# Patient Record
Sex: MALE | Race: WHITE | NOT HISPANIC OR LATINO
[De-identification: names, ages, dates, MRNs, and addresses within clinical notes are randomized per-mention and may not be internally consistent; named-entity substitution may affect disease eponyms.]

---

## 2017-01-25 ENCOUNTER — APPOINTMENT (OUTPATIENT)
Dept: INTERNAL MEDICINE | Facility: CLINIC | Age: 62
End: 2017-01-25

## 2017-01-25 ENCOUNTER — LABORATORY RESULT (OUTPATIENT)
Age: 62
End: 2017-01-25

## 2017-01-25 ENCOUNTER — NON-APPOINTMENT (OUTPATIENT)
Age: 62
End: 2017-01-25

## 2017-01-25 VITALS
DIASTOLIC BLOOD PRESSURE: 80 MMHG | HEIGHT: 72 IN | SYSTOLIC BLOOD PRESSURE: 134 MMHG | WEIGHT: 290 LBS | BODY MASS INDEX: 39.28 KG/M2

## 2017-01-26 ENCOUNTER — TRANSCRIPTION ENCOUNTER (OUTPATIENT)
Age: 62
End: 2017-01-26

## 2017-01-26 ENCOUNTER — APPOINTMENT (OUTPATIENT)
Dept: ULTRASOUND IMAGING | Facility: CLINIC | Age: 62
End: 2017-01-26

## 2017-01-26 ENCOUNTER — OUTPATIENT (OUTPATIENT)
Dept: OUTPATIENT SERVICES | Facility: HOSPITAL | Age: 62
LOS: 1 days | End: 2017-01-26
Payer: MEDICAID

## 2017-01-26 DIAGNOSIS — D17.9 BENIGN LIPOMATOUS NEOPLASM, UNSPECIFIED: ICD-10-CM

## 2017-01-26 PROCEDURE — 76882 US LMTD JT/FCL EVL NVASC XTR: CPT

## 2017-01-26 PROCEDURE — 76882 US LMTD JT/FCL EVL NVASC XTR: CPT | Mod: 26

## 2017-01-30 LAB
ALBUMIN SERPL ELPH-MCNC: 4.4 G/DL
ALP BLD-CCNC: 70 U/L
ALT SERPL-CCNC: 25 U/L
ANION GAP SERPL CALC-SCNC: 13 MMOL/L
APPEARANCE: ABNORMAL
AST SERPL-CCNC: 20 U/L
B BURGDOR IGG+IGM SER QL IB: NORMAL
BACTERIA: NEGATIVE
BASOPHILS # BLD AUTO: 0.03 K/UL
BASOPHILS NFR BLD AUTO: 0.4 %
BILIRUB SERPL-MCNC: 0.6 MG/DL
BILIRUBIN URINE: NEGATIVE
BLOOD URINE: NEGATIVE
BUN SERPL-MCNC: 15 MG/DL
CALCIUM SERPL-MCNC: 9.5 MG/DL
CHLORIDE SERPL-SCNC: 105 MMOL/L
CHOLEST SERPL-MCNC: 235 MG/DL
CHOLEST/HDLC SERPL: 7.1 RATIO
CO2 SERPL-SCNC: 27 MMOL/L
COLOR: YELLOW
CREAT SERPL-MCNC: 1.11 MG/DL
EOSINOPHIL # BLD AUTO: 0.3 K/UL
EOSINOPHIL NFR BLD AUTO: 4 %
GLUCOSE QUALITATIVE U: NORMAL MG/DL
GLUCOSE SERPL-MCNC: 87 MG/DL
HBA1C MFR BLD HPLC: 5.9 %
HCT VFR BLD CALC: 48.6 %
HCV AB SER QL: NONREACTIVE
HCV S/CO RATIO: 0.22 S/CO
HDLC SERPL-MCNC: 33 MG/DL
HGB BLD-MCNC: 15.8 G/DL
IMM GRANULOCYTES NFR BLD AUTO: 0.3 %
KETONES URINE: NEGATIVE
LDLC SERPL CALC-MCNC: 156 MG/DL
LEUKOCYTE ESTERASE URINE: NEGATIVE
LYMPHOCYTES # BLD AUTO: 2.01 K/UL
LYMPHOCYTES NFR BLD AUTO: 26.6 %
MAN DIFF?: NORMAL
MCHC RBC-ENTMCNC: 29.4 PG
MCHC RBC-ENTMCNC: 32.5 GM/DL
MCV RBC AUTO: 90.5 FL
MICROSCOPIC-UA: NORMAL
MONOCYTES # BLD AUTO: 0.6 K/UL
MONOCYTES NFR BLD AUTO: 7.9 %
NEUTROPHILS # BLD AUTO: 4.6 K/UL
NEUTROPHILS NFR BLD AUTO: 60.8 %
NITRITE URINE: NEGATIVE
PH URINE: 5
PLATELET # BLD AUTO: 225 K/UL
POTASSIUM SERPL-SCNC: 4.8 MMOL/L
PROT SERPL-MCNC: 7.5 G/DL
PROTEIN URINE: NEGATIVE MG/DL
PSA SERPL-MCNC: 5.69 NG/ML
RBC # BLD: 5.37 M/UL
RBC # FLD: 13.5 %
RED BLOOD CELLS URINE: 0 /HPF
SODIUM SERPL-SCNC: 145 MMOL/L
SPECIFIC GRAVITY URINE: 1.03
SQUAMOUS EPITHELIAL CELLS: 0 /HPF
TRIGL SERPL-MCNC: 232 MG/DL
TSH SERPL-ACNC: 1.06 UIU/ML
UROBILINOGEN URINE: NORMAL MG/DL
WBC # FLD AUTO: 7.56 K/UL
WHITE BLOOD CELLS URINE: 0 /HPF

## 2017-03-01 ENCOUNTER — APPOINTMENT (OUTPATIENT)
Dept: SURGERY | Facility: CLINIC | Age: 62
End: 2017-03-01

## 2017-03-01 VITALS
SYSTOLIC BLOOD PRESSURE: 156 MMHG | TEMPERATURE: 98.3 F | OXYGEN SATURATION: 97 % | RESPIRATION RATE: 15 BRPM | DIASTOLIC BLOOD PRESSURE: 95 MMHG | WEIGHT: 290 LBS | BODY MASS INDEX: 39.28 KG/M2 | HEART RATE: 67 BPM | HEIGHT: 72 IN

## 2017-03-23 ENCOUNTER — OUTPATIENT (OUTPATIENT)
Dept: OUTPATIENT SERVICES | Facility: HOSPITAL | Age: 62
LOS: 1 days | End: 2017-03-23
Payer: MEDICAID

## 2017-03-23 VITALS
RESPIRATION RATE: 16 BRPM | DIASTOLIC BLOOD PRESSURE: 90 MMHG | HEART RATE: 78 BPM | TEMPERATURE: 98 F | WEIGHT: 275.58 LBS | SYSTOLIC BLOOD PRESSURE: 150 MMHG | HEIGHT: 72 IN

## 2017-03-23 DIAGNOSIS — Z01.818 ENCOUNTER FOR OTHER PREPROCEDURAL EXAMINATION: ICD-10-CM

## 2017-03-23 DIAGNOSIS — D17.1 BENIGN LIPOMATOUS NEOPLASM OF SKIN AND SUBCUTANEOUS TISSUE OF TRUNK: ICD-10-CM

## 2017-03-23 LAB
ANION GAP SERPL CALC-SCNC: 11 MMOL/L — SIGNIFICANT CHANGE UP (ref 5–17)
BASOPHILS # BLD AUTO: 0 K/UL — SIGNIFICANT CHANGE UP (ref 0–0.2)
BASOPHILS NFR BLD AUTO: 0.3 % — SIGNIFICANT CHANGE UP (ref 0–2)
BUN SERPL-MCNC: 18 MG/DL — SIGNIFICANT CHANGE UP (ref 8–20)
CALCIUM SERPL-MCNC: 8.6 MG/DL — SIGNIFICANT CHANGE UP (ref 8.6–10.2)
CHLORIDE SERPL-SCNC: 102 MMOL/L — SIGNIFICANT CHANGE UP (ref 98–107)
CO2 SERPL-SCNC: 27 MMOL/L — SIGNIFICANT CHANGE UP (ref 22–29)
CREAT SERPL-MCNC: 0.98 MG/DL — SIGNIFICANT CHANGE UP (ref 0.5–1.3)
EOSINOPHIL # BLD AUTO: 0.3 K/UL — SIGNIFICANT CHANGE UP (ref 0–0.5)
EOSINOPHIL NFR BLD AUTO: 4.4 % — SIGNIFICANT CHANGE UP (ref 0–5)
GLUCOSE SERPL-MCNC: 91 MG/DL — SIGNIFICANT CHANGE UP (ref 70–115)
HCT VFR BLD CALC: 42.7 % — SIGNIFICANT CHANGE UP (ref 42–52)
HGB BLD-MCNC: 14.1 G/DL — SIGNIFICANT CHANGE UP (ref 14–18)
LYMPHOCYTES # BLD AUTO: 1.7 K/UL — SIGNIFICANT CHANGE UP (ref 1–4.8)
LYMPHOCYTES # BLD AUTO: 24.3 % — SIGNIFICANT CHANGE UP (ref 20–55)
MCHC RBC-ENTMCNC: 29.5 PG — SIGNIFICANT CHANGE UP (ref 27–31)
MCHC RBC-ENTMCNC: 33 G/DL — SIGNIFICANT CHANGE UP (ref 32–36)
MCV RBC AUTO: 89.3 FL — SIGNIFICANT CHANGE UP (ref 80–94)
MONOCYTES # BLD AUTO: 0.5 K/UL — SIGNIFICANT CHANGE UP (ref 0–0.8)
MONOCYTES NFR BLD AUTO: 7 % — SIGNIFICANT CHANGE UP (ref 3–10)
NEUTROPHILS # BLD AUTO: 4.4 K/UL — SIGNIFICANT CHANGE UP (ref 1.8–8)
NEUTROPHILS NFR BLD AUTO: 63.9 % — SIGNIFICANT CHANGE UP (ref 37–73)
PLATELET # BLD AUTO: 195 K/UL — SIGNIFICANT CHANGE UP (ref 150–400)
POTASSIUM SERPL-MCNC: 4.2 MMOL/L — SIGNIFICANT CHANGE UP (ref 3.5–5.3)
POTASSIUM SERPL-SCNC: 4.2 MMOL/L — SIGNIFICANT CHANGE UP (ref 3.5–5.3)
RBC # BLD: 4.78 M/UL — SIGNIFICANT CHANGE UP (ref 4.6–6.2)
RBC # FLD: 13.6 % — SIGNIFICANT CHANGE UP (ref 11–15.6)
SODIUM SERPL-SCNC: 140 MMOL/L — SIGNIFICANT CHANGE UP (ref 135–145)
WBC # BLD: 6.8 K/UL — SIGNIFICANT CHANGE UP (ref 4.8–10.8)
WBC # FLD AUTO: 6.8 K/UL — SIGNIFICANT CHANGE UP (ref 4.8–10.8)

## 2017-03-23 PROCEDURE — 93005 ELECTROCARDIOGRAM TRACING: CPT

## 2017-03-23 PROCEDURE — 80048 BASIC METABOLIC PNL TOTAL CA: CPT

## 2017-03-23 PROCEDURE — G0463: CPT

## 2017-03-23 PROCEDURE — 85027 COMPLETE CBC AUTOMATED: CPT

## 2017-03-23 PROCEDURE — 93010 ELECTROCARDIOGRAM REPORT: CPT

## 2017-03-23 NOTE — H&P PST ADULT - NSANTHOSAYNRD_GEN_A_CORE
No. ALFONSO screening performed.  STOP BANG Legend: 0-2 = LOW Risk; 3-4 = INTERMEDIATE Risk; 5-8 = HIGH Risk

## 2017-03-23 NOTE — H&P PST ADULT - HISTORY OF PRESENT ILLNESS
62 yo male with three large lipomas, back, L shoulder and abdomen. 62 yo male with three large lipomas, on on his R back, one on his L shoulder and a third on his L abdomen.

## 2017-03-23 NOTE — H&P PST ADULT - FAMILY HISTORY
Father  Still living? No  Family history of hypertension, Age at diagnosis: Age Unknown  Family history of coronary artery disease, Age at diagnosis: 71-80

## 2017-03-23 NOTE — H&P PST ADULT - ATTENDING COMMENTS
I have read, reviewed, edited and agree c/ the above H+P.  I have discussed the risks/benefits of surgery including blood loss, infection, seroma, wound dehiscence, and recurrence.  He understands and is willing to proceed c/ surgery.

## 2017-03-24 DIAGNOSIS — Z01.818 ENCOUNTER FOR OTHER PREPROCEDURAL EXAMINATION: ICD-10-CM

## 2017-03-24 DIAGNOSIS — D17.9 BENIGN LIPOMATOUS NEOPLASM, UNSPECIFIED: ICD-10-CM

## 2017-03-28 ENCOUNTER — APPOINTMENT (OUTPATIENT)
Dept: INTERNAL MEDICINE | Facility: CLINIC | Age: 62
End: 2017-03-28

## 2017-03-28 VITALS
HEART RATE: 61 BPM | HEIGHT: 72 IN | OXYGEN SATURATION: 97 % | SYSTOLIC BLOOD PRESSURE: 132 MMHG | WEIGHT: 278 LBS | BODY MASS INDEX: 37.65 KG/M2 | DIASTOLIC BLOOD PRESSURE: 70 MMHG

## 2017-03-28 DIAGNOSIS — Z82.49 FAMILY HISTORY OF ISCHEMIC HEART DISEASE AND OTHER DISEASES OF THE CIRCULATORY SYSTEM: ICD-10-CM

## 2017-03-29 ENCOUNTER — RESULT REVIEW (OUTPATIENT)
Age: 62
End: 2017-03-29

## 2017-03-30 ENCOUNTER — OUTPATIENT (OUTPATIENT)
Dept: OUTPATIENT SERVICES | Facility: HOSPITAL | Age: 62
LOS: 1 days | End: 2017-03-30
Payer: MEDICAID

## 2017-03-30 VITALS
TEMPERATURE: 98 F | WEIGHT: 272.05 LBS | HEIGHT: 72 IN | HEART RATE: 75 BPM | SYSTOLIC BLOOD PRESSURE: 122 MMHG | DIASTOLIC BLOOD PRESSURE: 82 MMHG | RESPIRATION RATE: 20 BRPM | OXYGEN SATURATION: 97 %

## 2017-03-30 VITALS
HEART RATE: 72 BPM | OXYGEN SATURATION: 95 % | DIASTOLIC BLOOD PRESSURE: 76 MMHG | SYSTOLIC BLOOD PRESSURE: 142 MMHG | RESPIRATION RATE: 16 BRPM

## 2017-03-30 DIAGNOSIS — D17.9 BENIGN LIPOMATOUS NEOPLASM, UNSPECIFIED: ICD-10-CM

## 2017-03-30 PROCEDURE — 12036 INTMD RPR S/A/T/EXT 20.1-30: CPT

## 2017-03-30 PROCEDURE — 88304 TISSUE EXAM BY PATHOLOGIST: CPT

## 2017-03-30 PROCEDURE — 88304 TISSUE EXAM BY PATHOLOGIST: CPT | Mod: 26

## 2017-03-30 PROCEDURE — 22902 EXC ABD LES SC < 3 CM: CPT

## 2017-03-30 PROCEDURE — 23075 EXC SHOULDER LES SC < 3 CM: CPT

## 2017-03-30 PROCEDURE — 21931 EXC BACK LES SC 3 CM/>: CPT

## 2017-03-30 PROCEDURE — 11406 EXC TR-EXT B9+MARG >4.0 CM: CPT | Mod: 59

## 2017-03-30 RX ORDER — MULTIVIT-MIN/FERROUS GLUCONATE 9 MG/15 ML
1 LIQUID (ML) ORAL
Qty: 0 | Refills: 0 | COMMUNITY

## 2017-03-30 RX ORDER — CEFAZOLIN SODIUM 1 G
3000 VIAL (EA) INJECTION ONCE
Qty: 0 | Refills: 0 | Status: COMPLETED | OUTPATIENT
Start: 2017-03-30 | End: 2017-03-30

## 2017-03-30 RX ORDER — SODIUM CHLORIDE 9 MG/ML
3 INJECTION INTRAMUSCULAR; INTRAVENOUS; SUBCUTANEOUS ONCE
Qty: 0 | Refills: 0 | Status: DISCONTINUED | OUTPATIENT
Start: 2017-03-30 | End: 2017-03-30

## 2017-03-30 RX ORDER — CEFAZOLIN SODIUM 1 G
2000 VIAL (EA) INJECTION ONCE
Qty: 0 | Refills: 0 | Status: DISCONTINUED | OUTPATIENT
Start: 2017-03-30 | End: 2017-03-30

## 2017-03-30 RX ORDER — OXYCODONE HYDROCHLORIDE 5 MG/1
1 TABLET ORAL
Qty: 10 | Refills: 0 | OUTPATIENT
Start: 2017-03-30

## 2017-03-30 RX ADMIN — Medication 200 MILLIGRAM(S): at 15:54

## 2017-03-30 NOTE — BRIEF OPERATIVE NOTE - OPERATION/FINDINGS
Lipomas in the Right Flank,  Left anterior abdominal wall and Left posterior Scapula Lipoma in the Right Flank 10cm x 8cm x 3cm ,  Left anterior abdominal wall 8cm x 5cm ovoid lipoma, and Left posterior Scapula/axilla 5cm ovoid lipomas

## 2017-03-30 NOTE — BRIEF OPERATIVE NOTE - POST-OP DX
Lipoma  03/30/2017  Lipoma Right Flank, Lipoma Left anterior abdmonial wall. Lipoma Left posterior Scalpula  Active  Shantelle Blackburn

## 2017-03-30 NOTE — ASU DISCHARGE PLAN (ADULT/PEDIATRIC). - MEDICATION SUMMARY - MEDICATIONS TO TAKE
I will START or STAY ON the medications listed below when I get home from the hospital:    acetaminophen-oxyCODONE 325 mg-5 mg oral tablet  -- 1 tab(s) by mouth every 6 hours, As Needed -for severe pain MDD:6 tabs  -- Caution federal law prohibits the transfer of this drug to any person other  than the person for whom it was prescribed.  May cause drowsiness.  Alcohol may intensify this effect.  Use care when operating dangerous machinery.  This prescription cannot be refilled.  This product contains acetaminophen.  Do not use  with any other product containing acetaminophen to prevent possible liver damage.  Using more of this medication than prescribed may cause serious breathing problems.    -- Indication: For Benign lipomatous neoplasm    Betimol 0.5% ophthalmic solution  -- 1 drop(s) to each affected eye 2 times a day  -- Indication: For as per PMD     Travatan 0.004% ophthalmic solution  -- 1 drop(s) to each affected eye once a day (in the evening)  -- Indication: For as per PMD

## 2017-03-30 NOTE — BRIEF OPERATIVE NOTE - PROCEDURE
Excision  03/30/2017  Excision of lipoma x 3, Lipoma Right Flank, Lipoma Left anterior abdominall wall. Lipoma Left posterior Scapula  Active  LLENTZ

## 2017-03-30 NOTE — ASU DISCHARGE PLAN (ADULT/PEDIATRIC). - SPECIAL INSTRUCTIONS
you can apply an ice pack to the surgical sites.You can use ibuprofen instead of or with percocet for pain.

## 2017-03-30 NOTE — BRIEF OPERATIVE NOTE - PRE-OP DX
Lipoma  03/30/2017  Lipoma Right Flank, Lipoma Left anterior abdominal wall. Lipoma Left posterior Scapula  Active  Shantelle Blackburn

## 2017-03-30 NOTE — ASU DISCHARGE PLAN (ADULT/PEDIATRIC). - NOTIFY
Numbness, color, or temperature change to extremity/Inability to Tolerate Liquids or Foods/Persistent Nausea and Vomiting/Fever greater than 101/Swelling that continues/Pain not relieved by Medications/Bleeding that does not stop

## 2017-03-30 NOTE — ASU DISCHARGE PLAN (ADULT/PEDIATRIC). - ACTIVITY LEVEL
no tub baths/no heavy lifting/you can walk, climb stairs, ride a bike, no heavy lifting or strenuous activity for at least 3 weeks/exercise

## 2017-03-30 NOTE — ASU DISCHARGE PLAN (ADULT/PEDIATRIC). - COMMENTS
Call for appointment in 1-2 weeks.  Please call with any of the above signs/symptoms of complications

## 2017-03-31 ENCOUNTER — TRANSCRIPTION ENCOUNTER (OUTPATIENT)
Age: 62
End: 2017-03-31

## 2017-04-05 LAB — SURGICAL PATHOLOGY FINAL REPORT - CH: SIGNIFICANT CHANGE UP

## 2017-04-06 ENCOUNTER — APPOINTMENT (OUTPATIENT)
Dept: UROLOGY | Facility: CLINIC | Age: 62
End: 2017-04-06

## 2017-04-06 VITALS — SYSTOLIC BLOOD PRESSURE: 128 MMHG | DIASTOLIC BLOOD PRESSURE: 95 MMHG | HEART RATE: 112 BPM | TEMPERATURE: 97.9 F

## 2017-04-10 ENCOUNTER — APPOINTMENT (OUTPATIENT)
Dept: SURGERY | Facility: CLINIC | Age: 62
End: 2017-04-10

## 2017-04-10 VITALS
BODY MASS INDEX: 36.57 KG/M2 | WEIGHT: 270 LBS | HEIGHT: 72 IN | TEMPERATURE: 97.9 F | HEART RATE: 75 BPM | SYSTOLIC BLOOD PRESSURE: 142 MMHG | OXYGEN SATURATION: 97 % | DIASTOLIC BLOOD PRESSURE: 90 MMHG | RESPIRATION RATE: 15 BRPM

## 2017-04-12 ENCOUNTER — RX RENEWAL (OUTPATIENT)
Age: 62
End: 2017-04-12

## 2017-04-13 ENCOUNTER — RESULT REVIEW (OUTPATIENT)
Age: 62
End: 2017-04-13

## 2017-04-14 ENCOUNTER — APPOINTMENT (OUTPATIENT)
Dept: GASTROENTEROLOGY | Facility: GI CENTER | Age: 62
End: 2017-04-14

## 2017-04-14 ENCOUNTER — OUTPATIENT (OUTPATIENT)
Dept: OUTPATIENT SERVICES | Facility: HOSPITAL | Age: 62
LOS: 1 days | End: 2017-04-14
Payer: MEDICAID

## 2017-04-14 DIAGNOSIS — K64.8 OTHER HEMORRHOIDS: ICD-10-CM

## 2017-04-14 DIAGNOSIS — K64.4 RESIDUAL HEMORRHOIDAL SKIN TAGS: ICD-10-CM

## 2017-04-14 DIAGNOSIS — Z12.11 ENCOUNTER FOR SCREENING FOR MALIGNANT NEOPLASM OF COLON: ICD-10-CM

## 2017-04-14 DIAGNOSIS — K62.5 HEMORRHAGE OF ANUS AND RECTUM: ICD-10-CM

## 2017-04-14 PROCEDURE — 45385 COLONOSCOPY W/LESION REMOVAL: CPT

## 2017-04-14 PROCEDURE — 88305 TISSUE EXAM BY PATHOLOGIST: CPT

## 2017-04-14 PROCEDURE — 88305 TISSUE EXAM BY PATHOLOGIST: CPT | Mod: 26

## 2017-04-17 ENCOUNTER — APPOINTMENT (OUTPATIENT)
Dept: CT IMAGING | Facility: CLINIC | Age: 62
End: 2017-04-17

## 2017-04-17 ENCOUNTER — OUTPATIENT (OUTPATIENT)
Dept: OUTPATIENT SERVICES | Facility: HOSPITAL | Age: 62
LOS: 1 days | End: 2017-04-17
Payer: MEDICAID

## 2017-04-17 ENCOUNTER — RESULT REVIEW (OUTPATIENT)
Age: 62
End: 2017-04-17

## 2017-04-17 DIAGNOSIS — Z00.8 ENCOUNTER FOR OTHER GENERAL EXAMINATION: ICD-10-CM

## 2017-04-17 LAB — SURGICAL PATHOLOGY FINAL REPORT - CH: SIGNIFICANT CHANGE UP

## 2017-04-17 PROCEDURE — 74177 CT ABD & PELVIS W/CONTRAST: CPT

## 2017-04-19 DIAGNOSIS — K76.89 OTHER SPECIFIED DISEASES OF LIVER: ICD-10-CM

## 2017-04-25 ENCOUNTER — OUTPATIENT (OUTPATIENT)
Dept: OUTPATIENT SERVICES | Facility: HOSPITAL | Age: 62
LOS: 1 days | End: 2017-04-25
Payer: MEDICAID

## 2017-04-25 ENCOUNTER — APPOINTMENT (OUTPATIENT)
Dept: MRI IMAGING | Facility: CLINIC | Age: 62
End: 2017-04-25

## 2017-04-25 ENCOUNTER — APPOINTMENT (OUTPATIENT)
Dept: INTERNAL MEDICINE | Facility: CLINIC | Age: 62
End: 2017-04-25

## 2017-04-25 VITALS
DIASTOLIC BLOOD PRESSURE: 92 MMHG | SYSTOLIC BLOOD PRESSURE: 140 MMHG | WEIGHT: 270 LBS | BODY MASS INDEX: 36.57 KG/M2 | HEIGHT: 72 IN

## 2017-04-25 DIAGNOSIS — Z12.11 ENCOUNTER FOR SCREENING FOR MALIGNANT NEOPLASM OF COLON: ICD-10-CM

## 2017-04-25 DIAGNOSIS — K76.89 OTHER SPECIFIED DISEASES OF LIVER: ICD-10-CM

## 2017-04-25 DIAGNOSIS — Z77.011 CONTACT WITH AND (SUSPECTED) EXPOSURE TO LEAD: ICD-10-CM

## 2017-04-25 DIAGNOSIS — M79.673 PAIN IN UNSPECIFIED FOOT: ICD-10-CM

## 2017-04-25 PROCEDURE — 74183 MRI ABD W/O CNTR FLWD CNTR: CPT

## 2017-04-25 PROCEDURE — A9585: CPT

## 2017-04-25 RX ORDER — SODIUM SULFATE, POTASSIUM SULFATE, MAGNESIUM SULFATE 17.5; 3.13; 1.6 G/ML; G/ML; G/ML
17.5-3.13-1.6 SOLUTION, CONCENTRATE ORAL
Qty: 1 | Refills: 0 | Status: DISCONTINUED | COMMUNITY
Start: 2017-04-12 | End: 2017-04-25

## 2017-04-26 LAB
CHOLEST SERPL-MCNC: 205 MG/DL
CHOLEST/HDLC SERPL: 5 RATIO
HBA1C MFR BLD HPLC: 5.8 %
HDLC SERPL-MCNC: 41 MG/DL
LDLC SERPL CALC-MCNC: 135 MG/DL
PSA SERPL-MCNC: 5.74 NG/ML
TRIGL SERPL-MCNC: 143 MG/DL

## 2017-04-27 ENCOUNTER — OUTPATIENT (OUTPATIENT)
Dept: OUTPATIENT SERVICES | Facility: HOSPITAL | Age: 62
LOS: 1 days | Discharge: HOME | End: 2017-04-27

## 2017-04-27 ENCOUNTER — APPOINTMENT (OUTPATIENT)
Dept: COLORECTAL SURGERY | Facility: CLINIC | Age: 62
End: 2017-04-27

## 2017-04-27 VITALS
SYSTOLIC BLOOD PRESSURE: 136 MMHG | DIASTOLIC BLOOD PRESSURE: 86 MMHG | HEIGHT: 72 IN | TEMPERATURE: 98.4 F | HEART RATE: 74 BPM | WEIGHT: 265 LBS | BODY MASS INDEX: 35.89 KG/M2

## 2017-04-27 DIAGNOSIS — C20 MALIGNANT NEOPLASM OF RECTUM: ICD-10-CM

## 2017-04-27 DIAGNOSIS — F41.9 ANXIETY DISORDER, UNSPECIFIED: ICD-10-CM

## 2017-04-28 ENCOUNTER — APPOINTMENT (OUTPATIENT)
Dept: RADIATION ONCOLOGY | Facility: CLINIC | Age: 62
End: 2017-04-28

## 2017-04-28 VITALS
RESPIRATION RATE: 16 BRPM | SYSTOLIC BLOOD PRESSURE: 119 MMHG | OXYGEN SATURATION: 95 % | TEMPERATURE: 97.7 F | BODY MASS INDEX: 36.3 KG/M2 | HEIGHT: 72 IN | DIASTOLIC BLOOD PRESSURE: 84 MMHG | WEIGHT: 268 LBS | HEART RATE: 79 BPM

## 2017-04-30 ENCOUNTER — RESULT REVIEW (OUTPATIENT)
Age: 62
End: 2017-04-30

## 2017-05-01 ENCOUNTER — APPOINTMENT (OUTPATIENT)
Dept: SURGERY | Facility: CLINIC | Age: 62
End: 2017-05-01

## 2017-05-01 VITALS
TEMPERATURE: 98.1 F | WEIGHT: 268 LBS | HEART RATE: 57 BPM | OXYGEN SATURATION: 98 % | HEIGHT: 72 IN | RESPIRATION RATE: 15 BRPM | BODY MASS INDEX: 36.3 KG/M2 | DIASTOLIC BLOOD PRESSURE: 84 MMHG | SYSTOLIC BLOOD PRESSURE: 127 MMHG

## 2017-05-01 DIAGNOSIS — Z71.9 COUNSELING, UNSPECIFIED: ICD-10-CM

## 2017-05-02 ENCOUNTER — OTHER (OUTPATIENT)
Age: 62
End: 2017-05-02

## 2017-05-02 ENCOUNTER — APPOINTMENT (OUTPATIENT)
Dept: NUCLEAR MEDICINE | Facility: CLINIC | Age: 62
End: 2017-05-02

## 2017-05-02 ENCOUNTER — OUTPATIENT (OUTPATIENT)
Dept: OUTPATIENT SERVICES | Facility: HOSPITAL | Age: 62
LOS: 1 days | End: 2017-05-02

## 2017-05-02 DIAGNOSIS — D49.0 NEOPLASM OF UNSPECIFIED BEHAVIOR OF DIGESTIVE SYSTEM: ICD-10-CM

## 2017-05-08 ENCOUNTER — FORM ENCOUNTER (OUTPATIENT)
Age: 62
End: 2017-05-08

## 2017-05-09 ENCOUNTER — OUTPATIENT (OUTPATIENT)
Dept: OUTPATIENT SERVICES | Facility: HOSPITAL | Age: 62
LOS: 1 days | End: 2017-05-09
Payer: MEDICAID

## 2017-05-09 ENCOUNTER — APPOINTMENT (OUTPATIENT)
Dept: MRI IMAGING | Facility: CLINIC | Age: 62
End: 2017-05-09

## 2017-05-09 DIAGNOSIS — D49.0 NEOPLASM OF UNSPECIFIED BEHAVIOR OF DIGESTIVE SYSTEM: ICD-10-CM

## 2017-05-10 ENCOUNTER — RESULT REVIEW (OUTPATIENT)
Age: 62
End: 2017-05-10

## 2017-05-10 ENCOUNTER — APPOINTMENT (OUTPATIENT)
Dept: HEMATOLOGY ONCOLOGY | Facility: CLINIC | Age: 62
End: 2017-05-10

## 2017-05-10 ENCOUNTER — APPOINTMENT (OUTPATIENT)
Dept: MRI IMAGING | Facility: CLINIC | Age: 62
End: 2017-05-10

## 2017-05-10 VITALS
BODY MASS INDEX: 35.41 KG/M2 | HEIGHT: 71.97 IN | HEART RATE: 91 BPM | WEIGHT: 261.42 LBS | RESPIRATION RATE: 16 BRPM | SYSTOLIC BLOOD PRESSURE: 121 MMHG | OXYGEN SATURATION: 99 % | DIASTOLIC BLOOD PRESSURE: 84 MMHG | TEMPERATURE: 97.4 F

## 2017-05-10 DIAGNOSIS — Z80.42 FAMILY HISTORY OF MALIGNANT NEOPLASM OF PROSTATE: ICD-10-CM

## 2017-05-10 DIAGNOSIS — R11.2 NAUSEA WITH VOMITING, UNSPECIFIED: ICD-10-CM

## 2017-05-10 DIAGNOSIS — T45.1X5A NAUSEA WITH VOMITING, UNSPECIFIED: ICD-10-CM

## 2017-05-10 LAB
ALBUMIN SERPL ELPH-MCNC: 4.3 G/DL
ALP BLD-CCNC: 75 U/L
ALT SERPL-CCNC: 23 U/L
ANION GAP SERPL CALC-SCNC: 18 MMOL/L
APTT BLD: 34.3 SEC
AST SERPL-CCNC: 25 U/L
BASOPHILS # BLD AUTO: 0.1 K/UL — SIGNIFICANT CHANGE UP (ref 0–0.2)
BASOPHILS NFR BLD AUTO: 2.1 % — HIGH (ref 0–2)
BILIRUB SERPL-MCNC: 0.7 MG/DL
BUN SERPL-MCNC: 13 MG/DL
CALCIUM SERPL-MCNC: 9.5 MG/DL
CEA SERPL-MCNC: 0.7 NG/ML
CHLORIDE SERPL-SCNC: 102 MMOL/L
CO2 SERPL-SCNC: 21 MMOL/L
CREAT SERPL-MCNC: 0.96 MG/DL
EOSINOPHIL # BLD AUTO: 0.2 K/UL — SIGNIFICANT CHANGE UP (ref 0–0.5)
EOSINOPHIL NFR BLD AUTO: 4 % — SIGNIFICANT CHANGE UP (ref 0–6)
GLUCOSE SERPL-MCNC: 87 MG/DL
HCT VFR BLD CALC: 43.4 % — SIGNIFICANT CHANGE UP (ref 39–50)
HGB BLD-MCNC: 14.6 G/DL — SIGNIFICANT CHANGE UP (ref 13–17)
INR PPP: 1.01 RATIO
LYMPHOCYTES # BLD AUTO: 1.3 K/UL — SIGNIFICANT CHANGE UP (ref 1–3.3)
LYMPHOCYTES # BLD AUTO: 22.3 % — SIGNIFICANT CHANGE UP (ref 13–44)
MAGNESIUM SERPL-MCNC: 2.1 MG/DL
MCHC RBC-ENTMCNC: 29.4 PG — SIGNIFICANT CHANGE UP (ref 27–34)
MCHC RBC-ENTMCNC: 33.7 GM/DL — SIGNIFICANT CHANGE UP (ref 32–36)
MCV RBC AUTO: 87.2 FL — SIGNIFICANT CHANGE UP (ref 80–100)
MONOCYTES # BLD AUTO: 0.4 K/UL — SIGNIFICANT CHANGE UP (ref 0–0.9)
MONOCYTES NFR BLD AUTO: 6.4 % — SIGNIFICANT CHANGE UP (ref 2–14)
NEUTROPHILS # BLD AUTO: 3.7 K/UL — SIGNIFICANT CHANGE UP (ref 1.8–7.4)
NEUTROPHILS NFR BLD AUTO: 65.1 % — SIGNIFICANT CHANGE UP (ref 43–77)
PLATELET # BLD AUTO: 156 K/UL — SIGNIFICANT CHANGE UP (ref 150–400)
POTASSIUM SERPL-SCNC: 4.2 MMOL/L
PROT SERPL-MCNC: 7 G/DL
PT BLD: 11.4 SEC
RBC # BLD: 4.98 M/UL — SIGNIFICANT CHANGE UP (ref 4.2–5.8)
RBC # FLD: 11.5 % — SIGNIFICANT CHANGE UP (ref 10.3–14.5)
SODIUM SERPL-SCNC: 141 MMOL/L
WBC # BLD: 5.7 K/UL — SIGNIFICANT CHANGE UP (ref 3.8–10.5)
WBC # FLD AUTO: 5.7 K/UL — SIGNIFICANT CHANGE UP (ref 3.8–10.5)

## 2017-05-10 PROCEDURE — A9585: CPT

## 2017-05-10 PROCEDURE — 82565 ASSAY OF CREATININE: CPT

## 2017-05-10 PROCEDURE — 72197 MRI PELVIS W/O & W/DYE: CPT

## 2017-05-11 PROBLEM — R11.2 CHEMOTHERAPY INDUCED NAUSEA AND VOMITING: Status: ACTIVE | Noted: 2017-05-11

## 2017-05-11 LAB
HBV CORE IGM SER QL: NONREACTIVE
HBV SURFACE AB SER QL: NONREACTIVE
HBV SURFACE AB SERPL IA-ACNC: <3 MIU/ML
HBV SURFACE AG SER QL: NONREACTIVE
HCV AB SER QL: NONREACTIVE
HCV S/CO RATIO: 0.14 S/CO

## 2017-05-18 ENCOUNTER — OTHER (OUTPATIENT)
Age: 62
End: 2017-05-18

## 2017-05-18 ENCOUNTER — APPOINTMENT (OUTPATIENT)
Dept: UROLOGY | Facility: CLINIC | Age: 62
End: 2017-05-18

## 2017-05-18 RX ORDER — AMOXICILLIN AND CLAVULANATE POTASSIUM 875; 125 MG/1; MG/1
875-125 TABLET, COATED ORAL TWICE DAILY
Qty: 6 | Refills: 0 | Status: COMPLETED | COMMUNITY
Start: 2017-05-18 | End: 2017-05-21

## 2017-05-22 ENCOUNTER — MESSAGE (OUTPATIENT)
Age: 62
End: 2017-05-22

## 2017-05-23 ENCOUNTER — APPOINTMENT (OUTPATIENT)
Dept: INTERNAL MEDICINE | Facility: CLINIC | Age: 62
End: 2017-05-23

## 2017-05-23 ENCOUNTER — OUTPATIENT (OUTPATIENT)
Dept: OUTPATIENT SERVICES | Facility: HOSPITAL | Age: 62
LOS: 1 days | Discharge: HOME | End: 2017-05-23

## 2017-05-23 VITALS
BODY MASS INDEX: 35.42 KG/M2 | HEIGHT: 71 IN | DIASTOLIC BLOOD PRESSURE: 76 MMHG | SYSTOLIC BLOOD PRESSURE: 118 MMHG | WEIGHT: 253 LBS

## 2017-05-23 DIAGNOSIS — Z86.018 PERSONAL HISTORY OF OTHER BENIGN NEOPLASM: ICD-10-CM

## 2017-05-23 DIAGNOSIS — C20 MALIGNANT NEOPLASM OF RECTUM: ICD-10-CM

## 2017-05-24 ENCOUNTER — APPOINTMENT (OUTPATIENT)
Dept: UROLOGY | Facility: CLINIC | Age: 62
End: 2017-05-24

## 2017-05-24 ENCOUNTER — OUTPATIENT (OUTPATIENT)
Dept: OUTPATIENT SERVICES | Facility: HOSPITAL | Age: 62
LOS: 1 days | End: 2017-05-24
Payer: MEDICAID

## 2017-05-24 VITALS
WEIGHT: 253 LBS | HEIGHT: 71 IN | TEMPERATURE: 98.6 F | BODY MASS INDEX: 35.42 KG/M2 | SYSTOLIC BLOOD PRESSURE: 118 MMHG | RESPIRATION RATE: 16 BRPM | DIASTOLIC BLOOD PRESSURE: 72 MMHG | HEART RATE: 57 BPM

## 2017-05-24 DIAGNOSIS — R35.0 FREQUENCY OF MICTURITION: ICD-10-CM

## 2017-05-24 RX ORDER — AMIKACIN SULFATE 250 MG/ML
500 INJECTION, SOLUTION INTRAMUSCULAR; INTRAVENOUS
Refills: 0 | Status: COMPLETED | OUTPATIENT
Start: 2017-05-24

## 2017-05-24 RX ADMIN — AMIKACIN SULFATE 0 MG/2ML: 250 INJECTION, SOLUTION INTRAMUSCULAR; INTRAVENOUS at 00:00

## 2017-05-25 ENCOUNTER — RESULT REVIEW (OUTPATIENT)
Age: 62
End: 2017-05-25

## 2017-05-25 ENCOUNTER — APPOINTMENT (OUTPATIENT)
Dept: HEMATOLOGY ONCOLOGY | Facility: CLINIC | Age: 62
End: 2017-05-25

## 2017-05-25 VITALS
RESPIRATION RATE: 16 BRPM | HEART RATE: 66 BPM | SYSTOLIC BLOOD PRESSURE: 124 MMHG | OXYGEN SATURATION: 97 % | TEMPERATURE: 98 F | DIASTOLIC BLOOD PRESSURE: 78 MMHG | WEIGHT: 261.25 LBS

## 2017-05-25 LAB
BASOPHILS # BLD AUTO: 0 K/UL — SIGNIFICANT CHANGE UP (ref 0–0.2)
BASOPHILS NFR BLD AUTO: 1 % — SIGNIFICANT CHANGE UP (ref 0–2)
EOSINOPHIL # BLD AUTO: 0.2 K/UL — SIGNIFICANT CHANGE UP (ref 0–0.5)
EOSINOPHIL NFR BLD AUTO: 3.9 % — SIGNIFICANT CHANGE UP (ref 0–6)
HCT VFR BLD CALC: 42.5 % — SIGNIFICANT CHANGE UP (ref 39–50)
HGB BLD-MCNC: 13.6 G/DL — SIGNIFICANT CHANGE UP (ref 13–17)
LYMPHOCYTES # BLD AUTO: 1.2 K/UL — SIGNIFICANT CHANGE UP (ref 1–3.3)
LYMPHOCYTES # BLD AUTO: 23.8 % — SIGNIFICANT CHANGE UP (ref 13–44)
MCHC RBC-ENTMCNC: 28.2 PG — SIGNIFICANT CHANGE UP (ref 27–34)
MCHC RBC-ENTMCNC: 32 GM/DL — SIGNIFICANT CHANGE UP (ref 32–36)
MCV RBC AUTO: 88.1 FL — SIGNIFICANT CHANGE UP (ref 80–100)
MONOCYTES # BLD AUTO: 0.4 K/UL — SIGNIFICANT CHANGE UP (ref 0–0.9)
MONOCYTES NFR BLD AUTO: 7.1 % — SIGNIFICANT CHANGE UP (ref 2–14)
NEUTROPHILS # BLD AUTO: 3.2 K/UL — SIGNIFICANT CHANGE UP (ref 1.8–7.4)
NEUTROPHILS NFR BLD AUTO: 64.2 % — SIGNIFICANT CHANGE UP (ref 43–77)
PLATELET # BLD AUTO: 183 K/UL — SIGNIFICANT CHANGE UP (ref 150–400)
RBC # BLD: 4.82 M/UL — SIGNIFICANT CHANGE UP (ref 4.2–5.8)
RBC # FLD: 12.1 % — SIGNIFICANT CHANGE UP (ref 10.3–14.5)
WBC # BLD: 5 K/UL — SIGNIFICANT CHANGE UP (ref 3.8–10.5)
WBC # FLD AUTO: 5 K/UL — SIGNIFICANT CHANGE UP (ref 3.8–10.5)

## 2017-05-25 RX ORDER — LEVOFLOXACIN 5 MG/ML
0.5 SOLUTION/ DROPS TOPICAL
Qty: 5 | Refills: 0 | Status: DISCONTINUED | COMMUNITY
Start: 2016-10-05 | End: 2017-05-25

## 2017-05-26 ENCOUNTER — MESSAGE (OUTPATIENT)
Age: 62
End: 2017-05-26

## 2017-05-30 PROCEDURE — 76942 ECHO GUIDE FOR BIOPSY: CPT | Mod: 59

## 2017-05-30 PROCEDURE — 76872 US TRANSRECTAL: CPT

## 2017-05-30 PROCEDURE — 55700: CPT

## 2017-05-31 ENCOUNTER — RESULT REVIEW (OUTPATIENT)
Age: 62
End: 2017-05-31

## 2017-05-31 LAB — CORE LAB BIOPSY: NORMAL

## 2017-06-08 DIAGNOSIS — R97.20 ELEVATED PROSTATE SPECIFIC ANTIGEN [PSA]: ICD-10-CM

## 2017-06-19 ENCOUNTER — OTHER (OUTPATIENT)
Age: 62
End: 2017-06-19

## 2017-06-19 VITALS
TEMPERATURE: 97.2 F | RESPIRATION RATE: 16 BRPM | SYSTOLIC BLOOD PRESSURE: 154 MMHG | HEIGHT: 71 IN | WEIGHT: 261 LBS | DIASTOLIC BLOOD PRESSURE: 96 MMHG | BODY MASS INDEX: 36.54 KG/M2 | OXYGEN SATURATION: 98 % | HEART RATE: 99 BPM

## 2017-06-20 ENCOUNTER — APPOINTMENT (OUTPATIENT)
Dept: UROLOGY | Facility: CLINIC | Age: 62
End: 2017-06-20

## 2017-06-20 ENCOUNTER — OUTPATIENT (OUTPATIENT)
Dept: OUTPATIENT SERVICES | Facility: HOSPITAL | Age: 62
LOS: 1 days | End: 2017-06-20
Payer: MEDICAID

## 2017-06-20 VITALS
RESPIRATION RATE: 16 BRPM | DIASTOLIC BLOOD PRESSURE: 81 MMHG | SYSTOLIC BLOOD PRESSURE: 130 MMHG | HEART RATE: 66 BPM | TEMPERATURE: 98.1 F

## 2017-06-20 DIAGNOSIS — C61 MALIGNANT NEOPLASM OF PROSTATE: ICD-10-CM

## 2017-06-20 DIAGNOSIS — R97.20 ELEVATED PROSTATE, SPECIFIC ANTIGEN [PSA]: ICD-10-CM

## 2017-06-20 DIAGNOSIS — R35.0 FREQUENCY OF MICTURITION: ICD-10-CM

## 2017-06-20 PROCEDURE — 96402 CHEMO HORMON ANTINEOPL SQ/IM: CPT

## 2017-06-20 RX ORDER — LEUPROLIDE ACETATE 45 MG
45 KIT INTRAMUSCULAR
Qty: 1 | Refills: 0 | Status: COMPLETED | OUTPATIENT
Start: 2017-06-20

## 2017-06-20 RX ORDER — AMIKACIN SULFATE 250 MG/ML
500 INJECTION, SOLUTION INTRAMUSCULAR; INTRAVENOUS
Qty: 2 | Refills: 0 | Status: COMPLETED | OUTPATIENT
Start: 2017-05-30 | End: 2017-06-20

## 2017-06-20 RX ORDER — LEUPROLIDE ACETATE 45 MG
45 KIT INTRAMUSCULAR
Qty: 1 | Refills: 0 | Status: COMPLETED | OUTPATIENT
Start: 2017-06-20 | End: 2017-06-20

## 2017-06-20 RX ADMIN — LEUPROLIDE ACETATE 0 MG: KIT at 00:00

## 2017-06-21 ENCOUNTER — RESULT REVIEW (OUTPATIENT)
Age: 62
End: 2017-06-21

## 2017-06-21 ENCOUNTER — APPOINTMENT (OUTPATIENT)
Dept: HEMATOLOGY ONCOLOGY | Facility: CLINIC | Age: 62
End: 2017-06-21

## 2017-06-21 VITALS
OXYGEN SATURATION: 97 % | BODY MASS INDEX: 36.13 KG/M2 | DIASTOLIC BLOOD PRESSURE: 87 MMHG | SYSTOLIC BLOOD PRESSURE: 133 MMHG | TEMPERATURE: 98.1 F | RESPIRATION RATE: 16 BRPM | WEIGHT: 255.19 LBS | HEIGHT: 70.59 IN

## 2017-06-21 DIAGNOSIS — C20 MALIGNANT NEOPLASM OF RECTUM: ICD-10-CM

## 2017-06-21 LAB
BASOPHILS # BLD AUTO: 0.1 K/UL — SIGNIFICANT CHANGE UP (ref 0–0.2)
BASOPHILS NFR BLD AUTO: 1.5 % — SIGNIFICANT CHANGE UP (ref 0–2)
EOSINOPHIL # BLD AUTO: 0.3 K/UL — SIGNIFICANT CHANGE UP (ref 0–0.5)
EOSINOPHIL NFR BLD AUTO: 3.5 % — SIGNIFICANT CHANGE UP (ref 0–6)
HCT VFR BLD CALC: 46 % — SIGNIFICANT CHANGE UP (ref 39–50)
HGB BLD-MCNC: 15.6 G/DL — SIGNIFICANT CHANGE UP (ref 13–17)
LYMPHOCYTES # BLD AUTO: 1.4 K/UL — SIGNIFICANT CHANGE UP (ref 1–3.3)
LYMPHOCYTES # BLD AUTO: 18.3 % — SIGNIFICANT CHANGE UP (ref 13–44)
MCHC RBC-ENTMCNC: 29.4 PG — SIGNIFICANT CHANGE UP (ref 27–34)
MCHC RBC-ENTMCNC: 33.8 GM/DL — SIGNIFICANT CHANGE UP (ref 32–36)
MCV RBC AUTO: 87 FL — SIGNIFICANT CHANGE UP (ref 80–100)
MONOCYTES # BLD AUTO: 0.4 K/UL — SIGNIFICANT CHANGE UP (ref 0–0.9)
MONOCYTES NFR BLD AUTO: 4.8 % — SIGNIFICANT CHANGE UP (ref 2–14)
NEUTROPHILS # BLD AUTO: 5.5 K/UL — SIGNIFICANT CHANGE UP (ref 1.8–7.4)
NEUTROPHILS NFR BLD AUTO: 72 % — SIGNIFICANT CHANGE UP (ref 43–77)
PLATELET # BLD AUTO: 219 K/UL — SIGNIFICANT CHANGE UP (ref 150–400)
RBC # BLD: 5.28 M/UL — SIGNIFICANT CHANGE UP (ref 4.2–5.8)
RBC # FLD: 12 % — SIGNIFICANT CHANGE UP (ref 10.3–14.5)
WBC # BLD: 7.6 K/UL — SIGNIFICANT CHANGE UP (ref 3.8–10.5)
WBC # FLD AUTO: 7.6 K/UL — SIGNIFICANT CHANGE UP (ref 3.8–10.5)

## 2017-06-21 RX ORDER — MV-MIN/FOLIC/VIT K/LYCOP/COQ10 200-100MCG
CAPSULE ORAL
Refills: 0 | Status: DISCONTINUED | COMMUNITY
End: 2017-06-21

## 2017-06-26 VITALS
RESPIRATION RATE: 16 BRPM | BODY MASS INDEX: 37.09 KG/M2 | HEART RATE: 73 BPM | TEMPERATURE: 97.8 F | WEIGHT: 262 LBS | SYSTOLIC BLOOD PRESSURE: 120 MMHG | DIASTOLIC BLOOD PRESSURE: 77 MMHG | HEIGHT: 70.5 IN | OXYGEN SATURATION: 97 %

## 2017-06-30 ENCOUNTER — OUTPATIENT (OUTPATIENT)
Dept: OUTPATIENT SERVICES | Facility: HOSPITAL | Age: 62
LOS: 1 days | Discharge: HOME | End: 2017-06-30

## 2017-06-30 DIAGNOSIS — C20 MALIGNANT NEOPLASM OF RECTUM: ICD-10-CM

## 2017-07-03 ENCOUNTER — OTHER (OUTPATIENT)
Age: 62
End: 2017-07-03

## 2017-07-03 VITALS
HEIGHT: 70.5 IN | RESPIRATION RATE: 16 BRPM | BODY MASS INDEX: 36.98 KG/M2 | WEIGHT: 261.2 LBS | SYSTOLIC BLOOD PRESSURE: 142 MMHG | OXYGEN SATURATION: 98 % | DIASTOLIC BLOOD PRESSURE: 91 MMHG | TEMPERATURE: 97.6 F | HEART RATE: 63 BPM

## 2017-07-07 ENCOUNTER — APPOINTMENT (OUTPATIENT)
Dept: HEMATOLOGY ONCOLOGY | Facility: CLINIC | Age: 62
End: 2017-07-07

## 2017-07-07 ENCOUNTER — RESULT REVIEW (OUTPATIENT)
Age: 62
End: 2017-07-07

## 2017-07-07 VITALS
HEIGHT: 70 IN | WEIGHT: 260.37 LBS | BODY MASS INDEX: 37.27 KG/M2 | SYSTOLIC BLOOD PRESSURE: 127 MMHG | OXYGEN SATURATION: 98 % | DIASTOLIC BLOOD PRESSURE: 85 MMHG | TEMPERATURE: 97.3 F | RESPIRATION RATE: 13 BRPM | HEART RATE: 74 BPM

## 2017-07-07 DIAGNOSIS — Z79.899 OTHER LONG TERM (CURRENT) DRUG THERAPY: ICD-10-CM

## 2017-07-07 LAB
ALBUMIN SERPL ELPH-MCNC: 3.8 G/DL
ALP BLD-CCNC: 72 U/L
ALT SERPL-CCNC: 23 U/L
ANION GAP SERPL CALC-SCNC: 16 MMOL/L
AST SERPL-CCNC: 21 U/L
BASOPHILS # BLD AUTO: 0 K/UL — SIGNIFICANT CHANGE UP (ref 0–0.2)
BASOPHILS NFR BLD AUTO: 0.5 % — SIGNIFICANT CHANGE UP (ref 0–2)
BILIRUB SERPL-MCNC: 0.5 MG/DL
BUN SERPL-MCNC: 17 MG/DL
CALCIUM SERPL-MCNC: 9.4 MG/DL
CHLORIDE SERPL-SCNC: 102 MMOL/L
CO2 SERPL-SCNC: 25 MMOL/L
CREAT SERPL-MCNC: 1.03 MG/DL
EOSINOPHIL # BLD AUTO: 0.2 K/UL — SIGNIFICANT CHANGE UP (ref 0–0.5)
EOSINOPHIL NFR BLD AUTO: 3.9 % — SIGNIFICANT CHANGE UP (ref 0–6)
GLUCOSE SERPL-MCNC: 102 MG/DL
HCT VFR BLD CALC: 45.8 % — SIGNIFICANT CHANGE UP (ref 39–50)
HGB BLD-MCNC: 14.9 G/DL — SIGNIFICANT CHANGE UP (ref 13–17)
LYMPHOCYTES # BLD AUTO: 0.6 K/UL — LOW (ref 1–3.3)
LYMPHOCYTES # BLD AUTO: 10.6 % — LOW (ref 13–44)
MAGNESIUM SERPL-MCNC: 2.1 MG/DL
MCHC RBC-ENTMCNC: 28.8 PG — SIGNIFICANT CHANGE UP (ref 27–34)
MCHC RBC-ENTMCNC: 32.6 GM/DL — SIGNIFICANT CHANGE UP (ref 32–36)
MCV RBC AUTO: 88.3 FL — SIGNIFICANT CHANGE UP (ref 80–100)
MONOCYTES # BLD AUTO: 0.4 K/UL — SIGNIFICANT CHANGE UP (ref 0–0.9)
MONOCYTES NFR BLD AUTO: 7 % — SIGNIFICANT CHANGE UP (ref 2–14)
NEUTROPHILS # BLD AUTO: 4.1 K/UL — SIGNIFICANT CHANGE UP (ref 1.8–7.4)
NEUTROPHILS NFR BLD AUTO: 77.9 % — HIGH (ref 43–77)
PLATELET # BLD AUTO: 133 K/UL — LOW (ref 150–400)
POTASSIUM SERPL-SCNC: 4.5 MMOL/L
PROT SERPL-MCNC: 7 G/DL
RBC # BLD: 5.18 M/UL — SIGNIFICANT CHANGE UP (ref 4.2–5.8)
RBC # FLD: 14 % — SIGNIFICANT CHANGE UP (ref 10.3–14.5)
SODIUM SERPL-SCNC: 143 MMOL/L
WBC # BLD: 5.2 K/UL — SIGNIFICANT CHANGE UP (ref 3.8–10.5)
WBC # FLD AUTO: 5.2 K/UL — SIGNIFICANT CHANGE UP (ref 3.8–10.5)

## 2017-07-07 RX ORDER — CALCIUM CARBONATE 260MG(650)
TABLET,CHEWABLE ORAL
Refills: 0 | Status: DISCONTINUED | COMMUNITY
End: 2017-07-07

## 2017-07-07 RX ORDER — UBIDECARENONE/VIT E ACET 100MG-5
CAPSULE ORAL
Refills: 0 | Status: DISCONTINUED | COMMUNITY
End: 2017-07-07

## 2017-07-10 VITALS
HEART RATE: 52 BPM | RESPIRATION RATE: 13 BRPM | WEIGHT: 262.8 LBS | OXYGEN SATURATION: 98 % | SYSTOLIC BLOOD PRESSURE: 138 MMHG | DIASTOLIC BLOOD PRESSURE: 87 MMHG | TEMPERATURE: 97.5 F | HEIGHT: 70 IN | BODY MASS INDEX: 37.62 KG/M2

## 2017-07-17 ENCOUNTER — CHART COPY (OUTPATIENT)
Age: 62
End: 2017-07-17

## 2017-07-17 VITALS
HEART RATE: 62 BPM | TEMPERATURE: 97.5 F | RESPIRATION RATE: 16 BRPM | HEIGHT: 70 IN | BODY MASS INDEX: 36.94 KG/M2 | WEIGHT: 258 LBS | DIASTOLIC BLOOD PRESSURE: 87 MMHG | SYSTOLIC BLOOD PRESSURE: 142 MMHG | OXYGEN SATURATION: 95 %

## 2017-07-24 VITALS
OXYGEN SATURATION: 97 % | HEIGHT: 70 IN | BODY MASS INDEX: 36.94 KG/M2 | WEIGHT: 258 LBS | TEMPERATURE: 97.8 F | HEART RATE: 59 BPM | SYSTOLIC BLOOD PRESSURE: 116 MMHG | RESPIRATION RATE: 16 BRPM | DIASTOLIC BLOOD PRESSURE: 81 MMHG

## 2017-07-27 ENCOUNTER — OUTPATIENT (OUTPATIENT)
Dept: OUTPATIENT SERVICES | Facility: HOSPITAL | Age: 62
LOS: 1 days | Discharge: HOME | End: 2017-07-27

## 2017-07-27 DIAGNOSIS — C20 MALIGNANT NEOPLASM OF RECTUM: ICD-10-CM

## 2017-07-31 ENCOUNTER — RESULT REVIEW (OUTPATIENT)
Age: 62
End: 2017-07-31

## 2017-07-31 ENCOUNTER — APPOINTMENT (OUTPATIENT)
Dept: HEMATOLOGY ONCOLOGY | Facility: CLINIC | Age: 62
End: 2017-07-31
Payer: MEDICAID

## 2017-07-31 VITALS
OXYGEN SATURATION: 97 % | SYSTOLIC BLOOD PRESSURE: 128 MMHG | TEMPERATURE: 97.3 F | BODY MASS INDEX: 37.2 KG/M2 | WEIGHT: 259.26 LBS | DIASTOLIC BLOOD PRESSURE: 83 MMHG | HEART RATE: 60 BPM | RESPIRATION RATE: 16 BRPM

## 2017-07-31 LAB
ALBUMIN SERPL ELPH-MCNC: 4.1 G/DL
ALP BLD-CCNC: 73 U/L
ALT SERPL-CCNC: 19 U/L
ANION GAP SERPL CALC-SCNC: 16 MMOL/L
AST SERPL-CCNC: 14 U/L
BASOPHILS # BLD AUTO: 0.1 K/UL — SIGNIFICANT CHANGE UP (ref 0–0.2)
BASOPHILS NFR BLD AUTO: 1.4 % — SIGNIFICANT CHANGE UP (ref 0–2)
BILIRUB SERPL-MCNC: 0.7 MG/DL
BUN SERPL-MCNC: 21 MG/DL
CALCIUM SERPL-MCNC: 9.3 MG/DL
CHLORIDE SERPL-SCNC: 102 MMOL/L
CO2 SERPL-SCNC: 23 MMOL/L
CREAT SERPL-MCNC: 1.15 MG/DL
EOSINOPHIL # BLD AUTO: 0.3 K/UL — SIGNIFICANT CHANGE UP (ref 0–0.5)
EOSINOPHIL NFR BLD AUTO: 6.2 % — HIGH (ref 0–6)
GLUCOSE SERPL-MCNC: 92 MG/DL
HCT VFR BLD CALC: 44 % — SIGNIFICANT CHANGE UP (ref 39–50)
HGB BLD-MCNC: 14.5 G/DL — SIGNIFICANT CHANGE UP (ref 13–17)
LYMPHOCYTES # BLD AUTO: 0.5 K/UL — LOW (ref 1–3.3)
LYMPHOCYTES # BLD AUTO: 8.8 % — LOW (ref 13–44)
MAGNESIUM SERPL-MCNC: 2.3 MG/DL
MCHC RBC-ENTMCNC: 31.1 PG — SIGNIFICANT CHANGE UP (ref 27–34)
MCHC RBC-ENTMCNC: 33 GM/DL — SIGNIFICANT CHANGE UP (ref 32–36)
MCV RBC AUTO: 94.4 FL — SIGNIFICANT CHANGE UP (ref 80–100)
MONOCYTES # BLD AUTO: 0.5 K/UL — SIGNIFICANT CHANGE UP (ref 0–0.9)
MONOCYTES NFR BLD AUTO: 10.5 % — SIGNIFICANT CHANGE UP (ref 2–14)
NEUTROPHILS # BLD AUTO: 3.8 K/UL — SIGNIFICANT CHANGE UP (ref 1.8–7.4)
NEUTROPHILS NFR BLD AUTO: 73.1 % — SIGNIFICANT CHANGE UP (ref 43–77)
PLATELET # BLD AUTO: 167 K/UL — SIGNIFICANT CHANGE UP (ref 150–400)
POTASSIUM SERPL-SCNC: 4.2 MMOL/L
PROT SERPL-MCNC: 6.9 G/DL
RBC # BLD: 4.66 M/UL — SIGNIFICANT CHANGE UP (ref 4.2–5.8)
RBC # FLD: 15.7 % — HIGH (ref 10.3–14.5)
SODIUM SERPL-SCNC: 141 MMOL/L
WBC # BLD: 5.2 K/UL — SIGNIFICANT CHANGE UP (ref 3.8–10.5)
WBC # FLD AUTO: 5.2 K/UL — SIGNIFICANT CHANGE UP (ref 3.8–10.5)

## 2017-07-31 PROCEDURE — 99214 OFFICE O/P EST MOD 30 MIN: CPT

## 2017-07-31 RX ORDER — CAPECITABINE 500 MG/1
500 TABLET ORAL TWICE DAILY
Qty: 280 | Refills: 0 | Status: DISCONTINUED | COMMUNITY
Start: 2017-05-25 | End: 2017-07-31

## 2017-08-08 ENCOUNTER — APPOINTMENT (OUTPATIENT)
Dept: COLORECTAL SURGERY | Facility: CLINIC | Age: 62
End: 2017-08-08
Payer: COMMERCIAL

## 2017-08-08 VITALS
SYSTOLIC BLOOD PRESSURE: 131 MMHG | WEIGHT: 258 LBS | HEART RATE: 62 BPM | BODY MASS INDEX: 36.94 KG/M2 | HEIGHT: 70 IN | DIASTOLIC BLOOD PRESSURE: 85 MMHG

## 2017-08-08 PROCEDURE — 99215 OFFICE O/P EST HI 40 MIN: CPT

## 2017-08-14 ENCOUNTER — APPOINTMENT (OUTPATIENT)
Dept: COLORECTAL SURGERY | Facility: CLINIC | Age: 62
End: 2017-08-14

## 2017-08-14 ENCOUNTER — MEDICATION RENEWAL (OUTPATIENT)
Age: 62
End: 2017-08-14

## 2017-08-15 ENCOUNTER — FORM ENCOUNTER (OUTPATIENT)
Age: 62
End: 2017-08-15

## 2017-08-16 ENCOUNTER — APPOINTMENT (OUTPATIENT)
Dept: CT IMAGING | Facility: CLINIC | Age: 62
End: 2017-08-16
Payer: COMMERCIAL

## 2017-08-16 ENCOUNTER — OUTPATIENT (OUTPATIENT)
Dept: OUTPATIENT SERVICES | Facility: HOSPITAL | Age: 62
LOS: 1 days | End: 2017-08-16
Payer: COMMERCIAL

## 2017-08-16 DIAGNOSIS — C20 MALIGNANT NEOPLASM OF RECTUM: ICD-10-CM

## 2017-08-16 PROCEDURE — 71260 CT THORAX DX C+: CPT | Mod: 26

## 2017-08-16 PROCEDURE — 71260 CT THORAX DX C+: CPT

## 2017-08-16 PROCEDURE — 74177 CT ABD & PELVIS W/CONTRAST: CPT | Mod: 26

## 2017-08-16 PROCEDURE — 74177 CT ABD & PELVIS W/CONTRAST: CPT

## 2017-08-18 ENCOUNTER — OTHER (OUTPATIENT)
Age: 62
End: 2017-08-18

## 2017-08-22 ENCOUNTER — APPOINTMENT (OUTPATIENT)
Dept: NUCLEAR MEDICINE | Facility: CLINIC | Age: 62
End: 2017-08-22
Payer: COMMERCIAL

## 2017-08-22 ENCOUNTER — FORM ENCOUNTER (OUTPATIENT)
Age: 62
End: 2017-08-22

## 2017-08-22 ENCOUNTER — OUTPATIENT (OUTPATIENT)
Dept: OUTPATIENT SERVICES | Facility: HOSPITAL | Age: 62
LOS: 1 days | End: 2017-08-22

## 2017-08-22 DIAGNOSIS — C20 MALIGNANT NEOPLASM OF RECTUM: ICD-10-CM

## 2017-08-22 PROCEDURE — 78815 PET IMAGE W/CT SKULL-THIGH: CPT | Mod: 26,PS

## 2017-08-23 ENCOUNTER — OUTPATIENT (OUTPATIENT)
Dept: OUTPATIENT SERVICES | Facility: HOSPITAL | Age: 62
LOS: 1 days | End: 2017-08-23
Payer: COMMERCIAL

## 2017-08-23 ENCOUNTER — APPOINTMENT (OUTPATIENT)
Dept: MRI IMAGING | Facility: CLINIC | Age: 62
End: 2017-08-23
Payer: COMMERCIAL

## 2017-08-23 DIAGNOSIS — C19 MALIGNANT NEOPLASM OF RECTOSIGMOID JUNCTION: ICD-10-CM

## 2017-08-23 DIAGNOSIS — R91.1 SOLITARY PULMONARY NODULE: ICD-10-CM

## 2017-08-23 DIAGNOSIS — C20 MALIGNANT NEOPLASM OF RECTUM: ICD-10-CM

## 2017-08-23 PROCEDURE — 82565 ASSAY OF CREATININE: CPT

## 2017-08-23 PROCEDURE — 72197 MRI PELVIS W/O & W/DYE: CPT

## 2017-08-23 PROCEDURE — A9585: CPT

## 2017-08-23 PROCEDURE — 72197 MRI PELVIS W/O & W/DYE: CPT | Mod: 26

## 2017-08-31 ENCOUNTER — OUTPATIENT (OUTPATIENT)
Dept: OUTPATIENT SERVICES | Facility: HOSPITAL | Age: 62
LOS: 1 days | Discharge: HOME | End: 2017-08-31

## 2017-08-31 DIAGNOSIS — C20 MALIGNANT NEOPLASM OF RECTUM: ICD-10-CM

## 2017-09-01 ENCOUNTER — OUTPATIENT (OUTPATIENT)
Dept: OUTPATIENT SERVICES | Facility: HOSPITAL | Age: 62
LOS: 1 days | End: 2017-09-01
Payer: COMMERCIAL

## 2017-09-01 VITALS
TEMPERATURE: 97 F | DIASTOLIC BLOOD PRESSURE: 70 MMHG | HEIGHT: 71 IN | WEIGHT: 264.55 LBS | SYSTOLIC BLOOD PRESSURE: 122 MMHG | HEART RATE: 60 BPM | RESPIRATION RATE: 16 BRPM

## 2017-09-01 DIAGNOSIS — C20 MALIGNANT NEOPLASM OF RECTUM: ICD-10-CM

## 2017-09-01 DIAGNOSIS — Z86.018 PERSONAL HISTORY OF OTHER BENIGN NEOPLASM: Chronic | ICD-10-CM

## 2017-09-01 DIAGNOSIS — Z96.7 PRESENCE OF OTHER BONE AND TENDON IMPLANTS: Chronic | ICD-10-CM

## 2017-09-01 DIAGNOSIS — Z01.818 ENCOUNTER FOR OTHER PREPROCEDURAL EXAMINATION: ICD-10-CM

## 2017-09-01 LAB
ALBUMIN SERPL ELPH-MCNC: 4.1 G/DL — SIGNIFICANT CHANGE UP (ref 3.3–5.2)
ALP SERPL-CCNC: 68 U/L — SIGNIFICANT CHANGE UP (ref 40–120)
ALT FLD-CCNC: 19 U/L — SIGNIFICANT CHANGE UP
ANION GAP SERPL CALC-SCNC: 12 MMOL/L — SIGNIFICANT CHANGE UP (ref 5–17)
APTT BLD: 31 SEC — SIGNIFICANT CHANGE UP (ref 27.5–37.4)
AST SERPL-CCNC: 16 U/L — SIGNIFICANT CHANGE UP
BASOPHILS # BLD AUTO: 0 K/UL — SIGNIFICANT CHANGE UP (ref 0–0.2)
BASOPHILS NFR BLD AUTO: 0.2 % — SIGNIFICANT CHANGE UP (ref 0–2)
BILIRUB SERPL-MCNC: 0.5 MG/DL — SIGNIFICANT CHANGE UP (ref 0.4–2)
BUN SERPL-MCNC: 17 MG/DL — SIGNIFICANT CHANGE UP (ref 8–20)
CALCIUM SERPL-MCNC: 9.4 MG/DL — SIGNIFICANT CHANGE UP (ref 8.6–10.2)
CHLORIDE SERPL-SCNC: 103 MMOL/L — SIGNIFICANT CHANGE UP (ref 98–107)
CO2 SERPL-SCNC: 28 MMOL/L — SIGNIFICANT CHANGE UP (ref 22–29)
CREAT SERPL-MCNC: 0.85 MG/DL — SIGNIFICANT CHANGE UP (ref 0.5–1.3)
EOSINOPHIL # BLD AUTO: 0.1 K/UL — SIGNIFICANT CHANGE UP (ref 0–0.5)
EOSINOPHIL NFR BLD AUTO: 2.6 % — SIGNIFICANT CHANGE UP (ref 0–5)
GLUCOSE SERPL-MCNC: 100 MG/DL — SIGNIFICANT CHANGE UP (ref 70–115)
HCT VFR BLD CALC: 37.9 % — LOW (ref 42–52)
HGB BLD-MCNC: 12.9 G/DL — LOW (ref 14–18)
INR BLD: 0.97 RATIO — SIGNIFICANT CHANGE UP (ref 0.88–1.16)
LYMPHOCYTES # BLD AUTO: 0.4 K/UL — LOW (ref 1–4.8)
LYMPHOCYTES # BLD AUTO: 9.6 % — LOW (ref 20–55)
MCHC RBC-ENTMCNC: 31.1 PG — HIGH (ref 27–31)
MCHC RBC-ENTMCNC: 34 G/DL — SIGNIFICANT CHANGE UP (ref 32–36)
MCV RBC AUTO: 91.3 FL — SIGNIFICANT CHANGE UP (ref 80–94)
MONOCYTES # BLD AUTO: 0.4 K/UL — SIGNIFICANT CHANGE UP (ref 0–0.8)
MONOCYTES NFR BLD AUTO: 8.3 % — SIGNIFICANT CHANGE UP (ref 3–10)
NEUTROPHILS # BLD AUTO: 3.6 K/UL — SIGNIFICANT CHANGE UP (ref 1.8–8)
NEUTROPHILS NFR BLD AUTO: 79.1 % — HIGH (ref 37–73)
PLATELET # BLD AUTO: 174 K/UL — SIGNIFICANT CHANGE UP (ref 150–400)
POTASSIUM SERPL-MCNC: 4.4 MMOL/L — SIGNIFICANT CHANGE UP (ref 3.5–5.3)
POTASSIUM SERPL-SCNC: 4.4 MMOL/L — SIGNIFICANT CHANGE UP (ref 3.5–5.3)
PROT SERPL-MCNC: 7.2 G/DL — SIGNIFICANT CHANGE UP (ref 6.6–8.7)
PROTHROM AB SERPL-ACNC: 10.7 SEC — SIGNIFICANT CHANGE UP (ref 9.8–12.7)
RBC # BLD: 4.15 M/UL — LOW (ref 4.6–6.2)
RBC # FLD: 15.3 % — SIGNIFICANT CHANGE UP (ref 11–15.6)
SODIUM SERPL-SCNC: 143 MMOL/L — SIGNIFICANT CHANGE UP (ref 135–145)
WBC # BLD: 4.6 K/UL — LOW (ref 4.8–10.8)
WBC # FLD AUTO: 4.6 K/UL — LOW (ref 4.8–10.8)

## 2017-09-01 PROCEDURE — 80053 COMPREHEN METABOLIC PANEL: CPT

## 2017-09-01 PROCEDURE — G0463: CPT

## 2017-09-01 PROCEDURE — 36415 COLL VENOUS BLD VENIPUNCTURE: CPT

## 2017-09-01 PROCEDURE — 85027 COMPLETE CBC AUTOMATED: CPT

## 2017-09-01 PROCEDURE — 85730 THROMBOPLASTIN TIME PARTIAL: CPT

## 2017-09-01 PROCEDURE — 85610 PROTHROMBIN TIME: CPT

## 2017-09-01 PROCEDURE — 93005 ELECTROCARDIOGRAM TRACING: CPT

## 2017-09-01 PROCEDURE — 93010 ELECTROCARDIOGRAM REPORT: CPT

## 2017-09-01 NOTE — H&P PST ADULT - HISTORY OF PRESENT ILLNESS
61M with rectal bleeding earlier this year, April 2017, colonoscopy showed "a tumor." Completed 6 weeks of radiation now for repeat colonoscopy.

## 2017-09-01 NOTE — H&P PST ADULT - FAMILY HISTORY
Father  Still living? No  Family history of hypertension, Age at diagnosis: Age Unknown  Family history of coronary artery disease, Age at diagnosis: 71-80  Family history of prostate cancer, Age at diagnosis: Age Unknown  Family history of CABG, Age at diagnosis: Age Unknown     Mother  Still living? No  Family history of cervical cancer, Age at diagnosis: 61-70     Sibling  Still living? Yes, Estimated age: Age Unknown  Family history of prostate cancer, Age at diagnosis: 51-60

## 2017-09-06 ENCOUNTER — APPOINTMENT (OUTPATIENT)
Dept: HEMATOLOGY ONCOLOGY | Facility: CLINIC | Age: 62
End: 2017-09-06
Payer: COMMERCIAL

## 2017-09-06 ENCOUNTER — RESULT REVIEW (OUTPATIENT)
Age: 62
End: 2017-09-06

## 2017-09-06 VITALS
BODY MASS INDEX: 38.72 KG/M2 | WEIGHT: 269.84 LBS | HEART RATE: 60 BPM | TEMPERATURE: 97.4 F | SYSTOLIC BLOOD PRESSURE: 147 MMHG | RESPIRATION RATE: 16 BRPM | DIASTOLIC BLOOD PRESSURE: 86 MMHG | OXYGEN SATURATION: 97 %

## 2017-09-06 LAB
BASOPHILS # BLD AUTO: 0.1 K/UL — SIGNIFICANT CHANGE UP (ref 0–0.2)
BASOPHILS NFR BLD AUTO: 1.2 % — SIGNIFICANT CHANGE UP (ref 0–2)
EOSINOPHIL # BLD AUTO: 0.1 K/UL — SIGNIFICANT CHANGE UP (ref 0–0.5)
EOSINOPHIL NFR BLD AUTO: 2.7 % — SIGNIFICANT CHANGE UP (ref 0–6)
HCT VFR BLD CALC: 38.8 % — LOW (ref 39–50)
HGB BLD-MCNC: 13.4 G/DL — SIGNIFICANT CHANGE UP (ref 13–17)
LYMPHOCYTES # BLD AUTO: 0.5 K/UL — LOW (ref 1–3.3)
LYMPHOCYTES # BLD AUTO: 12.3 % — LOW (ref 13–44)
MCHC RBC-ENTMCNC: 31.8 PG — SIGNIFICANT CHANGE UP (ref 27–34)
MCHC RBC-ENTMCNC: 34.6 GM/DL — SIGNIFICANT CHANGE UP (ref 32–36)
MCV RBC AUTO: 91.8 FL — SIGNIFICANT CHANGE UP (ref 80–100)
MONOCYTES # BLD AUTO: 0.3 K/UL — SIGNIFICANT CHANGE UP (ref 0–0.9)
MONOCYTES NFR BLD AUTO: 6.3 % — SIGNIFICANT CHANGE UP (ref 2–14)
NEUTROPHILS # BLD AUTO: 3.4 K/UL — SIGNIFICANT CHANGE UP (ref 1.8–7.4)
NEUTROPHILS NFR BLD AUTO: 77.5 % — HIGH (ref 43–77)
PLATELET # BLD AUTO: 168 K/UL — SIGNIFICANT CHANGE UP (ref 150–400)
RBC # BLD: 4.23 M/UL — SIGNIFICANT CHANGE UP (ref 4.2–5.8)
RBC # FLD: 13.9 % — SIGNIFICANT CHANGE UP (ref 10.3–14.5)
WBC # BLD: 4.4 K/UL — SIGNIFICANT CHANGE UP (ref 3.8–10.5)
WBC # FLD AUTO: 4.4 K/UL — SIGNIFICANT CHANGE UP (ref 3.8–10.5)

## 2017-09-06 PROCEDURE — 99214 OFFICE O/P EST MOD 30 MIN: CPT

## 2017-09-07 ENCOUNTER — NON-APPOINTMENT (OUTPATIENT)
Age: 62
End: 2017-09-07

## 2017-09-07 ENCOUNTER — APPOINTMENT (OUTPATIENT)
Dept: INTERNAL MEDICINE | Facility: CLINIC | Age: 62
End: 2017-09-07
Payer: COMMERCIAL

## 2017-09-07 LAB
ALBUMIN SERPL ELPH-MCNC: 4 G/DL
ALP BLD-CCNC: 70 U/L
ALT SERPL-CCNC: 25 U/L
ANION GAP SERPL CALC-SCNC: 17 MMOL/L
AST SERPL-CCNC: 21 U/L
BILIRUB SERPL-MCNC: 0.4 MG/DL
BUN SERPL-MCNC: 24 MG/DL
CALCIUM SERPL-MCNC: 9.4 MG/DL
CHLORIDE SERPL-SCNC: 103 MMOL/L
CO2 SERPL-SCNC: 21 MMOL/L
CREAT SERPL-MCNC: 1.01 MG/DL
GLUCOSE SERPL-MCNC: 122 MG/DL
MAGNESIUM SERPL-MCNC: 2 MG/DL
POTASSIUM SERPL-SCNC: 3.9 MMOL/L
PROT SERPL-MCNC: 6.8 G/DL
SODIUM SERPL-SCNC: 141 MMOL/L

## 2017-09-07 PROCEDURE — 93000 ELECTROCARDIOGRAM COMPLETE: CPT

## 2017-09-07 PROCEDURE — 99242 OFF/OP CONSLTJ NEW/EST SF 20: CPT | Mod: 25

## 2017-09-08 ENCOUNTER — RESULT REVIEW (OUTPATIENT)
Age: 62
End: 2017-09-08

## 2017-09-08 ENCOUNTER — OUTPATIENT (OUTPATIENT)
Dept: OUTPATIENT SERVICES | Facility: HOSPITAL | Age: 62
LOS: 1 days | End: 2017-09-08
Payer: COMMERCIAL

## 2017-09-08 ENCOUNTER — APPOINTMENT (OUTPATIENT)
Dept: COLORECTAL SURGERY | Facility: HOSPITAL | Age: 62
End: 2017-09-08
Payer: COMMERCIAL

## 2017-09-08 ENCOUNTER — TRANSCRIPTION ENCOUNTER (OUTPATIENT)
Age: 62
End: 2017-09-08

## 2017-09-08 DIAGNOSIS — Z01.818 ENCOUNTER FOR OTHER PREPROCEDURAL EXAMINATION: ICD-10-CM

## 2017-09-08 DIAGNOSIS — Z86.018 PERSONAL HISTORY OF OTHER BENIGN NEOPLASM: Chronic | ICD-10-CM

## 2017-09-08 DIAGNOSIS — C20 MALIGNANT NEOPLASM OF RECTUM: ICD-10-CM

## 2017-09-08 DIAGNOSIS — Z96.7 PRESENCE OF OTHER BONE AND TENDON IMPLANTS: Chronic | ICD-10-CM

## 2017-09-08 PROCEDURE — 45380 COLONOSCOPY AND BIOPSY: CPT

## 2017-09-08 PROCEDURE — 88305 TISSUE EXAM BY PATHOLOGIST: CPT | Mod: 26

## 2017-09-19 ENCOUNTER — APPOINTMENT (OUTPATIENT)
Dept: COLORECTAL SURGERY | Facility: CLINIC | Age: 62
End: 2017-09-19
Payer: COMMERCIAL

## 2017-09-19 ENCOUNTER — APPOINTMENT (OUTPATIENT)
Dept: RADIATION ONCOLOGY | Facility: CLINIC | Age: 62
End: 2017-09-19
Payer: COMMERCIAL

## 2017-09-19 VITALS
TEMPERATURE: 97.3 F | RESPIRATION RATE: 16 BRPM | HEART RATE: 66 BPM | HEIGHT: 70 IN | SYSTOLIC BLOOD PRESSURE: 103 MMHG | WEIGHT: 271.4 LBS | BODY MASS INDEX: 38.85 KG/M2 | OXYGEN SATURATION: 97 % | DIASTOLIC BLOOD PRESSURE: 67 MMHG

## 2017-09-19 PROCEDURE — 99213 OFFICE O/P EST LOW 20 MIN: CPT

## 2017-09-19 PROCEDURE — 99215 OFFICE O/P EST HI 40 MIN: CPT

## 2017-09-20 ENCOUNTER — OTHER (OUTPATIENT)
Age: 62
End: 2017-09-20

## 2017-09-26 ENCOUNTER — OUTPATIENT (OUTPATIENT)
Dept: OUTPATIENT SERVICES | Facility: HOSPITAL | Age: 62
LOS: 1 days | End: 2017-09-26
Payer: COMMERCIAL

## 2017-09-26 VITALS
DIASTOLIC BLOOD PRESSURE: 94 MMHG | RESPIRATION RATE: 16 BRPM | WEIGHT: 270.95 LBS | HEART RATE: 64 BPM | HEIGHT: 71 IN | SYSTOLIC BLOOD PRESSURE: 152 MMHG | TEMPERATURE: 97 F

## 2017-09-26 DIAGNOSIS — C20 MALIGNANT NEOPLASM OF RECTUM: ICD-10-CM

## 2017-09-26 DIAGNOSIS — Z86.018 PERSONAL HISTORY OF OTHER BENIGN NEOPLASM: Chronic | ICD-10-CM

## 2017-09-26 DIAGNOSIS — Z01.818 ENCOUNTER FOR OTHER PREPROCEDURAL EXAMINATION: ICD-10-CM

## 2017-09-26 DIAGNOSIS — Z96.7 PRESENCE OF OTHER BONE AND TENDON IMPLANTS: Chronic | ICD-10-CM

## 2017-09-26 LAB
ALBUMIN SERPL ELPH-MCNC: 4.4 G/DL — SIGNIFICANT CHANGE UP (ref 3.3–5.2)
ALP SERPL-CCNC: 64 U/L — SIGNIFICANT CHANGE UP (ref 40–120)
ALT FLD-CCNC: 20 U/L — SIGNIFICANT CHANGE UP
ANION GAP SERPL CALC-SCNC: 12 MMOL/L — SIGNIFICANT CHANGE UP (ref 5–17)
APTT BLD: 32.6 SEC — SIGNIFICANT CHANGE UP (ref 27.5–37.4)
AST SERPL-CCNC: 17 U/L — SIGNIFICANT CHANGE UP
BILIRUB SERPL-MCNC: 0.4 MG/DL — SIGNIFICANT CHANGE UP (ref 0.4–2)
BLD GP AB SCN SERPL QL: SIGNIFICANT CHANGE UP
BUN SERPL-MCNC: 22 MG/DL — HIGH (ref 8–20)
CALCIUM SERPL-MCNC: 9.1 MG/DL — SIGNIFICANT CHANGE UP (ref 8.6–10.2)
CHLORIDE SERPL-SCNC: 106 MMOL/L — SIGNIFICANT CHANGE UP (ref 98–107)
CO2 SERPL-SCNC: 24 MMOL/L — SIGNIFICANT CHANGE UP (ref 22–29)
CREAT SERPL-MCNC: 0.84 MG/DL — SIGNIFICANT CHANGE UP (ref 0.5–1.3)
GLUCOSE SERPL-MCNC: 90 MG/DL — SIGNIFICANT CHANGE UP (ref 70–115)
HBA1C BLD-MCNC: 5.2 % — SIGNIFICANT CHANGE UP (ref 4–5.6)
HCT VFR BLD CALC: 35.1 % — LOW (ref 42–52)
HGB BLD-MCNC: 11.9 G/DL — LOW (ref 14–18)
INR BLD: 1.02 RATIO — SIGNIFICANT CHANGE UP (ref 0.88–1.16)
MCHC RBC-ENTMCNC: 31.1 PG — HIGH (ref 27–31)
MCHC RBC-ENTMCNC: 33.9 G/DL — SIGNIFICANT CHANGE UP (ref 32–36)
MCV RBC AUTO: 91.6 FL — SIGNIFICANT CHANGE UP (ref 80–94)
PLATELET # BLD AUTO: 164 K/UL — SIGNIFICANT CHANGE UP (ref 150–400)
POTASSIUM SERPL-MCNC: 3.9 MMOL/L — SIGNIFICANT CHANGE UP (ref 3.5–5.3)
POTASSIUM SERPL-SCNC: 3.9 MMOL/L — SIGNIFICANT CHANGE UP (ref 3.5–5.3)
PROT SERPL-MCNC: 7.4 G/DL — SIGNIFICANT CHANGE UP (ref 6.6–8.7)
PROTHROM AB SERPL-ACNC: 11.2 SEC — SIGNIFICANT CHANGE UP (ref 9.8–12.7)
RBC # BLD: 3.83 M/UL — LOW (ref 4.6–6.2)
RBC # FLD: 14.1 % — SIGNIFICANT CHANGE UP (ref 11–15.6)
SODIUM SERPL-SCNC: 142 MMOL/L — SIGNIFICANT CHANGE UP (ref 135–145)
TYPE + AB SCN PNL BLD: SIGNIFICANT CHANGE UP
WBC # BLD: 4.5 K/UL — LOW (ref 4.8–10.8)
WBC # FLD AUTO: 4.5 K/UL — LOW (ref 4.8–10.8)

## 2017-09-26 RX ORDER — SODIUM CHLORIDE 9 MG/ML
3 INJECTION INTRAMUSCULAR; INTRAVENOUS; SUBCUTANEOUS EVERY 8 HOURS
Qty: 0 | Refills: 0 | Status: DISCONTINUED | OUTPATIENT
Start: 2017-10-03 | End: 2017-10-09

## 2017-09-26 RX ORDER — CEFOTETAN DISODIUM 1 G
2 VIAL (EA) INJECTION ONCE
Qty: 0 | Refills: 0 | Status: DISCONTINUED | OUTPATIENT
Start: 2017-10-02 | End: 2017-10-09

## 2017-09-26 RX ORDER — ALVIMOPAN 12 MG/1
12 CAPSULE ORAL ONCE
Qty: 0 | Refills: 0 | Status: COMPLETED | OUTPATIENT
Start: 2017-10-02 | End: 2017-10-02

## 2017-09-26 NOTE — PATIENT PROFILE ADULT. - LEARNING ASSESSMENT (PATIENT) ADDITIONAL COMMENTS
presurgical , surgical scrub instructions , pain management education given - verbalized understanding

## 2017-09-26 NOTE — H&P PST ADULT - NEGATIVE MUSCULOSKELETAL SYMPTOMS
no neck pain/no arm pain L/no arm pain R/no arthralgia/no myalgia/no muscle cramps/no stiffness/no leg pain L/no joint swelling/no back pain/no leg pain R/no arthritis/no muscle weakness

## 2017-09-26 NOTE — H&P PST ADULT - NEGATIVE SKIN SYMPTOMS
no dryness/no change in size/color of mole/no pitted nails/no itching/no rash/no tumor/no hair loss/no brittle nails

## 2017-09-26 NOTE — H&P PST ADULT - NEGATIVE GENERAL SYMPTOMS
no anorexia/no polyphagia/no fever/no malaise/no polyuria/no weight loss/no chills/no sweating/no polydipsia/no fatigue/no weight gain

## 2017-09-26 NOTE — H&P PST ADULT - HISTORY OF PRESENT ILLNESS
60 y/o male with 60 y/o male with rectal bleeding had colonoscopy and was DX with rectal cancer patient now presents for laparoscopic possible open low anterior resection with vaginal hysterectomy anterior posterior repair sacrospinous ligament fixation node disection with ileostomy 62 y/o male with rectal bleeding had colonoscopy and was DX with rectal cancer patient now presents for laparoscopic possible open low anterior resection lymph node dissection and possible  ileostomy

## 2017-09-26 NOTE — H&P PST ADULT - MUSCULOSKELETAL
details… detailed exam normal/ROM intact/no calf tenderness/no joint swelling/normal strength/no joint warmth/no joint erythema

## 2017-09-26 NOTE — H&P PST ADULT - NEGATIVE PSYCHIATRIC SYMPTOMS
no auditory hallucinations/no hyperactivity/no mood swings/no anxiety/no depression/no insomnia/no paranoia/no agitation/no visual hallucinations/no memory loss/no suicidal ideation

## 2017-09-26 NOTE — H&P PST ADULT - NEGATIVE ENMT SYMPTOMS
no nasal discharge/no nasal obstruction/no post-nasal discharge/no nose bleeds/no hearing difficulty/no dysphagia/no gum bleeding/no throat pain/no sinus symptoms/no ear pain/no tinnitus/no nasal congestion/no vertigo/no recurrent cold sores/no dry mouth/no abnormal taste sensation

## 2017-09-26 NOTE — H&P PST ADULT - RS GEN PE MLT RESP DETAILS PC
airway patent/good air movement/normal/no rhonchi/breath sounds equal/no chest wall tenderness/no intercostal retractions/no rales/no subcutaneous emphysema/no wheezes/respirations non-labored/clear to auscultation bilaterally

## 2017-09-26 NOTE — H&P PST ADULT - NEGATIVE NEUROLOGICAL SYMPTOMS
no facial palsy/no vertigo/no confusion/no generalized seizures/no headache/no loss of consciousness/no hemiparesis/no weakness/no focal seizures/no transient paralysis/no loss of sensation/no difficulty walking/no paresthesias/no syncope/no tremors

## 2017-09-26 NOTE — H&P PST ADULT - PROBLEM SELECTOR PLAN 1
laparoscopic possible open low anterior resection with vaginal hysterectomy anterior posterior repair sacrospinous ligament fixation node disection with ileostomy laparoscopic possible open low anterior resection with possible ileostomy

## 2017-09-26 NOTE — H&P PST ADULT - NEUROLOGICAL DETAILS
responds to pain/superficial reflexes intact/normal strength/responds to verbal commands/deep reflexes intact/alert and oriented x 3/no spontaneous movement/cranial nerves intact/sensation intact

## 2017-09-26 NOTE — H&P PST ADULT - NEGATIVE GASTROINTESTINAL SYMPTOMS
no nausea/no diarrhea/no constipation/no vomiting/no change in bowel habits/no flatulence/no jaundice/no hiccoughs/no melena/no hematochezia/no steatorrhea/no abdominal pain

## 2017-09-26 NOTE — H&P PST ADULT - NEGATIVE OPHTHALMOLOGIC SYMPTOMS
no irritation L/no irritation R/no blurred vision R/no diplopia/no blurred vision L/no discharge L/no photophobia/no pain R/no lacrimation L/no lacrimation R/no loss of vision L/no loss of vision R/no scleral injection L/no scleral injection R/no discharge R/no pain L

## 2017-09-26 NOTE — PATIENT PROFILE ADULT. - AS SC BRADEN ACTIVITY
Pt was sitting outside at the nursing home (admitted 10 days ago after CABG for rehab) when her external defibrillator alarmed. When EMS arrived the initial rhythm was a wide complex tachycardia, then converted on her own to a normal sinus rhythm   No pain, no shortness or breath.   (3) walks occasionally

## 2017-09-26 NOTE — H&P PST ADULT - GASTROINTESTINAL DETAILS
no distention/no organomegaly/bowel sounds normal/soft/normal/nontender/no masses palpable/no guarding/no rebound tenderness/no rigidity/no bruit

## 2017-09-26 NOTE — H&P PST ADULT - NEGATIVE BREAST SYMPTOMS
no breast lump L/no breast lump R/no nipple discharge L/no nipple discharge R/no breast tenderness L/no breast tenderness R

## 2017-09-26 NOTE — H&P PST ADULT - NEGATIVE MALE-SPECIFIC SYMPTOMS
no genital sores/no undescended testicle L/no urethral discharge/no ejaculatory dysfunction/no scrotal mass L/no impotence/no erectile dysfunction/no undescended testicle R/no scrotal mass R

## 2017-09-26 NOTE — H&P PST ADULT - NEGATIVE CARDIOVASCULAR SYMPTOMS
no orthopnea/no chest pain/no palpitations/no claudication/no dyspnea on exertion/no peripheral edema/no paroxysmal nocturnal dyspnea

## 2017-09-26 NOTE — H&P PST ADULT - NEGATIVE GENERAL GENITOURINARY SYMPTOMS
no bladder infections/no gas in urine/normal urinary frequency/no nocturia/no hematuria/no renal colic/no flank pain L/no flank pain R/no urine discoloration/normal libido/no incontinence/no dysuria/no urinary hesitancy

## 2017-09-28 LAB — CEA SERPL-MCNC: 0.8 NG/ML — SIGNIFICANT CHANGE UP (ref 0–3.8)

## 2017-09-29 ENCOUNTER — OTHER (OUTPATIENT)
Age: 62
End: 2017-09-29

## 2017-10-02 ENCOUNTER — RESULT REVIEW (OUTPATIENT)
Age: 62
End: 2017-10-02

## 2017-10-02 ENCOUNTER — TRANSCRIPTION ENCOUNTER (OUTPATIENT)
Age: 62
End: 2017-10-02

## 2017-10-02 ENCOUNTER — INPATIENT (INPATIENT)
Facility: HOSPITAL | Age: 62
LOS: 6 days | Discharge: HOME | DRG: 330 | End: 2017-10-09
Attending: COLON & RECTAL SURGERY | Admitting: COLON & RECTAL SURGERY
Payer: COMMERCIAL

## 2017-10-02 ENCOUNTER — APPOINTMENT (OUTPATIENT)
Dept: COLORECTAL SURGERY | Facility: HOSPITAL | Age: 62
End: 2017-10-02

## 2017-10-02 VITALS
TEMPERATURE: 97 F | RESPIRATION RATE: 16 BRPM | WEIGHT: 270.95 LBS | HEIGHT: 71 IN | SYSTOLIC BLOOD PRESSURE: 113 MMHG | DIASTOLIC BLOOD PRESSURE: 85 MMHG | OXYGEN SATURATION: 99 % | HEART RATE: 61 BPM

## 2017-10-02 DIAGNOSIS — Z96.7 PRESENCE OF OTHER BONE AND TENDON IMPLANTS: Chronic | ICD-10-CM

## 2017-10-02 DIAGNOSIS — C20 MALIGNANT NEOPLASM OF RECTUM: ICD-10-CM

## 2017-10-02 DIAGNOSIS — Z86.018 PERSONAL HISTORY OF OTHER BENIGN NEOPLASM: Chronic | ICD-10-CM

## 2017-10-02 LAB — ABO RH CONFIRMATION: SIGNIFICANT CHANGE UP

## 2017-10-02 PROCEDURE — 44187 LAP ILEO/JEJUNO-STOMY: CPT | Mod: 59

## 2017-10-02 PROCEDURE — 38570 LAPAROSCOPY LYMPH NODE BIOP: CPT

## 2017-10-02 PROCEDURE — 49203: CPT | Mod: AS

## 2017-10-02 PROCEDURE — 49203: CPT

## 2017-10-02 PROCEDURE — 45381 COLONOSCOPY SUBMUCOUS NJX: CPT

## 2017-10-02 PROCEDURE — 45380 COLONOSCOPY AND BIOPSY: CPT | Mod: XS

## 2017-10-02 PROCEDURE — 45397 LAP REMOVE RECTUM W/POUCH: CPT

## 2017-10-02 PROCEDURE — 88305 TISSUE EXAM BY PATHOLOGIST: CPT | Mod: 26

## 2017-10-02 PROCEDURE — 38570 LAPAROSCOPY LYMPH NODE BIOP: CPT | Mod: AS

## 2017-10-02 PROCEDURE — 45300 PROCTOSIGMOIDOSCOPY DX: CPT

## 2017-10-02 PROCEDURE — 44187 LAP ILEO/JEJUNO-STOMY: CPT | Mod: AS,59

## 2017-10-02 PROCEDURE — 45397 LAP REMOVE RECTUM W/POUCH: CPT | Mod: AS

## 2017-10-02 PROCEDURE — 88309 TISSUE EXAM BY PATHOLOGIST: CPT | Mod: 26

## 2017-10-02 PROCEDURE — 88305 TISSUE EXAM BY PATHOLOGIST: CPT

## 2017-10-02 RX ORDER — FENTANYL CITRATE 50 UG/ML
50 INJECTION INTRAVENOUS
Qty: 0 | Refills: 0 | Status: DISCONTINUED | OUTPATIENT
Start: 2017-10-02 | End: 2017-10-03

## 2017-10-02 RX ORDER — HEPARIN SODIUM 5000 [USP'U]/ML
5000 INJECTION INTRAVENOUS; SUBCUTANEOUS EVERY 8 HOURS
Qty: 0 | Refills: 0 | Status: DISCONTINUED | OUTPATIENT
Start: 2017-10-03 | End: 2017-10-09

## 2017-10-02 RX ORDER — ONDANSETRON 8 MG/1
4 TABLET, FILM COATED ORAL EVERY 6 HOURS
Qty: 0 | Refills: 0 | Status: DISCONTINUED | OUTPATIENT
Start: 2017-10-03 | End: 2017-10-09

## 2017-10-02 RX ORDER — BICALUTAMIDE 50 MG/1
50 TABLET, FILM COATED ORAL DAILY
Qty: 0 | Refills: 0 | Status: DISCONTINUED | OUTPATIENT
Start: 2017-10-02 | End: 2017-10-09

## 2017-10-02 RX ORDER — ONDANSETRON 8 MG/1
4 TABLET, FILM COATED ORAL ONCE
Qty: 0 | Refills: 0 | Status: DISCONTINUED | OUTPATIENT
Start: 2017-10-02 | End: 2017-10-03

## 2017-10-02 RX ORDER — HYDROMORPHONE HYDROCHLORIDE 2 MG/ML
0.5 INJECTION INTRAMUSCULAR; INTRAVENOUS; SUBCUTANEOUS
Qty: 0 | Refills: 0 | Status: DISCONTINUED | OUTPATIENT
Start: 2017-10-02 | End: 2017-10-03

## 2017-10-02 RX ORDER — ALVIMOPAN 12 MG/1
12 CAPSULE ORAL
Qty: 0 | Refills: 0 | Status: DISCONTINUED | OUTPATIENT
Start: 2017-10-03 | End: 2017-10-09

## 2017-10-02 RX ORDER — ONDANSETRON 8 MG/1
4 TABLET, FILM COATED ORAL EVERY 6 HOURS
Qty: 0 | Refills: 0 | Status: DISCONTINUED | OUTPATIENT
Start: 2017-10-02 | End: 2017-10-02

## 2017-10-02 RX ORDER — HYDROMORPHONE HYDROCHLORIDE 2 MG/ML
30 INJECTION INTRAMUSCULAR; INTRAVENOUS; SUBCUTANEOUS
Qty: 0 | Refills: 0 | Status: DISCONTINUED | OUTPATIENT
Start: 2017-10-02 | End: 2017-10-07

## 2017-10-02 RX ORDER — HYDROCHLOROTHIAZIDE 25 MG
12.5 TABLET ORAL DAILY
Qty: 0 | Refills: 0 | Status: DISCONTINUED | OUTPATIENT
Start: 2017-10-02 | End: 2017-10-03

## 2017-10-02 RX ORDER — SODIUM CHLORIDE 9 MG/ML
1000 INJECTION INTRAMUSCULAR; INTRAVENOUS; SUBCUTANEOUS
Qty: 0 | Refills: 0 | Status: DISCONTINUED | OUTPATIENT
Start: 2017-10-02 | End: 2017-10-03

## 2017-10-02 RX ORDER — BUPIVACAINE 13.3 MG/ML
20 INJECTION, SUSPENSION, LIPOSOMAL INFILTRATION ONCE
Qty: 0 | Refills: 0 | Status: DISCONTINUED | OUTPATIENT
Start: 2017-10-02 | End: 2017-10-02

## 2017-10-02 RX ORDER — LATANOPROST 0.05 MG/ML
1 SOLUTION/ DROPS OPHTHALMIC; TOPICAL AT BEDTIME
Qty: 0 | Refills: 0 | Status: DISCONTINUED | OUTPATIENT
Start: 2017-10-02 | End: 2017-10-09

## 2017-10-02 RX ORDER — ONDANSETRON 8 MG/1
4 TABLET, FILM COATED ORAL EVERY 6 HOURS
Qty: 0 | Refills: 0 | Status: DISCONTINUED | OUTPATIENT
Start: 2017-10-02 | End: 2017-10-03

## 2017-10-02 RX ORDER — DEXAMETHASONE 0.5 MG/5ML
8 ELIXIR ORAL ONCE
Qty: 0 | Refills: 0 | Status: DISCONTINUED | OUTPATIENT
Start: 2017-10-02 | End: 2017-10-03

## 2017-10-02 RX ORDER — SODIUM CHLORIDE 9 MG/ML
1000 INJECTION, SOLUTION INTRAVENOUS
Qty: 0 | Refills: 0 | Status: DISCONTINUED | OUTPATIENT
Start: 2017-10-03 | End: 2017-10-04

## 2017-10-02 RX ORDER — HYDROMORPHONE HYDROCHLORIDE 2 MG/ML
30 INJECTION INTRAMUSCULAR; INTRAVENOUS; SUBCUTANEOUS
Qty: 0 | Refills: 0 | Status: DISCONTINUED | OUTPATIENT
Start: 2017-10-02 | End: 2017-10-02

## 2017-10-02 RX ORDER — NALOXONE HYDROCHLORIDE 4 MG/.1ML
0.1 SPRAY NASAL
Qty: 0 | Refills: 0 | Status: DISCONTINUED | OUTPATIENT
Start: 2017-10-02 | End: 2017-10-02

## 2017-10-02 RX ORDER — TIMOLOL 0.5 %
1 DROPS OPHTHALMIC (EYE)
Qty: 0 | Refills: 0 | Status: DISCONTINUED | OUTPATIENT
Start: 2017-10-02 | End: 2017-10-09

## 2017-10-02 RX ORDER — HEPARIN SODIUM 5000 [USP'U]/ML
5000 INJECTION INTRAVENOUS; SUBCUTANEOUS ONCE
Qty: 0 | Refills: 0 | Status: COMPLETED | OUTPATIENT
Start: 2017-10-02 | End: 2017-10-02

## 2017-10-02 RX ORDER — NALOXONE HYDROCHLORIDE 4 MG/.1ML
0.1 SPRAY NASAL
Qty: 0 | Refills: 0 | Status: DISCONTINUED | OUTPATIENT
Start: 2017-10-02 | End: 2017-10-09

## 2017-10-02 RX ORDER — METOPROLOL TARTRATE 50 MG
25 TABLET ORAL
Qty: 0 | Refills: 0 | Status: DISCONTINUED | OUTPATIENT
Start: 2017-10-02 | End: 2017-10-04

## 2017-10-02 RX ADMIN — ALVIMOPAN 12 MILLIGRAM(S): 12 CAPSULE ORAL at 14:07

## 2017-10-02 RX ADMIN — HEPARIN SODIUM 5000 UNIT(S): 5000 INJECTION INTRAVENOUS; SUBCUTANEOUS at 14:07

## 2017-10-02 RX ADMIN — HYDROMORPHONE HYDROCHLORIDE 30 MILLILITER(S): 2 INJECTION INTRAMUSCULAR; INTRAVENOUS; SUBCUTANEOUS at 22:59

## 2017-10-02 RX ADMIN — HYDROMORPHONE HYDROCHLORIDE 0.5 MILLIGRAM(S): 2 INJECTION INTRAMUSCULAR; INTRAVENOUS; SUBCUTANEOUS at 22:54

## 2017-10-02 RX ADMIN — HYDROMORPHONE HYDROCHLORIDE 0.5 MILLIGRAM(S): 2 INJECTION INTRAMUSCULAR; INTRAVENOUS; SUBCUTANEOUS at 22:33

## 2017-10-02 NOTE — BRIEF OPERATIVE NOTE - PROCEDURE
<<-----Click on this checkbox to enter Procedure Ileostomy, loop  10/02/2017    Active  MBERRONESJR  Low anterior resection of rectum  10/02/2017  Lap assisted  Active  MBERRONESJR

## 2017-10-03 DIAGNOSIS — C20 MALIGNANT NEOPLASM OF RECTUM: ICD-10-CM

## 2017-10-03 DIAGNOSIS — G89.18 OTHER ACUTE POSTPROCEDURAL PAIN: ICD-10-CM

## 2017-10-03 LAB
ANION GAP SERPL CALC-SCNC: 14 MMOL/L — SIGNIFICANT CHANGE UP (ref 5–17)
BUN SERPL-MCNC: 19 MG/DL — SIGNIFICANT CHANGE UP (ref 8–20)
CALCIUM SERPL-MCNC: 9 MG/DL — SIGNIFICANT CHANGE UP (ref 8.6–10.2)
CHLORIDE SERPL-SCNC: 101 MMOL/L — SIGNIFICANT CHANGE UP (ref 98–107)
CO2 SERPL-SCNC: 25 MMOL/L — SIGNIFICANT CHANGE UP (ref 22–29)
CREAT SERPL-MCNC: 1.03 MG/DL — SIGNIFICANT CHANGE UP (ref 0.5–1.3)
GLUCOSE SERPL-MCNC: 135 MG/DL — HIGH (ref 70–115)
HCT VFR BLD CALC: 35.6 % — LOW (ref 42–52)
HGB BLD-MCNC: 12.2 G/DL — LOW (ref 14–18)
MCHC RBC-ENTMCNC: 31.5 PG — HIGH (ref 27–31)
MCHC RBC-ENTMCNC: 34.3 G/DL — SIGNIFICANT CHANGE UP (ref 32–36)
MCV RBC AUTO: 92 FL — SIGNIFICANT CHANGE UP (ref 80–94)
PLATELET # BLD AUTO: 188 K/UL — SIGNIFICANT CHANGE UP (ref 150–400)
POTASSIUM SERPL-MCNC: 4.8 MMOL/L — SIGNIFICANT CHANGE UP (ref 3.5–5.3)
POTASSIUM SERPL-SCNC: 4.8 MMOL/L — SIGNIFICANT CHANGE UP (ref 3.5–5.3)
RBC # BLD: 3.87 M/UL — LOW (ref 4.6–6.2)
RBC # FLD: 13.7 % — SIGNIFICANT CHANGE UP (ref 11–15.6)
SODIUM SERPL-SCNC: 140 MMOL/L — SIGNIFICANT CHANGE UP (ref 135–145)
WBC # BLD: 9.9 K/UL — SIGNIFICANT CHANGE UP (ref 4.8–10.8)
WBC # FLD AUTO: 9.9 K/UL — SIGNIFICANT CHANGE UP (ref 4.8–10.8)

## 2017-10-03 PROCEDURE — 82378 CARCINOEMBRYONIC ANTIGEN: CPT

## 2017-10-03 PROCEDURE — 83036 HEMOGLOBIN GLYCOSYLATED A1C: CPT

## 2017-10-03 PROCEDURE — 86900 BLOOD TYPING SEROLOGIC ABO: CPT

## 2017-10-03 PROCEDURE — 80053 COMPREHEN METABOLIC PANEL: CPT

## 2017-10-03 PROCEDURE — 85027 COMPLETE CBC AUTOMATED: CPT

## 2017-10-03 PROCEDURE — G0463: CPT

## 2017-10-03 PROCEDURE — 86901 BLOOD TYPING SEROLOGIC RH(D): CPT

## 2017-10-03 PROCEDURE — 86850 RBC ANTIBODY SCREEN: CPT

## 2017-10-03 PROCEDURE — 36415 COLL VENOUS BLD VENIPUNCTURE: CPT

## 2017-10-03 PROCEDURE — 93010 ELECTROCARDIOGRAM REPORT: CPT

## 2017-10-03 PROCEDURE — 93005 ELECTROCARDIOGRAM TRACING: CPT

## 2017-10-03 PROCEDURE — 85610 PROTHROMBIN TIME: CPT

## 2017-10-03 PROCEDURE — 85730 THROMBOPLASTIN TIME PARTIAL: CPT

## 2017-10-03 RX ORDER — CEFOTETAN DISODIUM 1 G
2 VIAL (EA) INJECTION ONCE
Qty: 0 | Refills: 0 | Status: COMPLETED | OUTPATIENT
Start: 2017-10-03 | End: 2017-10-03

## 2017-10-03 RX ORDER — ACETAMINOPHEN 500 MG
1000 TABLET ORAL ONCE
Qty: 0 | Refills: 0 | Status: COMPLETED | OUTPATIENT
Start: 2017-10-03 | End: 2017-10-04

## 2017-10-03 RX ORDER — ACETAMINOPHEN 500 MG
1000 TABLET ORAL ONCE
Qty: 0 | Refills: 0 | Status: COMPLETED | OUTPATIENT
Start: 2017-10-03 | End: 2017-10-03

## 2017-10-03 RX ORDER — KETOROLAC TROMETHAMINE 30 MG/ML
30 SYRINGE (ML) INJECTION EVERY 6 HOURS
Qty: 0 | Refills: 0 | Status: DISCONTINUED | OUTPATIENT
Start: 2017-10-03 | End: 2017-10-07

## 2017-10-03 RX ADMIN — SODIUM CHLORIDE 150 MILLILITER(S): 9 INJECTION, SOLUTION INTRAVENOUS at 05:15

## 2017-10-03 RX ADMIN — Medication 1 DROP(S): at 05:59

## 2017-10-03 RX ADMIN — LATANOPROST 1 DROP(S): 0.05 SOLUTION/ DROPS OPHTHALMIC; TOPICAL at 23:11

## 2017-10-03 RX ADMIN — BICALUTAMIDE 50 MILLIGRAM(S): 50 TABLET, FILM COATED ORAL at 13:40

## 2017-10-03 RX ADMIN — SODIUM CHLORIDE 3 MILLILITER(S): 9 INJECTION INTRAMUSCULAR; INTRAVENOUS; SUBCUTANEOUS at 06:04

## 2017-10-03 RX ADMIN — HEPARIN SODIUM 5000 UNIT(S): 5000 INJECTION INTRAVENOUS; SUBCUTANEOUS at 05:59

## 2017-10-03 RX ADMIN — Medication 25 MILLIGRAM(S): at 17:11

## 2017-10-03 RX ADMIN — Medication 400 MILLIGRAM(S): at 05:58

## 2017-10-03 RX ADMIN — HYDROMORPHONE HYDROCHLORIDE 30 MILLILITER(S): 2 INJECTION INTRAMUSCULAR; INTRAVENOUS; SUBCUTANEOUS at 07:14

## 2017-10-03 RX ADMIN — Medication 1000 MILLIGRAM(S): at 06:04

## 2017-10-03 RX ADMIN — Medication 12.5 MILLIGRAM(S): at 05:58

## 2017-10-03 RX ADMIN — Medication 100 GRAM(S): at 09:00

## 2017-10-03 RX ADMIN — Medication 1 TABLET(S): at 13:28

## 2017-10-03 RX ADMIN — HEPARIN SODIUM 5000 UNIT(S): 5000 INJECTION INTRAVENOUS; SUBCUTANEOUS at 13:28

## 2017-10-03 RX ADMIN — SODIUM CHLORIDE 3 MILLILITER(S): 9 INJECTION INTRAMUSCULAR; INTRAVENOUS; SUBCUTANEOUS at 13:28

## 2017-10-03 RX ADMIN — ALVIMOPAN 12 MILLIGRAM(S): 12 CAPSULE ORAL at 05:59

## 2017-10-03 RX ADMIN — SODIUM CHLORIDE 150 MILLILITER(S): 9 INJECTION, SOLUTION INTRAVENOUS at 01:28

## 2017-10-03 RX ADMIN — SODIUM CHLORIDE 3 MILLILITER(S): 9 INJECTION INTRAMUSCULAR; INTRAVENOUS; SUBCUTANEOUS at 21:57

## 2017-10-03 RX ADMIN — ALVIMOPAN 12 MILLIGRAM(S): 12 CAPSULE ORAL at 17:11

## 2017-10-03 RX ADMIN — HYDROMORPHONE HYDROCHLORIDE 30 MILLILITER(S): 2 INJECTION INTRAMUSCULAR; INTRAVENOUS; SUBCUTANEOUS at 19:44

## 2017-10-03 RX ADMIN — Medication 1 DROP(S): at 17:11

## 2017-10-03 RX ADMIN — HYDROMORPHONE HYDROCHLORIDE 30 MILLILITER(S): 2 INJECTION INTRAMUSCULAR; INTRAVENOUS; SUBCUTANEOUS at 01:03

## 2017-10-03 RX ADMIN — Medication 25 MILLIGRAM(S): at 05:58

## 2017-10-03 RX ADMIN — HEPARIN SODIUM 5000 UNIT(S): 5000 INJECTION INTRAVENOUS; SUBCUTANEOUS at 22:02

## 2017-10-03 NOTE — CONSULT NOTE ADULT - SUBJECTIVE AND OBJECTIVE BOX
HPI:  62 y/o male with rectal bleeding had colonoscopy and was DX with rectal cancer patient now presents for laparoscopic possible open low anterior resection lymph node dissection and possible  ileostomy (26 Sep 2017 15:37)      PAST MEDICAL & SURGICAL HISTORY:  Rectal cancer  Prostate cancer  Hypertension  Glaucoma  S/P ORIF (open reduction internal fixation) fracture: Left ankle  History of lipoma: x 3    acetaminophen  IVPB. 1000 milliGRAM(s) IV Intermittent once PRN  alvimopan 12 milliGRAM(s) Oral two times a day  bicalutamide 50 milliGRAM(s) Oral daily  cefoTEtan  IVPB 2 Gram(s) IV Intermittent once  heparin  Injectable 5000 Unit(s) SubCutaneous every 8 hours  HYDROmorphone PCA (1 mG/mL) 30 milliLiter(s) PCA Continuous PCA Continuous  ketorolac   Injectable 30 milliGRAM(s) IV Push every 6 hours PRN  lactated ringers. 1000 milliLiter(s) IV Continuous <Continuous>  latanoprost 0.005% Ophthalmic Solution 1 Drop(s) Both EYES at bedtime  metoprolol 25 milliGRAM(s) Oral two times a day  multivitamin 1 Tablet(s) Oral daily  naloxone Injectable 0.1 milliGRAM(s) IV Push every 3 minutes PRN  ondansetron Injectable 4 milliGRAM(s) IV Push every 6 hours PRN  sodium chloride 0.9% lock flush 3 milliLiter(s) IV Push every 8 hours  timolol 0.5% Solution 1 Drop(s) Both EYES two times a day    MEDICATIONS  (STANDING):  alvimopan 12 milliGRAM(s) Oral two times a day  bicalutamide 50 milliGRAM(s) Oral daily  cefoTEtan  IVPB 2 Gram(s) IV Intermittent once  heparin  Injectable 5000 Unit(s) SubCutaneous every 8 hours  HYDROmorphone PCA (1 mG/mL) 30 milliLiter(s) PCA Continuous PCA Continuous  lactated ringers. 1000 milliLiter(s) (150 mL/Hr) IV Continuous <Continuous>  latanoprost 0.005% Ophthalmic Solution 1 Drop(s) Both EYES at bedtime  metoprolol 25 milliGRAM(s) Oral two times a day  multivitamin 1 Tablet(s) Oral daily  sodium chloride 0.9% lock flush 3 milliLiter(s) IV Push every 8 hours  timolol 0.5% Solution 1 Drop(s) Both EYES two times a day    MEDICATIONS  (PRN):  acetaminophen  IVPB. 1000 milliGRAM(s) IV Intermittent once PRN Mild Pain (1 - 3)  ketorolac   Injectable 30 milliGRAM(s) IV Push every 6 hours PRN Severe Pain (7 - 10)  naloxone Injectable 0.1 milliGRAM(s) IV Push every 3 minutes PRN For ANY of the following changes in patient status:  A. RR LESS THAN 10 breaths per minute, B. Oxygen saturation LESS THAN 90%, C. Sedation score of 6  ondansetron Injectable 4 milliGRAM(s) IV Push every 6 hours PRN Nausea and/or Vomiting      Allergies    No Known Allergies    Intolerances        SOCIAL HISTORY:  No S/D/ IVDU    FAMILY HISTORY:  Family history of CABG (Father)  Family history of prostate cancer (Father, Sibling)  Family history of cervical cancer (Mother)  Family history of coronary artery disease (Father)  Family history of hypertension (Father)      LABS:                        12.2   9.9   )-----------( 188      ( 03 Oct 2017 12:26 )             35.6     10-03    140  |  101  |  19.0  ----------------------------<  135<H>  4.8   |  25.0  |  1.03    Ca    9.0      03 Oct 2017 12:26      ROS  - Headache  - Neck Stiffness  - Chest Pain  - SOB  Mod Abd pain  - Pelvic Pain  - Leg Pain  - Head Ache      Vital Signs Last 24 Hrs  T(C): 36.4 (03 Oct 2017 16:53), Max: 36.7 (03 Oct 2017 09:14)  T(F): 97.6 (03 Oct 2017 16:53), Max: 98 (03 Oct 2017 09:14)  HR: 69 (03 Oct 2017 16:53) (62 - 72)  BP: 132/83 (03 Oct 2017 16:53) (111/55 - 152/74)  BP(mean): --  RR: 19 (03 Oct 2017 16:53) (12 - 20)  SpO2: 94% (03 Oct 2017 16:53) (94% - 100%)      HEENT: PEARLA  Neck: Supple  Cardio: S1 S2 No Murmur  Pulm: CTA No Rales or Ronchi  Abd: Soft NT ND BS dec, Colostomy Incision bandage clean  Rectal - refused  Ext: No DCT  Skin: No Rash  Neuro: Awake Pleasant    Rectal cancer - Post op Pain control, ambulation await bowel fxn  Prostate cancer - will monitor for urinary retention post alejandre removal  Hypertension - meds with parameters  Glaucoma - timolol

## 2017-10-03 NOTE — PROGRESS NOTE ADULT - SUBJECTIVE AND OBJECTIVE BOX
SURGICAL PA NOTE:     STATUS POST:      Diagnosis:    Pre-Op Diagnosis:  Rectal cancer  10/02/2017    Active  Bill Damon     Post-Op Dx:  Rectal cancer  10/02/2017    Active  Bill Damon    Procedure:    Procedure:  Ileostomy, loop  10/02/2017    Active  JULIUSONES  Low anterior resection of rectum  10/02/2017  Lap assisted  Active  JULIUSONES.       Operative Findings:  · Operative Findings	adhesions, low rectal tumor/cancer	    Specimens/Blood Loss/IV/Output/Protocol/VTE:    Specimens/Blood Loss/IV/Output/Protocol/VTE:  · Specimens	sigmoid rectum and lymph nodes, coloanal anastamosis	  · Estimated Blood Loss	150 milliLiter(s)	      POST OPERATIVE DAY #: 1    Vital Signs Last 24 Hrs  T(C): 36.3 (03 Oct 2017 04:40), Max: 36.4 (03 Oct 2017 01:01)  T(F): 97.4 (03 Oct 2017 04:40), Max: 97.6 (03 Oct 2017 01:01)  HR: 68 (03 Oct 2017 04:40) (61 - 72)  BP: 124/75 (03 Oct 2017 04:40) (111/55 - 152/74)  BP(mean): --  RR: 18 (03 Oct 2017 04:40) (12 - 20)  SpO2: 99% (03 Oct 2017 04:40) (95% - 100%)    HPI:  62 y/o male with rectal bleeding had colonoscopy and was DX with rectal cancer patient now presents for laparoscopic possible open low anterior resection lymph node dissection and possible  ileostomy (26 Sep 2017 15:37)      Malignant neoplasm of rectum  No h/o HF  Family history of CABG (Father)  Family history of prostate cancer (Father, Sibling)  Family history of cervical cancer (Mother)  Family history of coronary artery disease (Father)  Family history of hypertension (Father)  Handoff  MEWS Score  Rectal cancer  Colon cancer  Prostate cancer  Hypertension  Glaucoma  Rectal cancer  Rectal cancer  CA of rectum  Low anterior resection of rectum  Ileostomy, loop  S/P ORIF (open reduction internal fixation) fracture  History of lipoma  No significant past surgical history  MALIGNANT NEOPLASM OF RECTUM      SUBJECTIVE: Pt seen lying supine with HOB up, c/o incisional pain     Diet: NPO to clears    Activity: OOB later today    Fevers: [ ]Yes [x ]NO  Chills: [ ] Yes [x ] NO  SOB:  [ ] YES [x ] NO  Dyspnea: [ ]YES [x]NO  Chest Discomfort: [ ] YES [x ] NO    Nausea: [ ] YES [x ] NO           Vomiting: [ ] YES [x ] NO  Flatus: [ ] YES [ ] NO             Bowel Movement: [ ] YES [ ] NO  Diarrhea: [ ] YES [ ] NO         Void: [ ]YES [ ]No  Constipation: [ ] YES [ ] NO       Pain (0-10):      5        Pain Control Adequate: [ ] YES [ ] NO    Baker: indwelling	    NGT:      I&O's Detail    02 Oct 2017 07:01  -  03 Oct 2017 07:00  --------------------------------------------------------  IN:    lactated ringers.: 1050 mL  Total IN: 1050 mL    OUT:    Ileostomy: 5 mL    Indwelling Catheter - Urethral: 200 mL    Voided: 150 mL  Total OUT: 355 mL    Total NET: 695 mL        I&O's Summary    02 Oct 2017 07:01  -  03 Oct 2017 07:00  --------------------------------------------------------  IN: 1050 mL / OUT: 355 mL / NET: 695 mL      I&O's Detail    02 Oct 2017 07:01  -  03 Oct 2017 07:00  --------------------------------------------------------  IN:    lactated ringers.: 1050 mL  Total IN: 1050 mL    OUT:    Ileostomy: 5 mL    Indwelling Catheter - Urethral: 200 mL    Voided: 150 mL  Total OUT: 355 mL    Total NET: 695 mL          MEDICATIONS  (STANDING):  alvimopan 12 milliGRAM(s) Oral two times a day  bicalutamide 50 milliGRAM(s) Oral daily  cefoTEtan  IVPB 2 Gram(s) IV Intermittent once  cefoTEtan  IVPB 2 Gram(s) IV Intermittent once  heparin  Injectable 5000 Unit(s) SubCutaneous every 8 hours  hydrochlorothiazide 12.5 milliGRAM(s) Oral daily  HYDROmorphone PCA (1 mG/mL) 30 milliLiter(s) PCA Continuous PCA Continuous  lactated ringers. 1000 milliLiter(s) (150 mL/Hr) IV Continuous <Continuous>  latanoprost 0.005% Ophthalmic Solution 1 Drop(s) Both EYES at bedtime  metoprolol 25 milliGRAM(s) Oral two times a day  multivitamin 1 Tablet(s) Oral daily  sodium chloride 0.9% lock flush 3 milliLiter(s) IV Push every 8 hours  timolol 0.5% Solution 1 Drop(s) Both EYES two times a day    MEDICATIONS  (PRN):  naloxone Injectable 0.1 milliGRAM(s) IV Push every 3 minutes PRN For ANY of the following changes in patient status:  A. RR LESS THAN 10 breaths per minute, B. Oxygen saturation LESS THAN 90%, C. Sedation score of 6  ondansetron Injectable 4 milliGRAM(s) IV Push every 6 hours PRN Nausea and/or Vomiting      LABS: pending                  RADIOLOGY & ADDITIONAL STUDIES:

## 2017-10-03 NOTE — PROGRESS NOTE ADULT - PROBLEM SELECTOR PLAN 1
cont IVF, leave alejandre indwelling, OOB, NPO to clears today, chk I and O's, k labs, plan per Dr Hand

## 2017-10-03 NOTE — PROGRESS NOTE ADULT - SUBJECTIVE AND OBJECTIVE BOX
INTERVAL HPI/OVERNIGHT EVENTS: Patient seen and examined for a post op check. Patient complaining of abdominal pain, relieved by the PCA.  Alejandre in place. VSS. Denies n/v/d, sob, cp, palpitations, dizziness    STATUS POST:  LAR, loop ileostomy creation    POST OPERATIVE DAY #: 0      MEDICATIONS  (STANDING):  cefoTEtan  IVPB 2 Gram(s) IV Intermittent once  sodium chloride 0.9%. 1000 milliLiter(s) (100 mL/Hr) IV Continuous <Continuous>  bicalutamide 50 milliGRAM(s) Oral daily  metoprolol 25 milliGRAM(s) Oral two times a day  hydrochlorothiazide 12.5 milliGRAM(s) Oral daily  timolol 0.5% Solution 1 Drop(s) Both EYES two times a day  latanoprost 0.005% Ophthalmic Solution 1 Drop(s) Both EYES at bedtime  multivitamin 1 Tablet(s) Oral daily  HYDROmorphone PCA (1 mG/mL) 30 milliLiter(s) PCA Continuous PCA Continuous    MEDICATIONS  (PRN):  fentaNYL    Injectable 50 MICROGram(s) IV Push every 30 minutes PRN Severe Pain  HYDROmorphone  Injectable 0.5 milliGRAM(s) IV Push every 10 minutes PRN Moderate Pain  ondansetron Injectable 4 milliGRAM(s) IV Push once PRN Nausea and/or Vomiting  dexamethasone  IVPB 8 milliGRAM(s) IV Intermittent once PRN Nausea and/or Vomiting  naloxone Injectable 0.1 milliGRAM(s) IV Push every 3 minutes PRN For ANY of the following changes in patient status:  A. RR LESS THAN 10 breaths per minute, B. Oxygen saturation LESS THAN 90%, C. Sedation score of 6  ondansetron Injectable 4 milliGRAM(s) IV Push every 6 hours PRN Nausea      Vital Signs Last 24 Hrs  T(C): 36 (02 Oct 2017 22:25), Max: 36 (02 Oct 2017 13:27)  T(F): 96.8 (02 Oct 2017 22:25), Max: 96.8 (02 Oct 2017 13:27)  HR: 66 (03 Oct 2017 00:00) (61 - 70)  BP: 136/63 (03 Oct 2017 00:00) (111/55 - 152/74)  BP(mean): --  RR: 16 (03 Oct 2017 00:00) (12 - 19)  SpO2: 96% (03 Oct 2017 00:00) (95% - 100%)    PHYSICAL EXAM:      Constitutional: NAD  Eyes: EOMI  Respiratory: CTA b/l, no accessory muscle use, no accessory muscle use  Cardiovascular: S1, S2 RRR  Gastrointestinal: abdomen soft, appropriately TTP, provena in place with good seal, ileostomy healthy appearing, no stool in bag, no rebound, no guarding  Genitourinary: alejandre in place  Extremities: no edema, no calf tenderness  Neurological: A&O x3  Skin: warm and dry      I&O's Detail      LABS:                RADIOLOGY & ADDITIONAL STUDIES:

## 2017-10-03 NOTE — CONSULT NOTE ADULT - SUBJECTIVE AND OBJECTIVE BOX
Chief Complaint: postoperative abdominal pain       HPI:   ASHLEY SORENSON is a 61y Male that was seen and examined at bedside with wife present. Patient reports that pain is currently 5/10 and localized to the abdomen. He describes pain as intermittent and burning in nature. Pain is worse with movement and improved with IV pain medication through IV PCA. He reports that he is fearful of pain medications, for which he attributes the cause of his current medical course. He had been prescribed Percocet in the past, which caused severe constipation and lead to abdominal discomfort. He denies any nausea or vomiting. He denies any sedation or pruritis. He is now locked out of IV PCA due to 4hr limit. He offers no further complaints.       REVIEW OF SYSTEMS: X denotes positive finding, otherwise all additional items were reviewed and negative  GEN: [] fever, [] chills, [] change in appetite, [] weight loss, [] fatigue, [] sedation  ENT: [] change in vision, [] dry mouth, [] ringing in ears, [] sore throat  RESP: [] shortness of breath, [] Obstructive sleep apnea  CV: [] chest pain, [] palpitations   GI: [] nausea, [] vomiting, [] constipation, [] GERD  : [] bladder dysfunction, [] dysuria  MSK: [] weakness, [] joint pain, [] myalgias  Neuro: [x]pain, [] numbness, [] tingling, [] tremor, [] seizures  Skin: [] rash  Psych: [] anxiety, [] depression  Endo: [] polydipsia  Heme: [] coagulopathic disorder      PAST MEDICAL & SURGICAL HISTORY:  Rectal cancer  Prostate cancer  Hypertension  Glaucoma  S/P ORIF (open reduction internal fixation) fracture: Left ankle  History of lipoma: x 3      SOCIAL HISTORY:  Denies Smoking, Alcohol, or Drug Use  FAMILY HISTORY:  Family history of CABG (Father)  Family history of prostate cancer (Father, Sibling)  Family history of cervical cancer (Mother)  Family history of coronary artery disease (Father)  Family history of hypertension (Father)      Allergies    No Known Allergies    Intolerances      PAIN MEDICATIONS:  MEDICATIONS  (STANDING):  alvimopan 12 milliGRAM(s) Oral two times a day  bicalutamide 50 milliGRAM(s) Oral daily  cefoTEtan  IVPB 2 Gram(s) IV Intermittent once  heparin  Injectable 5000 Unit(s) SubCutaneous every 8 hours  HYDROmorphone PCA (1 mG/mL) 30 milliLiter(s) PCA Continuous PCA Continuous  lactated ringers. 1000 milliLiter(s) (150 mL/Hr) IV Continuous <Continuous>  latanoprost 0.005% Ophthalmic Solution 1 Drop(s) Both EYES at bedtime  metoprolol 25 milliGRAM(s) Oral two times a day  multivitamin 1 Tablet(s) Oral daily  sodium chloride 0.9% lock flush 3 milliLiter(s) IV Push every 8 hours  timolol 0.5% Solution 1 Drop(s) Both EYES two times a day    MEDICATIONS  (PRN):  naloxone Injectable 0.1 milliGRAM(s) IV Push every 3 minutes PRN For ANY of the following changes in patient status:  A. RR LESS THAN 10 breaths per minute, B. Oxygen saturation LESS THAN 90%, C. Sedation score of 6  ondansetron Injectable 4 milliGRAM(s) IV Push every 6 hours PRN Nausea and/or Vomiting      Vital Signs Last 24 Hrs  T(C): 36.7 (03 Oct 2017 09:14), Max: 36.7 (03 Oct 2017 09:14)  T(F): 98 (03 Oct 2017 09:14), Max: 98 (03 Oct 2017 09:14)  HR: 62 (03 Oct 2017 09:14) (61 - 72)  BP: 122/60 (03 Oct 2017 09:14) (111/55 - 152/74)  BP(mean): --  RR: 18 (03 Oct 2017 09:14) (12 - 20)  SpO2: 100% (03 Oct 2017 09:14) (95% - 100%)             PHYSICAL EXAM: X denotes positive finding   GENERAL: [x] cooperative, [] uncooperative, [x] NAD, [] in distress, [x] speech clear and coherent  HEENT: [x] NCAT, [x] EOMI, [] pupils non pinpoint, [] no external deformities, [] NGT   NECK: [x] normal overall appearance, [] no gross masses  RESPIRATORY: [x] unlabored respirations, [] on room air, [x] on supplemental O2, [] labored respirations  CV: [x] regular rate, [] regular rhythm, [x] no LE edema, [] pitting LE edema  Abdomen: [x] soft, [] non-tender, [x] tender to palpation, [] pos bowel sounds  : [] alejandre, [x] deferred  Skin: [] normal texture, [] rash, [x] lesion(s), [] drain(s)   MSK: [x] limited AROM, [] extremities antigravity x4, [] normal gait  Neuro: [x] SILT, [] sensation reduced to light touch, [] abnormal DTRs  Psych: [x] oriented to person, place, time, [] displays insight, [] limited/impaired insight, [] poor coping skills       LABS:        Drug Screen:      Radiology:      :  This report was requested by: Walter Gill | Reference #: [id number]   Rx Written	Rx Dispensed	Drug	Quantity	Days Supply	Prescriber Name			  05/18/2017	05/19/2017	alprazolam 0.5 mg tablet 	2	1	Beto Kim			  04/27/2017	04/27/2017	alprazolam 0.5 mg tablet 	6	3	Aurora Swenson)			  Rx Written	Rx Dispensed	Drug	Quantity	Days Supply	Prescriber Name			  03/30/2017	03/30/2017	oxycodone-acetaminophen 5-325 mg tablet 	10	2	Romana, Rachel A			  Prescriptions Dispensed in [place]  There are no results for the search terms that you entered.   Prescriptions Dispensed in [place]  There are no results for the search terms that you entered.   Prescriptions Dispensed in [place]  There are no results for the search terms that you entered.   Prescriptions Dispensed in [place]  There are no results for the search terms that you entered.   Prescriptions Dispensed in [place]  There are no results for the search terms that you entered.   Prescriptions Dispensed in [place]  There are no results for the search terms that you entered.   Prescriptions Dispensed in [facility]  There are no results for the search terms that you entered.   Prescriptions Dispensed in [place]  There are no results for the search terms that you entered.   Prescriptions Dispensed in [place]  There are no results for the search terms that you entered.   Prescriptions Dispensed in [place]  There are no results for the search terms that you entered.   Prescriptions Dispensed in [place]  There are no results for the search terms that you entered.   Prescriptions Dispensed in [place]  There are no results for the search terms that you entered.   Prescriptions Dispensed in [place]  There are no results for the search terms that you entered.   Prescriptions Dispensed in [place]  There are no results for the search terms that you entered.   Prescriptions Dispensed in [place]  There are no results for the search terms that you entered.   Prescriptions Dispensed in [place]  There are no results for the search terms that you entered.   Prescriptions Dispensed in [place]  There are no results for the search terms that you entered.   Prescriptions Dispensed in [place]  There are no results for the search terms that you entered.   Prescriptions Dispensed in [place]  There are no results for the search terms that you entered.

## 2017-10-03 NOTE — CONSULT NOTE ADULT - ATTENDING COMMENTS
PLAN-  #Opioid:  - c/w Dilaudid IV PCA 0/0.2/6  - Please monitor for oversedation and respiratory depression     #Adjuvant Analgesics:  - start acetaminophen 1g IV x1 prn mild pain, monitor LFTs  - start ketorolac 30mg IV q6h prn moderate pain, monitor renal fx     #Bowel Regimen:  - per primary team as clinically indicated     Risks, benefits, and alternatives to opioids discussed with patient, who verbalizes understanding. Patient counseled on treatment plan and pain expectations. All questions and concerns addressed at this time.    Please page if any concerns: [phone].

## 2017-10-03 NOTE — PROGRESS NOTE ADULT - ASSESSMENT
61 year old male, pod0 s/p LAR ileostomy creation  - pain control PRN with PCA  - monitor vital signs  - encourage deep breathing, IS  - NPO , IVF  - DVT PPX  - alejandre  remainder of care as per Dr. Hand

## 2017-10-03 NOTE — CONSULT NOTE ADULT - ASSESSMENT
ASHLEY SORENSON is a 61y Male who presents with pain in the abdomen which is nociceptive in nature due to s/p loop ileostomy, LAR.  Pain is intermittently controlled with current regimen.     Co-morbid Conditions:  Malignant neoplasm of rectum  No h/o HF  Family history of CABG (Father)  Family history of prostate cancer (Father, Sibling)  Family history of cervical cancer (Mother)  Family history of coronary artery disease (Father)  Family history of hypertension (Father)  Handoff  MEWS Score  Rectal cancer  Colon cancer  Prostate cancer  Hypertension  Glaucoma  Rectal cancer  Rectal cancer  Rectal cancer  CA of rectum  Low anterior resection of rectum  Ileostomy, loop  S/P ORIF (open reduction internal fixation) fracture  History of lipoma  No significant past surgical history  MALIGNANT NEOPLASM OF RECTUM

## 2017-10-04 LAB
ANION GAP SERPL CALC-SCNC: 10 MMOL/L — SIGNIFICANT CHANGE UP (ref 5–17)
ANION GAP SERPL CALC-SCNC: 9 MMOL/L — SIGNIFICANT CHANGE UP (ref 5–17)
BASOPHILS # BLD AUTO: 0 K/UL — SIGNIFICANT CHANGE UP (ref 0–0.2)
BASOPHILS NFR BLD AUTO: 0.1 % — SIGNIFICANT CHANGE UP (ref 0–2)
BUN SERPL-MCNC: 14 MG/DL — SIGNIFICANT CHANGE UP (ref 8–20)
BUN SERPL-MCNC: 16 MG/DL — SIGNIFICANT CHANGE UP (ref 8–20)
CALCIUM SERPL-MCNC: 8.9 MG/DL — SIGNIFICANT CHANGE UP (ref 8.6–10.2)
CALCIUM SERPL-MCNC: 9.3 MG/DL — SIGNIFICANT CHANGE UP (ref 8.6–10.2)
CHLORIDE SERPL-SCNC: 101 MMOL/L — SIGNIFICANT CHANGE UP (ref 98–107)
CHLORIDE SERPL-SCNC: 98 MMOL/L — SIGNIFICANT CHANGE UP (ref 98–107)
CK MB CFR SERPL CALC: 2.1 NG/ML — SIGNIFICANT CHANGE UP (ref 0–6.7)
CK SERPL-CCNC: 342 U/L — HIGH (ref 30–200)
CO2 SERPL-SCNC: 30 MMOL/L — HIGH (ref 22–29)
CO2 SERPL-SCNC: 31 MMOL/L — HIGH (ref 22–29)
CREAT SERPL-MCNC: 0.97 MG/DL — SIGNIFICANT CHANGE UP (ref 0.5–1.3)
CREAT SERPL-MCNC: 1 MG/DL — SIGNIFICANT CHANGE UP (ref 0.5–1.3)
EOSINOPHIL # BLD AUTO: 0 K/UL — SIGNIFICANT CHANGE UP (ref 0–0.5)
EOSINOPHIL NFR BLD AUTO: 0.6 % — SIGNIFICANT CHANGE UP (ref 0–5)
GLUCOSE SERPL-MCNC: 97 MG/DL — SIGNIFICANT CHANGE UP (ref 70–115)
GLUCOSE SERPL-MCNC: 99 MG/DL — SIGNIFICANT CHANGE UP (ref 70–115)
HCT VFR BLD CALC: 32.1 % — LOW (ref 42–52)
HCT VFR BLD CALC: 35 % — LOW (ref 42–52)
HGB BLD-MCNC: 10.9 G/DL — LOW (ref 14–18)
HGB BLD-MCNC: 11.8 G/DL — LOW (ref 14–18)
LACTATE SERPL-SCNC: 0.9 MMOL/L — SIGNIFICANT CHANGE UP (ref 0.5–2)
LYMPHOCYTES # BLD AUTO: 0.6 K/UL — LOW (ref 1–4.8)
LYMPHOCYTES # BLD AUTO: 7 % — LOW (ref 20–55)
MAGNESIUM SERPL-MCNC: 1.9 MG/DL — SIGNIFICANT CHANGE UP (ref 1.6–2.6)
MAGNESIUM SERPL-MCNC: 1.9 MG/DL — SIGNIFICANT CHANGE UP (ref 1.6–2.6)
MCHC RBC-ENTMCNC: 31.3 PG — HIGH (ref 27–31)
MCHC RBC-ENTMCNC: 31.5 PG — HIGH (ref 27–31)
MCHC RBC-ENTMCNC: 33.7 G/DL — SIGNIFICANT CHANGE UP (ref 32–36)
MCHC RBC-ENTMCNC: 34 G/DL — SIGNIFICANT CHANGE UP (ref 32–36)
MCV RBC AUTO: 92.8 FL — SIGNIFICANT CHANGE UP (ref 80–94)
MCV RBC AUTO: 92.8 FL — SIGNIFICANT CHANGE UP (ref 80–94)
MONOCYTES # BLD AUTO: 0.6 K/UL — SIGNIFICANT CHANGE UP (ref 0–0.8)
MONOCYTES NFR BLD AUTO: 6.9 % — SIGNIFICANT CHANGE UP (ref 3–10)
NEUTROPHILS # BLD AUTO: 6.9 K/UL — SIGNIFICANT CHANGE UP (ref 1.8–8)
NEUTROPHILS NFR BLD AUTO: 85.2 % — HIGH (ref 37–73)
NT-PROBNP SERPL-SCNC: 552 PG/ML — HIGH (ref 0–300)
PHOSPHATE SERPL-MCNC: 3.2 MG/DL — SIGNIFICANT CHANGE UP (ref 2.4–4.7)
PHOSPHATE SERPL-MCNC: 3.5 MG/DL — SIGNIFICANT CHANGE UP (ref 2.4–4.7)
PLATELET # BLD AUTO: 156 K/UL — SIGNIFICANT CHANGE UP (ref 150–400)
PLATELET # BLD AUTO: 161 K/UL — SIGNIFICANT CHANGE UP (ref 150–400)
POTASSIUM SERPL-MCNC: 4.1 MMOL/L — SIGNIFICANT CHANGE UP (ref 3.5–5.3)
POTASSIUM SERPL-MCNC: 4.3 MMOL/L — SIGNIFICANT CHANGE UP (ref 3.5–5.3)
POTASSIUM SERPL-SCNC: 4.1 MMOL/L — SIGNIFICANT CHANGE UP (ref 3.5–5.3)
POTASSIUM SERPL-SCNC: 4.3 MMOL/L — SIGNIFICANT CHANGE UP (ref 3.5–5.3)
RBC # BLD: 3.46 M/UL — LOW (ref 4.6–6.2)
RBC # BLD: 3.77 M/UL — LOW (ref 4.6–6.2)
RBC # FLD: 13.7 % — SIGNIFICANT CHANGE UP (ref 11–15.6)
RBC # FLD: 13.8 % — SIGNIFICANT CHANGE UP (ref 11–15.6)
SODIUM SERPL-SCNC: 139 MMOL/L — SIGNIFICANT CHANGE UP (ref 135–145)
SODIUM SERPL-SCNC: 140 MMOL/L — SIGNIFICANT CHANGE UP (ref 135–145)
T3 SERPL-MCNC: 67 NG/DL — LOW (ref 80–200)
T4 AB SER-ACNC: 5.8 UG/DL — SIGNIFICANT CHANGE UP (ref 4.5–12)
TROPONIN T SERPL-MCNC: <0.01 NG/ML — SIGNIFICANT CHANGE UP (ref 0–0.06)
TROPONIN T SERPL-MCNC: <0.01 NG/ML — SIGNIFICANT CHANGE UP (ref 0–0.06)
TSH SERPL-MCNC: 0.71 UIU/ML — SIGNIFICANT CHANGE UP (ref 0.27–4.2)
WBC # BLD: 7.6 K/UL — SIGNIFICANT CHANGE UP (ref 4.8–10.8)
WBC # BLD: 8.1 K/UL — SIGNIFICANT CHANGE UP (ref 4.8–10.8)
WBC # FLD AUTO: 7.6 K/UL — SIGNIFICANT CHANGE UP (ref 4.8–10.8)
WBC # FLD AUTO: 8.1 K/UL — SIGNIFICANT CHANGE UP (ref 4.8–10.8)

## 2017-10-04 PROCEDURE — 93970 EXTREMITY STUDY: CPT | Mod: 26

## 2017-10-04 PROCEDURE — 71010: CPT | Mod: 26

## 2017-10-04 PROCEDURE — 74000: CPT | Mod: 26

## 2017-10-04 PROCEDURE — 93010 ELECTROCARDIOGRAM REPORT: CPT

## 2017-10-04 RX ORDER — MAGNESIUM SULFATE 500 MG/ML
2 VIAL (ML) INJECTION ONCE
Qty: 0 | Refills: 0 | Status: COMPLETED | OUTPATIENT
Start: 2017-10-04 | End: 2017-10-04

## 2017-10-04 RX ORDER — METOPROLOL TARTRATE 50 MG
5 TABLET ORAL ONCE
Qty: 0 | Refills: 0 | Status: COMPLETED | OUTPATIENT
Start: 2017-10-04 | End: 2017-10-04

## 2017-10-04 RX ORDER — DILTIAZEM HCL 120 MG
15 CAPSULE, EXT RELEASE 24 HR ORAL
Qty: 125 | Refills: 0 | Status: DISCONTINUED | OUTPATIENT
Start: 2017-10-04 | End: 2017-10-05

## 2017-10-04 RX ORDER — KETOROLAC TROMETHAMINE 30 MG/ML
15 SYRINGE (ML) INJECTION EVERY 6 HOURS
Qty: 0 | Refills: 0 | Status: DISCONTINUED | OUTPATIENT
Start: 2017-10-04 | End: 2017-10-07

## 2017-10-04 RX ORDER — SODIUM CHLORIDE 9 MG/ML
1000 INJECTION, SOLUTION INTRAVENOUS
Qty: 0 | Refills: 0 | Status: DISCONTINUED | OUTPATIENT
Start: 2017-10-04 | End: 2017-10-07

## 2017-10-04 RX ORDER — PANTOPRAZOLE SODIUM 20 MG/1
40 TABLET, DELAYED RELEASE ORAL ONCE
Qty: 0 | Refills: 0 | Status: COMPLETED | OUTPATIENT
Start: 2017-10-04 | End: 2017-10-04

## 2017-10-04 RX ORDER — PANTOPRAZOLE SODIUM 20 MG/1
40 TABLET, DELAYED RELEASE ORAL DAILY
Qty: 0 | Refills: 0 | Status: DISCONTINUED | OUTPATIENT
Start: 2017-10-04 | End: 2017-10-09

## 2017-10-04 RX ORDER — METOPROLOL TARTRATE 50 MG
25 TABLET ORAL EVERY 6 HOURS
Qty: 0 | Refills: 0 | Status: DISCONTINUED | OUTPATIENT
Start: 2017-10-04 | End: 2017-10-05

## 2017-10-04 RX ORDER — DILTIAZEM HCL 120 MG
10 CAPSULE, EXT RELEASE 24 HR ORAL
Qty: 125 | Refills: 0 | Status: DISCONTINUED | OUTPATIENT
Start: 2017-10-04 | End: 2017-10-04

## 2017-10-04 RX ORDER — ZOLPIDEM TARTRATE 10 MG/1
5 TABLET ORAL AT BEDTIME
Qty: 0 | Refills: 0 | Status: DISCONTINUED | OUTPATIENT
Start: 2017-10-04 | End: 2017-10-09

## 2017-10-04 RX ORDER — CALCIUM CARBONATE 500(1250)
2 TABLET ORAL ONCE
Qty: 0 | Refills: 0 | Status: COMPLETED | OUTPATIENT
Start: 2017-10-04 | End: 2017-10-04

## 2017-10-04 RX ORDER — BENZOCAINE 10 %
1 GEL (GRAM) MUCOUS MEMBRANE
Qty: 0 | Refills: 0 | Status: DISCONTINUED | OUTPATIENT
Start: 2017-10-04 | End: 2017-10-09

## 2017-10-04 RX ADMIN — Medication 15 MILLIGRAM(S): at 23:50

## 2017-10-04 RX ADMIN — SODIUM CHLORIDE 150 MILLILITER(S): 9 INJECTION, SOLUTION INTRAVENOUS at 01:12

## 2017-10-04 RX ADMIN — Medication 25 MILLIGRAM(S): at 06:49

## 2017-10-04 RX ADMIN — Medication 10 MG/HR: at 15:56

## 2017-10-04 RX ADMIN — Medication 1 TABLET(S): at 13:22

## 2017-10-04 RX ADMIN — PANTOPRAZOLE SODIUM 40 MILLIGRAM(S): 20 TABLET, DELAYED RELEASE ORAL at 09:17

## 2017-10-04 RX ADMIN — LATANOPROST 1 DROP(S): 0.05 SOLUTION/ DROPS OPHTHALMIC; TOPICAL at 23:15

## 2017-10-04 RX ADMIN — Medication 5 MILLIGRAM(S): at 13:17

## 2017-10-04 RX ADMIN — PANTOPRAZOLE SODIUM 40 MILLIGRAM(S): 20 TABLET, DELAYED RELEASE ORAL at 18:02

## 2017-10-04 RX ADMIN — Medication 15 MILLIGRAM(S): at 18:02

## 2017-10-04 RX ADMIN — Medication 20 MILLILITER(S): at 09:13

## 2017-10-04 RX ADMIN — Medication 5 MILLIGRAM(S): at 13:19

## 2017-10-04 RX ADMIN — Medication 2 TABLET(S): at 15:57

## 2017-10-04 RX ADMIN — SODIUM CHLORIDE 150 MILLILITER(S): 9 INJECTION, SOLUTION INTRAVENOUS at 06:43

## 2017-10-04 RX ADMIN — Medication 1000 MILLIGRAM(S): at 07:00

## 2017-10-04 RX ADMIN — BICALUTAMIDE 50 MILLIGRAM(S): 50 TABLET, FILM COATED ORAL at 13:22

## 2017-10-04 RX ADMIN — Medication 15 MILLIGRAM(S): at 23:21

## 2017-10-04 RX ADMIN — Medication 0.5 MILLIGRAM(S): at 18:12

## 2017-10-04 RX ADMIN — Medication 1 DROP(S): at 18:24

## 2017-10-04 RX ADMIN — Medication 25 MILLIGRAM(S): at 18:24

## 2017-10-04 RX ADMIN — HEPARIN SODIUM 5000 UNIT(S): 5000 INJECTION INTRAVENOUS; SUBCUTANEOUS at 06:43

## 2017-10-04 RX ADMIN — Medication 400 MILLIGRAM(S): at 06:49

## 2017-10-04 RX ADMIN — HEPARIN SODIUM 5000 UNIT(S): 5000 INJECTION INTRAVENOUS; SUBCUTANEOUS at 14:00

## 2017-10-04 RX ADMIN — HYDROMORPHONE HYDROCHLORIDE 30 MILLILITER(S): 2 INJECTION INTRAMUSCULAR; INTRAVENOUS; SUBCUTANEOUS at 19:36

## 2017-10-04 RX ADMIN — Medication 15 MG/HR: at 18:30

## 2017-10-04 RX ADMIN — Medication 1 DROP(S): at 06:43

## 2017-10-04 RX ADMIN — HEPARIN SODIUM 5000 UNIT(S): 5000 INJECTION INTRAVENOUS; SUBCUTANEOUS at 23:15

## 2017-10-04 RX ADMIN — SODIUM CHLORIDE 3 MILLILITER(S): 9 INJECTION INTRAMUSCULAR; INTRAVENOUS; SUBCUTANEOUS at 22:07

## 2017-10-04 RX ADMIN — ALVIMOPAN 12 MILLIGRAM(S): 12 CAPSULE ORAL at 06:42

## 2017-10-04 RX ADMIN — HYDROMORPHONE HYDROCHLORIDE 30 MILLILITER(S): 2 INJECTION INTRAMUSCULAR; INTRAVENOUS; SUBCUTANEOUS at 07:18

## 2017-10-04 RX ADMIN — Medication 15 MILLIGRAM(S): at 18:28

## 2017-10-04 RX ADMIN — Medication 15 MILLIGRAM(S): at 18:42

## 2017-10-04 RX ADMIN — SODIUM CHLORIDE 3 MILLILITER(S): 9 INJECTION INTRAMUSCULAR; INTRAVENOUS; SUBCUTANEOUS at 06:41

## 2017-10-04 RX ADMIN — Medication 15 MILLIGRAM(S): at 18:12

## 2017-10-04 RX ADMIN — Medication 50 GRAM(S): at 18:19

## 2017-10-04 RX ADMIN — SODIUM CHLORIDE 3 MILLILITER(S): 9 INJECTION INTRAMUSCULAR; INTRAVENOUS; SUBCUTANEOUS at 13:49

## 2017-10-04 RX ADMIN — ALVIMOPAN 12 MILLIGRAM(S): 12 CAPSULE ORAL at 18:12

## 2017-10-04 RX ADMIN — Medication 25 MILLIGRAM(S): at 23:22

## 2017-10-04 RX ADMIN — Medication 1 SPRAY(S): at 23:20

## 2017-10-04 NOTE — CONSULT NOTE ADULT - SUBJECTIVE AND OBJECTIVE BOX
[facility]                [address] [address]                                     PHONE: [phone]                                       FAX: [phone]  [email or web address]      Reason for Consult: atrial fibrillation with RVR     HPI:  Patient is a 60 y/o man with prostate cancer on casodex, and recently diagnoses colorectal cancer who was found to have atrial fibrillation on pre-operative testing who is now s/p low anterior resection of the rectum with loop ileostomy who developed atrial fibrillation with RVR. Patient denies chest pain and palpitations. He reports severe suprapubic discomfort and abdominal distension as well as insomnia and dyspepsia. Patient received IVP diltiazem and was started on a diltiazem gtt, however, continues to have RVR.   Telemetry reviewed and patient with paroxysms of heart rate to 160-180    PAST MEDICAL & SURGICAL HISTORY:  Rectal cancer  Prostate cancer  Hypertension  Glaucoma  S/P ORIF (open reduction internal fixation) fracture: Left ankle  History of lipoma: x 3    PREVIOUS DIAGNOSTIC TESTING:      ECHO 9/7/17  FINDINGS: Normal LVEF systolic function EF 60-65%  Trace MR and TR    STRESS: PET/CT MPI 9/22/17  FINDINGS: no ischemic changes on EKG, normal myocardial perfusion post stress, EF 53%    MEDICATIONS  (STANDING):  alvimopan 12 milliGRAM(s) Oral two times a day  benzocaine 20% Spray 1 Spray(s) Topical four times a day  bicalutamide 50 milliGRAM(s) Oral daily  cefoTEtan  IVPB 2 Gram(s) IV Intermittent once  diltiazem Infusion 15 mG/Hr (15 mL/Hr) IV Continuous <Continuous>  diltiazem Injectable 20 milliGRAM(s) IV Push once  heparin  Injectable 5000 Unit(s) SubCutaneous every 8 hours  HYDROmorphone PCA (1 mG/mL) 30 milliLiter(s) PCA Continuous PCA Continuous  ketorolac   Injectable 15 milliGRAM(s) IV Push every 6 hours  lactated ringers. 1000 milliLiter(s) (125 mL/Hr) IV Continuous <Continuous>  latanoprost 0.005% Ophthalmic Solution 1 Drop(s) Both EYES at bedtime  LORazepam     Tablet 0.5 milliGRAM(s) Oral once  magnesium sulfate  IVPB 2 Gram(s) IV Intermittent once  metoprolol 25 milliGRAM(s) Oral every 6 hours  multivitamin 1 Tablet(s) Oral daily  pantoprazole   Suspension 40 milliGRAM(s) Oral daily  sodium chloride 0.9% lock flush 3 milliLiter(s) IV Push every 8 hours  timolol 0.5% Solution 1 Drop(s) Both EYES two times a day    MEDICATIONS  (PRN):  aluminum hydroxide/magnesium hydroxide/simethicone Suspension 30 milliLiter(s) Oral every 4 hours PRN Dyspepsia  ketorolac   Injectable 30 milliGRAM(s) IV Push every 6 hours PRN Severe Pain (7 - 10)  naloxone Injectable 0.1 milliGRAM(s) IV Push every 3 minutes PRN For ANY of the following changes in patient status:  A. RR LESS THAN 10 breaths per minute, B. Oxygen saturation LESS THAN 90%, C. Sedation score of 6  ondansetron Injectable 4 milliGRAM(s) IV Push every 6 hours PRN Nausea and/or Vomiting  zolpidem 5 milliGRAM(s) Oral at bedtime PRN Insomnia    FAMILY HISTORY:  Family history of CABG (Father)  Family history of prostate cancer (Father, Sibling)  Family history of cervical cancer (Mother)  Family history of coronary artery disease (Father)  Family history of hypertension (Father)    SOCIAL HISTORY:  CIGARETTES: former smoker  ALCOHOL: social ETOH    ROS: Negative other than as mentioned in HPI.    Vital Signs Last 24 Hrs  T(C): 36.8 (04 Oct 2017 16:18), Max: 37.4 (03 Oct 2017 23:17)  T(F): 98.3 (04 Oct 2017 16:18), Max: 99.3 (03 Oct 2017 23:17)  HR: 133 (04 Oct 2017 16:18) (65 - 133)  BP: 103/67 (04 Oct 2017 16:18) (103/67 - 149/88)  BP(mean): --  RR: 20 (04 Oct 2017 16:18) (17 - 20)  SpO2: 95% (04 Oct 2017 16:18) (95% - 100%)    PHYSICAL EXAM:  General: Appears well developed, well nourished alert and cooperative.  HEENT: Head; normocephalic, atraumatic.  Eyes;   Pupils reactive, cornea wnl.  Neck; Supple, no nodes adenopathy, no NVD or carotid bruit or thyromegaly.  CARDIOVASCULAR; tachycardic, irregularly irregular, no murmur appreciated, no rub, gallop or lift. Normal S1 and S2.  LUNGS; Poor effort, decreased breath sounds at the bases, No rales, rhonchi or wheeze. Normal breath sounds bilaterally.  ABDOMEN ; distended, tympanic, ileostomy with pink tissue   EXTREMITIES; No clubbing, cyanosis or edema. Distal pulses wnl. ROM normal.  SKIN; warm and dry with normal turgor.  NEURO; Alert/oriented x 3/normal motor exam. No pathologic reflexes.    PSYCH; normal affect.          INTERPRETATION OF TELEMETRY:    ECG: atrial fibrillation with RVR     I&O's Detail    03 Oct 2017 07:01  -  04 Oct 2017 07:00  --------------------------------------------------------  IN:    lactated ringers.: 3600 mL  Total IN: 3600 mL    OUT:    Indwelling Catheter - Urethral: 2100 mL  Total OUT: 2100 mL    Total NET: 1500 mL          LABS:                        11.8   7.6   )-----------( 161      ( 04 Oct 2017 14:58 )             35.0     10-04    139  |  98  |  14.0  ----------------------------<  97  4.3   |  31.0<H>  |  0.97    Ca    9.3      04 Oct 2017 14:58  Phos  3.5     10-04  Mg     1.9     10-04      CARDIAC MARKERS ( 04 Oct 2017 14:58 )  x     / <0.01 ng/mL / 342 U/L / x     / 2.1 ng/mL          I&O's Summary    03 Oct 2017 07:01  -  04 Oct 2017 07:00  --------------------------------------------------------  IN: 3600 mL / OUT: 2100 mL / NET: 1500 mL        RADIOLOGY & ADDITIONAL STUDIES:

## 2017-10-04 NOTE — CONSULT NOTE ADULT - ASSESSMENT
60 y/o man with prostate cancer on casodex, and recently diagnoses colorectal cancer who was found to have atrial fibrillation on pre-operative testing who is now s/p low anterior resection of the rectum with loop ileostomy who developed atrial fibrillation with RVR    Atrial fibrillation with RVR  [id number] 1, OP discussion re: AC   Patient with a myriad of adrenergic stimulators including post op pain/discomfort, anxiety and insomnia   Continue diltiazem gtt  Increase metoprolol to 25mg Q6hrs, holding parameters SBP<90  Discontinue HCTZ   Increase IVF to 125cc/hr  Aggressive pain control and anxiolytic   Abdominal xray to evaluate for ileus     We will continue to follow this patient. Thank you for pleasure of this consult. Please call if there are any concerns related to the care of this patient.

## 2017-10-04 NOTE — PROGRESS NOTE ADULT - SUBJECTIVE AND OBJECTIVE BOX
INTERVAL HPI/OVERNIGHT EVENTS/SUBJECTIVE:  61yM POD2 LAR, lymph node dissection with ileostomy for malignant neoplasm of rectum. Seen and examined at bedside this AM. c/o abdominal soreness and indigestion, also c/o some nausea from indigestion. +alejandre, ileostomy viable. Denies CP, SOB, palpitations, HA, f/c.     ICU Vital Signs Last 24 Hrs  T(C): 37.4 (04 Oct 2017 04:07), Max: 37.4 (03 Oct 2017 23:17)  T(F): 99.3 (04 Oct 2017 04:07), Max: 99.3 (03 Oct 2017 23:17)  HR: 73 (04 Oct 2017 04:07) (62 - 73)  BP: 119/69 (04 Oct 2017 04:07) (117/71 - 149/88)  BP(mean): --  ABP: --  ABP(mean): --  RR: 17 (04 Oct 2017 04:07) (17 - 19)  SpO2: 99% (04 Oct 2017 04:07) (94% - 100%)      I&O's Detail    03 Oct 2017 07:01  -  04 Oct 2017 07:00  --------------------------------------------------------  IN:    lactated ringers.: 3600 mL  Total IN: 3600 mL    OUT:    Indwelling Catheter - Urethral: 2100 mL  Total OUT: 2100 mL    Total NET: 1500 mL      MEDICATIONS  (STANDING):  aluminum hydroxide/magnesium hydroxide/simethicone Suspension 20 milliLiter(s) Oral once  alvimopan 12 milliGRAM(s) Oral two times a day  benzocaine 20% Spray 1 Spray(s) Topical four times a day  bicalutamide 50 milliGRAM(s) Oral daily  cefoTEtan  IVPB 2 Gram(s) IV Intermittent once  heparin  Injectable 5000 Unit(s) SubCutaneous every 8 hours  HYDROmorphone PCA (1 mG/mL) 30 milliLiter(s) PCA Continuous PCA Continuous  lactated ringers. 1000 milliLiter(s) (150 mL/Hr) IV Continuous <Continuous>  latanoprost 0.005% Ophthalmic Solution 1 Drop(s) Both EYES at bedtime  metoprolol 25 milliGRAM(s) Oral two times a day  multivitamin 1 Tablet(s) Oral daily  pantoprazole  Injectable 40 milliGRAM(s) IV Push once  sodium chloride 0.9% lock flush 3 milliLiter(s) IV Push every 8 hours  timolol 0.5% Solution 1 Drop(s) Both EYES two times a day    MEDICATIONS  (PRN):  ketorolac   Injectable 30 milliGRAM(s) IV Push every 6 hours PRN Severe Pain (7 - 10)  naloxone Injectable 0.1 milliGRAM(s) IV Push every 3 minutes PRN For ANY of the following changes in patient status:  A. RR LESS THAN 10 breaths per minute, B. Oxygen saturation LESS THAN 90%, C. Sedation score of 6  ondansetron Injectable 4 milliGRAM(s) IV Push every 6 hours PRN Nausea and/or Vomiting      MISC:     PHYSICAL EXAM:    Gen:  NAD, laying in bed.     Neurological:n AAOx3, no loss of sensation, no neuro deficits.     Neck: supple    Pulmonary: CTAB/L, non-labored, no accessory muscle use    Cardiovascular: s1/s2, RRR    Gastrointestinal: provena in place, abd is distended, appropriately tender, hypoactive BS, ileostomy with liquid stool, stoma pink. No guarding or rebound, no tympany.     Genitourinary: +alejandre  Extremities: SCD in place , no calf TTP b/l    Skin: warm and dry, no rashes      LABS:  CBC Full  -  ( 03 Oct 2017 12:26 )  WBC Count : 9.9 K/uL  Hemoglobin : 12.2 g/dL  Hematocrit : 35.6 %  Platelet Count - Automated : 188 K/uL  Mean Cell Volume : 92.0 fl  Mean Cell Hemoglobin : 31.5 pg  Mean Cell Hemoglobin Concentration : 34.3 g/dL  Auto Neutrophil # : x  Auto Lymphocyte # : x  Auto Monocyte # : x  Auto Eosinophil # : x  Auto Basophil # : x  Auto Neutrophil % : x  Auto Lymphocyte % : x  Auto Monocyte % : x  Auto Eosinophil % : x  Auto Basophil % : x    10-03    140  |  101  |  19.0  ----------------------------<  135<H>  4.8   |  25.0  |  1.03    Ca    9.0      03 Oct 2017 12:26      ASSESSMENT/PLAN:  61yMale  POD2 LAR, lymph node dissection with ileostomy for malignant neoplasm of rectum. c/o indigestion and abd soreness.   -maalox and protonix ordered  -monitor i/o, urine output  -pain control PCA  -encourage ISS and OOB ambulation  -CLD, possible advance today  -f/u am labs  -dvt ppx  -will discuss plan with attending

## 2017-10-04 NOTE — ADVANCED PRACTICE NURSE CONSULT - ASSESSMENT
Pt is 60 y/o male, alert and oriented x3. Pt is known to ostomy nurse from the pre-op ostomy education and stoma marking on 9/29/2017. Pt s/p new ileostomy at abdominal right lower quadrant. Stoma assessed through clear pouching system, stoma red, moist and protuberant, no leaking issues with pouching system, minimal bilious output noted in the pouching, Abdomen soft, but distended, minimal flatus, alejandre bedside bag with clear yellow urine. Midline abdominal surgical wound with Prevena, dressing dry and intact. Unable to remove pt's ostomy pouching now since the ostomy pouching and pt's Prevena surgical wound dressing are overlapping each other. Spoke with ELA Mackey, will reviewe the hand on ostomy teaching with pt after the prevena removal.

## 2017-10-04 NOTE — PROGRESS NOTE ADULT - SUBJECTIVE AND OBJECTIVE BOX
HPI:  60 y/o male with rectal bleeding had colonoscopy and was DX with rectal cancer patient now presents for laparoscopic possible open low anterior resection lymph node dissection and possible  ileostomy (26 Sep 2017 15:37)     Allergies    No Known Allergies    Intolerances      Rectal cancer  Colon cancer  Prostate cancer  Hypertension  Glaucoma    MEDICATIONS  (STANDING):  alvimopan 12 milliGRAM(s) Oral two times a day  benzocaine 20% Spray 1 Spray(s) Topical four times a day  bicalutamide 50 milliGRAM(s) Oral daily  cefoTEtan  IVPB 2 Gram(s) IV Intermittent once  diltiazem Infusion 15 mG/Hr (15 mL/Hr) IV Continuous <Continuous>  heparin  Injectable 5000 Unit(s) SubCutaneous every 8 hours  HYDROmorphone PCA (1 mG/mL) 30 milliLiter(s) PCA Continuous PCA Continuous  ketorolac   Injectable 15 milliGRAM(s) IV Push every 6 hours  lactated ringers. 1000 milliLiter(s) (125 mL/Hr) IV Continuous <Continuous>  latanoprost 0.005% Ophthalmic Solution 1 Drop(s) Both EYES at bedtime  metoprolol 25 milliGRAM(s) Oral every 6 hours  multivitamin 1 Tablet(s) Oral daily  pantoprazole   Suspension 40 milliGRAM(s) Oral daily  sodium chloride 0.9% lock flush 3 milliLiter(s) IV Push every 8 hours  timolol 0.5% Solution 1 Drop(s) Both EYES two times a day    MEDICATIONS  (PRN):  aluminum hydroxide/magnesium hydroxide/simethicone Suspension 30 milliLiter(s) Oral every 4 hours PRN Dyspepsia  ketorolac   Injectable 30 milliGRAM(s) IV Push every 6 hours PRN Severe Pain (7 - 10)  naloxone Injectable 0.1 milliGRAM(s) IV Push every 3 minutes PRN For ANY of the following changes in patient status:  A. RR LESS THAN 10 breaths per minute, B. Oxygen saturation LESS THAN 90%, C. Sedation score of 6  ondansetron Injectable 4 milliGRAM(s) IV Push every 6 hours PRN Nausea and/or Vomiting  zolpidem 5 milliGRAM(s) Oral at bedtime PRN Insomnia                           11.8   7.6   )-----------( 161      ( 04 Oct 2017 14:58 )             35.0     10-04    139  |  98  |  14.0  ----------------------------<  97  4.3   |  31.0<H>  |  0.97    Ca    9.3      04 Oct 2017 14:58  Phos  3.5     10-04  Mg     1.9     10-04        ;  Vital Signs Last 24 Hrs  T(C): 36.8 (04 Oct 2017 16:18), Max: 37.4 (03 Oct 2017 23:17)  T(F): 98.3 (04 Oct 2017 16:18), Max: 99.3 (03 Oct 2017 23:17)  HR: 133 (04 Oct 2017 16:18) (65 - 133)  BP: 103/67 (04 Oct 2017 16:18) (103/67 - 149/88)  BP(mean): --  RR: 20 (04 Oct 2017 16:18) (17 - 20)  SpO2: 95% (04 Oct 2017 16:18) (95% - 100%)  CAPILLARY BLOOD GLUCOSE      10-03 @ 07:01  -  10-04 @ 07:00  --------------------------------------------------------  IN: 3600 mL / OUT: 2100 mL / NET: 1500 mL      Patient feeling better No CP, No SOB, No N/V    HEENT: PEARLA  Neck: Supple  Cardio: S1 S2 No Murmur Irreg  Pulm: CTA No Rales or Ronchi  Abd: Soft NT ND BS dec, Colostomy Incision bandage clean  Rectal - refused  Ext: No DCT  Skin: No Rash  Neuro: Awake Pleasant    Rectal cancer - Post op Pain control, ambulation await bowel fxn  Prostate cancer - will monitor for urinary retention post alejandre removal  Hypertension - meds with parameters  Glaucoma - timolol  AFib RVR - Cardiazem drip, Cardiology

## 2017-10-04 NOTE — PROGRESS NOTE ADULT - SUBJECTIVE AND OBJECTIVE BOX
Chief Complaint: postoperative abdominal pain       HPI:   ASHLEY SORENSON is a 61y Male that was seen and examined at bedside. Patient reports that pain is currently 6/10 and localized to the abdomen. He describes pain as intermittent and burning in nature. He admits to associated reflux with intermittent nausea. Pain is worse with movement and improved with IV pain medication through IV PCA. He denies any sedation or pruritis. He has poorly tolerated any food since starting liquid diet. He offers no further complaints.       REVIEW OF SYSTEMS: X denotes positive finding, otherwise all additional items were reviewed and negative  GEN: [] fever, [] chills, [] change in appetite, [] weight loss, [] fatigue, [] sedation  ENT: [] change in vision, [] dry mouth, [] ringing in ears, [] sore throat  RESP: [] shortness of breath, [] Obstructive sleep apnea  CV: [] chest pain, [] palpitations   GI: [] nausea, [] vomiting, [] constipation, [] GERD  : [] bladder dysfunction, [] dysuria  MSK: [] weakness, [] joint pain, [] myalgias  Neuro: [x]pain, [] numbness, [] tingling, [] tremor, [] seizures  Skin: [] rash  Psych: [] anxiety, [] depression  Endo: [] polydipsia  Heme: [] coagulopathic disorder      PAST MEDICAL & SURGICAL HISTORY:  Rectal cancer  Prostate cancer  Hypertension  Glaucoma  S/P ORIF (open reduction internal fixation) fracture: Left ankle  History of lipoma: x 3      SOCIAL HISTORY:  Denies Smoking, Alcohol, or Drug Use  FAMILY HISTORY:  Family history of CABG (Father)  Family history of prostate cancer (Father, Sibling)  Family history of cervical cancer (Mother)  Family history of coronary artery disease (Father)  Family history of hypertension (Father)      Allergies    No Known Allergies    Intolerances      PAIN MEDICATIONS:  MEDICATIONS  (STANDING):  MEDICATIONS  (STANDING):  alvimopan 12 milliGRAM(s) Oral two times a day  benzocaine 20% Spray 1 Spray(s) Topical four times a day  bicalutamide 50 milliGRAM(s) Oral daily  cefoTEtan  IVPB 2 Gram(s) IV Intermittent once  heparin  Injectable 5000 Unit(s) SubCutaneous every 8 hours  HYDROmorphone PCA (1 mG/mL) 30 milliLiter(s) PCA Continuous PCA Continuous  ketorolac   Injectable 15 milliGRAM(s) IV Push every 6 hours  lactated ringers. 1000 milliLiter(s) (100 mL/Hr) IV Continuous <Continuous>  latanoprost 0.005% Ophthalmic Solution 1 Drop(s) Both EYES at bedtime  metoprolol 25 milliGRAM(s) Oral two times a day  multivitamin 1 Tablet(s) Oral daily  pantoprazole   Suspension 40 milliGRAM(s) Oral daily  sodium chloride 0.9% lock flush 3 milliLiter(s) IV Push every 8 hours  timolol 0.5% Solution 1 Drop(s) Both EYES two times a day    MEDICATIONS  (PRN):  aluminum hydroxide/magnesium hydroxide/simethicone Suspension 30 milliLiter(s) Oral every 4 hours PRN Dyspepsia  ketorolac   Injectable 30 milliGRAM(s) IV Push every 6 hours PRN Severe Pain (7 - 10)  naloxone Injectable 0.1 milliGRAM(s) IV Push every 3 minutes PRN For ANY of the following changes in patient status:  A. RR LESS THAN 10 breaths per minute, B. Oxygen saturation LESS THAN 90%, C. Sedation score of 6  ondansetron Injectable 4 milliGRAM(s) IV Push every 6 hours PRN Nausea and/or Vomiting  zolpidem 5 milliGRAM(s) Oral at bedtime PRN Insomnia      Vital Signs Last 24 Hrs  T(C): 36.7 (04 Oct 2017 09:44), Max: 37.4 (03 Oct 2017 23:17)  T(F): 98 (04 Oct 2017 09:44), Max: 99.3 (03 Oct 2017 23:17)  HR: 89 (04 Oct 2017 09:44) (65 - 89)  BP: 134/73 (04 Oct 2017 09:44) (117/71 - 149/88)  BP(mean): --  RR: 18 (04 Oct 2017 09:44) (17 - 19)  SpO2: 95% (04 Oct 2017 09:44) (94% - 100%)             PHYSICAL EXAM: X denotes positive finding   GENERAL: [x] cooperative, [] uncooperative, [x] NAD, [] in distress, [x] speech clear and coherent  HEENT: [x] NCAT, [x] EOMI, [] pupils non pinpoint, [] no external deformities, [] NGT   NECK: [x] normal overall appearance, [] no gross masses  RESPIRATORY: [x] unlabored respirations, [] on room air, [x] on supplemental O2, [] labored respirations  CV: [x] regular rate, [] regular rhythm, [x] no LE edema, [] pitting LE edema  Abdomen: [x] soft, [] non-tender, [x] tender to palpation, [] pos bowel sounds  : [] alejandre, [x] deferred  Skin: [] normal texture, [] rash, [x] lesion(s), [] drain(s)   MSK: [x] limited AROM, [] extremities antigravity x4, [] normal gait  Neuro: [x] SILT, [] sensation reduced to light touch, [] abnormal DTRs  Psych: [x] oriented to person, place, time, [] displays insight, [] limited/impaired insight, [] poor coping skills       LABS:                        10.9   8.1   )-----------( 156      ( 04 Oct 2017 07:38 )             32.1   10-04    140  |  101  |  16.0  ----------------------------<  99  4.1   |  30.0<H>  |  1.00    Ca    8.9      04 Oct 2017 07:38  Phos  3.2     10-04  Mg     1.9     10-04      Drug Screen:      Radiology:      :  This report was requested by: Walter Gill | Reference #: [id number]   Rx Written	Rx Dispensed	Drug	Quantity	Days Supply	Prescriber Name			  05/18/2017	05/19/2017	alprazolam 0.5 mg tablet 	2	1	Beto Kim			  04/27/2017	04/27/2017	alprazolam 0.5 mg tablet 	6	3	Aurora Swenson)			  Rx Written	Rx Dispensed	Drug	Quantity	Days Supply	Prescriber Name			  03/30/2017	03/30/2017	oxycodone-acetaminophen 5-325 mg tablet 	10	2	Rachel Avendano			  Prescriptions Dispensed in [place]  There are no results for the search terms that you entered.   Prescriptions Dispensed in [place]  There are no results for the search terms that you entered.   Prescriptions Dispensed in [place]  There are no results for the search terms that you entered.   Prescriptions Dispensed in [place]  There are no results for the search terms that you entered.   Prescriptions Dispensed in [place]  There are no results for the search terms that you entered.   Prescriptions Dispensed in [place]  There are no results for the search terms that you entered.   Prescriptions Dispensed in [facility]  There are no results for the search terms that you entered.   Prescriptions Dispensed in [place]  There are no results for the search terms that you entered.   Prescriptions Dispensed in [place]  There are no results for the search terms that you entered.   Prescriptions Dispensed in [place]  There are no results for the search terms that you entered.   Prescriptions Dispensed in [place]  There are no results for the search terms that you entered.   Prescriptions Dispensed in [place]  There are no results for the search terms that you entered.   Prescriptions Dispensed in [place]  There are no results for the search terms that you entered.   Prescriptions Dispensed in [place]  There are no results for the search terms that you entered.   Prescriptions Dispensed in [place]  There are no results for the search terms that you entered.   Prescriptions Dispensed in [place]  There are no results for the search terms that you entered.   Prescriptions Dispensed in [place]  There are no results for the search terms that you entered.   Prescriptions Dispensed in [place]  There are no results for the search terms that you entered.   Prescriptions Dispensed in [place]  There are no results for the search terms that you entered.

## 2017-10-04 NOTE — CHART NOTE - NSCHARTNOTEFT_GEN_A_CORE
Called by the patient's nurse for HR in the 130-150s. Went to the bedside with Natasha MAHMOOD. Patient endorsed no complaints except acid reflux. Denied chest pain, sob, chest tightness/pressure, palpitations, dizziness, lightheadedness, headache, n/v/d.    PE:  GEN: NAD, resting comfortably in chair  Resp: CTA b/l, no r/w/r, no conversational dyspnea  Cardio: tachycardic, irregular rhythm  GI: abdomen soft, appropriately TTP, provena in place with good suction  : alejandre in place  Ext: no calf tenderness, - Homans  Neuro: A&O x3    Patient put on 2L NC  Stat EKG was ordered- showed Afib with RVR. Cardio consult placed, Dr. Fagan made aware  STAT cbc, bmp, mag, phos, troponin, ckmb ordered, pending results    We administered IV lopressor 5 mg IV push x3- did not respond appropriately. BP was taken between each push and remained stable  We then administered 10 mg Cardizem push x 1. Patient's HR remained in the . Cardizem drip started    Awaiting Cardiac consult  Awaiting lab results  Continuing to monitor closely   Attending made aware Called by the patient's nurse for HR in the 130-150s. Went to the bedside with Natasha MAHMOOD. Patient endorsed no complaints except acid reflux. Denied chest pain, sob, chest tightness/pressure, palpitations, dizziness, lightheadedness, headache, n/v/d.    PE:  GEN: NAD, resting comfortably in chair  Resp: CTA b/l, no r/w/r, no conversational dyspnea  Cardio: tachycardic, irregular rhythm  GI: abdomen soft, appropriately TTP, provena in place with good suction  : alejandre in place  Ext: no calf tenderness, - Homans  Neuro: A&O x3    Patient put on 2L NC  Stat EKG was ordered- showed Afib with RVR. Cardio consult placed, Dr. Fagan made aware  STAT cbc, bmp, mag, phos, troponin, ckmb ordered, pending results    We administered IV lopressor 5 mg IV push x3- did not respond appropriately. BP was taken between each push and remained stable  We then administered 10 mg Cardizem push x 1. Patient's HR remained in the . Cardizem drip started    Awaiting Cardiac consult  Awaiting lab results  Continuing to monitor closely   Attending made aware    Addendum - Spoke with Dr. Mayfeild from [facility], she has no recommendations at this time, pt still with HR in 150's, was told to get in touch with his home cardiologist. [facility] cardiovascular was called for urgent consult. Additional STAT labs and imaging was ordered including BNP, CXR, KUB, b/l LE duplex, thyroid panel, ABG, and an additional 20mg of diltiazem was ordered. Pt is still asymptomatic with a HR currently at 135, most recent BP was 126/84.

## 2017-10-05 LAB
ANION GAP SERPL CALC-SCNC: 8 MMOL/L — SIGNIFICANT CHANGE UP (ref 5–17)
BASOPHILS # BLD AUTO: 0 K/UL — SIGNIFICANT CHANGE UP (ref 0–0.2)
BASOPHILS NFR BLD AUTO: 0.2 % — SIGNIFICANT CHANGE UP (ref 0–2)
BUN SERPL-MCNC: 12 MG/DL — SIGNIFICANT CHANGE UP (ref 8–20)
CALCIUM SERPL-MCNC: 8.6 MG/DL — SIGNIFICANT CHANGE UP (ref 8.6–10.2)
CHLORIDE SERPL-SCNC: 101 MMOL/L — SIGNIFICANT CHANGE UP (ref 98–107)
CO2 SERPL-SCNC: 32 MMOL/L — HIGH (ref 22–29)
CREAT SERPL-MCNC: 0.85 MG/DL — SIGNIFICANT CHANGE UP (ref 0.5–1.3)
EOSINOPHIL # BLD AUTO: 0.1 K/UL — SIGNIFICANT CHANGE UP (ref 0–0.5)
EOSINOPHIL NFR BLD AUTO: 0.9 % — SIGNIFICANT CHANGE UP (ref 0–5)
GLUCOSE SERPL-MCNC: 97 MG/DL — SIGNIFICANT CHANGE UP (ref 70–115)
HCT VFR BLD CALC: 33 % — LOW (ref 42–52)
HGB BLD-MCNC: 11.2 G/DL — LOW (ref 14–18)
LYMPHOCYTES # BLD AUTO: 0.5 K/UL — LOW (ref 1–4.8)
LYMPHOCYTES # BLD AUTO: 7.8 % — LOW (ref 20–55)
MAGNESIUM SERPL-MCNC: 2.2 MG/DL — SIGNIFICANT CHANGE UP (ref 1.6–2.6)
MCHC RBC-ENTMCNC: 31.3 PG — HIGH (ref 27–31)
MCHC RBC-ENTMCNC: 33.9 G/DL — SIGNIFICANT CHANGE UP (ref 32–36)
MCV RBC AUTO: 92.2 FL — SIGNIFICANT CHANGE UP (ref 80–94)
MONOCYTES # BLD AUTO: 0.5 K/UL — SIGNIFICANT CHANGE UP (ref 0–0.8)
MONOCYTES NFR BLD AUTO: 7 % — SIGNIFICANT CHANGE UP (ref 3–10)
NEUTROPHILS # BLD AUTO: 5.5 K/UL — SIGNIFICANT CHANGE UP (ref 1.8–8)
NEUTROPHILS NFR BLD AUTO: 83.9 % — HIGH (ref 37–73)
PHOSPHATE SERPL-MCNC: 3.1 MG/DL — SIGNIFICANT CHANGE UP (ref 2.4–4.7)
PLATELET # BLD AUTO: 145 K/UL — LOW (ref 150–400)
POTASSIUM SERPL-MCNC: 3.6 MMOL/L — SIGNIFICANT CHANGE UP (ref 3.5–5.3)
POTASSIUM SERPL-SCNC: 3.6 MMOL/L — SIGNIFICANT CHANGE UP (ref 3.5–5.3)
RBC # BLD: 3.58 M/UL — LOW (ref 4.6–6.2)
RBC # FLD: 13.5 % — SIGNIFICANT CHANGE UP (ref 11–15.6)
SODIUM SERPL-SCNC: 141 MMOL/L — SIGNIFICANT CHANGE UP (ref 135–145)
WBC # BLD: 6.6 K/UL — SIGNIFICANT CHANGE UP (ref 4.8–10.8)
WBC # FLD AUTO: 6.6 K/UL — SIGNIFICANT CHANGE UP (ref 4.8–10.8)

## 2017-10-05 PROCEDURE — 71275 CT ANGIOGRAPHY CHEST: CPT | Mod: 26

## 2017-10-05 RX ORDER — POTASSIUM CHLORIDE 20 MEQ
10 PACKET (EA) ORAL
Qty: 0 | Refills: 0 | Status: COMPLETED | OUTPATIENT
Start: 2017-10-05 | End: 2017-10-05

## 2017-10-05 RX ORDER — DILTIAZEM HCL 120 MG
10 CAPSULE, EXT RELEASE 24 HR ORAL
Qty: 125 | Refills: 0 | Status: DISCONTINUED | OUTPATIENT
Start: 2017-10-05 | End: 2017-10-07

## 2017-10-05 RX ORDER — SODIUM CHLORIDE 9 MG/ML
500 INJECTION, SOLUTION INTRAVENOUS ONCE
Qty: 0 | Refills: 0 | Status: COMPLETED | OUTPATIENT
Start: 2017-10-05 | End: 2017-10-05

## 2017-10-05 RX ORDER — METOPROLOL TARTRATE 50 MG
25 TABLET ORAL EVERY 6 HOURS
Qty: 0 | Refills: 0 | Status: DISCONTINUED | OUTPATIENT
Start: 2017-10-05 | End: 2017-10-06

## 2017-10-05 RX ADMIN — Medication 1 SPRAY(S): at 17:37

## 2017-10-05 RX ADMIN — Medication 1 SPRAY(S): at 12:24

## 2017-10-05 RX ADMIN — Medication 25 MILLIGRAM(S): at 12:24

## 2017-10-05 RX ADMIN — Medication 15 MG/HR: at 17:33

## 2017-10-05 RX ADMIN — LATANOPROST 1 DROP(S): 0.05 SOLUTION/ DROPS OPHTHALMIC; TOPICAL at 22:05

## 2017-10-05 RX ADMIN — SODIUM CHLORIDE 3 MILLILITER(S): 9 INJECTION INTRAMUSCULAR; INTRAVENOUS; SUBCUTANEOUS at 16:34

## 2017-10-05 RX ADMIN — Medication 25 MILLIGRAM(S): at 17:36

## 2017-10-05 RX ADMIN — Medication 15 MILLIGRAM(S): at 12:25

## 2017-10-05 RX ADMIN — SODIUM CHLORIDE 125 MILLILITER(S): 9 INJECTION, SOLUTION INTRAVENOUS at 22:04

## 2017-10-05 RX ADMIN — BICALUTAMIDE 50 MILLIGRAM(S): 50 TABLET, FILM COATED ORAL at 12:24

## 2017-10-05 RX ADMIN — Medication 1 DROP(S): at 17:45

## 2017-10-05 RX ADMIN — Medication 100 MILLIEQUIVALENT(S): at 17:34

## 2017-10-05 RX ADMIN — ALVIMOPAN 12 MILLIGRAM(S): 12 CAPSULE ORAL at 19:09

## 2017-10-05 RX ADMIN — HEPARIN SODIUM 5000 UNIT(S): 5000 INJECTION INTRAVENOUS; SUBCUTANEOUS at 06:07

## 2017-10-05 RX ADMIN — Medication 15 MILLIGRAM(S): at 06:30

## 2017-10-05 RX ADMIN — SODIUM CHLORIDE 125 MILLILITER(S): 9 INJECTION, SOLUTION INTRAVENOUS at 00:35

## 2017-10-05 RX ADMIN — HYDROMORPHONE HYDROCHLORIDE 30 MILLILITER(S): 2 INJECTION INTRAMUSCULAR; INTRAVENOUS; SUBCUTANEOUS at 00:19

## 2017-10-05 RX ADMIN — SODIUM CHLORIDE 3 MILLILITER(S): 9 INJECTION INTRAMUSCULAR; INTRAVENOUS; SUBCUTANEOUS at 06:06

## 2017-10-05 RX ADMIN — Medication 1 DROP(S): at 06:08

## 2017-10-05 RX ADMIN — Medication 25 MILLIGRAM(S): at 06:07

## 2017-10-05 RX ADMIN — SODIUM CHLORIDE 500 MILLILITER(S): 9 INJECTION, SOLUTION INTRAVENOUS at 17:34

## 2017-10-05 RX ADMIN — SODIUM CHLORIDE 3 MILLILITER(S): 9 INJECTION INTRAMUSCULAR; INTRAVENOUS; SUBCUTANEOUS at 22:04

## 2017-10-05 RX ADMIN — HEPARIN SODIUM 5000 UNIT(S): 5000 INJECTION INTRAVENOUS; SUBCUTANEOUS at 22:04

## 2017-10-05 RX ADMIN — Medication 1 TABLET(S): at 12:24

## 2017-10-05 RX ADMIN — Medication 100 MILLIEQUIVALENT(S): at 19:09

## 2017-10-05 RX ADMIN — Medication 15 MILLIGRAM(S): at 17:37

## 2017-10-05 RX ADMIN — SODIUM CHLORIDE 125 MILLILITER(S): 9 INJECTION, SOLUTION INTRAVENOUS at 06:15

## 2017-10-05 RX ADMIN — Medication 15 MILLIGRAM(S): at 16:35

## 2017-10-05 RX ADMIN — Medication 1 SPRAY(S): at 06:08

## 2017-10-05 RX ADMIN — HEPARIN SODIUM 5000 UNIT(S): 5000 INJECTION INTRAVENOUS; SUBCUTANEOUS at 12:24

## 2017-10-05 RX ADMIN — ALVIMOPAN 12 MILLIGRAM(S): 12 CAPSULE ORAL at 06:08

## 2017-10-05 RX ADMIN — HYDROMORPHONE HYDROCHLORIDE 30 MILLILITER(S): 2 INJECTION INTRAMUSCULAR; INTRAVENOUS; SUBCUTANEOUS at 19:07

## 2017-10-05 RX ADMIN — Medication 15 MILLIGRAM(S): at 06:07

## 2017-10-05 RX ADMIN — PANTOPRAZOLE SODIUM 40 MILLIGRAM(S): 20 TABLET, DELAYED RELEASE ORAL at 12:24

## 2017-10-05 NOTE — PROGRESS NOTE ADULT - ASSESSMENT
ASHLEY SORENSON is a 61y Male who presents with pain in the abdomen which is nociceptive in nature due to s/p loop ileostomy, LAR.  Pain is controlled with current regimen.     Co-morbid Conditions:  Malignant neoplasm of rectum  No h/o HF  Family history of CABG (Father)  Family history of prostate cancer (Father, Sibling)  Family history of cervical cancer (Mother)  Family history of coronary artery disease (Father)  Family history of hypertension (Father)  Handoff  MEWS Score  Rectal cancer  Colon cancer  Prostate cancer  Hypertension  Glaucoma  Rectal cancer  Rectal cancer  Rectal cancer  CA of rectum  Low anterior resection of rectum  Ileostomy, loop  S/P ORIF (open reduction internal fixation) fracture  History of lipoma  No significant past surgical history  MALIGNANT NEOPLASM OF RECTUM

## 2017-10-05 NOTE — PROGRESS NOTE ADULT - SUBJECTIVE AND OBJECTIVE BOX
Chief Complaint: postoperative abdominal pain       HPI:   ASHLEY SORENSON is a 61y Male that was seen and examined at bedside. Patient reports that pain is currently 6/10 and localized to the abdomen. He describes pain as intermittent and burning in nature. Pain is worse with movement and improved with IV pain medication through IV PCA. He denies any sedation or pruritis. He poorly tolerated food since starting liquid diet and was put back to NPO. He rpeorts good relief of pain with Toradol IV. He admits to output with ostomy. He offers no further complaints.       REVIEW OF SYSTEMS: X denotes positive finding, otherwise all additional items were reviewed and negative  GEN: [] fever, [] chills, [] change in appetite, [] weight loss, [] fatigue, [] sedation  ENT: [] change in vision, [] dry mouth, [] ringing in ears, [] sore throat  RESP: [] shortness of breath, [] Obstructive sleep apnea  CV: [] chest pain, [] palpitations   GI: [] nausea, [] vomiting, [] constipation, [] GERD  : [] bladder dysfunction, [] dysuria  MSK: [] weakness, [] joint pain, [] myalgias  Neuro: [x]pain, [] numbness, [] tingling, [] tremor, [] seizures  Skin: [] rash  Psych: [] anxiety, [] depression  Endo: [] polydipsia  Heme: [] coagulopathic disorder      PAST MEDICAL & SURGICAL HISTORY:  Rectal cancer  Prostate cancer  Hypertension  Glaucoma  S/P ORIF (open reduction internal fixation) fracture: Left ankle  History of lipoma: x 3      SOCIAL HISTORY:  Denies Smoking, Alcohol, or Drug Use  FAMILY HISTORY:  Family history of CABG (Father)  Family history of prostate cancer (Father, Sibling)  Family history of cervical cancer (Mother)  Family history of coronary artery disease (Father)  Family history of hypertension (Father)      Allergies    No Known Allergies    Intolerances      PAIN MEDICATIONS:  MEDICATIONS  (STANDING):  alvimopan 12 milliGRAM(s) Oral two times a day  benzocaine 20% Spray 1 Spray(s) Topical four times a day  bicalutamide 50 milliGRAM(s) Oral daily  cefoTEtan  IVPB 2 Gram(s) IV Intermittent once  diltiazem Infusion 15 mG/Hr (15 mL/Hr) IV Continuous <Continuous>  heparin  Injectable 5000 Unit(s) SubCutaneous every 8 hours  HYDROmorphone PCA (1 mG/mL) 30 milliLiter(s) PCA Continuous PCA Continuous  ketorolac   Injectable 15 milliGRAM(s) IV Push every 6 hours  lactated ringers. 1000 milliLiter(s) (125 mL/Hr) IV Continuous <Continuous>  latanoprost 0.005% Ophthalmic Solution 1 Drop(s) Both EYES at bedtime  metoprolol 25 milliGRAM(s) Oral every 6 hours  multivitamin 1 Tablet(s) Oral daily  pantoprazole   Suspension 40 milliGRAM(s) Oral daily  potassium chloride  10 mEq/100 mL IVPB 10 milliEquivalent(s) IV Intermittent every 1 hour  sodium chloride 0.9% lock flush 3 milliLiter(s) IV Push every 8 hours  timolol 0.5% Solution 1 Drop(s) Both EYES two times a day    MEDICATIONS  (PRN):  aluminum hydroxide/magnesium hydroxide/simethicone Suspension 30 milliLiter(s) Oral every 4 hours PRN Dyspepsia  ketorolac   Injectable 30 milliGRAM(s) IV Push every 6 hours PRN Severe Pain (7 - 10)  naloxone Injectable 0.1 milliGRAM(s) IV Push every 3 minutes PRN For ANY of the following changes in patient status:  A. RR LESS THAN 10 breaths per minute, B. Oxygen saturation LESS THAN 90%, C. Sedation score of 6  ondansetron Injectable 4 milliGRAM(s) IV Push every 6 hours PRN Nausea and/or Vomiting  zolpidem 5 milliGRAM(s) Oral at bedtime PRN Insomnia      Vital Signs Last 24 Hrs  T(C): 37.2 (05 Oct 2017 08:06), Max: 37.3 (04 Oct 2017 22:37)  T(F): 98.9 (05 Oct 2017 08:06), Max: 99.1 (04 Oct 2017 22:37)  HR: 104 (05 Oct 2017 08:06) (90 - 133)  BP: 105/62 (05 Oct 2017 08:06) (96/60 - 126/74)  BP(mean): --  RR: 20 (05 Oct 2017 08:06) (17 - 20)  SpO2: 100% (05 Oct 2017 08:06) (94% - 100%)             PHYSICAL EXAM: X denotes positive finding   GENERAL: [x] cooperative, [] uncooperative, [x] NAD, [] in distress, [x] speech clear and coherent  HEENT: [x] NCAT, [x] EOMI, [] pupils non pinpoint, [] no external deformities, [] NGT   NECK: [x] normal overall appearance, [] no gross masses  RESPIRATORY: [x] unlabored respirations, [] on room air, [x] on supplemental O2, [] labored respirations  CV: [x] regular rate, [] regular rhythm, [x] no LE edema, [] pitting LE edema  Abdomen: [x] soft, [] non-tender, [x] tender to palpation, [] pos bowel sounds  : [] alejandre, [x] deferred  Skin: [] normal texture, [] rash, [x] lesion(s), [] drain(s)   MSK: [x] limited AROM, [] extremities antigravity x4, [] normal gait  Neuro: [x] SILT, [] sensation reduced to light touch, [] abnormal DTRs  Psych: [x] oriented to person, place, time, [] displays insight, [] limited/impaired insight, [] poor coping skills       LABS:                        10.9   8.1   )-----------( 156      ( 04 Oct 2017 07:38 )             32.1   10-04    140  |  101  |  16.0  ----------------------------<  99  4.1   |  30.0<H>  |  1.00    Ca    8.9      04 Oct 2017 07:38  Phos  3.2     10-04  Mg     1.9     10-04      Drug Screen:      Radiology:      :  This report was requested by: Walter Gill | Reference #: [id number]   Rx Written	Rx Dispensed	Drug	Quantity	Days Supply	Prescriber Name			  05/18/2017	05/19/2017	alprazolam 0.5 mg tablet 	2	1	Beto Kim			  04/27/2017	04/27/2017	alprazolam 0.5 mg tablet 	6	3	Aurora Swenson)			  Rx Written	Rx Dispensed	Drug	Quantity	Days Supply	Prescriber Name			  03/30/2017	03/30/2017	oxycodone-acetaminophen 5-325 mg tablet 	10	2	Rachel Avendano			  Prescriptions Dispensed in [place]  There are no results for the search terms that you entered.   Prescriptions Dispensed in [place]  There are no results for the search terms that you entered.   Prescriptions Dispensed in [place]  There are no results for the search terms that you entered.   Prescriptions Dispensed in [place]  There are no results for the search terms that you entered.   Prescriptions Dispensed in [place]  There are no results for the search terms that you entered.   Prescriptions Dispensed in [place]  There are no results for the search terms that you entered.   Prescriptions Dispensed in [facility]  There are no results for the search terms that you entered.   Prescriptions Dispensed in [place]  There are no results for the search terms that you entered.   Prescriptions Dispensed in [place]  There are no results for the search terms that you entered.   Prescriptions Dispensed in [place]  There are no results for the search terms that you entered.   Prescriptions Dispensed in [place]  There are no results for the search terms that you entered.   Prescriptions Dispensed in [place]  There are no results for the search terms that you entered.   Prescriptions Dispensed in [place]  There are no results for the search terms that you entered.   Prescriptions Dispensed in [place]  There are no results for the search terms that you entered.   Prescriptions Dispensed in [place]  There are no results for the search terms that you entered.   Prescriptions Dispensed in [place]  There are no results for the search terms that you entered.   Prescriptions Dispensed in [place]  There are no results for the search terms that you entered.   Prescriptions Dispensed in [place]  There are no results for the search terms that you entered.   Prescriptions Dispensed in [place]  There are no results for the search terms that you entered.

## 2017-10-05 NOTE — PROGRESS NOTE ADULT - SUBJECTIVE AND OBJECTIVE BOX
HPI:  62 y/o male with rectal bleeding had colonoscopy and was DX with rectal cancer patient now presents for laparoscopic possible open low anterior resection lymph node dissection and possible  ileostomy (26 Sep 2017 15:37)     Allergies    No Known Allergies    Intolerances    avoid acidic foods/tomato products (Unknown)    Rectal cancer  Colon cancer  Prostate cancer  Hypertension  Glaucoma    MEDICATIONS  (STANDING):  alvimopan 12 milliGRAM(s) Oral two times a day  benzocaine 20% Spray 1 Spray(s) Topical four times a day  bicalutamide 50 milliGRAM(s) Oral daily  cefoTEtan  IVPB 2 Gram(s) IV Intermittent once  diltiazem Infusion 15 mG/Hr (15 mL/Hr) IV Continuous <Continuous>  heparin  Injectable 5000 Unit(s) SubCutaneous every 8 hours  HYDROmorphone PCA (1 mG/mL) 30 milliLiter(s) PCA Continuous PCA Continuous  ketorolac   Injectable 15 milliGRAM(s) IV Push every 6 hours  lactated ringers. 1000 milliLiter(s) (125 mL/Hr) IV Continuous <Continuous>  latanoprost 0.005% Ophthalmic Solution 1 Drop(s) Both EYES at bedtime  metoprolol 25 milliGRAM(s) Oral every 6 hours  multivitamin 1 Tablet(s) Oral daily  pantoprazole   Suspension 40 milliGRAM(s) Oral daily  potassium chloride  10 mEq/100 mL IVPB 10 milliEquivalent(s) IV Intermittent every 1 hour  sodium chloride 0.9% lock flush 3 milliLiter(s) IV Push every 8 hours  timolol 0.5% Solution 1 Drop(s) Both EYES two times a day    MEDICATIONS  (PRN):  aluminum hydroxide/magnesium hydroxide/simethicone Suspension 30 milliLiter(s) Oral every 4 hours PRN Dyspepsia  ketorolac   Injectable 30 milliGRAM(s) IV Push every 6 hours PRN Severe Pain (7 - 10)  naloxone Injectable 0.1 milliGRAM(s) IV Push every 3 minutes PRN For ANY of the following changes in patient status:  A. RR LESS THAN 10 breaths per minute, B. Oxygen saturation LESS THAN 90%, C. Sedation score of 6  ondansetron Injectable 4 milliGRAM(s) IV Push every 6 hours PRN Nausea and/or Vomiting  zolpidem 5 milliGRAM(s) Oral at bedtime PRN Insomnia                           11.2   6.6   )-----------( 145      ( 05 Oct 2017 06:37 )             33.0     10-05    141  |  101  |  12.0  ----------------------------<  97  3.6   |  32.0<H>  |  0.85    Ca    8.6      05 Oct 2017 06:37  Phos  3.1     10-05  Mg     2.2     10-05        ;  Vital Signs Last 24 Hrs  T(C): 36.8 (05 Oct 2017 16:35), Max: 37.3 (04 Oct 2017 22:37)  T(F): 98.3 (05 Oct 2017 16:35), Max: 99.1 (04 Oct 2017 22:37)  HR: 113 (05 Oct 2017 16:35) (90 - 130)  BP: 122/73 (05 Oct 2017 16:35) (96/60 - 126/74)  BP(mean): --  RR: 20 (05 Oct 2017 16:35) (17 - 20)  SpO2: 100% (05 Oct 2017 16:35) (94% - 100%)  CAPILLARY BLOOD GLUCOSE      10-04 @ 07:01  -  10-05 @ 07:00  --------------------------------------------------------  IN: 0 mL / OUT: 2150 mL / NET: -2150 mL          Patient feeling better No CP, No SOB, No N/V    HEENT: PEARLA  Neck: Supple  Cardio: S1 S2 No Murmur Irreg  Pulm: CTA No Rales or Ronchi  Abd: Soft NT ND BS dec, Colostomy Incision bandage clean  Rectal - refused  Ext: No DCT  Skin: No Rash  Neuro: Awake Pleasant    Rectal cancer - Post op Pain control, ambulation await bowel fxn  Prostate cancer - will monitor for urinary retention post alejandre removal  Hypertension - meds with parameters  Glaucoma - timolol  AFib RVR - Cardiazem drip, Cardiology, CT Angio r/o PE

## 2017-10-05 NOTE — PROGRESS NOTE ADULT - SUBJECTIVE AND OBJECTIVE BOX
[place] [facility] - [facility], THE HEART CENTER                                   [address] [place] [address]                                                      PHONE: [phone]                                                         FAX: [phone]  [email or web address]  ---------------------------------------------------------------------------------------------------------------------------------    Overnight events/patient complaints:      PAST MEDICAL & SURGICAL HISTORY:  Rectal cancer  Prostate cancer  Hypertension  Glaucoma  S/P ORIF (open reduction internal fixation) fracture: Left ankle  History of lipoma: x 3      No Known Allergies    MEDICATIONS  (STANDING):  alvimopan 12 milliGRAM(s) Oral two times a day  benzocaine 20% Spray 1 Spray(s) Topical four times a day  bicalutamide 50 milliGRAM(s) Oral daily  cefoTEtan  IVPB 2 Gram(s) IV Intermittent once  diltiazem Infusion 15 mG/Hr (15 mL/Hr) IV Continuous <Continuous>  heparin  Injectable 5000 Unit(s) SubCutaneous every 8 hours  HYDROmorphone PCA (1 mG/mL) 30 milliLiter(s) PCA Continuous PCA Continuous  ketorolac   Injectable 15 milliGRAM(s) IV Push every 6 hours  lactated ringers. 1000 milliLiter(s) (125 mL/Hr) IV Continuous <Continuous>  latanoprost 0.005% Ophthalmic Solution 1 Drop(s) Both EYES at bedtime  metoprolol 25 milliGRAM(s) Oral every 6 hours  multivitamin 1 Tablet(s) Oral daily  pantoprazole   Suspension 40 milliGRAM(s) Oral daily  sodium chloride 0.9% lock flush 3 milliLiter(s) IV Push every 8 hours  timolol 0.5% Solution 1 Drop(s) Both EYES two times a day    MEDICATIONS  (PRN):  aluminum hydroxide/magnesium hydroxide/simethicone Suspension 30 milliLiter(s) Oral every 4 hours PRN Dyspepsia  ketorolac   Injectable 30 milliGRAM(s) IV Push every 6 hours PRN Severe Pain (7 - 10)  naloxone Injectable 0.1 milliGRAM(s) IV Push every 3 minutes PRN For ANY of the following changes in patient status:  A. RR LESS THAN 10 breaths per minute, B. Oxygen saturation LESS THAN 90%, C. Sedation score of 6  ondansetron Injectable 4 milliGRAM(s) IV Push every 6 hours PRN Nausea and/or Vomiting  zolpidem 5 milliGRAM(s) Oral at bedtime PRN Insomnia      Vital Signs Last 24 Hrs  T(C): 37.2 (05 Oct 2017 04:54), Max: 37.3 (04 Oct 2017 22:37)  T(F): 98.9 (05 Oct 2017 04:54), Max: 99.1 (04 Oct 2017 22:37)  HR: 98 (05 Oct 2017 04:54) (89 - 133)  BP: 117/87 (05 Oct 2017 04:54) (96/60 - 134/73)  BP(mean): --  RR: 18 (05 Oct 2017 04:54) (17 - 20)  SpO2: 99% (05 Oct 2017 04:54) (94% - 99%)  ICU Vital Signs Last 24 Hrs  ASHLEY SORENSON  I&O's Detail    04 Oct 2017 07:01  -  05 Oct 2017 07:00  --------------------------------------------------------  IN:  Total IN: 0 mL    OUT:    Ileostomy: 150 mL    Indwelling Catheter - Urethral: 2000 mL  Total OUT: 2150 mL    Total NET: -2150 mL        I&O's Summary    04 Oct 2017 07:01  -  05 Oct 2017 07:00  --------------------------------------------------------  IN: 0 mL / OUT: 2150 mL / NET: -2150 mL      Drug Dosing Weight  ASHLEY SORENSON      PHYSICAL EXAM:  General: Appears well developed, well nourished alert and cooperative.  HEENT: Head; normocephalic, atraumatic.  Eyes: Pupils reactive, cornea wnl.  Neck: Supple, no nodes adenopathy, no NVD or carotid bruit or thyromegaly.  CARDIOVASCULAR: Normal S1 and S2, No murmur, rub, gallop or lift.   LUNGS: No rales, rhonchi or wheeze. Normal breath sounds bilaterally.  ABDOMEN: Soft, nontender without mass or organomegaly. bowel sounds normoactive.  EXTREMITIES: No clubbing, cyanosis or edema. Distal pulses wnl.   SKIN: warm and dry with normal turgor.  NEURO: Alert/oriented x 3/normal motor exam. No pathologic reflexes.    PSYCH: normal affect.        LABS:                        11.2   6.6   )-----------( 145      ( 05 Oct 2017 06:37 )             33.0     10-05    141  |  101  |  12.0  ----------------------------<  97  3.6   |  32.0<H>  |  0.85    Ca    8.6      05 Oct 2017 06:37  Phos  3.1     10-05  Mg     2.2     10-05      ASHLEY SORENSON  CARDIAC MARKERS ( 04 Oct 2017 17:50 )  x     / <0.01 ng/mL / x     / x     / x      CARDIAC MARKERS ( 04 Oct 2017 14:58 )  x     / <0.01 ng/mL / 342 U/L / x     / 2.1 ng/mL            RADIOLOGY & ADDITIONAL STUDIES:    INTERPRETATION OF TELEMETRY (personally reviewed):  afib with rvr- rate currently 90 bpm      ECHO: pending    ASSESSMENT AND PLAN:  In summary, 62 y/o man with prostate cancer on casodex, and recently diagnoses colorectal cancer who was found to have atrial fibrillation on pre-operative testing who is now s/p low anterior resection of the rectum with loop ileostomy who developed atrial fibrillation with RVR    Atrial fibrillation:  -CHADSVASc score-1  -continue cardizem gtt  -goal HR control < 110 bpm   -continue metoprolol   -pain control  -continue IV fluids       Thank you for allowing [facility] to participate in the care of this patient.  Please feel free to call with any questions or concerns. [place] [facility] - [facility], THE HEART CENTER                                   [address] [place] [address]                                                      PHONE: [phone]                                                         FAX: [phone]  [email or web address]  ---------------------------------------------------------------------------------------------------------------------------------    Overnight events/patient complaints:  no events  afib better controlled     PAST MEDICAL & SURGICAL HISTORY:  Rectal cancer  Prostate cancer  Hypertension  Glaucoma  S/P ORIF (open reduction internal fixation) fracture: Left ankle  History of lipoma: x 3      No Known Allergies    MEDICATIONS  (STANDING):  alvimopan 12 milliGRAM(s) Oral two times a day  benzocaine 20% Spray 1 Spray(s) Topical four times a day  bicalutamide 50 milliGRAM(s) Oral daily  cefoTEtan  IVPB 2 Gram(s) IV Intermittent once  diltiazem Infusion 15 mG/Hr (15 mL/Hr) IV Continuous <Continuous>  heparin  Injectable 5000 Unit(s) SubCutaneous every 8 hours  HYDROmorphone PCA (1 mG/mL) 30 milliLiter(s) PCA Continuous PCA Continuous  ketorolac   Injectable 15 milliGRAM(s) IV Push every 6 hours  lactated ringers. 1000 milliLiter(s) (125 mL/Hr) IV Continuous <Continuous>  latanoprost 0.005% Ophthalmic Solution 1 Drop(s) Both EYES at bedtime  metoprolol 25 milliGRAM(s) Oral every 6 hours  multivitamin 1 Tablet(s) Oral daily  pantoprazole   Suspension 40 milliGRAM(s) Oral daily  sodium chloride 0.9% lock flush 3 milliLiter(s) IV Push every 8 hours  timolol 0.5% Solution 1 Drop(s) Both EYES two times a day    MEDICATIONS  (PRN):  aluminum hydroxide/magnesium hydroxide/simethicone Suspension 30 milliLiter(s) Oral every 4 hours PRN Dyspepsia  ketorolac   Injectable 30 milliGRAM(s) IV Push every 6 hours PRN Severe Pain (7 - 10)  naloxone Injectable 0.1 milliGRAM(s) IV Push every 3 minutes PRN For ANY of the following changes in patient status:  A. RR LESS THAN 10 breaths per minute, B. Oxygen saturation LESS THAN 90%, C. Sedation score of 6  ondansetron Injectable 4 milliGRAM(s) IV Push every 6 hours PRN Nausea and/or Vomiting  zolpidem 5 milliGRAM(s) Oral at bedtime PRN Insomnia      Vital Signs Last 24 Hrs  T(C): 37.2 (05 Oct 2017 04:54), Max: 37.3 (04 Oct 2017 22:37)  T(F): 98.9 (05 Oct 2017 04:54), Max: 99.1 (04 Oct 2017 22:37)  HR: 98 (05 Oct 2017 04:54) (89 - 133)  BP: 117/87 (05 Oct 2017 04:54) (96/60 - 134/73)  BP(mean): --  RR: 18 (05 Oct 2017 04:54) (17 - 20)  SpO2: 99% (05 Oct 2017 04:54) (94% - 99%)  ICU Vital Signs Last 24 Hrs  ASHLEY SORENSON  I&O's Detail    04 Oct 2017 07:01  -  05 Oct 2017 07:00  --------------------------------------------------------  IN:  Total IN: 0 mL    OUT:    Ileostomy: 150 mL    Indwelling Catheter - Urethral: 2000 mL  Total OUT: 2150 mL    Total NET: -2150 mL        I&O's Summary    04 Oct 2017 07:01  -  05 Oct 2017 07:00  --------------------------------------------------------  IN: 0 mL / OUT: 2150 mL / NET: -2150 mL      Drug Dosing Weight  ASHLEY SORENSON      PHYSICAL EXAM:  General: Appears well developed, well nourished alert and cooperative.  HEENT: Head; normocephalic, atraumatic.  Eyes: Pupils reactive, cornea wnl.  Neck: Supple, no nodes adenopathy, no NVD or carotid bruit or thyromegaly.  CARDIOVASCULAR: irreg irreg tachy  LUNGS: No rales, rhonchi or wheeze. Normal breath sounds bilaterally.  ABDOMEN: Soft, nontender without mass or organomegaly. bowel sounds normoactive.  EXTREMITIES: No clubbing, cyanosis or edema. Distal pulses wnl.   SKIN: warm and dry with normal turgor.  NEURO: Alert/oriented x 3/normal motor exam. No pathologic reflexes.    PSYCH: normal affect.        LABS:                        11.2   6.6   )-----------( 145      ( 05 Oct 2017 06:37 )             33.0     10-05    141  |  101  |  12.0  ----------------------------<  97  3.6   |  32.0<H>  |  0.85    Ca    8.6      05 Oct 2017 06:37  Phos  3.1     10-05  Mg     2.2     10-05      ASHLEY SORENSON  CARDIAC MARKERS ( 04 Oct 2017 17:50 )  x     / <0.01 ng/mL / x     / x     / x      CARDIAC MARKERS ( 04 Oct 2017 14:58 )  x     / <0.01 ng/mL / 342 U/L / x     / 2.1 ng/mL            RADIOLOGY & ADDITIONAL STUDIES:    INTERPRETATION OF TELEMETRY (personally reviewed):  afib with rvr- rate currently 90 bpm      ECHO: pending    ASSESSMENT AND PLAN:  In summary, 62 y/o man with prostate cancer on casodex, and recently diagnoses colorectal cancer who was found to have atrial fibrillation on pre-operative testing who is now s/p low anterior resection of the rectum with loop ileostomy who developed atrial fibrillation with RVR    Atrial fibrillation:  -CHADSVASc score-1  -pt and wife interested in watchman device not systemic AC given hx of falls and job as a    -continue cardizem gtt  -goal HR control < 110 bpm   -continue metoprolol   -pain control  -continue IV fluids       Thank you for allowing [facility] to participate in the care of this patient.  Please feel free to call with any questions or concerns.

## 2017-10-05 NOTE — PROGRESS NOTE ADULT - SUBJECTIVE AND OBJECTIVE BOX
INTERVAL HPI/OVERNIGHT EVENTS: Patient seen and examined, no acute events overnight. Afib is better rate controlled. Cardiology and family medicine consult appreciated. Patient states belching and feeling of abdominal fullness has improved. No complaints. Reports + flatus, + liquid stool in ostomy bag. Denies n/v/d, sob, cp, palpitations, dizziness.      MEDICATIONS  (STANDING):  alvimopan 12 milliGRAM(s) Oral two times a day  benzocaine 20% Spray 1 Spray(s) Topical four times a day  bicalutamide 50 milliGRAM(s) Oral daily  cefoTEtan  IVPB 2 Gram(s) IV Intermittent once  diltiazem Infusion 15 mG/Hr (15 mL/Hr) IV Continuous <Continuous>  heparin  Injectable 5000 Unit(s) SubCutaneous every 8 hours  HYDROmorphone PCA (1 mG/mL) 30 milliLiter(s) PCA Continuous PCA Continuous  ketorolac   Injectable 15 milliGRAM(s) IV Push every 6 hours  lactated ringers. 1000 milliLiter(s) (125 mL/Hr) IV Continuous <Continuous>  latanoprost 0.005% Ophthalmic Solution 1 Drop(s) Both EYES at bedtime  metoprolol 25 milliGRAM(s) Oral every 6 hours  multivitamin 1 Tablet(s) Oral daily  pantoprazole   Suspension 40 milliGRAM(s) Oral daily  sodium chloride 0.9% lock flush 3 milliLiter(s) IV Push every 8 hours  timolol 0.5% Solution 1 Drop(s) Both EYES two times a day    MEDICATIONS  (PRN):  aluminum hydroxide/magnesium hydroxide/simethicone Suspension 30 milliLiter(s) Oral every 4 hours PRN Dyspepsia  ketorolac   Injectable 30 milliGRAM(s) IV Push every 6 hours PRN Severe Pain (7 - 10)  naloxone Injectable 0.1 milliGRAM(s) IV Push every 3 minutes PRN For ANY of the following changes in patient status:  A. RR LESS THAN 10 breaths per minute, B. Oxygen saturation LESS THAN 90%, C. Sedation score of 6  ondansetron Injectable 4 milliGRAM(s) IV Push every 6 hours PRN Nausea and/or Vomiting  zolpidem 5 milliGRAM(s) Oral at bedtime PRN Insomnia      Vital Signs Last 24 Hrs  T(C): 37.2 (05 Oct 2017 08:06), Max: 37.3 (04 Oct 2017 22:37)  T(F): 98.9 (05 Oct 2017 08:06), Max: 99.1 (04 Oct 2017 22:37)  HR: 104 (05 Oct 2017 08:06) (90 - 133)  BP: 105/62 (05 Oct 2017 08:06) (96/60 - 126/74)  BP(mean): --  RR: 20 (05 Oct 2017 08:06) (17 - 20)  SpO2: 100% (05 Oct 2017 08:06) (94% - 100%)    PHYSICAL EXAM:      Constitutional: NAD  Eyes: EOMI  Respiratory: CTA b/l, breathing comfortably on room air, no accessory muscle use, no conversational dyspnea  Cardiovascular: S1, S2, irregular heartbeat  Gastrointestinal: abdomen softly distended, tympanic, decreased BS, appropriately TTP, provena in place with good suction, ostomy pink, liquid in bag, no rebound, no guarding  Genitourinary: alejandre in place  Extremities: FROM all 4 extremities, no edema, no calf tenderness  Neurological: A&O x3        I&O's Detail    04 Oct 2017 07:01  -  05 Oct 2017 07:00  --------------------------------------------------------  IN:  Total IN: 0 mL    OUT:    Ileostomy: 150 mL    Indwelling Catheter - Urethral: 2000 mL  Total OUT: 2150 mL    Total NET: -2150 mL          LABS:                        11.2   6.6   )-----------( 145      ( 05 Oct 2017 06:37 )             33.0     10-05    141  |  101  |  12.0  ----------------------------<  97  3.6   |  32.0<H>  |  0.85    Ca    8.6      05 Oct 2017 06:37  Phos  3.1     10-05  Mg     2.2     10-05            RADIOLOGY & ADDITIONAL STUDIES:

## 2017-10-05 NOTE — DIETITIAN INITIAL EVALUATION ADULT. - OTHER INFO
Pt is POD#3 s/p LAR + ileostomy for rectal/prostate CA (diagnosed April 2017). Pt reports prior to dx he intentionally most 40#. Since dx in April, he has undergone chemotherapy with no major weight loss. Pt reports reflux with acidic foods/tomatoes. Thorough diet education provided to pt and wife.

## 2017-10-05 NOTE — DIETITIAN INITIAL EVALUATION ADULT. - NS AS NUTRI INTERV MEALS SNACK
start clear liquid diet as feasible, advance to full liquid (no tomato products) > low fiber as tolerated

## 2017-10-06 LAB
ANION GAP SERPL CALC-SCNC: 13 MMOL/L — SIGNIFICANT CHANGE UP (ref 5–17)
ANISOCYTOSIS BLD QL: SLIGHT — SIGNIFICANT CHANGE UP
BASOPHILS # BLD AUTO: 0 K/UL — SIGNIFICANT CHANGE UP (ref 0–0.2)
BASOPHILS NFR BLD AUTO: 1 % — SIGNIFICANT CHANGE UP (ref 0–2)
BUN SERPL-MCNC: 10 MG/DL — SIGNIFICANT CHANGE UP (ref 8–20)
CALCIUM SERPL-MCNC: 8.3 MG/DL — LOW (ref 8.6–10.2)
CHLORIDE SERPL-SCNC: 100 MMOL/L — SIGNIFICANT CHANGE UP (ref 98–107)
CO2 SERPL-SCNC: 28 MMOL/L — SIGNIFICANT CHANGE UP (ref 22–29)
CREAT SERPL-MCNC: 0.91 MG/DL — SIGNIFICANT CHANGE UP (ref 0.5–1.3)
EOSINOPHIL # BLD AUTO: 0.1 K/UL — SIGNIFICANT CHANGE UP (ref 0–0.5)
EOSINOPHIL NFR BLD AUTO: 3 % — SIGNIFICANT CHANGE UP (ref 0–6)
GLUCOSE SERPL-MCNC: 82 MG/DL — SIGNIFICANT CHANGE UP (ref 70–115)
HCT VFR BLD CALC: 31.5 % — LOW (ref 42–52)
HGB BLD-MCNC: 10.6 G/DL — LOW (ref 14–18)
LYMPHOCYTES # BLD AUTO: 0.4 K/UL — LOW (ref 1–4.8)
LYMPHOCYTES # BLD AUTO: 9 % — LOW (ref 20–55)
MACROCYTES BLD QL: SLIGHT — SIGNIFICANT CHANGE UP
MAGNESIUM SERPL-MCNC: 2 MG/DL — SIGNIFICANT CHANGE UP (ref 1.6–2.6)
MCHC RBC-ENTMCNC: 31.2 PG — HIGH (ref 27–31)
MCHC RBC-ENTMCNC: 33.7 G/DL — SIGNIFICANT CHANGE UP (ref 32–36)
MCV RBC AUTO: 92.6 FL — SIGNIFICANT CHANGE UP (ref 80–94)
MICROCYTES BLD QL: SLIGHT — SIGNIFICANT CHANGE UP
MONOCYTES # BLD AUTO: 0.4 K/UL — SIGNIFICANT CHANGE UP (ref 0–0.8)
MONOCYTES NFR BLD AUTO: 9 % — SIGNIFICANT CHANGE UP (ref 3–10)
NEUTROPHILS # BLD AUTO: 3.2 K/UL — SIGNIFICANT CHANGE UP (ref 1.8–8)
NEUTROPHILS NFR BLD AUTO: 78 % — HIGH (ref 37–73)
PHOSPHATE SERPL-MCNC: 3.7 MG/DL — SIGNIFICANT CHANGE UP (ref 2.4–4.7)
PLAT MORPH BLD: ABNORMAL
PLATELET # BLD AUTO: 143 K/UL — LOW (ref 150–400)
POIKILOCYTOSIS BLD QL AUTO: SLIGHT — SIGNIFICANT CHANGE UP
POTASSIUM SERPL-MCNC: 3.6 MMOL/L — SIGNIFICANT CHANGE UP (ref 3.5–5.3)
POTASSIUM SERPL-SCNC: 3.6 MMOL/L — SIGNIFICANT CHANGE UP (ref 3.5–5.3)
RBC # BLD: 3.4 M/UL — LOW (ref 4.6–6.2)
RBC # FLD: 13.3 % — SIGNIFICANT CHANGE UP (ref 11–15.6)
RBC BLD AUTO: ABNORMAL
SODIUM SERPL-SCNC: 141 MMOL/L — SIGNIFICANT CHANGE UP (ref 135–145)
WBC # BLD: 4.1 K/UL — LOW (ref 4.8–10.8)
WBC # FLD AUTO: 4.1 K/UL — LOW (ref 4.8–10.8)

## 2017-10-06 PROCEDURE — 74000: CPT | Mod: 26

## 2017-10-06 RX ORDER — METOPROLOL TARTRATE 50 MG
50 TABLET ORAL EVERY 6 HOURS
Qty: 0 | Refills: 0 | Status: DISCONTINUED | OUTPATIENT
Start: 2017-10-06 | End: 2017-10-09

## 2017-10-06 RX ORDER — POTASSIUM CHLORIDE 20 MEQ
40 PACKET (EA) ORAL ONCE
Qty: 0 | Refills: 0 | Status: COMPLETED | OUTPATIENT
Start: 2017-10-06 | End: 2017-10-06

## 2017-10-06 RX ADMIN — Medication 15 MILLIGRAM(S): at 00:04

## 2017-10-06 RX ADMIN — SODIUM CHLORIDE 75 MILLILITER(S): 9 INJECTION, SOLUTION INTRAVENOUS at 19:31

## 2017-10-06 RX ADMIN — SODIUM CHLORIDE 3 MILLILITER(S): 9 INJECTION INTRAMUSCULAR; INTRAVENOUS; SUBCUTANEOUS at 14:20

## 2017-10-06 RX ADMIN — Medication 10 MG/HR: at 19:32

## 2017-10-06 RX ADMIN — Medication 1 DROP(S): at 06:00

## 2017-10-06 RX ADMIN — Medication 50 MILLIGRAM(S): at 23:59

## 2017-10-06 RX ADMIN — PANTOPRAZOLE SODIUM 40 MILLIGRAM(S): 20 TABLET, DELAYED RELEASE ORAL at 12:47

## 2017-10-06 RX ADMIN — SODIUM CHLORIDE 3 MILLILITER(S): 9 INJECTION INTRAMUSCULAR; INTRAVENOUS; SUBCUTANEOUS at 21:39

## 2017-10-06 RX ADMIN — HEPARIN SODIUM 5000 UNIT(S): 5000 INJECTION INTRAVENOUS; SUBCUTANEOUS at 06:00

## 2017-10-06 RX ADMIN — Medication 1 TABLET(S): at 12:47

## 2017-10-06 RX ADMIN — Medication 15 MILLIGRAM(S): at 00:05

## 2017-10-06 RX ADMIN — SODIUM CHLORIDE 3 MILLILITER(S): 9 INJECTION INTRAMUSCULAR; INTRAVENOUS; SUBCUTANEOUS at 06:06

## 2017-10-06 RX ADMIN — LATANOPROST 1 DROP(S): 0.05 SOLUTION/ DROPS OPHTHALMIC; TOPICAL at 21:37

## 2017-10-06 RX ADMIN — HYDROMORPHONE HYDROCHLORIDE 30 MILLILITER(S): 2 INJECTION INTRAMUSCULAR; INTRAVENOUS; SUBCUTANEOUS at 19:25

## 2017-10-06 RX ADMIN — Medication 15 MILLIGRAM(S): at 18:49

## 2017-10-06 RX ADMIN — Medication 15 MILLIGRAM(S): at 06:00

## 2017-10-06 RX ADMIN — Medication 15 MILLIGRAM(S): at 17:24

## 2017-10-06 RX ADMIN — Medication 1 SPRAY(S): at 23:58

## 2017-10-06 RX ADMIN — Medication 1 SPRAY(S): at 12:48

## 2017-10-06 RX ADMIN — ALVIMOPAN 12 MILLIGRAM(S): 12 CAPSULE ORAL at 17:24

## 2017-10-06 RX ADMIN — Medication 15 MILLIGRAM(S): at 12:47

## 2017-10-06 RX ADMIN — Medication 15 MILLIGRAM(S): at 14:18

## 2017-10-06 RX ADMIN — HEPARIN SODIUM 5000 UNIT(S): 5000 INJECTION INTRAVENOUS; SUBCUTANEOUS at 14:20

## 2017-10-06 RX ADMIN — HEPARIN SODIUM 5000 UNIT(S): 5000 INJECTION INTRAVENOUS; SUBCUTANEOUS at 21:37

## 2017-10-06 RX ADMIN — Medication 10 MG/HR: at 06:04

## 2017-10-06 RX ADMIN — BICALUTAMIDE 50 MILLIGRAM(S): 50 TABLET, FILM COATED ORAL at 12:47

## 2017-10-06 RX ADMIN — SODIUM CHLORIDE 125 MILLILITER(S): 9 INJECTION, SOLUTION INTRAVENOUS at 06:04

## 2017-10-06 RX ADMIN — Medication 1 SPRAY(S): at 06:00

## 2017-10-06 RX ADMIN — HYDROMORPHONE HYDROCHLORIDE 30 MILLILITER(S): 2 INJECTION INTRAMUSCULAR; INTRAVENOUS; SUBCUTANEOUS at 07:27

## 2017-10-06 RX ADMIN — Medication 15 MILLIGRAM(S): at 23:59

## 2017-10-06 RX ADMIN — Medication 50 MILLIGRAM(S): at 17:24

## 2017-10-06 RX ADMIN — ALVIMOPAN 12 MILLIGRAM(S): 12 CAPSULE ORAL at 06:00

## 2017-10-06 RX ADMIN — Medication 1 SPRAY(S): at 00:04

## 2017-10-06 RX ADMIN — Medication 1 DROP(S): at 17:25

## 2017-10-06 RX ADMIN — Medication 15 MILLIGRAM(S): at 23:58

## 2017-10-06 RX ADMIN — Medication 25 MILLIGRAM(S): at 06:00

## 2017-10-06 RX ADMIN — Medication 15 MILLIGRAM(S): at 06:06

## 2017-10-06 RX ADMIN — Medication 1 SPRAY(S): at 17:25

## 2017-10-06 RX ADMIN — Medication 25 MILLIGRAM(S): at 00:04

## 2017-10-06 RX ADMIN — Medication 40 MILLIEQUIVALENT(S): at 12:48

## 2017-10-06 NOTE — PROGRESS NOTE ADULT - SUBJECTIVE AND OBJECTIVE BOX
[place] [facility] - [facility], THE HEART CENTER                                   [address] [place] [address]                                                      PHONE: [phone]                                                         FAX: [phone]  [email or web address]  ---------------------------------------------------------------------------------------------------------------------------------    Overnight events/patient complaints:  Patient reports worsening of abdominal discomfort this am. HR improved, continues to be diltiazem gtt.     No Known Allergies    MEDICATIONS  (STANDING):  alvimopan 12 milliGRAM(s) Oral two times a day  benzocaine 20% Spray 1 Spray(s) Topical four times a day  bicalutamide 50 milliGRAM(s) Oral daily  cefoTEtan  IVPB 2 Gram(s) IV Intermittent once  diltiazem Infusion 10 mG/Hr (10 mL/Hr) IV Continuous <Continuous>  heparin  Injectable 5000 Unit(s) SubCutaneous every 8 hours  HYDROmorphone PCA (1 mG/mL) 30 milliLiter(s) PCA Continuous PCA Continuous  ketorolac   Injectable 15 milliGRAM(s) IV Push every 6 hours  lactated ringers. 1000 milliLiter(s) (125 mL/Hr) IV Continuous <Continuous>  latanoprost 0.005% Ophthalmic Solution 1 Drop(s) Both EYES at bedtime  metoprolol 25 milliGRAM(s) Oral every 6 hours  multivitamin 1 Tablet(s) Oral daily  pantoprazole   Suspension 40 milliGRAM(s) Oral daily  potassium chloride   Powder 40 milliEquivalent(s) Oral once  sodium chloride 0.9% lock flush 3 milliLiter(s) IV Push every 8 hours  timolol 0.5% Solution 1 Drop(s) Both EYES two times a day    MEDICATIONS  (PRN):  aluminum hydroxide/magnesium hydroxide/simethicone Suspension 30 milliLiter(s) Oral every 4 hours PRN Dyspepsia  ketorolac   Injectable 30 milliGRAM(s) IV Push every 6 hours PRN Severe Pain (7 - 10)  naloxone Injectable 0.1 milliGRAM(s) IV Push every 3 minutes PRN For ANY of the following changes in patient status:  A. RR LESS THAN 10 breaths per minute, B. Oxygen saturation LESS THAN 90%, C. Sedation score of 6  ondansetron Injectable 4 milliGRAM(s) IV Push every 6 hours PRN Nausea and/or Vomiting  zolpidem 5 milliGRAM(s) Oral at bedtime PRN Insomnia    Vital Signs Last 24 Hrs  T(C): 36.7 (06 Oct 2017 09:09), Max: 37.6 (05 Oct 2017 23:15)  T(F): 98 (06 Oct 2017 09:09), Max: 99.7 (05 Oct 2017 23:15)  HR: 105 (06 Oct 2017 09:09) (70 - 113)  BP: 154/82 (06 Oct 2017 09:09) (113/73 - 154/82)  BP(mean): --  RR: 18 (06 Oct 2017 09:09) (17 - 20)  SpO2: 95% (06 Oct 2017 09:09) (95% - 100%)  ICU Vital Signs Last 24 Hrs  ASHLEY SORENSON  I&O's Detail    05 Oct 2017 07:01  -  06 Oct 2017 07:00  --------------------------------------------------------  IN:    diltiazem Infusion: 50 mL    diltiazem Infusion: 10 mL    IV PiggyBack: 300 mL    lactated ringers.: 3375 mL  Total IN: 3735 mL    OUT:    Ileostomy: 1625 mL    Indwelling Catheter - Urethral: 2100 mL  Total OUT: 3725 mL    Total NET: 10 mL      I&O's Summary    05 Oct 2017 07:01  -  06 Oct 2017 07:00  --------------------------------------------------------  IN: 3735 mL / OUT: 3725 mL / NET: 10 mL      Drug Dosing Weight  ASHLEY SORENSON      PHYSICAL EXAM:  General: Appears well developed, well nourished alert and cooperative.  HEENT: Head; normocephalic, atraumatic.  Eyes: Pupils reactive, cornea wnl.  Neck: Supple, no nodes adenopathy, no NVD or carotid bruit or thyromegaly.  CARDIOVASCULAR: Normal S1 and S2, No murmur, rub, gallop or lift.   LUNGS: No rales, rhonchi or wheeze. Normal breath sounds bilaterally.  ABDOMEN: distended, tympanic, ostomy pink , green liquid output   EXTREMITIES: No clubbing, cyanosis or edema. Distal pulses wnl.   SKIN: warm and dry with normal turgor.  NEURO: Alert/oriented x 3/normal motor exam. No pathologic reflexes.    PSYCH: normal affect.        LABS:                        10.6   4.1   )-----------( 143      ( 06 Oct 2017 07:18 )             31.5     10-06    141  |  100  |  10.0  ----------------------------<  82  3.6   |  28.0  |  0.91    Ca    8.3<L>      06 Oct 2017 07:18  Phos  3.7     10-06  Mg     2.0     10-06      ASHLEY SORENSON  CARDIAC MARKERS ( 04 Oct 2017 17:50 )  x     / <0.01 ng/mL / x     / x     / x      CARDIAC MARKERS ( 04 Oct 2017 14:58 )  x     / <0.01 ng/mL / 342 U/L / x     / 2.1 ng/mL      RADIOLOGY & ADDITIONAL STUDIES:    INTERPRETATION OF TELEMETRY (personally reviewed):    ECHO 9/7/17  FINDINGS: Normal LVEF systolic function EF 60-65%  Trace MR and TR    STRESS: PET/CT MPI 9/22/17  FINDINGS: no ischemic changes on EKG, normal myocardial perfusion post stress, EF 53%    ASSESSMENT AND PLAN:  In summary, ASHLEY SORENSON is an 61y Male with past medical history significant for prostate cancer on casodex, and recently diagnoses colorectal cancer who was found to have atrial fibrillation on pre-operative testing who is now s/p low anterior resection of the rectum with loop ileostomy who developed atrial fibrillation with RVR    Atrial fibrillation with RVR  [id number],  Patient with a myriad of adrenergic stimulators including post op pain/discomfort, anxiety and insomnia   Continue diltiazem gtt  Increase metoprolol to 50mg Q6hrs, to facilitate d/c of Diltiazem gtt; holding parameters SBP<90  Increase IVF to 150cc/hr, given NPO status   Pain control     Thank you for allowing [facility] to participate in the care of this patient.  Please feel free to call with any questions or concerns.

## 2017-10-06 NOTE — PROGRESS NOTE ADULT - SUBJECTIVE AND OBJECTIVE BOX
Chief Complaint: postoperative abdominal pain       HPI:   ASHLEY SORENSON is a 61y Male that was seen and examined at bedside. Patient reports that pain is currently 6/10 and localized to the abdomen. He describes pain as intermittent and burning in nature. Pain is worse with movement and improved with IV pain medication through IV PCA. NOted to only press for bolus 1x in last 4 hrs. He denies any sedation or pruritis. He poorly tolerated food since starting liquid diet and was put back to NPO. He reports good relief of pain with Toradol IV. He admits to output with ostomy. He offers no further complaints.       REVIEW OF SYSTEMS: X denotes positive finding, otherwise all additional items were reviewed and negative  GEN: [] fever, [] chills, [] change in appetite, [] weight loss, [] fatigue, [] sedation  ENT: [] change in vision, [] dry mouth, [] ringing in ears, [] sore throat  RESP: [] shortness of breath, [] Obstructive sleep apnea  CV: [] chest pain, [] palpitations   GI: [] nausea, [] vomiting, [] constipation, [] GERD  : [] bladder dysfunction, [] dysuria  MSK: [] weakness, [] joint pain, [] myalgias  Neuro: [x]pain, [] numbness, [] tingling, [] tremor, [] seizures  Skin: [] rash  Psych: [] anxiety, [] depression  Endo: [] polydipsia  Heme: [] coagulopathic disorder      PAST MEDICAL & SURGICAL HISTORY:  Rectal cancer  Prostate cancer  Hypertension  Glaucoma  S/P ORIF (open reduction internal fixation) fracture: Left ankle  History of lipoma: x 3      SOCIAL HISTORY:  Denies Smoking, Alcohol, or Drug Use  FAMILY HISTORY:  Family history of CABG (Father)  Family history of prostate cancer (Father, Sibling)  Family history of cervical cancer (Mother)  Family history of coronary artery disease (Father)  Family history of hypertension (Father)      Allergies    No Known Allergies    Intolerances      PAIN MEDICATIONS:  MEDICATIONS  (STANDING):  alvimopan 12 milliGRAM(s) Oral two times a day  benzocaine 20% Spray 1 Spray(s) Topical four times a day  bicalutamide 50 milliGRAM(s) Oral daily  cefoTEtan  IVPB 2 Gram(s) IV Intermittent once  diltiazem Infusion 10 mG/Hr (10 mL/Hr) IV Continuous <Continuous>  heparin  Injectable 5000 Unit(s) SubCutaneous every 8 hours  HYDROmorphone PCA (1 mG/mL) 30 milliLiter(s) PCA Continuous PCA Continuous  ketorolac   Injectable 15 milliGRAM(s) IV Push every 6 hours  lactated ringers. 1000 milliLiter(s) (125 mL/Hr) IV Continuous <Continuous>  latanoprost 0.005% Ophthalmic Solution 1 Drop(s) Both EYES at bedtime  metoprolol 50 milliGRAM(s) Oral every 6 hours  multivitamin 1 Tablet(s) Oral daily  pantoprazole   Suspension 40 milliGRAM(s) Oral daily  potassium chloride   Powder 40 milliEquivalent(s) Oral once  sodium chloride 0.9% lock flush 3 milliLiter(s) IV Push every 8 hours  timolol 0.5% Solution 1 Drop(s) Both EYES two times a day    MEDICATIONS  (PRN):  aluminum hydroxide/magnesium hydroxide/simethicone Suspension 30 milliLiter(s) Oral every 4 hours PRN Dyspepsia  ketorolac   Injectable 30 milliGRAM(s) IV Push every 6 hours PRN Severe Pain (7 - 10)  naloxone Injectable 0.1 milliGRAM(s) IV Push every 3 minutes PRN For ANY of the following changes in patient status:  A. RR LESS THAN 10 breaths per minute, B. Oxygen saturation LESS THAN 90%, C. Sedation score of 6  ondansetron Injectable 4 milliGRAM(s) IV Push every 6 hours PRN Nausea and/or Vomiting  zolpidem 5 milliGRAM(s) Oral at bedtime PRN Insomnia      Vital Signs Last 24 Hrs  T(C): 36.7 (06 Oct 2017 09:09), Max: 37.6 (05 Oct 2017 23:15)  T(F): 98 (06 Oct 2017 09:09), Max: 99.7 (05 Oct 2017 23:15)  HR: 105 (06 Oct 2017 09:09) (70 - 113)  BP: 154/82 (06 Oct 2017 09:09) (113/73 - 154/82)  BP(mean): --  RR: 18 (06 Oct 2017 09:09) (17 - 20)  SpO2: 95% (06 Oct 2017 09:09) (95% - 100%)             PHYSICAL EXAM: X denotes positive finding   GENERAL: [x] cooperative, [] uncooperative, [x] NAD, [] in distress, [x] speech clear and coherent  HEENT: [x] NCAT, [x] EOMI, [] pupils non pinpoint, [] no external deformities, [] NGT   NECK: [x] normal overall appearance, [] no gross masses  RESPIRATORY: [x] unlabored respirations, [x] on room air, [] on supplemental O2, [] labored respirations  CV: [x] regular rate, [] regular rhythm, [x] no LE edema, [] pitting LE edema  Abdomen: [x] soft, [] non-tender, [x] tender to palpation, [] pos bowel sounds  : [] alejandre, [x] deferred  Skin: [] normal texture, [] rash, [x] lesion(s), [] drain(s)   MSK: [x] limited AROM, [] extremities antigravity x4, [] normal gait  Neuro: [x] SILT, [] sensation reduced to light touch, [] abnormal DTRs  Psych: [x] oriented to person, place, time, [] displays insight, [] limited/impaired insight, [] poor coping skills       LABS:                        10.6   4.1   )-----------( 143      ( 06 Oct 2017 07:18 )             31.5   10-06    141  |  100  |  10.0  ----------------------------<  82  3.6   |  28.0  |  0.91    Ca    8.3<L>      06 Oct 2017 07:18  Phos  3.7     10-06  Mg     2.0     10-06      Drug Screen:      Radiology:      :  This report was requested by: Walter Gill | Reference #: [id number]   Rx Written	Rx Dispensed	Drug	Quantity	Days Supply	Prescriber Name			  05/18/2017	05/19/2017	alprazolam 0.5 mg tablet 	2	1	Beto Kim			  04/27/2017	04/27/2017	alprazolam 0.5 mg tablet 	6	3	Aurora Swenson)			  Rx Written	Rx Dispensed	Drug	Quantity	Days Supply	Prescriber Name			  03/30/2017	03/30/2017	oxycodone-acetaminophen 5-325 mg tablet 	10	2	Rachel Avendano			  Prescriptions Dispensed in [place]  There are no results for the search terms that you entered.   Prescriptions Dispensed in [place]  There are no results for the search terms that you entered.   Prescriptions Dispensed in [place]  There are no results for the search terms that you entered.   Prescriptions Dispensed in [place]  There are no results for the search terms that you entered.   Prescriptions Dispensed in [place]  There are no results for the search terms that you entered.   Prescriptions Dispensed in [place]  There are no results for the search terms that you entered.   Prescriptions Dispensed in [facility]  There are no results for the search terms that you entered.   Prescriptions Dispensed in [place]  There are no results for the search terms that you entered.   Prescriptions Dispensed in [place]  There are no results for the search terms that you entered.   Prescriptions Dispensed in [place]  There are no results for the search terms that you entered.   Prescriptions Dispensed in [place]  There are no results for the search terms that you entered.   Prescriptions Dispensed in [place]  There are no results for the search terms that you entered.   Prescriptions Dispensed in [place]  There are no results for the search terms that you entered.   Prescriptions Dispensed in [place]  There are no results for the search terms that you entered.   Prescriptions Dispensed in [place]  There are no results for the search terms that you entered.   Prescriptions Dispensed in [place]  There are no results for the search terms that you entered.   Prescriptions Dispensed in [place]  There are no results for the search terms that you entered.   Prescriptions Dispensed in [place]  There are no results for the search terms that you entered.   Prescriptions Dispensed in [place]  There are no results for the search terms that you entered.

## 2017-10-06 NOTE — ADVANCED PRACTICE NURSE CONSULT - ASSESSMENT
Pt is alert and oriented. Pt reported that he felt much better compared to two days ago. Pt's ileostomy is functioning well with flatus and bilious output. Pt is tolerating diet. Unable to remove pt's pouching to assess stoma and peristomal skin due to overlaying of pouching system with midline surgical NPWT (Prevena). Pt reported that he has been emptying the pouching by himself, no problem with pouching opening and draining. Assessed pt's stoma via clear pouching system, stoma red, moist and protuberant, stoma measured about 35mm via the clear pouching. Pt reported no pouching leaking issues. Educated the pt about the pouching changing procedure on ostomy model. Outpatient ostomy support group and outpatient ostomy nurse contact information provided. Pt was enrolled in secure start program from [facility], [place] and [facility]. Questions answered to pt's satisfaction. Extra pouching supplies also provided for pt to take home. Discussed with KAITLYNN Steele about pt's ostomy care.

## 2017-10-06 NOTE — PROGRESS NOTE ADULT - SUBJECTIVE AND OBJECTIVE BOX
INTERVAL HPI/OVERNIGHT EVENTS/SUBJECTIVE:  61yM POD 3 LAR, lymph node dissection with ileostomy. Patient seen and examined, no acute events overnight. Afib is better rate controlled, HR currently in 90's on monitor.. Patient states belching and feeling of abdominal fullness has improved. offers No complaints at this time. Reports + flatus, + liquid stool in ostomy bag. Denies n/v/d, sob, cp, palpitations, dizziness.     ICU Vital Signs Last 24 Hrs  T(C): 37.2 (06 Oct 2017 04:35), Max: 37.6 (05 Oct 2017 23:15)  T(F): 99 (06 Oct 2017 04:35), Max: 99.7 (05 Oct 2017 23:15)  HR: 70 (06 Oct 2017 04:35) (70 - 113)  BP: 119/66 (06 Oct 2017 04:35) (105/62 - 122/73)  BP(mean): --  ABP: --  ABP(mean): --  RR: 18 (06 Oct 2017 04:35) (17 - 20)  SpO2: 99% (06 Oct 2017 04:35) (99% - 100%)      I&O's Detail    05 Oct 2017 07:01  -  06 Oct 2017 07:00  --------------------------------------------------------  IN:    diltiazem Infusion: 50 mL    diltiazem Infusion: 10 mL    IV PiggyBack: 300 mL    lactated ringers.: 3375 mL  Total IN: 3735 mL    OUT:    Ileostomy: 1625 mL    Indwelling Catheter - Urethral: 2100 mL  Total OUT: 3725 mL    Total NET: 10 mL      MEDICATIONS  (STANDING):  alvimopan 12 milliGRAM(s) Oral two times a day  benzocaine 20% Spray 1 Spray(s) Topical four times a day  bicalutamide 50 milliGRAM(s) Oral daily  cefoTEtan  IVPB 2 Gram(s) IV Intermittent once  diltiazem Infusion 10 mG/Hr (10 mL/Hr) IV Continuous <Continuous>  heparin  Injectable 5000 Unit(s) SubCutaneous every 8 hours  HYDROmorphone PCA (1 mG/mL) 30 milliLiter(s) PCA Continuous PCA Continuous  ketorolac   Injectable 15 milliGRAM(s) IV Push every 6 hours  lactated ringers. 1000 milliLiter(s) (125 mL/Hr) IV Continuous <Continuous>  latanoprost 0.005% Ophthalmic Solution 1 Drop(s) Both EYES at bedtime  metoprolol 25 milliGRAM(s) Oral every 6 hours  multivitamin 1 Tablet(s) Oral daily  pantoprazole   Suspension 40 milliGRAM(s) Oral daily  sodium chloride 0.9% lock flush 3 milliLiter(s) IV Push every 8 hours  timolol 0.5% Solution 1 Drop(s) Both EYES two times a day    MEDICATIONS  (PRN):  aluminum hydroxide/magnesium hydroxide/simethicone Suspension 30 milliLiter(s) Oral every 4 hours PRN Dyspepsia  ketorolac   Injectable 30 milliGRAM(s) IV Push every 6 hours PRN Severe Pain (7 - 10)  naloxone Injectable 0.1 milliGRAM(s) IV Push every 3 minutes PRN For ANY of the following changes in patient status:  A. RR LESS THAN 10 breaths per minute, B. Oxygen saturation LESS THAN 90%, C. Sedation score of 6  ondansetron Injectable 4 milliGRAM(s) IV Push every 6 hours PRN Nausea and/or Vomiting  zolpidem 5 milliGRAM(s) Oral at bedtime PRN Insomnia  MISC:     PHYSICAL EXAM:  Constitutional: NAD, laying comfortably in bed  Respiratory: CTA b/l,no accessory muscle use, nn-labored  Cardiovascular: S1, S2, irregular heartbeat  Gastrointestinal: abdomen softly distended, however is improved from yesterday, tympanic, decreased BS, appropriately TTP, ostomy pink, liquid in bag, no rebound, no guarding.   Genitourinary: alejandre in place  Extremities: FROM all 4 extremities, no edema, no calf tenderness  Neurological: A&O x3, no loss of sensation, no neuro deficits.     LABS:  CBC Full  -  ( 05 Oct 2017 06:37 )  WBC Count : 6.6 K/uL  Hemoglobin : 11.2 g/dL  Hematocrit : 33.0 %  Platelet Count - Automated : 145 K/uL  Mean Cell Volume : 92.2 fl  Mean Cell Hemoglobin : 31.3 pg  Mean Cell Hemoglobin Concentration : 33.9 g/dL  Auto Neutrophil # : 5.5 K/uL  Auto Lymphocyte # : 0.5 K/uL  Auto Monocyte # : 0.5 K/uL  Auto Eosinophil # : 0.1 K/uL  Auto Basophil # : 0.0 K/uL  Auto Neutrophil % : 83.9 %  Auto Lymphocyte % : 7.8 %  Auto Monocyte % : 7.0 %  Auto Eosinophil % : 0.9 %  Auto Basophil % : 0.2 %    10-05    141  |  101  |  12.0  ----------------------------<  97  3.6   |  32.0<H>  |  0.85    Ca    8.6      05 Oct 2017 06:37  Phos  3.1     10-05  Mg     2.2     10-05    CARDIAC MARKERS ( 04 Oct 2017 17:50 )  x     / <0.01 ng/mL / x     / x     / x      CARDIAC MARKERS ( 04 Oct 2017 14:58 )  x     / <0.01 ng/mL / 342 U/L / x     / 2.1 ng/mL    RADIOLOGY & ADDITIONAL STUDIES:  CTA chest - negative     ASSESSMENT/PLAN:  61yMale 61yMale  POD4 LAR, lymph node dissection with ileostomy for malignant neoplasm of rectum, complicated by post-op rapid afib with RVR and ileus.   -will review am labs  - pain control PRN  - afib better controlled, continue cardiac medications as per cardiology, keep on cardiac monitor and   - encourage deep breathing, IS  - alejandre for now  - encourage OOB/ambulation  - +liquid stool and gas in ostomy, no nausea or emesis, will most likely start CLD today  - DVT ppx  will discuss with attending

## 2017-10-06 NOTE — ADVANCED PRACTICE NURSE CONSULT - RECOMMEDATIONS
Encourage pt to continue self-care the ileostomy pouching, also encourage pt to perform the pouching change once the Prevena was removed from midline abdominal surgical wound. Instructed pt to follow up ileostomy care with home visiting nurse and outpatient ostomy nurse as needed after discharge from hospital. Pt verbalized the understanding of the information given.

## 2017-10-06 NOTE — PROGRESS NOTE ADULT - SUBJECTIVE AND OBJECTIVE BOX
HPI:  62 y/o male with rectal bleeding had colonoscopy and was DX with rectal cancer patient now presents for laparoscopic possible open low anterior resection lymph node dissection and possible  ileostomy (26 Sep 2017 15:37)     Allergies    No Known Allergies    Intolerances    avoid acidic foods/tomato products (Unknown)    Rectal cancer  Colon cancer  Prostate cancer  Hypertension  Glaucoma    MEDICATIONS  (STANDING):  alvimopan 12 milliGRAM(s) Oral two times a day  benzocaine 20% Spray 1 Spray(s) Topical four times a day  bicalutamide 50 milliGRAM(s) Oral daily  cefoTEtan  IVPB 2 Gram(s) IV Intermittent once  diltiazem Infusion 10 mG/Hr (10 mL/Hr) IV Continuous <Continuous>  heparin  Injectable 5000 Unit(s) SubCutaneous every 8 hours  HYDROmorphone PCA (1 mG/mL) 30 milliLiter(s) PCA Continuous PCA Continuous  ketorolac   Injectable 15 milliGRAM(s) IV Push every 6 hours  lactated ringers. 1000 milliLiter(s) (75 mL/Hr) IV Continuous <Continuous>  latanoprost 0.005% Ophthalmic Solution 1 Drop(s) Both EYES at bedtime  metoprolol 50 milliGRAM(s) Oral every 6 hours  multivitamin 1 Tablet(s) Oral daily  pantoprazole   Suspension 40 milliGRAM(s) Oral daily  sodium chloride 0.9% lock flush 3 milliLiter(s) IV Push every 8 hours  timolol 0.5% Solution 1 Drop(s) Both EYES two times a day    MEDICATIONS  (PRN):  aluminum hydroxide/magnesium hydroxide/simethicone Suspension 30 milliLiter(s) Oral every 4 hours PRN Dyspepsia  ketorolac   Injectable 30 milliGRAM(s) IV Push every 6 hours PRN Severe Pain (7 - 10)  naloxone Injectable 0.1 milliGRAM(s) IV Push every 3 minutes PRN For ANY of the following changes in patient status:  A. RR LESS THAN 10 breaths per minute, B. Oxygen saturation LESS THAN 90%, C. Sedation score of 6  ondansetron Injectable 4 milliGRAM(s) IV Push every 6 hours PRN Nausea and/or Vomiting  zolpidem 5 milliGRAM(s) Oral at bedtime PRN Insomnia                           10.6   4.1   )-----------( 143      ( 06 Oct 2017 07:18 )             31.5     10-06    141  |  100  |  10.0  ----------------------------<  82  3.6   |  28.0  |  0.91    Ca    8.3<L>      06 Oct 2017 07:18  Phos  3.7     10-06  Mg     2.0     10-06        ;  Vital Signs Last 24 Hrs  T(C): 36.7 (06 Oct 2017 15:53), Max: 37.6 (05 Oct 2017 23:15)  T(F): 98 (06 Oct 2017 15:53), Max: 99.7 (05 Oct 2017 23:15)  HR: 92 (06 Oct 2017 15:53) (70 - 105)  BP: 108/80 (06 Oct 2017 15:53) (100/60 - 154/82)  BP(mean): --  RR: 17 (06 Oct 2017 15:53) (17 - 18)  SpO2: 97% (06 Oct 2017 15:53) (95% - 99%)  CAPILLARY BLOOD GLUCOSE      10-05 @ 07:01  -  10-06 @ 07:00  --------------------------------------------------------  IN: 3735 mL / OUT: 3725 mL / NET: 10 mL    10-06 @ 07:01  -  10-06 @ 18:33  --------------------------------------------------------  IN: 750 mL / OUT: 1900 mL / NET: -1150 mL      CAPILLARY BLOOD GLUCOSE      10-04 @ 07:01  -  10-05 @ 07:00  --------------------------------------------------------  IN: 0 mL / OUT: 2150 mL / NET: -2150 mL          Patient feeling better now No CP, No SOB, No N/V +Abd pain this morning    HEENT: ALIZALA  Neck: Supple  Cardio: S1 S2 No Murmur Irreg  Pulm: CTA No Rales or Ronchi  Abd: Soft NT ND BS dec, Colostomy Incision bandage clean  Rectal - refused  Ext: No DCT  Skin: No Rash  Neuro: Awake Pleasant    Rectal cancer - Post op Pain control, ambulation  bowel fxn returning  Prostate cancer - will monitor for urinary retention post alejandre removal  Hypertension - meds with parameters  Glaucoma - timolol  AFib RVR - Cardiazem metoprolol, Cardiology, CT Angio no PE

## 2017-10-07 LAB
ANION GAP SERPL CALC-SCNC: 13 MMOL/L — SIGNIFICANT CHANGE UP (ref 5–17)
BASOPHILS # BLD AUTO: 0 K/UL — SIGNIFICANT CHANGE UP (ref 0–0.2)
BASOPHILS NFR BLD AUTO: 0.3 % — SIGNIFICANT CHANGE UP (ref 0–2)
BUN SERPL-MCNC: 12 MG/DL — SIGNIFICANT CHANGE UP (ref 8–20)
CALCIUM SERPL-MCNC: 8.1 MG/DL — LOW (ref 8.6–10.2)
CHLORIDE SERPL-SCNC: 100 MMOL/L — SIGNIFICANT CHANGE UP (ref 98–107)
CO2 SERPL-SCNC: 26 MMOL/L — SIGNIFICANT CHANGE UP (ref 22–29)
CREAT SERPL-MCNC: 0.91 MG/DL — SIGNIFICANT CHANGE UP (ref 0.5–1.3)
EOSINOPHIL # BLD AUTO: 0.2 K/UL — SIGNIFICANT CHANGE UP (ref 0–0.5)
EOSINOPHIL NFR BLD AUTO: 5.4 % — SIGNIFICANT CHANGE UP (ref 0–6)
GLUCOSE SERPL-MCNC: 147 MG/DL — HIGH (ref 70–115)
HCT VFR BLD CALC: 31.1 % — LOW (ref 42–52)
HGB BLD-MCNC: 10.1 G/DL — LOW (ref 14–18)
LYMPHOCYTES # BLD AUTO: 0.4 K/UL — LOW (ref 1–4.8)
LYMPHOCYTES # BLD AUTO: 10 % — LOW (ref 20–55)
MAGNESIUM SERPL-MCNC: 1.9 MG/DL — SIGNIFICANT CHANGE UP (ref 1.6–2.6)
MCHC RBC-ENTMCNC: 30.8 PG — SIGNIFICANT CHANGE UP (ref 27–31)
MCHC RBC-ENTMCNC: 32.5 G/DL — SIGNIFICANT CHANGE UP (ref 32–36)
MCV RBC AUTO: 94.8 FL — HIGH (ref 80–94)
MONOCYTES # BLD AUTO: 0.4 K/UL — SIGNIFICANT CHANGE UP (ref 0–0.8)
MONOCYTES NFR BLD AUTO: 10.8 % — HIGH (ref 3–10)
NEUTROPHILS # BLD AUTO: 2.6 K/UL — SIGNIFICANT CHANGE UP (ref 1.8–8)
NEUTROPHILS NFR BLD AUTO: 73.2 % — HIGH (ref 37–73)
PHOSPHATE SERPL-MCNC: 4.2 MG/DL — SIGNIFICANT CHANGE UP (ref 2.4–4.7)
PLATELET # BLD AUTO: 153 K/UL — SIGNIFICANT CHANGE UP (ref 150–400)
POTASSIUM SERPL-MCNC: 3.4 MMOL/L — LOW (ref 3.5–5.3)
POTASSIUM SERPL-SCNC: 3.4 MMOL/L — LOW (ref 3.5–5.3)
RBC # BLD: 3.28 M/UL — LOW (ref 4.6–6.2)
RBC # FLD: 13.1 % — SIGNIFICANT CHANGE UP (ref 11–15.6)
SODIUM SERPL-SCNC: 139 MMOL/L — SIGNIFICANT CHANGE UP (ref 135–145)
WBC # BLD: 3.5 K/UL — LOW (ref 4.8–10.8)
WBC # FLD AUTO: 3.5 K/UL — LOW (ref 4.8–10.8)

## 2017-10-07 PROCEDURE — 93010 ELECTROCARDIOGRAM REPORT: CPT

## 2017-10-07 RX ORDER — ACETAMINOPHEN 500 MG
650 TABLET ORAL EVERY 6 HOURS
Qty: 0 | Refills: 0 | Status: DISCONTINUED | OUTPATIENT
Start: 2017-10-07 | End: 2017-10-07

## 2017-10-07 RX ORDER — OXYCODONE AND ACETAMINOPHEN 5; 325 MG/1; MG/1
1 TABLET ORAL EVERY 4 HOURS
Qty: 0 | Refills: 0 | Status: DISCONTINUED | OUTPATIENT
Start: 2017-10-07 | End: 2017-10-07

## 2017-10-07 RX ORDER — OXYCODONE HYDROCHLORIDE 5 MG/1
5 TABLET ORAL EVERY 4 HOURS
Qty: 0 | Refills: 0 | Status: DISCONTINUED | OUTPATIENT
Start: 2017-10-07 | End: 2017-10-09

## 2017-10-07 RX ORDER — HYDROMORPHONE HYDROCHLORIDE 2 MG/ML
0.5 INJECTION INTRAMUSCULAR; INTRAVENOUS; SUBCUTANEOUS
Qty: 0 | Refills: 0 | Status: DISCONTINUED | OUTPATIENT
Start: 2017-10-07 | End: 2017-10-07

## 2017-10-07 RX ORDER — OXYCODONE AND ACETAMINOPHEN 5; 325 MG/1; MG/1
2 TABLET ORAL EVERY 4 HOURS
Qty: 0 | Refills: 0 | Status: DISCONTINUED | OUTPATIENT
Start: 2017-10-07 | End: 2017-10-07

## 2017-10-07 RX ORDER — ASPIRIN/CALCIUM CARB/MAGNESIUM 324 MG
325 TABLET ORAL DAILY
Qty: 0 | Refills: 0 | Status: DISCONTINUED | OUTPATIENT
Start: 2017-10-07 | End: 2017-10-09

## 2017-10-07 RX ORDER — OXYCODONE HYDROCHLORIDE 5 MG/1
10 TABLET ORAL EVERY 4 HOURS
Qty: 0 | Refills: 0 | Status: DISCONTINUED | OUTPATIENT
Start: 2017-10-07 | End: 2017-10-09

## 2017-10-07 RX ORDER — ACETAMINOPHEN 500 MG
975 TABLET ORAL EVERY 6 HOURS
Qty: 0 | Refills: 0 | Status: DISCONTINUED | OUTPATIENT
Start: 2017-10-07 | End: 2017-10-09

## 2017-10-07 RX ORDER — POTASSIUM CHLORIDE 20 MEQ
40 PACKET (EA) ORAL EVERY 4 HOURS
Qty: 0 | Refills: 0 | Status: COMPLETED | OUTPATIENT
Start: 2017-10-07 | End: 2017-10-07

## 2017-10-07 RX ORDER — HYDROMORPHONE HYDROCHLORIDE 2 MG/ML
0.5 INJECTION INTRAMUSCULAR; INTRAVENOUS; SUBCUTANEOUS EVERY 6 HOURS
Qty: 0 | Refills: 0 | Status: DISCONTINUED | OUTPATIENT
Start: 2017-10-07 | End: 2017-10-09

## 2017-10-07 RX ADMIN — Medication 975 MILLIGRAM(S): at 14:19

## 2017-10-07 RX ADMIN — HEPARIN SODIUM 5000 UNIT(S): 5000 INJECTION INTRAVENOUS; SUBCUTANEOUS at 21:08

## 2017-10-07 RX ADMIN — Medication 1 SPRAY(S): at 23:53

## 2017-10-07 RX ADMIN — Medication 50 MILLIGRAM(S): at 18:13

## 2017-10-07 RX ADMIN — Medication 1 DROP(S): at 05:49

## 2017-10-07 RX ADMIN — Medication 975 MILLIGRAM(S): at 23:54

## 2017-10-07 RX ADMIN — Medication 1 SPRAY(S): at 18:13

## 2017-10-07 RX ADMIN — BICALUTAMIDE 50 MILLIGRAM(S): 50 TABLET, FILM COATED ORAL at 14:18

## 2017-10-07 RX ADMIN — PANTOPRAZOLE SODIUM 40 MILLIGRAM(S): 20 TABLET, DELAYED RELEASE ORAL at 18:12

## 2017-10-07 RX ADMIN — ALVIMOPAN 12 MILLIGRAM(S): 12 CAPSULE ORAL at 05:48

## 2017-10-07 RX ADMIN — SODIUM CHLORIDE 75 MILLILITER(S): 9 INJECTION, SOLUTION INTRAVENOUS at 05:54

## 2017-10-07 RX ADMIN — ZOLPIDEM TARTRATE 5 MILLIGRAM(S): 10 TABLET ORAL at 21:14

## 2017-10-07 RX ADMIN — HEPARIN SODIUM 5000 UNIT(S): 5000 INJECTION INTRAVENOUS; SUBCUTANEOUS at 05:48

## 2017-10-07 RX ADMIN — Medication 975 MILLIGRAM(S): at 15:00

## 2017-10-07 RX ADMIN — Medication 15 MILLIGRAM(S): at 05:48

## 2017-10-07 RX ADMIN — Medication 1 SPRAY(S): at 05:48

## 2017-10-07 RX ADMIN — SODIUM CHLORIDE 3 MILLILITER(S): 9 INJECTION INTRAMUSCULAR; INTRAVENOUS; SUBCUTANEOUS at 14:07

## 2017-10-07 RX ADMIN — Medication 40 MILLIEQUIVALENT(S): at 14:17

## 2017-10-07 RX ADMIN — Medication 1 TABLET(S): at 14:18

## 2017-10-07 RX ADMIN — LATANOPROST 1 DROP(S): 0.05 SOLUTION/ DROPS OPHTHALMIC; TOPICAL at 21:08

## 2017-10-07 RX ADMIN — Medication 40 MILLIEQUIVALENT(S): at 08:45

## 2017-10-07 RX ADMIN — Medication 325 MILLIGRAM(S): at 14:17

## 2017-10-07 RX ADMIN — Medication 50 MILLIGRAM(S): at 05:48

## 2017-10-07 RX ADMIN — SODIUM CHLORIDE 3 MILLILITER(S): 9 INJECTION INTRAMUSCULAR; INTRAVENOUS; SUBCUTANEOUS at 21:10

## 2017-10-07 RX ADMIN — Medication 50 MILLIGRAM(S): at 14:18

## 2017-10-07 RX ADMIN — HYDROMORPHONE HYDROCHLORIDE 30 MILLILITER(S): 2 INJECTION INTRAMUSCULAR; INTRAVENOUS; SUBCUTANEOUS at 07:10

## 2017-10-07 RX ADMIN — Medication 15 MILLIGRAM(S): at 05:54

## 2017-10-07 RX ADMIN — ALVIMOPAN 12 MILLIGRAM(S): 12 CAPSULE ORAL at 18:13

## 2017-10-07 RX ADMIN — SODIUM CHLORIDE 3 MILLILITER(S): 9 INJECTION INTRAMUSCULAR; INTRAVENOUS; SUBCUTANEOUS at 05:54

## 2017-10-07 RX ADMIN — Medication 975 MILLIGRAM(S): at 23:52

## 2017-10-07 RX ADMIN — Medication 1 SPRAY(S): at 14:17

## 2017-10-07 RX ADMIN — HEPARIN SODIUM 5000 UNIT(S): 5000 INJECTION INTRAVENOUS; SUBCUTANEOUS at 14:17

## 2017-10-07 RX ADMIN — Medication 975 MILLIGRAM(S): at 18:13

## 2017-10-07 RX ADMIN — Medication 1 DROP(S): at 18:13

## 2017-10-07 NOTE — PROGRESS NOTE ADULT - SUBJECTIVE AND OBJECTIVE BOX
HPI:  60 y/o male with rectal bleeding had colonoscopy and was DX with rectal cancer patient now presents for laparoscopic possible open low anterior resection lymph node dissection and possible  ileostomy (26 Sep 2017 15:37)     Allergies    No Known Allergies    Intolerances    avoid acidic foods/tomato products (Unknown)    Rectal cancer  Colon cancer  Prostate cancer  Hypertension  Glaucoma    MEDICATIONS  (STANDING):  acetaminophen   Tablet. 975 milliGRAM(s) Oral every 6 hours  alvimopan 12 milliGRAM(s) Oral two times a day  aspirin enteric coated 325 milliGRAM(s) Oral daily  benzocaine 20% Spray 1 Spray(s) Topical four times a day  bicalutamide 50 milliGRAM(s) Oral daily  cefoTEtan  IVPB 2 Gram(s) IV Intermittent once  diltiazem    Tablet 30 milliGRAM(s) Oral every 8 hours  heparin  Injectable 5000 Unit(s) SubCutaneous every 8 hours  latanoprost 0.005% Ophthalmic Solution 1 Drop(s) Both EYES at bedtime  metoprolol 50 milliGRAM(s) Oral every 6 hours  multivitamin 1 Tablet(s) Oral daily  pantoprazole   Suspension 40 milliGRAM(s) Oral daily  sodium chloride 0.9% lock flush 3 milliLiter(s) IV Push every 8 hours  timolol 0.5% Solution 1 Drop(s) Both EYES two times a day    MEDICATIONS  (PRN):  aluminum hydroxide/magnesium hydroxide/simethicone Suspension 30 milliLiter(s) Oral every 4 hours PRN Dyspepsia  HYDROmorphone  Injectable 0.5 milliGRAM(s) IV Push every 6 hours PRN break thru  naloxone Injectable 0.1 milliGRAM(s) IV Push every 3 minutes PRN For ANY of the following changes in patient status:  A. RR LESS THAN 10 breaths per minute, B. Oxygen saturation LESS THAN 90%, C. Sedation score of 6  ondansetron Injectable 4 milliGRAM(s) IV Push every 6 hours PRN Nausea and/or Vomiting  oxyCODONE    IR 5 milliGRAM(s) Oral every 4 hours PRN Moderate Pain (4 - 6)  oxyCODONE    IR 10 milliGRAM(s) Oral every 4 hours PRN Severe Pain (7 - 10)  zolpidem 5 milliGRAM(s) Oral at bedtime PRN Insomnia                           10.1   3.5   )-----------( 153      ( 07 Oct 2017 06:19 )             31.1     10-07    139  |  100  |  12.0  ----------------------------<  147<H>  3.4<L>   |  26.0  |  0.91    Ca    8.1<L>      07 Oct 2017 06:19  Phos  4.2     10-07  Mg     1.9     10-07        ;  Vital Signs Last 24 Hrs  T(C): 36.8 (07 Oct 2017 15:30), Max: 37.1 (07 Oct 2017 04:15)  T(F): 98.2 (07 Oct 2017 15:30), Max: 98.7 (07 Oct 2017 04:15)  HR: 66 (07 Oct 2017 15:30) (58 - 87)  BP: 134/86 (07 Oct 2017 15:30) (98/64 - 134/86)  BP(mean): --  RR: 18 (07 Oct 2017 15:30) (17 - 18)  SpO2: 99% (07 Oct 2017 15:30) (93% - 99%)  CAPILLARY BLOOD GLUCOSE      10-06 @ 07:01  -  10-07 @ 07:00  --------------------------------------------------------  IN: 1770 mL / OUT: 3275 mL / NET: -1505 mL      Patient feeling better now No CP, No SOB, No N/V     HEENT: PEARLA  Neck: Supple  Cardio: S1 S2 No Murmur  Pulm: CTA No Rales or Ronchi  Abd: Soft NT ND BS dec, Colostomy Incision bandage clean  Rectal - refused  Ext: No DCT  Skin: No Rash  Neuro: Awake Pleasant    Rectal cancer - Post op Pain control, ambulation  bowel fxn returning  Prostate cancer - will monitor for urinary retention post alejandre removal  Hypertension - meds with parameters  Glaucoma - timolol  AFib RVR - Cardiazem metoprolol, converted to sinus

## 2017-10-07 NOTE — PROGRESS NOTE ADULT - ASSESSMENT
s/p laparoscopic low anterior resection and diverting loop ileostomy. Doing well.    advance diet as tolerated today.  Will monitory ileostomy output. High amount yesterday (1500) but will wait to add metamucil and imodium given recent ileus  Appreciate cardiac recs, transitioning to oral beta blocker for pain control  Decrease IVF but continue given ileostomy output  Replace potassium  D/c PCA.   Ambulate and IS  VTE prophylaxis

## 2017-10-07 NOTE — PROGRESS NOTE ADULT - SUBJECTIVE AND OBJECTIVE BOX
Doing well this morning. Having good amount of ileostomy output and tolerating some clear liquids. Heart rate controlled.     Exam:  Vital Signs Last 24 Hrs  T(C): 36.8 (07 Oct 2017 09:13), Max: 37.1 (07 Oct 2017 04:15)  T(F): 98.2 (07 Oct 2017 09:13), Max: 98.7 (07 Oct 2017 04:15)  HR: 58 (07 Oct 2017 09:13) (58 - 92)  BP: 98/64 (07 Oct 2017 09:13) (98/64 - 123/80)  RR: 18 (07 Oct 2017 09:13) (17 - 18)  SpO2: 97% (07 Oct 2017 09:13) (93% - 99%)    General: in no acute distress  Respiratory: non labored breathing  Abdomen: improved distension, ileostomy with large amount of air and liquid stool. Incision with prevena  Neuro: alert and oriented x 3                          10.1   3.5   )-----------( 153      ( 07 Oct 2017 06:19 )             31.1   10-07    139  |  100  |  12.0  ----------------------------<  147<H>  3.4<L>   |  26.0  |  0.91    Ca    8.1<L>      07 Oct 2017 06:19  Phos  4.2     10-07  Mg     1.9     10-07

## 2017-10-07 NOTE — PROGRESS NOTE ADULT - SUBJECTIVE AND OBJECTIVE BOX
[place] [facility] - [facility], THE [facility]                                   [address] [place] [address]                                                      PHONE: [phone]                                                         FAX: [phone]  [email or web address]  ---------------------------------------------------------------------------------------------------------------------------------    Overnight events/patient complaints: converted to SR on IV cardizem last pm      avoid acidic foods/tomato products (Unknown)  No Known Allergies    MEDICATIONS  (STANDING):  alvimopan 12 milliGRAM(s) Oral two times a day  benzocaine 20% Spray 1 Spray(s) Topical four times a day  bicalutamide 50 milliGRAM(s) Oral daily  cefoTEtan  IVPB 2 Gram(s) IV Intermittent once  diltiazem Infusion 10 mG/Hr (10 mL/Hr) IV Continuous <Continuous>  heparin  Injectable 5000 Unit(s) SubCutaneous every 8 hours  HYDROmorphone PCA (1 mG/mL) 30 milliLiter(s) PCA Continuous PCA Continuous  ketorolac   Injectable 15 milliGRAM(s) IV Push every 6 hours  lactated ringers. 1000 milliLiter(s) (75 mL/Hr) IV Continuous <Continuous>  latanoprost 0.005% Ophthalmic Solution 1 Drop(s) Both EYES at bedtime  metoprolol 50 milliGRAM(s) Oral every 6 hours  multivitamin 1 Tablet(s) Oral daily  pantoprazole   Suspension 40 milliGRAM(s) Oral daily  potassium chloride    Tablet ER 40 milliEquivalent(s) Oral every 4 hours  sodium chloride 0.9% lock flush 3 milliLiter(s) IV Push every 8 hours  timolol 0.5% Solution 1 Drop(s) Both EYES two times a day    MEDICATIONS  (PRN):  aluminum hydroxide/magnesium hydroxide/simethicone Suspension 30 milliLiter(s) Oral every 4 hours PRN Dyspepsia  ketorolac   Injectable 30 milliGRAM(s) IV Push every 6 hours PRN Severe Pain (7 - 10)  naloxone Injectable 0.1 milliGRAM(s) IV Push every 3 minutes PRN For ANY of the following changes in patient status:  A. RR LESS THAN 10 breaths per minute, B. Oxygen saturation LESS THAN 90%, C. Sedation score of 6  ondansetron Injectable 4 milliGRAM(s) IV Push every 6 hours PRN Nausea and/or Vomiting  zolpidem 5 milliGRAM(s) Oral at bedtime PRN Insomnia      Vital Signs Last 24 Hrs  T(C): 36.8 (07 Oct 2017 09:13), Max: 37.1 (07 Oct 2017 04:15)  T(F): 98.2 (07 Oct 2017 09:13), Max: 98.7 (07 Oct 2017 04:15)  HR: 58 (07 Oct 2017 09:13) (58 - 92)  BP: 98/64 (07 Oct 2017 09:13) (98/64 - 123/80)  BP(mean): --  RR: 18 (07 Oct 2017 09:13) (17 - 18)  SpO2: 97% (07 Oct 2017 09:13) (93% - 99%)  Daily     Daily   ICU Vital Signs Last 24 Hrs  ASHLEY SORENSON  I&O's Detail    06 Oct 2017 07:01  -  07 Oct 2017 07:00  --------------------------------------------------------  IN:    diltiazem Infusion: 170 mL    lactated ringers.: 1600 mL  Total IN: 1770 mL    OUT:    Ileostomy: 1525 mL    Indwelling Catheter - Urethral: 950 mL    Voided: 800 mL  Total OUT: 3275 mL    Total NET: -1505 mL        I&O's Summary    06 Oct 2017 07:01  -  07 Oct 2017 07:00  --------------------------------------------------------  IN: 1770 mL / OUT: 3275 mL / NET: -1505 mL      Drug Dosing Weight  ASHLEY SORENSON      PHYSICAL EXAM:  General: Appears well developed, well nourished alert and cooperative.  HEENT: Head; normocephalic, atraumatic.  Eyes: Pupils reactive, cornea wnl.  Neck: Supple, no nodes adenopathy, no NVD or carotid bruit or thyromegaly.  CARDIOVASCULAR: Normal S1 and S2, No murmur, rub, gallop or lift.   LUNGS: No rales, rhonchi or wheeze. Normal breath sounds bilaterally.  ABDOMEN: Soft, nontender without mass or organomegaly. bowel sounds normoactive.  EXTREMITIES: No clubbing, cyanosis or edema. Distal pulses wnl.   SKIN: warm and dry with normal turgor.  NEURO: Alert/oriented x 3/normal motor exam. No pathologic reflexes.    PSYCH: normal affect.        LABS:                        10.1   3.5   )-----------( 153      ( 07 Oct 2017 06:19 )             31.1     10-07    139  |  100  |  12.0  ----------------------------<  147<H>  3.4<L>   |  26.0  |  0.91    Ca    8.1<L>      07 Oct 2017 06:19  Phos  4.2     10-07  Mg     1.9     10-07      ASHLEY SORENSON            RADIOLOGY & ADDITIONAL STUDIES: < from: Xray Chest 1 View AP/PA. (10.04.17 @ 18:20) >  IMPRESSION:   No evidence of active chest disease.                          JASS MCGEE M.D., ATTENDING RADIOLOGIST  This document has been electronically signed. Oct  5 2017  7:31AM          < end of copied text >      INTERPRETATION OF TELEMETRY (personally reviewed):    ECG:< from: 12 Lead ECG (10.04.17 @ 12:48) >  Diagnosis Line Atrial fibrillation with rapid ventricular response  Nonspecific ST abnormality  Abnormal ECG    Confirmed by MARGARITA AARON ([id number]) on 10/4/2017 11:07:00 PM    < end of copied text >

## 2017-10-07 NOTE — PROGRESS NOTE ADULT - ASSESSMENT
Assessment  Colon Ca s/p AP resection  prostate Ca on lupron  PAF ChadsVAsc score of 1 in SR on IV cardizem  Htn controlled  Hypocalcemia    Rec  Replete Ca  DC IV cardizem and switch to oral withy lopressor  Full dose asa if cleared by surgery  cont tele overnight  'If remains in SR  may dc in am Assessment  Colon Ca s/p AP resection  prostate Ca on lupron  PAF ChadsVAsc score of 1 in SR on IV cardizem  Htn controlled  Hypocalcemia    Rec  Replete Ca and K  DC IV cardizem and switch to oral withy lopressor  Full dose asa if cleared by surgery  cont tele overnight  'If remains in SR  may dc in am

## 2017-10-08 ENCOUNTER — TRANSCRIPTION ENCOUNTER (OUTPATIENT)
Age: 62
End: 2017-10-08

## 2017-10-08 LAB
ANION GAP SERPL CALC-SCNC: 11 MMOL/L — SIGNIFICANT CHANGE UP (ref 5–17)
ANISOCYTOSIS BLD QL: SLIGHT — SIGNIFICANT CHANGE UP
BASOPHILS # BLD AUTO: 0 K/UL — SIGNIFICANT CHANGE UP (ref 0–0.2)
BASOPHILS NFR BLD AUTO: 0.3 % — SIGNIFICANT CHANGE UP (ref 0–2)
BUN SERPL-MCNC: 11 MG/DL — SIGNIFICANT CHANGE UP (ref 8–20)
CALCIUM SERPL-MCNC: 8.7 MG/DL — SIGNIFICANT CHANGE UP (ref 8.6–10.2)
CHLORIDE SERPL-SCNC: 104 MMOL/L — SIGNIFICANT CHANGE UP (ref 98–107)
CO2 SERPL-SCNC: 27 MMOL/L — SIGNIFICANT CHANGE UP (ref 22–29)
CREAT SERPL-MCNC: 0.93 MG/DL — SIGNIFICANT CHANGE UP (ref 0.5–1.3)
EOSINOPHIL # BLD AUTO: 0.1 K/UL — SIGNIFICANT CHANGE UP (ref 0–0.5)
EOSINOPHIL NFR BLD AUTO: 4.5 % — SIGNIFICANT CHANGE UP (ref 0–6)
GLUCOSE SERPL-MCNC: 96 MG/DL — SIGNIFICANT CHANGE UP (ref 70–115)
HCT VFR BLD CALC: 31.3 % — LOW (ref 42–52)
HGB BLD-MCNC: 10.1 G/DL — LOW (ref 14–18)
LYMPHOCYTES # BLD AUTO: 0.4 K/UL — LOW (ref 1–4.8)
LYMPHOCYTES # BLD AUTO: 12 % — LOW (ref 20–55)
MACROCYTES BLD QL: SLIGHT — SIGNIFICANT CHANGE UP
MAGNESIUM SERPL-MCNC: 2 MG/DL — SIGNIFICANT CHANGE UP (ref 1.6–2.6)
MCHC RBC-ENTMCNC: 30.8 PG — SIGNIFICANT CHANGE UP (ref 27–31)
MCHC RBC-ENTMCNC: 32.3 G/DL — SIGNIFICANT CHANGE UP (ref 32–36)
MCV RBC AUTO: 95.4 FL — HIGH (ref 80–94)
MICROCYTES BLD QL: SLIGHT — SIGNIFICANT CHANGE UP
MONOCYTES # BLD AUTO: 0.4 K/UL — SIGNIFICANT CHANGE UP (ref 0–0.8)
MONOCYTES NFR BLD AUTO: 13 % — HIGH (ref 3–10)
NEUTROPHILS # BLD AUTO: 2 K/UL — SIGNIFICANT CHANGE UP (ref 1.8–8)
NEUTROPHILS NFR BLD AUTO: 69.9 % — SIGNIFICANT CHANGE UP (ref 37–73)
OVALOCYTES BLD QL SMEAR: SLIGHT — SIGNIFICANT CHANGE UP
PHOSPHATE SERPL-MCNC: 4.5 MG/DL — SIGNIFICANT CHANGE UP (ref 2.4–4.7)
PLAT MORPH BLD: NORMAL — SIGNIFICANT CHANGE UP
PLATELET # BLD AUTO: 158 K/UL — SIGNIFICANT CHANGE UP (ref 150–400)
POIKILOCYTOSIS BLD QL AUTO: SLIGHT — SIGNIFICANT CHANGE UP
POTASSIUM SERPL-MCNC: 3.8 MMOL/L — SIGNIFICANT CHANGE UP (ref 3.5–5.3)
POTASSIUM SERPL-SCNC: 3.8 MMOL/L — SIGNIFICANT CHANGE UP (ref 3.5–5.3)
RBC # BLD: 3.28 M/UL — LOW (ref 4.6–6.2)
RBC # FLD: 13 % — SIGNIFICANT CHANGE UP (ref 11–15.6)
RBC BLD AUTO: ABNORMAL
SODIUM SERPL-SCNC: 142 MMOL/L — SIGNIFICANT CHANGE UP (ref 135–145)
WBC # BLD: 2.9 K/UL — LOW (ref 4.8–10.8)
WBC # FLD AUTO: 2.9 K/UL — LOW (ref 4.8–10.8)

## 2017-10-08 RX ADMIN — Medication 1 DROP(S): at 05:34

## 2017-10-08 RX ADMIN — Medication 975 MILLIGRAM(S): at 19:33

## 2017-10-08 RX ADMIN — LATANOPROST 1 DROP(S): 0.05 SOLUTION/ DROPS OPHTHALMIC; TOPICAL at 21:13

## 2017-10-08 RX ADMIN — Medication 975 MILLIGRAM(S): at 18:24

## 2017-10-08 RX ADMIN — Medication 1 DROP(S): at 18:24

## 2017-10-08 RX ADMIN — SODIUM CHLORIDE 3 MILLILITER(S): 9 INJECTION INTRAMUSCULAR; INTRAVENOUS; SUBCUTANEOUS at 21:22

## 2017-10-08 RX ADMIN — Medication 975 MILLIGRAM(S): at 05:35

## 2017-10-08 RX ADMIN — HEPARIN SODIUM 5000 UNIT(S): 5000 INJECTION INTRAVENOUS; SUBCUTANEOUS at 05:34

## 2017-10-08 RX ADMIN — HEPARIN SODIUM 5000 UNIT(S): 5000 INJECTION INTRAVENOUS; SUBCUTANEOUS at 14:47

## 2017-10-08 RX ADMIN — Medication 975 MILLIGRAM(S): at 23:17

## 2017-10-08 RX ADMIN — ALVIMOPAN 12 MILLIGRAM(S): 12 CAPSULE ORAL at 18:24

## 2017-10-08 RX ADMIN — Medication 50 MILLIGRAM(S): at 18:24

## 2017-10-08 RX ADMIN — SODIUM CHLORIDE 3 MILLILITER(S): 9 INJECTION INTRAMUSCULAR; INTRAVENOUS; SUBCUTANEOUS at 12:29

## 2017-10-08 RX ADMIN — Medication 975 MILLIGRAM(S): at 14:26

## 2017-10-08 RX ADMIN — ALVIMOPAN 12 MILLIGRAM(S): 12 CAPSULE ORAL at 05:35

## 2017-10-08 RX ADMIN — Medication 1 SPRAY(S): at 18:25

## 2017-10-08 RX ADMIN — BICALUTAMIDE 50 MILLIGRAM(S): 50 TABLET, FILM COATED ORAL at 12:29

## 2017-10-08 RX ADMIN — OXYCODONE HYDROCHLORIDE 10 MILLIGRAM(S): 5 TABLET ORAL at 22:50

## 2017-10-08 RX ADMIN — OXYCODONE HYDROCHLORIDE 10 MILLIGRAM(S): 5 TABLET ORAL at 23:45

## 2017-10-08 RX ADMIN — Medication 50 MILLIGRAM(S): at 23:18

## 2017-10-08 RX ADMIN — Medication 1 SPRAY(S): at 23:17

## 2017-10-08 RX ADMIN — Medication 975 MILLIGRAM(S): at 12:28

## 2017-10-08 RX ADMIN — Medication 1 SPRAY(S): at 05:35

## 2017-10-08 RX ADMIN — HEPARIN SODIUM 5000 UNIT(S): 5000 INJECTION INTRAVENOUS; SUBCUTANEOUS at 21:13

## 2017-10-08 RX ADMIN — Medication 975 MILLIGRAM(S): at 05:48

## 2017-10-08 RX ADMIN — Medication 50 MILLIGRAM(S): at 12:28

## 2017-10-08 RX ADMIN — Medication 325 MILLIGRAM(S): at 12:28

## 2017-10-08 RX ADMIN — Medication 975 MILLIGRAM(S): at 23:45

## 2017-10-08 RX ADMIN — PANTOPRAZOLE SODIUM 40 MILLIGRAM(S): 20 TABLET, DELAYED RELEASE ORAL at 12:28

## 2017-10-08 RX ADMIN — SODIUM CHLORIDE 3 MILLILITER(S): 9 INJECTION INTRAMUSCULAR; INTRAVENOUS; SUBCUTANEOUS at 05:49

## 2017-10-08 RX ADMIN — Medication 1 TABLET(S): at 12:28

## 2017-10-08 NOTE — PROGRESS NOTE ADULT - ASSESSMENT
Assessmemnt  PAF ChadsVasc score 1 on ASA  Htn controlled  SB with 1st degree AV block on high dose lopressor and cardizem  colon ca s/p resection    Rec  Dc cardizem  DC home on lopressor 100 in am and 50 im pm  will fu in office 1 week with Dr Robledo

## 2017-10-08 NOTE — PROGRESS NOTE ADULT - SUBJECTIVE AND OBJECTIVE BOX
[place] [facility] - [facility], THE [facility]ART CENTER                                   [address] [place] [address]                                                      PHONE: [phone]                                                         FAX: [phone]  [email or web address]  ---------------------------------------------------------------------------------------------------------------------------------    Overnight events/patient complaints: pt maintained SR, HR 50- 80 with walking asx      avoid acidic foods/tomato products (Unknown)  No Known Allergies    MEDICATIONS  (STANDING):  acetaminophen   Tablet. 975 milliGRAM(s) Oral every 6 hours  alvimopan 12 milliGRAM(s) Oral two times a day  aspirin enteric coated 325 milliGRAM(s) Oral daily  benzocaine 20% Spray 1 Spray(s) Topical four times a day  bicalutamide 50 milliGRAM(s) Oral daily  cefoTEtan  IVPB 2 Gram(s) IV Intermittent once  diltiazem    Tablet 30 milliGRAM(s) Oral every 8 hours  heparin  Injectable 5000 Unit(s) SubCutaneous every 8 hours  latanoprost 0.005% Ophthalmic Solution 1 Drop(s) Both EYES at bedtime  metoprolol 50 milliGRAM(s) Oral every 6 hours  multivitamin 1 Tablet(s) Oral daily  pantoprazole   Suspension 40 milliGRAM(s) Oral daily  sodium chloride 0.9% lock flush 3 milliLiter(s) IV Push every 8 hours  timolol 0.5% Solution 1 Drop(s) Both EYES two times a day    MEDICATIONS  (PRN):  aluminum hydroxide/magnesium hydroxide/simethicone Suspension 30 milliLiter(s) Oral every 4 hours PRN Dyspepsia  HYDROmorphone  Injectable 0.5 milliGRAM(s) IV Push every 6 hours PRN break thru  naloxone Injectable 0.1 milliGRAM(s) IV Push every 3 minutes PRN For ANY of the following changes in patient status:  A. RR LESS THAN 10 breaths per minute, B. Oxygen saturation LESS THAN 90%, C. Sedation score of 6  ondansetron Injectable 4 milliGRAM(s) IV Push every 6 hours PRN Nausea and/or Vomiting  oxyCODONE    IR 5 milliGRAM(s) Oral every 4 hours PRN Moderate Pain (4 - 6)  oxyCODONE    IR 10 milliGRAM(s) Oral every 4 hours PRN Severe Pain (7 - 10)  zolpidem 5 milliGRAM(s) Oral at bedtime PRN Insomnia      Vital Signs Last 24 Hrs  T(C): 36.8 (08 Oct 2017 07:49), Max: 36.9 (07 Oct 2017 19:31)  T(F): 98.2 (08 Oct 2017 07:49), Max: 98.4 (07 Oct 2017 19:31)  HR: 59 (08 Oct 2017 07:49) (56 - 66)  BP: 128/76 (08 Oct 2017 07:49) (110/69 - 134/89)  BP(mean): --  RR: 18 (08 Oct 2017 07:49) (16 - 18)  SpO2: 98% (08 Oct 2017 07:49) (96% - 100%)  Daily     Daily   ICU Vital Signs Last 24 Hrs  ASHLEY SORENSON  I&O's Detail    07 Oct 2017 07:01  -  08 Oct 2017 07:00  --------------------------------------------------------  IN:  Total IN: 0 mL    OUT:    Ileostomy: 500 mL    Voided: 450 mL  Total OUT: 950 mL    Total NET: -950 mL        I&O's Summary    07 Oct 2017 07:01  -  08 Oct 2017 07:00  --------------------------------------------------------  IN: 0 mL / OUT: 950 mL / NET: -950 mL      Drug Dosing Weight  ASHLEY SORENSON      PHYSICAL EXAM:  General: Appears well developed, well nourished alert and cooperative.  HEENT: Head; normocephalic, atraumatic.  Eyes: Pupils reactive, cornea wnl.  Neck: Supple, no nodes adenopathy, no NVD or carotid bruit or thyromegaly.  CARDIOVASCULAR: Normal S1 and S2, No murmur, rub, gallop or lift.   LUNGS: No rales, rhonchi or wheeze. Normal breath sounds bilaterally.  ABDOMEN: Soft, nontender without mass or organomegaly. bowel sounds normoactive.  EXTREMITIES: No clubbing, cyanosis or edema. Distal pulses wnl.   SKIN: warm and dry with normal turgor.  NEURO: Alert/oriented x 3/normal motor exam. No pathologic reflexes.    PSYCH: normal affect.        LABS:                        10.1   2.9   )-----------( 158      ( 08 Oct 2017 06:39 )             31.3     10-08    142  |  104  |  11.0  ----------------------------<  96  3.8   |  27.0  |  0.93    Ca    8.7      08 Oct 2017 06:40  Phos  4.5     10-08  Mg     2.0     10-08      ASHLEY SORENSON            RADIOLOGY & ADDITIONAL STUDIES:    INTERPRETATION OF TELEMETRY (personally reviewed):    ECG:<  Sr 1st degree AV block        ECHO:< from: TTE Echo Complete w/Doppler (10.06.17 @ 11:44) >     Summary:   1.Technically difficult study.   2. Left ventricular ejection fraction, by visual estimation, is 55 to   60%.   3. Normal global left ventricular systolic function.   4. The mitral in-flow pattern reveals no discernable A-wave, therefore   no comment on diastolic function can be made.   5. Normal left ventricular internal cavity size.   6. Normal right ventricular size and function.   7. Mildly enlarged left atrium.   8. Trace mitral valve regurgitation.   9. Normal trileaflet aortic valve with normal opening.  10. There is no evidence of pericardial effusion.     MD Polo Electronically signed on 10/6/2017 at 2:14:43 PM             < end of copied text >

## 2017-10-08 NOTE — PROGRESS NOTE ADULT - SUBJECTIVE AND OBJECTIVE BOX
[facility] HPI/OVERNIGHT EVENTS: 62 yo male s/p LAR with lymph node dissection and ileostomy creation 10/2/17, POD#6. Patient seen and evaluated with Dr. Mo this am. Denies chest pain, shortness of breath, nausea, vomiting. Ostomy functioning with stool and gas, patient is draining it several times daily.       MEDICATIONS  (STANDING):  acetaminophen   Tablet. 975 milliGRAM(s) Oral every 6 hours  alvimopan 12 milliGRAM(s) Oral two times a day  aspirin enteric coated 325 milliGRAM(s) Oral daily  benzocaine 20% Spray 1 Spray(s) Topical four times a day  bicalutamide 50 milliGRAM(s) Oral daily  cefoTEtan  IVPB 2 Gram(s) IV Intermittent once  heparin  Injectable 5000 Unit(s) SubCutaneous every 8 hours  latanoprost 0.005% Ophthalmic Solution 1 Drop(s) Both EYES at bedtime  metoprolol 50 milliGRAM(s) Oral every 6 hours  multivitamin 1 Tablet(s) Oral daily  pantoprazole   Suspension 40 milliGRAM(s) Oral daily  sodium chloride 0.9% lock flush 3 milliLiter(s) IV Push every 8 hours  timolol 0.5% Solution 1 Drop(s) Both EYES two times a day    MEDICATIONS  (PRN):  aluminum hydroxide/magnesium hydroxide/simethicone Suspension 30 milliLiter(s) Oral every 4 hours PRN Dyspepsia  HYDROmorphone  Injectable 0.5 milliGRAM(s) IV Push every 6 hours PRN break thru  naloxone Injectable 0.1 milliGRAM(s) IV Push every 3 minutes PRN For ANY of the following changes in patient status:  A. RR LESS THAN 10 breaths per minute, B. Oxygen saturation LESS THAN 90%, C. Sedation score of 6  ondansetron Injectable 4 milliGRAM(s) IV Push every 6 hours PRN Nausea and/or Vomiting  oxyCODONE    IR 5 milliGRAM(s) Oral every 4 hours PRN Moderate Pain (4 - 6)  oxyCODONE    IR 10 milliGRAM(s) Oral every 4 hours PRN Severe Pain (7 - 10)  zolpidem 5 milliGRAM(s) Oral at bedtime PRN Insomnia      Vital Signs Last 24 Hrs  T(C): 36.8 (08 Oct 2017 07:49), Max: 36.9 (07 Oct 2017 19:31)  T(F): 98.2 (08 Oct 2017 07:49), Max: 98.4 (07 Oct 2017 19:31)  HR: 59 (08 Oct 2017 07:49) (56 - 66)  BP: 128/76 (08 Oct 2017 07:49) (110/69 - 134/89)  BP(mean): --  RR: 18 (08 Oct 2017 07:49) (16 - 18)  SpO2: 98% (08 Oct 2017 07:49) (96% - 100%)    PHYSICAL EXAM:      Constitutional: A&Ox3, in NAD, walking upon arrival.     Eyes: EOMI    Respiratory: Breathing comfortably on room air, no accessory muscle use.     Cardiovascular: RRR, monitored.     Gastrointestinal: Abdomen softly distended, ileostomy with loose stool and gas in bag.     Genitourinary: Urinating independently.     Extremities: Ambulating independently, moving all 4 extremities.     Skin: warm, dry.         LABS:                        10.1   2.9   )-----------( 158      ( 08 Oct 2017 06:39 )             31.3     10-08    142  |  104  |  11.0  ----------------------------<  96  3.8   |  27.0  |  0.93    Ca    8.7      08 Oct 2017 06:40  Phos  4.5     10-08  Mg     2.0     10-08            RADIOLOGY & ADDITIONAL STUDIES:

## 2017-10-08 NOTE — PROGRESS NOTE ADULT - SUBJECTIVE AND OBJECTIVE BOX
HPI:  60 y/o male with rectal bleeding had colonoscopy and was DX with rectal cancer patient now presents for laparoscopic possible open low anterior resection lymph node dissection and possible  ileostomy (26 Sep 2017 15:37)     Allergies    No Known Allergies    Intolerances    avoid acidic foods/tomato products (Unknown)    Rectal cancer  Colon cancer  Prostate cancer  Hypertension  Glaucoma    MEDICATIONS  (STANDING):  acetaminophen   Tablet. 975 milliGRAM(s) Oral every 6 hours  alvimopan 12 milliGRAM(s) Oral two times a day  aspirin enteric coated 325 milliGRAM(s) Oral daily  benzocaine 20% Spray 1 Spray(s) Topical four times a day  bicalutamide 50 milliGRAM(s) Oral daily  cefoTEtan  IVPB 2 Gram(s) IV Intermittent once  heparin  Injectable 5000 Unit(s) SubCutaneous every 8 hours  latanoprost 0.005% Ophthalmic Solution 1 Drop(s) Both EYES at bedtime  metoprolol 50 milliGRAM(s) Oral every 6 hours  multivitamin 1 Tablet(s) Oral daily  pantoprazole   Suspension 40 milliGRAM(s) Oral daily  sodium chloride 0.9% lock flush 3 milliLiter(s) IV Push every 8 hours  timolol 0.5% Solution 1 Drop(s) Both EYES two times a day    MEDICATIONS  (PRN):  aluminum hydroxide/magnesium hydroxide/simethicone Suspension 30 milliLiter(s) Oral every 4 hours PRN Dyspepsia  HYDROmorphone  Injectable 0.5 milliGRAM(s) IV Push every 6 hours PRN break thru  naloxone Injectable 0.1 milliGRAM(s) IV Push every 3 minutes PRN For ANY of the following changes in patient status:  A. RR LESS THAN 10 breaths per minute, B. Oxygen saturation LESS THAN 90%, C. Sedation score of 6  ondansetron Injectable 4 milliGRAM(s) IV Push every 6 hours PRN Nausea and/or Vomiting  oxyCODONE    IR 5 milliGRAM(s) Oral every 4 hours PRN Moderate Pain (4 - 6)  oxyCODONE    IR 10 milliGRAM(s) Oral every 4 hours PRN Severe Pain (7 - 10)  zolpidem 5 milliGRAM(s) Oral at bedtime PRN Insomnia                           10.1   2.9   )-----------( 158      ( 08 Oct 2017 06:39 )             31.3     10-08    142  |  104  |  11.0  ----------------------------<  96  3.8   |  27.0  |  0.93    Ca    8.7      08 Oct 2017 06:40  Phos  4.5     10-08  Mg     2.0     10-08        ;  Vital Signs Last 24 Hrs  T(C): 36.8 (08 Oct 2017 07:49), Max: 36.9 (07 Oct 2017 19:31)  T(F): 98.2 (08 Oct 2017 07:49), Max: 98.4 (07 Oct 2017 19:31)  HR: 59 (08 Oct 2017 07:49) (56 - 66)  BP: 128/76 (08 Oct 2017 07:49) (110/69 - 134/89)  BP(mean): --  RR: 18 (08 Oct 2017 07:49) (16 - 18)  SpO2: 98% (08 Oct 2017 07:49) (96% - 100%)      10-07 @ 07:01  -  10-08 @ 07:00  --------------------------------------------------------  IN: 0 mL / OUT: 950 mL / NET: -950 mL      Patient feeling better No CP, No SOB, No N/V Tolerating regular diet    HEENT: PEARLA  Neck: Supple  Cardio: S1 S2 No Murmur  Pulm: CTA No Rales or Ronchi  Abd: Soft NT ND BS+ Colostomy Incision bandage clean  Rectal - refused  Ext: No DCT  Skin: No Rash  Neuro: Awake Pleasant    Rectal cancer - Post op Pain control, ambulation bowel fxn improved  Hypertension - meds with parameters  Glaucoma - timolol  AFib RVR - metoprolol, converted to sinus

## 2017-10-09 VITALS
OXYGEN SATURATION: 100 % | RESPIRATION RATE: 18 BRPM | TEMPERATURE: 98 F | SYSTOLIC BLOOD PRESSURE: 146 MMHG | DIASTOLIC BLOOD PRESSURE: 83 MMHG | HEART RATE: 56 BPM

## 2017-10-09 PROCEDURE — 84436 ASSAY OF TOTAL THYROXINE: CPT

## 2017-10-09 PROCEDURE — 83605 ASSAY OF LACTIC ACID: CPT

## 2017-10-09 PROCEDURE — 93005 ELECTROCARDIOGRAM TRACING: CPT

## 2017-10-09 PROCEDURE — 85027 COMPLETE CBC AUTOMATED: CPT

## 2017-10-09 PROCEDURE — 71045 X-RAY EXAM CHEST 1 VIEW: CPT

## 2017-10-09 PROCEDURE — 82553 CREATINE MB FRACTION: CPT

## 2017-10-09 PROCEDURE — 84100 ASSAY OF PHOSPHORUS: CPT

## 2017-10-09 PROCEDURE — 83735 ASSAY OF MAGNESIUM: CPT

## 2017-10-09 PROCEDURE — 93970 EXTREMITY STUDY: CPT

## 2017-10-09 PROCEDURE — 88305 TISSUE EXAM BY PATHOLOGIST: CPT

## 2017-10-09 PROCEDURE — 88309 TISSUE EXAM BY PATHOLOGIST: CPT

## 2017-10-09 PROCEDURE — 84443 ASSAY THYROID STIM HORMONE: CPT

## 2017-10-09 PROCEDURE — 93306 TTE W/DOPPLER COMPLETE: CPT

## 2017-10-09 PROCEDURE — 74018 RADEX ABDOMEN 1 VIEW: CPT

## 2017-10-09 PROCEDURE — 84480 ASSAY TRIIODOTHYRONINE (T3): CPT

## 2017-10-09 PROCEDURE — 71275 CT ANGIOGRAPHY CHEST: CPT

## 2017-10-09 PROCEDURE — 36415 COLL VENOUS BLD VENIPUNCTURE: CPT

## 2017-10-09 PROCEDURE — 84484 ASSAY OF TROPONIN QUANT: CPT

## 2017-10-09 PROCEDURE — 83880 ASSAY OF NATRIURETIC PEPTIDE: CPT

## 2017-10-09 PROCEDURE — 82550 ASSAY OF CK (CPK): CPT

## 2017-10-09 PROCEDURE — 80048 BASIC METABOLIC PNL TOTAL CA: CPT

## 2017-10-09 RX ORDER — METOPROLOL TARTRATE 50 MG
1 TABLET ORAL
Qty: 0 | Refills: 0 | COMMUNITY
Start: 2017-10-09

## 2017-10-09 RX ORDER — METOPROLOL TARTRATE 50 MG
1 TABLET ORAL
Qty: 0 | Refills: 0 | COMMUNITY

## 2017-10-09 RX ORDER — OXYCODONE HYDROCHLORIDE 5 MG/1
1 TABLET ORAL
Qty: 0 | Refills: 0 | COMMUNITY
Start: 2017-10-09

## 2017-10-09 RX ORDER — METOPROLOL TARTRATE 50 MG
100 TABLET ORAL DAILY
Qty: 0 | Refills: 0 | Status: DISCONTINUED | OUTPATIENT
Start: 2017-10-09 | End: 2017-10-09

## 2017-10-09 RX ORDER — OXYCODONE HYDROCHLORIDE 5 MG/1
1 TABLET ORAL
Qty: 20 | Refills: 0 | OUTPATIENT
Start: 2017-10-09 | End: 2017-10-14

## 2017-10-09 RX ORDER — METOPROLOL TARTRATE 50 MG
1 TABLET ORAL
Qty: 21 | Refills: 0 | OUTPATIENT
Start: 2017-10-09

## 2017-10-09 RX ORDER — ASPIRIN/CALCIUM CARB/MAGNESIUM 324 MG
1 TABLET ORAL
Qty: 0 | Refills: 0 | COMMUNITY
Start: 2017-10-09

## 2017-10-09 RX ORDER — METOPROLOL TARTRATE 50 MG
50 TABLET ORAL
Qty: 0 | Refills: 0 | Status: DISCONTINUED | OUTPATIENT
Start: 2017-10-09 | End: 2017-10-09

## 2017-10-09 RX ADMIN — Medication 1 DROP(S): at 05:20

## 2017-10-09 RX ADMIN — Medication 50 MILLIGRAM(S): at 05:22

## 2017-10-09 RX ADMIN — PANTOPRAZOLE SODIUM 40 MILLIGRAM(S): 20 TABLET, DELAYED RELEASE ORAL at 11:38

## 2017-10-09 RX ADMIN — SODIUM CHLORIDE 3 MILLILITER(S): 9 INJECTION INTRAMUSCULAR; INTRAVENOUS; SUBCUTANEOUS at 05:20

## 2017-10-09 RX ADMIN — HEPARIN SODIUM 5000 UNIT(S): 5000 INJECTION INTRAVENOUS; SUBCUTANEOUS at 05:20

## 2017-10-09 RX ADMIN — Medication 325 MILLIGRAM(S): at 12:04

## 2017-10-09 RX ADMIN — Medication 1 SPRAY(S): at 05:21

## 2017-10-09 RX ADMIN — Medication 975 MILLIGRAM(S): at 05:21

## 2017-10-09 RX ADMIN — Medication 975 MILLIGRAM(S): at 05:26

## 2017-10-09 RX ADMIN — BICALUTAMIDE 50 MILLIGRAM(S): 50 TABLET, FILM COATED ORAL at 12:04

## 2017-10-09 RX ADMIN — Medication 1 SPRAY(S): at 11:45

## 2017-10-09 RX ADMIN — OXYCODONE HYDROCHLORIDE 10 MILLIGRAM(S): 5 TABLET ORAL at 12:04

## 2017-10-09 RX ADMIN — Medication 100 MILLIGRAM(S): at 11:38

## 2017-10-09 RX ADMIN — Medication 1 TABLET(S): at 11:39

## 2017-10-09 RX ADMIN — Medication 975 MILLIGRAM(S): at 11:39

## 2017-10-09 RX ADMIN — ALVIMOPAN 12 MILLIGRAM(S): 12 CAPSULE ORAL at 05:21

## 2017-10-09 NOTE — DISCHARGE NOTE ADULT - CARE PROVIDERS DIRECT ADDRESSES
,[email or web address]

## 2017-10-09 NOTE — PROGRESS NOTE ADULT - PROVIDER SPECIALTY LIST ADULT
Cardiology
Colorectal Surgery
Family Medicine
Pain Medicine
Surgery
Surgery

## 2017-10-09 NOTE — DISCHARGE NOTE ADULT - PLAN OF CARE
s/p loop ileostomy Please make an appointment to follow up in 1week. No heavy lifting. Follow up with your surgeon in 2 weeks. Follow up with your cardiologist within 1 week of discharge. No lifting anything heavier than 10 pounds. Continue low fiber diet.  Contact a physician or present to the nearest emergency room for fever, chills, or pain not relieved by medications. Do not drive while taking prescription pain medications.

## 2017-10-09 NOTE — DISCHARGE NOTE ADULT - MEDICATION SUMMARY - MEDICATIONS TO TAKE
I will START or STAY ON the medications listed below when I get home from the hospital:    oxyCODONE 5 mg oral tablet  -- 1 tab(s) by mouth every 6 hours, As Needed -Moderate Pain (4 - 6) MDD:4  -- Indication: For Pain    oxyCODONE 10 mg oral tablet  -- 1 tab(s) by mouth every 4 hours, As needed, Severe Pain (7 - 10)  -- Indication: For Pain    aspirin 325 mg oral delayed release tablet  -- 1 tab(s) by mouth once a day  -- Indication: For anticoaagulation    bicalutamide 50 mg oral tablet  -- 1 tab(s) by mouth every 24 hours  -- Indication: For home    metoprolol tartrate 25 mg oral tablet  -- 1 tab(s) by mouth 2 times a day  -- Indication: For afib    hydroCHLOROthiazide 12.5 mg oral tablet  -- 1 tab(s) by mouth once a day  -- Indication: For home    Betimol 0.5% ophthalmic solution  -- 1 drop(s) to each affected eye 2 times a day  -- Indication: For home    Travatan 0.004% ophthalmic solution  -- 1 drop(s) to each affected eye once a day (in the evening)  -- Indication: For home    multivitamin  -- 1 tab(s) by mouth once a day  -- Indication: For home I will START or STAY ON the medications listed below when I get home from the hospital:    oxyCODONE 5 mg oral tablet  -- 1 tab(s) by mouth every 6 hours, As Needed -Moderate Pain (4 - 6) MDD:4  -- Indication: For Pain    oxyCODONE 10 mg oral tablet  -- 1 tab(s) by mouth every 4 hours, As needed, Severe Pain (7 - 10)  -- Indication: For Pain    aspirin 325 mg oral delayed release tablet  -- 1 tab(s) by mouth once a day  -- Indication: For anticoaagulation    Imodium 2 mg oral capsule  -- 1 cap(s) by mouth every 4 hours, As Needed to keep ileostomy output <1200cc/day  -- Indication: For High ilesostomy output    bicalutamide 50 mg oral tablet  -- 1 tab(s) by mouth every 24 hours  -- Indication: For home    metoprolol tartrate 100 mg oral tablet  -- 1 tab(s) by mouth once a day  -- Indication: For Tachycardia    metoprolol tartrate 50 mg oral tablet  -- Take 2 tabs each morning and 1 tab at night by mouth   -- Indication: For Tachycardia    hydroCHLOROthiazide 12.5 mg oral tablet  -- 1 tab(s) by mouth once a day  -- Indication: For home    Betimol 0.5% ophthalmic solution  -- 1 drop(s) to each affected eye 2 times a day  -- Indication: For home    Travatan 0.004% ophthalmic solution  -- 1 drop(s) to each affected eye once a day (in the evening)  -- Indication: For home    multivitamin  -- 1 tab(s) by mouth once a day  -- Indication: For home

## 2017-10-09 NOTE — PROGRESS NOTE ADULT - ATTENDING COMMENTS
Patient seen and examined. Having bowel function from ileostomy but feel more distended this morning. On exam, abdomen is distended and tympanic. Ileostomy with thin liquid stool. Heart rate better controlled with cardizem drip. Remain NPO this morning and obtain abdominal xray. If he does not feel better by afternoon, favor placement to NG. Remove alejandre. Ambulate and IS
Pt seen and examined.  Complaints of indigestion and reflux. Did not sleep well because of abdominal pain and discomfort. ileostomy with some gas.  AFVSS  NAD  ileostomy pink and productive of thin succus, abd soft, approp tender, moderate distention  POD 2  Add toradol ATC.  Protonix and maalox. Full liquid diet.  Keep alejandre.  Decrease IVF.
Patient seen and examined this morning. Doing well and tolerating diet with ileostomy output. Advance to low fiber diet. Remove prevena dressing. HR controlled on oral medication. Discharge planning- home tomorrow
Patient seen and examined. Feels better this morning. No nausea or emesis and having ileostomy function. Abdomen still distended. HR in 110s. Appreciate cardiology management of Afib. Ambulate and IS. If continues to have bowel function and feel well, will initiate diet tomorrow.
PLAN-  #Opioid:  - c/w Dilaudid IV PCA 0/0.2/6  - Please monitor for oversedation and respiratory depression     #Adjuvant Analgesics:  - consider acetaminophen 1g IV x1 prn mild pain, monitor LFTs  - ketorolac 15mg IV q6h, monitor renal fx     #Bowel Regimen:  - per primary team as clinically indicated     Risks, benefits, and alternatives to opioids discussed with patient, who verbalizes understanding. Patient counseled on treatment plan and pain expectations. All questions and concerns addressed at this time.    Please page if any concerns: [phone].
PLAN-  #Opioid:  - c/w Dilaudid IV PCA 0/0.2/6  - Please monitor for oversedation and respiratory depression     #Adjuvant Analgesics:  - consider acetaminophen 1g IV x1 prn mild pain, monitor LFTs  - ketorolac 15mg IV q6h, monitor renal fx     #Bowel Regimen:  - per primary team as clinically indicated     Risks, benefits, and alternatives to opioids discussed with patient, who verbalizes understanding. Patient counseled on treatment plan and pain expectations. All questions and concerns addressed at this time.    Please page if any concerns: [phone].
PLAN-  #Opioid:  - c/w Dilaudid IV PCA 0/0.2/6  - Please monitor for oversedation and respiratory depression     #Adjuvant Analgesics:  - may consider acetaminophen 1g IV x1 prn mild pain, monitor LFTs  - c/w ketorolac 15mg IV q6h, monitor renal fx     #Bowel Regimen:  - per primary team as clinically indicated     Risks, benefits, and alternatives to opioids discussed with patient, who verbalizes understanding. Patient counseled on treatment plan and pain expectations. All questions and concerns addressed at this time.    Please page if any concerns: [phone].
PLAN-  #Opioid:  - c/w oxycodone 5/10gm PO q4h prn mod/sev pain   - Dilaudid 0.5mg IV q6h prn breakthrough pain   - Please monitor for oversedation and respiratory depression     #Adjuvant Analgesics:  - acetaminophen 975mg PO q8h, monitor LFTs    #Bowel Regimen:  - per primary team as clinically indicated     Risks, benefits, and alternatives to opioids discussed with patient, who verbalizes understanding. Patient counseled on treatment plan and pain expectations. All questions and concerns addressed at this time.    Please page if any concerns: [phone].
Pt seen and examined.  Feels sore.   AFVSS  NAD  provena in place, stoma pink and viable with thin succus, soft, approp tender, mod distention  Labs pending  POD 1  Clear liquid diet.  Stoma teaching.  Keep alejandre in place.  Encourage OOB.  Labs pending for this AM.

## 2017-10-09 NOTE — DISCHARGE NOTE ADULT - CARE PROVIDER_API CALL
Quirino Hand), ColonRectal Surgery; Surgery  [address] [place] [address]  Phone: [phone]  Fax: [phone] Quirino Hand), ColonRectal Surgery; Surgery  [address] [place] [address]  Phone: [phone]  Fax: [phone]    Belen Guillaume), Cardiovascular Disease; Internal Medicine  [address] [place] [id number]  Phone: [phone]  Fax: [phone]

## 2017-10-09 NOTE — DISCHARGE NOTE ADULT - HOSPITAL COURSE
60 y/o male with rectal bleeding had colonoscopy and was DX with rectal cancer patient now presents for laparoscopic possible open low anterior resection lymph node dissection and possible  ileostomy    10/2 - Went to OR with Dr. Hand for loop ileostomy , LAR   Post op tolerating clears and then developed ileus managed with bowel rest. Also developed refractory afib and placed on cardizem gtt.   Cardiology consulted and eventually had rate control with lopressor.  Was not a candidate for anticoagulation due to history of falls/work as a . Started on full dose aspirin. 60 y/o male with rectal bleeding had colonoscopy and was DX with rectal cancer patient now presents for laparoscopic possible open low anterior resection lymph node dissection and possible  ileostomy    10/2 - Went to OR with Dr. Hand for loop ileostomy , LAR   Post op tolerating clears and then developed ileus managed with bowel rest. Also developed refractory afib and placed on cardizem gtt.   Cardiology consulted and rate was initially controlled with cardizem and lopressor po. Found to be bradycardic on that combination so it was decided to DC the patient on oral lopressor. Given the patient has  CHADSVASC score of 1 he will be managed with oral aspirin as opposed to full anticoagulation.     Patient is stable: tolerating diet, voiding, ambulating, pain well controlled. Pt will follow up with his surgeon in 2 weeks and cardiology in 1 week.

## 2017-10-09 NOTE — DISCHARGE NOTE ADULT - CARE PLAN
Principal Discharge DX:	Rectal cancer  Goal:	s/p loop ileostomy  Instructions for follow-up, activity and diet:	Please make an appointment to follow up in 1week. No heavy lifting. Principal Discharge DX:	Rectal cancer  Goal:	s/p loop ileostomy  Instructions for follow-up, activity and diet:	Follow up with your surgeon in 2 weeks. Follow up with your cardiologist within 1 week of discharge. No lifting anything heavier than 10 pounds. Continue low fiber diet.  Contact a physician or present to the nearest emergency room for fever, chills, or pain not relieved by medications. Do not drive while taking prescription pain medications.

## 2017-10-09 NOTE — PROGRESS NOTE ADULT - SUBJECTIVE AND OBJECTIVE BOX
Chief Complaint: postoperative abdominal pain       HPI:   ASHLEY SORENSON is a 61y Male that was seen and examined at bedside. Patient reports that pain is currently 5/10 and localized to the abdomen. He describes pain as intermittent and burning in nature. Pain is worse with movement and improved with oral pain medication. He denies any sedation or pruritis. He admits to output with ostomy. He offers no further complaints.       REVIEW OF SYSTEMS: X denotes positive finding, otherwise all additional items were reviewed and negative  GEN: [] fever, [] chills, [] change in appetite, [] weight loss, [] fatigue, [] sedation  ENT: [] change in vision, [] dry mouth, [] ringing in ears, [] sore throat  RESP: [] shortness of breath, [] Obstructive sleep apnea  CV: [] chest pain, [] palpitations   GI: [] nausea, [] vomiting, [] constipation, [] GERD  : [] bladder dysfunction, [] dysuria  MSK: [] weakness, [] joint pain, [] myalgias  Neuro: [x]pain, [] numbness, [] tingling, [] tremor, [] seizures  Skin: [] rash  Psych: [] anxiety, [] depression  Endo: [] polydipsia  Heme: [] coagulopathic disorder      PAST MEDICAL & SURGICAL HISTORY:  Rectal cancer  Prostate cancer  Hypertension  Glaucoma  S/P ORIF (open reduction internal fixation) fracture: Left ankle  History of lipoma: x 3      SOCIAL HISTORY:  Denies Smoking, Alcohol, or Drug Use  FAMILY HISTORY:  Family history of CABG (Father)  Family history of prostate cancer (Father, Sibling)  Family history of cervical cancer (Mother)  Family history of coronary artery disease (Father)  Family history of hypertension (Father)      Allergies    No Known Allergies    Intolerances      PAIN MEDICATIONS:  MEDICATIONS  (STANDING):  acetaminophen   Tablet. 975 milliGRAM(s) Oral every 6 hours  alvimopan 12 milliGRAM(s) Oral two times a day  aspirin enteric coated 325 milliGRAM(s) Oral daily  benzocaine 20% Spray 1 Spray(s) Topical four times a day  bicalutamide 50 milliGRAM(s) Oral daily  cefoTEtan  IVPB 2 Gram(s) IV Intermittent once  heparin  Injectable 5000 Unit(s) SubCutaneous every 8 hours  latanoprost 0.005% Ophthalmic Solution 1 Drop(s) Both EYES at bedtime  metoprolol 100 milliGRAM(s) Oral daily  metoprolol 50 milliGRAM(s) Oral after dinner  multivitamin 1 Tablet(s) Oral daily  pantoprazole   Suspension 40 milliGRAM(s) Oral daily  sodium chloride 0.9% lock flush 3 milliLiter(s) IV Push every 8 hours  timolol 0.5% Solution 1 Drop(s) Both EYES two times a day    MEDICATIONS  (PRN):  aluminum hydroxide/magnesium hydroxide/simethicone Suspension 30 milliLiter(s) Oral every 4 hours PRN Dyspepsia  HYDROmorphone  Injectable 0.5 milliGRAM(s) IV Push every 6 hours PRN break thru  naloxone Injectable 0.1 milliGRAM(s) IV Push every 3 minutes PRN For ANY of the following changes in patient status:  A. RR LESS THAN 10 breaths per minute, B. Oxygen saturation LESS THAN 90%, C. Sedation score of 6  ondansetron Injectable 4 milliGRAM(s) IV Push every 6 hours PRN Nausea and/or Vomiting  oxyCODONE    IR 5 milliGRAM(s) Oral every 4 hours PRN Moderate Pain (4 - 6)  oxyCODONE    IR 10 milliGRAM(s) Oral every 4 hours PRN Severe Pain (7 - 10)  zolpidem 5 milliGRAM(s) Oral at bedtime PRN Insomnia      Vital Signs Last 24 Hrs  T(C): 36.7 (09 Oct 2017 09:39), Max: 37.3 (08 Oct 2017 23:45)  T(F): 98 (09 Oct 2017 09:39), Max: 99.1 (08 Oct 2017 23:45)  HR: 56 (09 Oct 2017 09:39) (56 - 65)  BP: 146/83 (09 Oct 2017 09:39) (126/87 - 150/90)  BP(mean): --  RR: 18 (09 Oct 2017 09:39) (18 - 18)  SpO2: 100% (09 Oct 2017 09:39) (98% - 100%)             PHYSICAL EXAM: X denotes positive finding   GENERAL: [x] cooperative, [] uncooperative, [x] NAD, [] in distress, [x] speech clear and coherent  HEENT: [x] NCAT, [x] EOMI, [] pupils non pinpoint, [] no external deformities, [] NGT   NECK: [x] normal overall appearance, [] no gross masses  RESPIRATORY: [x] unlabored respirations, [x] on room air, [] on supplemental O2, [] labored respirations  CV: [x] regular rate, [] regular rhythm, [x] no LE edema, [] pitting LE edema  Abdomen: [x] soft, [] non-tender, [x] tender to palpation, [] pos bowel sounds  : [] alejandre, [x] deferred  Skin: [] normal texture, [] rash, [x] lesion(s), [] drain(s)   MSK: [x] limited AROM, [] extremities antigravity x4, [] normal gait  Neuro: [x] SILT, [] sensation reduced to light touch, [] abnormal DTRs  Psych: [x] oriented to person, place, time, [] displays insight, [] limited/impaired insight, [] poor coping skills       LABS:                        10.6   4.1   )-----------( 143      ( 06 Oct 2017 07:18 )             31.5   10-06    141  |  100  |  10.0  ----------------------------<  82  3.6   |  28.0  |  0.91    Ca    8.3<L>      06 Oct 2017 07:18  Phos  3.7     10-06  Mg     2.0     10-06      Drug Screen:      Radiology:      :  This report was requested by: Walter Gill | Reference #: [id number]   Rx Written	Rx Dispensed	Drug	Quantity	Days Supply	Prescriber Name			  05/18/2017	05/19/2017	alprazolam 0.5 mg tablet 	2	1	[facility]			  04/27/2017	04/27/2017	alprazolam 0.5 mg tablet 	6	3	Aurora Swenson)			  Rx Written	Rx Dispensed	Drug	Quantity	Days Supply	Prescriber Name			  03/30/2017	03/30/2017	oxycodone-acetaminophen 5-325 mg tablet 	10	2	Rachel Avendano			  Prescriptions Dispensed in [place]  There are no results for the search terms that you entered.   Prescriptions Dispensed in [place]  There are no results for the search terms that you entered.   Prescriptions Dispensed in [place]  There are no results for the search terms that you entered.   Prescriptions Dispensed in [place]  There are no results for the search terms that you entered.   Prescriptions Dispensed in [place]  There are no results for the search terms that you entered.   Prescriptions Dispensed in [place]  There are no results for the search terms that you entered.   Prescriptions Dispensed in [facility]  There are no results for the search terms that you entered.   Prescriptions Dispensed in [place]  There are no results for the search terms that you entered.   Prescriptions Dispensed in [place]  There are no results for the search terms that you entered.   Prescriptions Dispensed in [place]  There are no results for the search terms that you entered.   Prescriptions Dispensed in [place]  There are no results for the search terms that you entered.   Prescriptions Dispensed in [place]  There are no results for the search terms that you entered.   Prescriptions Dispensed in [place]  There are no results for the search terms that you entered.   Prescriptions Dispensed in [place]  There are no results for the search terms that you entered.   Prescriptions Dispensed in [place]  There are no results for the search terms that you entered.   Prescriptions Dispensed in [place]  There are no results for the search terms that you entered.   Prescriptions Dispensed in [place]  There are no results for the search terms that you entered.   Prescriptions Dispensed in [place]  There are no results for the search terms that you entered.   Prescriptions Dispensed in [place]  There are no results for the search terms that you entered.

## 2017-10-09 NOTE — DISCHARGE NOTE ADULT - PATIENT PORTAL LINK FT
“You can access the [facility] Patient Portal, offered by [facility], by registering with the following website: [email or web address]”

## 2017-10-12 ENCOUNTER — OUTPATIENT (OUTPATIENT)
Dept: OUTPATIENT SERVICES | Facility: HOSPITAL | Age: 62
LOS: 1 days | Discharge: HOME | End: 2017-10-12

## 2017-10-12 DIAGNOSIS — C20 MALIGNANT NEOPLASM OF RECTUM: ICD-10-CM

## 2017-10-12 DIAGNOSIS — Z86.018 PERSONAL HISTORY OF OTHER BENIGN NEOPLASM: Chronic | ICD-10-CM

## 2017-10-12 DIAGNOSIS — Z96.7 PRESENCE OF OTHER BONE AND TENDON IMPLANTS: Chronic | ICD-10-CM

## 2017-10-12 LAB — SURGICAL PATHOLOGY FINAL REPORT - CH: SIGNIFICANT CHANGE UP

## 2017-10-16 ENCOUNTER — OTHER (OUTPATIENT)
Age: 62
End: 2017-10-16

## 2017-10-18 ENCOUNTER — RESULT REVIEW (OUTPATIENT)
Age: 62
End: 2017-10-18

## 2017-10-18 ENCOUNTER — APPOINTMENT (OUTPATIENT)
Dept: HEMATOLOGY ONCOLOGY | Facility: CLINIC | Age: 62
End: 2017-10-18
Payer: COMMERCIAL

## 2017-10-18 VITALS
TEMPERATURE: 97.3 F | WEIGHT: 260.67 LBS | BODY MASS INDEX: 37.41 KG/M2 | OXYGEN SATURATION: 99 % | DIASTOLIC BLOOD PRESSURE: 81 MMHG | HEART RATE: 61 BPM | SYSTOLIC BLOOD PRESSURE: 120 MMHG

## 2017-10-18 LAB
BASOPHILS # BLD AUTO: 0.1 K/UL — SIGNIFICANT CHANGE UP (ref 0–0.2)
BASOPHILS NFR BLD AUTO: 1.1 % — SIGNIFICANT CHANGE UP (ref 0–2)
EOSINOPHIL # BLD AUTO: 0.2 K/UL — SIGNIFICANT CHANGE UP (ref 0–0.5)
EOSINOPHIL NFR BLD AUTO: 2.6 % — SIGNIFICANT CHANGE UP (ref 0–6)
HCT VFR BLD CALC: 36.6 % — LOW (ref 39–50)
HGB BLD-MCNC: 12.5 G/DL — LOW (ref 13–17)
LYMPHOCYTES # BLD AUTO: 0.7 K/UL — LOW (ref 1–3.3)
LYMPHOCYTES # BLD AUTO: 10.9 % — LOW (ref 13–44)
MCHC RBC-ENTMCNC: 31.8 PG — SIGNIFICANT CHANGE UP (ref 27–34)
MCHC RBC-ENTMCNC: 34.2 GM/DL — SIGNIFICANT CHANGE UP (ref 32–36)
MCV RBC AUTO: 93 FL — SIGNIFICANT CHANGE UP (ref 80–100)
MONOCYTES # BLD AUTO: 0.6 K/UL — SIGNIFICANT CHANGE UP (ref 0–0.9)
MONOCYTES NFR BLD AUTO: 8.8 % — SIGNIFICANT CHANGE UP (ref 2–14)
NEUTROPHILS # BLD AUTO: 5 K/UL — SIGNIFICANT CHANGE UP (ref 1.8–7.4)
NEUTROPHILS NFR BLD AUTO: 76.7 % — SIGNIFICANT CHANGE UP (ref 43–77)
PLATELET # BLD AUTO: 329 K/UL — SIGNIFICANT CHANGE UP (ref 150–400)
RBC # BLD: 3.94 M/UL — LOW (ref 4.2–5.8)
RBC # FLD: 11.8 % — SIGNIFICANT CHANGE UP (ref 10.3–14.5)
WBC # BLD: 6.5 K/UL — SIGNIFICANT CHANGE UP (ref 3.8–10.5)
WBC # FLD AUTO: 6.5 K/UL — SIGNIFICANT CHANGE UP (ref 3.8–10.5)

## 2017-10-18 PROCEDURE — 99215 OFFICE O/P EST HI 40 MIN: CPT

## 2017-10-18 RX ORDER — HYDROCHLOROTHIAZIDE 25 MG/1
25 TABLET ORAL EVERY OTHER DAY
Qty: 15 | Refills: 0 | Status: COMPLETED | COMMUNITY
Start: 2017-10-18 | End: 2017-10-18

## 2017-10-19 LAB
ALBUMIN SERPL ELPH-MCNC: 4.1 G/DL
ALP BLD-CCNC: 79 U/L
ALT SERPL-CCNC: 45 U/L
ANION GAP SERPL CALC-SCNC: 14 MMOL/L
AST SERPL-CCNC: 21 U/L
BILIRUB SERPL-MCNC: 0.3 MG/DL
BUN SERPL-MCNC: 16 MG/DL
CALCIUM SERPL-MCNC: 9.7 MG/DL
CHLORIDE SERPL-SCNC: 102 MMOL/L
CO2 SERPL-SCNC: 26 MMOL/L
CREAT SERPL-MCNC: 0.96 MG/DL
GLUCOSE SERPL-MCNC: 92 MG/DL
MAGNESIUM SERPL-MCNC: 2 MG/DL
POTASSIUM SERPL-SCNC: 4.3 MMOL/L
PROT SERPL-MCNC: 7.7 G/DL
SODIUM SERPL-SCNC: 142 MMOL/L

## 2017-10-20 ENCOUNTER — OTHER (OUTPATIENT)
Age: 62
End: 2017-10-20

## 2017-10-24 ENCOUNTER — APPOINTMENT (OUTPATIENT)
Dept: COLORECTAL SURGERY | Facility: CLINIC | Age: 62
End: 2017-10-24
Payer: COMMERCIAL

## 2017-10-24 ENCOUNTER — APPOINTMENT (OUTPATIENT)
Dept: INTERNAL MEDICINE | Facility: CLINIC | Age: 62
End: 2017-10-24
Payer: COMMERCIAL

## 2017-10-24 VITALS
WEIGHT: 258 LBS | HEART RATE: 58 BPM | RESPIRATION RATE: 16 BRPM | HEIGHT: 70 IN | SYSTOLIC BLOOD PRESSURE: 132 MMHG | BODY MASS INDEX: 36.94 KG/M2 | TEMPERATURE: 97.3 F | DIASTOLIC BLOOD PRESSURE: 77 MMHG

## 2017-10-24 VITALS
SYSTOLIC BLOOD PRESSURE: 114 MMHG | HEIGHT: 70 IN | OXYGEN SATURATION: 97 % | WEIGHT: 258 LBS | BODY MASS INDEX: 36.94 KG/M2 | HEART RATE: 58 BPM | DIASTOLIC BLOOD PRESSURE: 70 MMHG

## 2017-10-24 PROCEDURE — 99024 POSTOP FOLLOW-UP VISIT: CPT

## 2017-10-24 PROCEDURE — 99214 OFFICE O/P EST MOD 30 MIN: CPT | Mod: 25

## 2017-10-24 PROCEDURE — G0008: CPT

## 2017-10-24 PROCEDURE — 90686 IIV4 VACC NO PRSV 0.5 ML IM: CPT

## 2017-10-24 RX ORDER — NEOMYCIN SULFATE 500 MG/1
500 TABLET ORAL
Qty: 6 | Refills: 0 | Status: DISCONTINUED | COMMUNITY
Start: 2017-09-20 | End: 2017-10-24

## 2017-10-24 RX ORDER — METRONIDAZOLE 500 MG/1
500 TABLET ORAL
Qty: 3 | Refills: 0 | Status: DISCONTINUED | COMMUNITY
Start: 2017-09-20 | End: 2017-10-24

## 2017-10-25 LAB — PATH REPORT ADDENDUM.SYNOPTIC DOC: SIGNIFICANT CHANGE UP

## 2017-11-01 ENCOUNTER — RESULT REVIEW (OUTPATIENT)
Age: 62
End: 2017-11-01

## 2017-11-01 ENCOUNTER — APPOINTMENT (OUTPATIENT)
Dept: HEMATOLOGY ONCOLOGY | Facility: CLINIC | Age: 62
End: 2017-11-01
Payer: COMMERCIAL

## 2017-11-01 VITALS
OXYGEN SATURATION: 99 % | BODY MASS INDEX: 37.77 KG/M2 | RESPIRATION RATE: 16 BRPM | HEART RATE: 65 BPM | TEMPERATURE: 97.8 F | SYSTOLIC BLOOD PRESSURE: 126 MMHG | WEIGHT: 263.23 LBS | DIASTOLIC BLOOD PRESSURE: 82 MMHG

## 2017-11-01 LAB
BASOPHILS # BLD AUTO: 0 K/UL — SIGNIFICANT CHANGE UP (ref 0–0.2)
BASOPHILS NFR BLD AUTO: 0.7 % — SIGNIFICANT CHANGE UP (ref 0–2)
EOSINOPHIL # BLD AUTO: 0.3 K/UL — SIGNIFICANT CHANGE UP (ref 0–0.5)
EOSINOPHIL NFR BLD AUTO: 5 % — SIGNIFICANT CHANGE UP (ref 0–6)
HCT VFR BLD CALC: 36.4 % — LOW (ref 39–50)
HGB BLD-MCNC: 12.8 G/DL — LOW (ref 13–17)
LYMPHOCYTES # BLD AUTO: 0.8 K/UL — LOW (ref 1–3.3)
LYMPHOCYTES # BLD AUTO: 14.1 % — SIGNIFICANT CHANGE UP (ref 13–44)
MCHC RBC-ENTMCNC: 31.7 PG — SIGNIFICANT CHANGE UP (ref 27–34)
MCHC RBC-ENTMCNC: 35.3 GM/DL — SIGNIFICANT CHANGE UP (ref 32–36)
MCV RBC AUTO: 89.9 FL — SIGNIFICANT CHANGE UP (ref 80–100)
MONOCYTES # BLD AUTO: 0.5 K/UL — SIGNIFICANT CHANGE UP (ref 0–0.9)
MONOCYTES NFR BLD AUTO: 8.6 % — SIGNIFICANT CHANGE UP (ref 2–14)
NEUTROPHILS # BLD AUTO: 3.9 K/UL — SIGNIFICANT CHANGE UP (ref 1.8–7.4)
NEUTROPHILS NFR BLD AUTO: 71.6 % — SIGNIFICANT CHANGE UP (ref 43–77)
PLATELET # BLD AUTO: 233 K/UL — SIGNIFICANT CHANGE UP (ref 150–400)
RBC # BLD: 4.05 M/UL — LOW (ref 4.2–5.8)
RBC # FLD: 11.5 % — SIGNIFICANT CHANGE UP (ref 10.3–14.5)
WBC # BLD: 5.5 K/UL — SIGNIFICANT CHANGE UP (ref 3.8–10.5)
WBC # FLD AUTO: 5.5 K/UL — SIGNIFICANT CHANGE UP (ref 3.8–10.5)

## 2017-11-01 PROCEDURE — 99215 OFFICE O/P EST HI 40 MIN: CPT

## 2017-11-02 LAB
ALBUMIN SERPL ELPH-MCNC: 4.1 G/DL
ALP BLD-CCNC: 79 U/L
ALT SERPL-CCNC: 49 U/L
ANION GAP SERPL CALC-SCNC: 17 MMOL/L
AST SERPL-CCNC: 23 U/L
BILIRUB SERPL-MCNC: 0.3 MG/DL
BUN SERPL-MCNC: 21 MG/DL
CALCIUM SERPL-MCNC: 9.3 MG/DL
CHLORIDE SERPL-SCNC: 102 MMOL/L
CO2 SERPL-SCNC: 22 MMOL/L
CREAT SERPL-MCNC: 1.07 MG/DL
GLUCOSE SERPL-MCNC: 105 MG/DL
MAGNESIUM SERPL-MCNC: 2 MG/DL
POTASSIUM SERPL-SCNC: 4.1 MMOL/L
PROT SERPL-MCNC: 7.4 G/DL
SODIUM SERPL-SCNC: 141 MMOL/L

## 2017-11-06 ENCOUNTER — RX RENEWAL (OUTPATIENT)
Age: 62
End: 2017-11-06

## 2017-11-15 ENCOUNTER — OUTPATIENT (OUTPATIENT)
Dept: OUTPATIENT SERVICES | Facility: HOSPITAL | Age: 62
LOS: 1 days | Discharge: HOME | End: 2017-11-15

## 2017-11-15 DIAGNOSIS — Z96.7 PRESENCE OF OTHER BONE AND TENDON IMPLANTS: Chronic | ICD-10-CM

## 2017-11-15 DIAGNOSIS — Z86.018 PERSONAL HISTORY OF OTHER BENIGN NEOPLASM: Chronic | ICD-10-CM

## 2017-11-15 DIAGNOSIS — C20 MALIGNANT NEOPLASM OF RECTUM: ICD-10-CM

## 2017-11-21 ENCOUNTER — APPOINTMENT (OUTPATIENT)
Dept: COLORECTAL SURGERY | Facility: CLINIC | Age: 62
End: 2017-11-21
Payer: COMMERCIAL

## 2017-11-21 PROCEDURE — 99024 POSTOP FOLLOW-UP VISIT: CPT

## 2017-12-06 ENCOUNTER — APPOINTMENT (OUTPATIENT)
Dept: HEMATOLOGY ONCOLOGY | Facility: CLINIC | Age: 62
End: 2017-12-06
Payer: COMMERCIAL

## 2017-12-06 ENCOUNTER — RESULT REVIEW (OUTPATIENT)
Age: 62
End: 2017-12-06

## 2017-12-06 ENCOUNTER — APPOINTMENT (OUTPATIENT)
Dept: UROLOGY | Facility: CLINIC | Age: 62
End: 2017-12-06
Payer: COMMERCIAL

## 2017-12-06 ENCOUNTER — LABORATORY RESULT (OUTPATIENT)
Age: 62
End: 2017-12-06

## 2017-12-06 VITALS
BODY MASS INDEX: 38.4 KG/M2 | HEART RATE: 78 BPM | SYSTOLIC BLOOD PRESSURE: 116 MMHG | DIASTOLIC BLOOD PRESSURE: 86 MMHG | OXYGEN SATURATION: 98 % | TEMPERATURE: 96.4 F | WEIGHT: 275.36 LBS

## 2017-12-06 VITALS
BODY MASS INDEX: 37.1 KG/M2 | HEART RATE: 82 BPM | WEIGHT: 265 LBS | HEIGHT: 71 IN | SYSTOLIC BLOOD PRESSURE: 110 MMHG | TEMPERATURE: 97.3 F | OXYGEN SATURATION: 98 % | DIASTOLIC BLOOD PRESSURE: 73 MMHG

## 2017-12-06 DIAGNOSIS — R93.5 ABNORMAL FINDINGS ON DIAGNOSTIC IMAGING OF OTHER ABDOMINAL REGIONS, INCLUDING RETROPERITONEUM: ICD-10-CM

## 2017-12-06 LAB
BASOPHILS # BLD AUTO: 0.1 K/UL — SIGNIFICANT CHANGE UP (ref 0–0.2)
BASOPHILS NFR BLD AUTO: 1 % — SIGNIFICANT CHANGE UP (ref 0–2)
EOSINOPHIL # BLD AUTO: 0.1 K/UL — SIGNIFICANT CHANGE UP (ref 0–0.5)
EOSINOPHIL NFR BLD AUTO: 2 % — SIGNIFICANT CHANGE UP (ref 0–6)
HCT VFR BLD CALC: 41.4 % — SIGNIFICANT CHANGE UP (ref 39–50)
HGB BLD-MCNC: 14.3 G/DL — SIGNIFICANT CHANGE UP (ref 13–17)
LYMPHOCYTES # BLD AUTO: 0.8 K/UL — LOW (ref 1–3.3)
LYMPHOCYTES # BLD AUTO: 12.8 % — LOW (ref 13–44)
MCHC RBC-ENTMCNC: 31.5 PG — SIGNIFICANT CHANGE UP (ref 27–34)
MCHC RBC-ENTMCNC: 34.5 GM/DL — SIGNIFICANT CHANGE UP (ref 32–36)
MCV RBC AUTO: 91.4 FL — SIGNIFICANT CHANGE UP (ref 80–100)
MONOCYTES # BLD AUTO: 0.5 K/UL — SIGNIFICANT CHANGE UP (ref 0–0.9)
MONOCYTES NFR BLD AUTO: 6.8 % — SIGNIFICANT CHANGE UP (ref 2–14)
NEUTROPHILS # BLD AUTO: 5.1 K/UL — SIGNIFICANT CHANGE UP (ref 1.8–7.4)
NEUTROPHILS NFR BLD AUTO: 77.5 % — HIGH (ref 43–77)
PLATELET # BLD AUTO: 211 K/UL — SIGNIFICANT CHANGE UP (ref 150–400)
RBC # BLD: 4.53 M/UL — SIGNIFICANT CHANGE UP (ref 4.2–5.8)
RBC # FLD: 14.7 % — HIGH (ref 10.3–14.5)
WBC # BLD: 6.6 K/UL — SIGNIFICANT CHANGE UP (ref 3.8–10.5)
WBC # FLD AUTO: 6.6 K/UL — SIGNIFICANT CHANGE UP (ref 3.8–10.5)

## 2017-12-06 PROCEDURE — 99214 OFFICE O/P EST MOD 30 MIN: CPT

## 2017-12-06 PROCEDURE — 99215 OFFICE O/P EST HI 40 MIN: CPT

## 2017-12-07 LAB
ALBUMIN SERPL ELPH-MCNC: 3.9 G/DL
ALP BLD-CCNC: 80 U/L
ALT SERPL-CCNC: 50 U/L
ANION GAP SERPL CALC-SCNC: 14 MMOL/L
AST SERPL-CCNC: 28 U/L
BILIRUB SERPL-MCNC: 0.4 MG/DL
BUN SERPL-MCNC: 16 MG/DL
CALCIUM SERPL-MCNC: 9.4 MG/DL
CHLORIDE SERPL-SCNC: 104 MMOL/L
CO2 SERPL-SCNC: 21 MMOL/L
CREAT SERPL-MCNC: 1.02 MG/DL
GLUCOSE SERPL-MCNC: 110 MG/DL
MAGNESIUM SERPL-MCNC: 2.1 MG/DL
POTASSIUM SERPL-SCNC: 4.2 MMOL/L
PROT SERPL-MCNC: 7 G/DL
SODIUM SERPL-SCNC: 139 MMOL/L

## 2017-12-12 ENCOUNTER — APPOINTMENT (OUTPATIENT)
Dept: RADIATION ONCOLOGY | Facility: CLINIC | Age: 62
End: 2017-12-12
Payer: COMMERCIAL

## 2017-12-13 ENCOUNTER — OUTPATIENT (OUTPATIENT)
Dept: OUTPATIENT SERVICES | Facility: HOSPITAL | Age: 62
LOS: 1 days | Discharge: HOME | End: 2017-12-13

## 2017-12-13 DIAGNOSIS — C20 MALIGNANT NEOPLASM OF RECTUM: ICD-10-CM

## 2017-12-13 DIAGNOSIS — Z86.018 PERSONAL HISTORY OF OTHER BENIGN NEOPLASM: Chronic | ICD-10-CM

## 2017-12-13 DIAGNOSIS — Z96.7 PRESENCE OF OTHER BONE AND TENDON IMPLANTS: Chronic | ICD-10-CM

## 2017-12-14 ENCOUNTER — APPOINTMENT (OUTPATIENT)
Dept: RADIATION ONCOLOGY | Facility: CLINIC | Age: 62
End: 2017-12-14
Payer: COMMERCIAL

## 2017-12-14 VITALS
RESPIRATION RATE: 16 BRPM | WEIGHT: 279 LBS | SYSTOLIC BLOOD PRESSURE: 100 MMHG | DIASTOLIC BLOOD PRESSURE: 64 MMHG | TEMPERATURE: 97.4 F | HEART RATE: 79 BPM | HEIGHT: 71 IN | OXYGEN SATURATION: 98 % | BODY MASS INDEX: 39.06 KG/M2

## 2017-12-14 PROCEDURE — 99214 OFFICE O/P EST MOD 30 MIN: CPT

## 2017-12-14 RX ORDER — TRAVOPROST 0.04 MG/ML
0 SOLUTION/ DROPS OPHTHALMIC
Refills: 0 | Status: DISCONTINUED | COMMUNITY
Start: 2017-01-25 | End: 2017-12-14

## 2017-12-14 RX ORDER — PROCHLORPERAZINE MALEATE 10 MG/1
10 TABLET ORAL EVERY 6 HOURS
Qty: 120 | Refills: 5 | Status: DISCONTINUED | COMMUNITY
Start: 2017-05-11 | End: 2017-12-14

## 2017-12-14 RX ORDER — SILVER SULFADIAZINE 10 MG/G
1 CREAM TOPICAL
Qty: 400 | Refills: 1 | Status: DISCONTINUED | COMMUNITY
Start: 2017-07-17 | End: 2017-12-14

## 2017-12-18 ENCOUNTER — APPOINTMENT (OUTPATIENT)
Dept: HEMATOLOGY ONCOLOGY | Facility: CLINIC | Age: 62
End: 2017-12-18
Payer: COMMERCIAL

## 2017-12-18 ENCOUNTER — RESULT REVIEW (OUTPATIENT)
Age: 62
End: 2017-12-18

## 2017-12-18 VITALS
DIASTOLIC BLOOD PRESSURE: 81 MMHG | SYSTOLIC BLOOD PRESSURE: 121 MMHG | WEIGHT: 269.82 LBS | TEMPERATURE: 98.2 F | HEART RATE: 61 BPM | OXYGEN SATURATION: 96 % | BODY MASS INDEX: 37.64 KG/M2

## 2017-12-18 LAB
BASOPHILS # BLD AUTO: 0.1 K/UL — SIGNIFICANT CHANGE UP (ref 0–0.2)
BASOPHILS NFR BLD AUTO: 0.9 % — SIGNIFICANT CHANGE UP (ref 0–2)
EOSINOPHIL # BLD AUTO: 0.2 K/UL — SIGNIFICANT CHANGE UP (ref 0–0.5)
EOSINOPHIL NFR BLD AUTO: 3 % — SIGNIFICANT CHANGE UP (ref 0–6)
HCT VFR BLD CALC: 41.8 % — SIGNIFICANT CHANGE UP (ref 39–50)
HGB BLD-MCNC: 14 G/DL — SIGNIFICANT CHANGE UP (ref 13–17)
LYMPHOCYTES # BLD AUTO: 0.8 K/UL — LOW (ref 1–3.3)
LYMPHOCYTES # BLD AUTO: 12.7 % — LOW (ref 13–44)
MCHC RBC-ENTMCNC: 31.4 PG — SIGNIFICANT CHANGE UP (ref 27–34)
MCHC RBC-ENTMCNC: 33.6 GM/DL — SIGNIFICANT CHANGE UP (ref 32–36)
MCV RBC AUTO: 93.4 FL — SIGNIFICANT CHANGE UP (ref 80–100)
MONOCYTES # BLD AUTO: 0.5 K/UL — SIGNIFICANT CHANGE UP (ref 0–0.9)
MONOCYTES NFR BLD AUTO: 7.7 % — SIGNIFICANT CHANGE UP (ref 2–14)
NEUTROPHILS # BLD AUTO: 4.8 K/UL — SIGNIFICANT CHANGE UP (ref 1.8–7.4)
NEUTROPHILS NFR BLD AUTO: 75.7 % — SIGNIFICANT CHANGE UP (ref 43–77)
PLATELET # BLD AUTO: 254 K/UL — SIGNIFICANT CHANGE UP (ref 150–400)
RBC # BLD: 4.47 M/UL — SIGNIFICANT CHANGE UP (ref 4.2–5.8)
RBC # FLD: 16.7 % — HIGH (ref 10.3–14.5)
WBC # BLD: 6.4 K/UL — SIGNIFICANT CHANGE UP (ref 3.8–10.5)
WBC # FLD AUTO: 6.4 K/UL — SIGNIFICANT CHANGE UP (ref 3.8–10.5)

## 2017-12-18 PROCEDURE — 99214 OFFICE O/P EST MOD 30 MIN: CPT

## 2017-12-19 LAB
ALBUMIN SERPL ELPH-MCNC: 4.3 G/DL
ALP BLD-CCNC: 91 U/L
ALT SERPL-CCNC: 72 U/L
ANION GAP SERPL CALC-SCNC: 16 MMOL/L
AST SERPL-CCNC: 34 U/L
BILIRUB SERPL-MCNC: 0.8 MG/DL
BUN SERPL-MCNC: 22 MG/DL
CALCIUM SERPL-MCNC: 9.7 MG/DL
CHLORIDE SERPL-SCNC: 97 MMOL/L
CO2 SERPL-SCNC: 27 MMOL/L
CREAT SERPL-MCNC: 1.21 MG/DL
GLUCOSE SERPL-MCNC: 76 MG/DL
MAGNESIUM SERPL-MCNC: 2.3 MG/DL
POTASSIUM SERPL-SCNC: 4.2 MMOL/L
PROT SERPL-MCNC: 7.6 G/DL
SODIUM SERPL-SCNC: 140 MMOL/L

## 2017-12-21 RX ORDER — CAPECITABINE 500 MG/1
500 TABLET ORAL TWICE DAILY
Qty: 112 | Refills: 6 | Status: DISCONTINUED | COMMUNITY
Start: 2017-11-01 | End: 2017-12-21

## 2018-01-03 ENCOUNTER — OUTPATIENT (OUTPATIENT)
Dept: OUTPATIENT SERVICES | Facility: HOSPITAL | Age: 63
LOS: 1 days | Discharge: HOME | End: 2018-01-03
Payer: COMMERCIAL

## 2018-01-03 DIAGNOSIS — Z96.7 PRESENCE OF OTHER BONE AND TENDON IMPLANTS: Chronic | ICD-10-CM

## 2018-01-03 DIAGNOSIS — Z86.018 PERSONAL HISTORY OF OTHER BENIGN NEOPLASM: Chronic | ICD-10-CM

## 2018-01-09 PROCEDURE — G6002: CPT | Mod: 26

## 2018-01-10 ENCOUNTER — RESULT REVIEW (OUTPATIENT)
Age: 63
End: 2018-01-10

## 2018-01-10 ENCOUNTER — APPOINTMENT (OUTPATIENT)
Dept: HEMATOLOGY ONCOLOGY | Facility: CLINIC | Age: 63
End: 2018-01-10
Payer: COMMERCIAL

## 2018-01-10 VITALS
DIASTOLIC BLOOD PRESSURE: 88 MMHG | WEIGHT: 285.81 LBS | TEMPERATURE: 97.7 F | HEART RATE: 68 BPM | BODY MASS INDEX: 39.86 KG/M2 | OXYGEN SATURATION: 96 % | SYSTOLIC BLOOD PRESSURE: 130 MMHG

## 2018-01-10 DIAGNOSIS — R11.0 NAUSEA: ICD-10-CM

## 2018-01-10 LAB
BASOPHILS # BLD AUTO: 0 K/UL — SIGNIFICANT CHANGE UP (ref 0–0.2)
BASOPHILS NFR BLD AUTO: 0.8 % — SIGNIFICANT CHANGE UP (ref 0–2)
EOSINOPHIL # BLD AUTO: 0.2 K/UL — SIGNIFICANT CHANGE UP (ref 0–0.5)
EOSINOPHIL NFR BLD AUTO: 2.6 % — SIGNIFICANT CHANGE UP (ref 0–6)
HCT VFR BLD CALC: 36.2 % — LOW (ref 39–50)
HGB BLD-MCNC: 12.6 G/DL — LOW (ref 13–17)
LYMPHOCYTES # BLD AUTO: 0.8 K/UL — LOW (ref 1–3.3)
LYMPHOCYTES # BLD AUTO: 12.9 % — LOW (ref 13–44)
MCHC RBC-ENTMCNC: 33 PG — SIGNIFICANT CHANGE UP (ref 27–34)
MCHC RBC-ENTMCNC: 34.9 GM/DL — SIGNIFICANT CHANGE UP (ref 32–36)
MCV RBC AUTO: 94.7 FL — SIGNIFICANT CHANGE UP (ref 80–100)
MONOCYTES # BLD AUTO: 0.5 K/UL — SIGNIFICANT CHANGE UP (ref 0–0.9)
MONOCYTES NFR BLD AUTO: 8.9 % — SIGNIFICANT CHANGE UP (ref 2–14)
NEUTROPHILS # BLD AUTO: 4.5 K/UL — SIGNIFICANT CHANGE UP (ref 1.8–7.4)
NEUTROPHILS NFR BLD AUTO: 74.7 % — SIGNIFICANT CHANGE UP (ref 43–77)
PLATELET # BLD AUTO: 182 K/UL — SIGNIFICANT CHANGE UP (ref 150–400)
RBC # BLD: 3.82 M/UL — LOW (ref 4.2–5.8)
RBC # FLD: 16.6 % — HIGH (ref 10.3–14.5)
WBC # BLD: 6 K/UL — SIGNIFICANT CHANGE UP (ref 3.8–10.5)
WBC # FLD AUTO: 6 K/UL — SIGNIFICANT CHANGE UP (ref 3.8–10.5)

## 2018-01-10 PROCEDURE — 99214 OFFICE O/P EST MOD 30 MIN: CPT

## 2018-01-10 PROCEDURE — G6002: CPT | Mod: 26

## 2018-01-11 LAB
ALBUMIN SERPL ELPH-MCNC: 4.3 G/DL
ALP BLD-CCNC: 85 U/L
ALT SERPL-CCNC: 46 U/L
ANION GAP SERPL CALC-SCNC: 13 MMOL/L
AST SERPL-CCNC: 25 U/L
BILIRUB SERPL-MCNC: 0.5 MG/DL
BUN SERPL-MCNC: 20 MG/DL
CALCIUM SERPL-MCNC: 9.4 MG/DL
CHLORIDE SERPL-SCNC: 105 MMOL/L
CO2 SERPL-SCNC: 23 MMOL/L
CREAT SERPL-MCNC: 0.94 MG/DL
GLUCOSE SERPL-MCNC: 85 MG/DL
MAGNESIUM SERPL-MCNC: 2.1 MG/DL
POTASSIUM SERPL-SCNC: 4.2 MMOL/L
PROT SERPL-MCNC: 7.1 G/DL
SODIUM SERPL-SCNC: 141 MMOL/L

## 2018-01-11 PROCEDURE — G6002: CPT | Mod: 26

## 2018-01-12 PROCEDURE — G6002: CPT | Mod: 26

## 2018-01-16 VITALS
DIASTOLIC BLOOD PRESSURE: 78 MMHG | HEART RATE: 68 BPM | TEMPERATURE: 98 F | SYSTOLIC BLOOD PRESSURE: 121 MMHG | OXYGEN SATURATION: 100 % | BODY MASS INDEX: 40.04 KG/M2 | RESPIRATION RATE: 16 BRPM | HEIGHT: 71 IN | WEIGHT: 286 LBS

## 2018-01-16 PROCEDURE — 77427 RADIATION TX MANAGEMENT X5: CPT

## 2018-01-16 PROCEDURE — G6002: CPT | Mod: 26

## 2018-01-17 PROCEDURE — G6002: CPT | Mod: 26

## 2018-01-18 PROCEDURE — G6002: CPT | Mod: 26

## 2018-01-19 PROCEDURE — G6002: CPT | Mod: 26

## 2018-01-22 PROCEDURE — G6002: CPT | Mod: 26

## 2018-01-23 PROCEDURE — 77427 RADIATION TX MANAGEMENT X5: CPT

## 2018-01-23 PROCEDURE — G6002: CPT | Mod: 26

## 2018-01-24 PROCEDURE — G6002: CPT | Mod: 26

## 2018-01-25 PROCEDURE — G6002: CPT | Mod: 26

## 2018-01-26 ENCOUNTER — OUTPATIENT (OUTPATIENT)
Dept: OUTPATIENT SERVICES | Facility: HOSPITAL | Age: 63
LOS: 1 days | Discharge: HOME | End: 2018-01-26

## 2018-01-26 DIAGNOSIS — Z96.7 PRESENCE OF OTHER BONE AND TENDON IMPLANTS: Chronic | ICD-10-CM

## 2018-01-26 DIAGNOSIS — C20 MALIGNANT NEOPLASM OF RECTUM: ICD-10-CM

## 2018-01-26 DIAGNOSIS — Z86.018 PERSONAL HISTORY OF OTHER BENIGN NEOPLASM: Chronic | ICD-10-CM

## 2018-01-26 PROCEDURE — G6002: CPT | Mod: 26

## 2018-01-29 VITALS
BODY MASS INDEX: 40.18 KG/M2 | RESPIRATION RATE: 16 BRPM | TEMPERATURE: 97.5 F | OXYGEN SATURATION: 98 % | HEART RATE: 75 BPM | HEIGHT: 71 IN | DIASTOLIC BLOOD PRESSURE: 90 MMHG | SYSTOLIC BLOOD PRESSURE: 132 MMHG | WEIGHT: 287 LBS

## 2018-01-29 PROCEDURE — G6002: CPT | Mod: 26

## 2018-01-30 PROCEDURE — 77427 RADIATION TX MANAGEMENT X5: CPT

## 2018-01-30 PROCEDURE — G6002: CPT | Mod: 26

## 2018-01-31 ENCOUNTER — RESULT REVIEW (OUTPATIENT)
Age: 63
End: 2018-01-31

## 2018-01-31 ENCOUNTER — APPOINTMENT (OUTPATIENT)
Dept: HEMATOLOGY ONCOLOGY | Facility: CLINIC | Age: 63
End: 2018-01-31
Payer: COMMERCIAL

## 2018-01-31 VITALS
OXYGEN SATURATION: 97 % | BODY MASS INDEX: 40 KG/M2 | HEART RATE: 67 BPM | SYSTOLIC BLOOD PRESSURE: 111 MMHG | DIASTOLIC BLOOD PRESSURE: 81 MMHG | TEMPERATURE: 97.5 F | WEIGHT: 286.82 LBS

## 2018-01-31 LAB
BASOPHILS # BLD AUTO: 0 K/UL — SIGNIFICANT CHANGE UP (ref 0–0.2)
BASOPHILS NFR BLD AUTO: 0.9 % — SIGNIFICANT CHANGE UP (ref 0–2)
EOSINOPHIL # BLD AUTO: 0.2 K/UL — SIGNIFICANT CHANGE UP (ref 0–0.5)
EOSINOPHIL NFR BLD AUTO: 3.3 % — SIGNIFICANT CHANGE UP (ref 0–6)
HCT VFR BLD CALC: 37.3 % — LOW (ref 39–50)
HGB BLD-MCNC: 12.8 G/DL — LOW (ref 13–17)
LYMPHOCYTES # BLD AUTO: 0.6 K/UL — LOW (ref 1–3.3)
LYMPHOCYTES # BLD AUTO: 11.6 % — LOW (ref 13–44)
MCHC RBC-ENTMCNC: 33.4 PG — SIGNIFICANT CHANGE UP (ref 27–34)
MCHC RBC-ENTMCNC: 34.4 GM/DL — SIGNIFICANT CHANGE UP (ref 32–36)
MCV RBC AUTO: 97.2 FL — SIGNIFICANT CHANGE UP (ref 80–100)
MONOCYTES # BLD AUTO: 0.6 K/UL — SIGNIFICANT CHANGE UP (ref 0–0.9)
MONOCYTES NFR BLD AUTO: 11.2 % — SIGNIFICANT CHANGE UP (ref 2–14)
NEUTROPHILS # BLD AUTO: 3.9 K/UL — SIGNIFICANT CHANGE UP (ref 1.8–7.4)
NEUTROPHILS NFR BLD AUTO: 73 % — SIGNIFICANT CHANGE UP (ref 43–77)
PLATELET # BLD AUTO: 182 K/UL — SIGNIFICANT CHANGE UP (ref 150–400)
RBC # BLD: 3.84 M/UL — LOW (ref 4.2–5.8)
RBC # FLD: 16.1 % — HIGH (ref 10.3–14.5)
WBC # BLD: 5.3 K/UL — SIGNIFICANT CHANGE UP (ref 3.8–10.5)
WBC # FLD AUTO: 5.3 K/UL — SIGNIFICANT CHANGE UP (ref 3.8–10.5)

## 2018-01-31 PROCEDURE — G6002: CPT | Mod: 26

## 2018-01-31 PROCEDURE — 99215 OFFICE O/P EST HI 40 MIN: CPT

## 2018-02-01 LAB
ALBUMIN SERPL ELPH-MCNC: 4.2 G/DL
ALP BLD-CCNC: 85 U/L
ALT SERPL-CCNC: 44 U/L
ANION GAP SERPL CALC-SCNC: 16 MMOL/L
AST SERPL-CCNC: 30 U/L
BILIRUB SERPL-MCNC: 0.6 MG/DL
BUN SERPL-MCNC: 18 MG/DL
CALCIUM SERPL-MCNC: 9.2 MG/DL
CHLORIDE SERPL-SCNC: 102 MMOL/L
CO2 SERPL-SCNC: 22 MMOL/L
CREAT SERPL-MCNC: 1.06 MG/DL
GLUCOSE SERPL-MCNC: 89 MG/DL
MAGNESIUM SERPL-MCNC: 2 MG/DL
POTASSIUM SERPL-SCNC: 3.7 MMOL/L
PROT SERPL-MCNC: 7.3 G/DL
SODIUM SERPL-SCNC: 140 MMOL/L

## 2018-02-01 PROCEDURE — G6002: CPT | Mod: 26

## 2018-02-02 PROCEDURE — G6002: CPT | Mod: 26

## 2018-02-05 VITALS
OXYGEN SATURATION: 97 % | SYSTOLIC BLOOD PRESSURE: 149 MMHG | HEART RATE: 88 BPM | TEMPERATURE: 97.5 F | RESPIRATION RATE: 16 BRPM | HEIGHT: 71 IN | WEIGHT: 289 LBS | DIASTOLIC BLOOD PRESSURE: 92 MMHG | BODY MASS INDEX: 40.46 KG/M2

## 2018-02-05 PROCEDURE — G6002: CPT | Mod: 26

## 2018-02-05 PROCEDURE — 77427 RADIATION TX MANAGEMENT X5: CPT

## 2018-02-09 ENCOUNTER — APPOINTMENT (OUTPATIENT)
Dept: COLORECTAL SURGERY | Facility: CLINIC | Age: 63
End: 2018-02-09
Payer: COMMERCIAL

## 2018-02-09 VITALS — BODY MASS INDEX: 40.04 KG/M2 | HEIGHT: 71 IN | WEIGHT: 286 LBS | RESPIRATION RATE: 14 BRPM

## 2018-02-09 PROCEDURE — 99215 OFFICE O/P EST HI 40 MIN: CPT

## 2018-02-12 ENCOUNTER — OTHER (OUTPATIENT)
Age: 63
End: 2018-02-12

## 2018-02-12 ENCOUNTER — APPOINTMENT (OUTPATIENT)
Dept: HEMATOLOGY ONCOLOGY | Facility: CLINIC | Age: 63
End: 2018-02-12
Payer: COMMERCIAL

## 2018-02-12 ENCOUNTER — RESULT REVIEW (OUTPATIENT)
Age: 63
End: 2018-02-12

## 2018-02-12 VITALS
HEIGHT: 71 IN | SYSTOLIC BLOOD PRESSURE: 132 MMHG | HEART RATE: 71 BPM | RESPIRATION RATE: 14 BRPM | TEMPERATURE: 97.6 F | BODY MASS INDEX: 40.62 KG/M2 | OXYGEN SATURATION: 97 % | WEIGHT: 290.12 LBS | DIASTOLIC BLOOD PRESSURE: 83 MMHG

## 2018-02-12 DIAGNOSIS — Z92.21 PERSONAL HISTORY OF ANTINEOPLASTIC CHEMOTHERAPY: ICD-10-CM

## 2018-02-12 LAB
BASOPHILS # BLD AUTO: 0 K/UL — SIGNIFICANT CHANGE UP (ref 0–0.2)
BASOPHILS NFR BLD AUTO: 0.7 % — SIGNIFICANT CHANGE UP (ref 0–2)
EOSINOPHIL # BLD AUTO: 0.2 K/UL — SIGNIFICANT CHANGE UP (ref 0–0.5)
EOSINOPHIL NFR BLD AUTO: 3 % — SIGNIFICANT CHANGE UP (ref 0–6)
HCT VFR BLD CALC: 36.6 % — LOW (ref 39–50)
HGB BLD-MCNC: 12.6 G/DL — LOW (ref 13–17)
LYMPHOCYTES # BLD AUTO: 0.7 K/UL — LOW (ref 1–3.3)
LYMPHOCYTES # BLD AUTO: 12.1 % — LOW (ref 13–44)
MCHC RBC-ENTMCNC: 34.4 GM/DL — SIGNIFICANT CHANGE UP (ref 32–36)
MCHC RBC-ENTMCNC: 34.5 PG — HIGH (ref 27–34)
MCV RBC AUTO: 100.2 FL — HIGH (ref 80–100)
MONOCYTES # BLD AUTO: 0.5 K/UL — SIGNIFICANT CHANGE UP (ref 0–0.9)
MONOCYTES NFR BLD AUTO: 9.3 % — SIGNIFICANT CHANGE UP (ref 2–14)
NEUTROPHILS # BLD AUTO: 4.3 K/UL — SIGNIFICANT CHANGE UP (ref 1.8–7.4)
NEUTROPHILS NFR BLD AUTO: 74.9 % — SIGNIFICANT CHANGE UP (ref 43–77)
PLATELET # BLD AUTO: 188 K/UL — SIGNIFICANT CHANGE UP (ref 150–400)
RBC # BLD: 3.65 M/UL — LOW (ref 4.2–5.8)
RBC # FLD: 16.4 % — HIGH (ref 10.3–14.5)
WBC # BLD: 5.7 K/UL — SIGNIFICANT CHANGE UP (ref 3.8–10.5)
WBC # FLD AUTO: 5.7 K/UL — SIGNIFICANT CHANGE UP (ref 3.8–10.5)

## 2018-02-12 PROCEDURE — 99214 OFFICE O/P EST MOD 30 MIN: CPT

## 2018-02-13 ENCOUNTER — OTHER (OUTPATIENT)
Age: 63
End: 2018-02-13

## 2018-02-13 LAB
ALBUMIN SERPL ELPH-MCNC: 4 G/DL
ALP BLD-CCNC: 85 U/L
ALT SERPL-CCNC: 30 U/L
ANION GAP SERPL CALC-SCNC: 15 MMOL/L
AST SERPL-CCNC: 20 U/L
BILIRUB SERPL-MCNC: 0.6 MG/DL
BUN SERPL-MCNC: 19 MG/DL
CALCIUM SERPL-MCNC: 9.5 MG/DL
CHLORIDE SERPL-SCNC: 106 MMOL/L
CO2 SERPL-SCNC: 22 MMOL/L
CREAT SERPL-MCNC: 1.01 MG/DL
GLUCOSE SERPL-MCNC: 102 MG/DL
MAGNESIUM SERPL-MCNC: 2.2 MG/DL
POTASSIUM SERPL-SCNC: 4.7 MMOL/L
PROT SERPL-MCNC: 7.2 G/DL
SODIUM SERPL-SCNC: 143 MMOL/L

## 2018-02-16 ENCOUNTER — OUTPATIENT (OUTPATIENT)
Dept: OUTPATIENT SERVICES | Facility: HOSPITAL | Age: 63
LOS: 1 days | End: 2018-02-16

## 2018-02-16 ENCOUNTER — APPOINTMENT (OUTPATIENT)
Dept: CT IMAGING | Facility: CLINIC | Age: 63
End: 2018-02-16
Payer: COMMERCIAL

## 2018-02-16 DIAGNOSIS — C18.9 MALIGNANT NEOPLASM OF COLON, UNSPECIFIED: ICD-10-CM

## 2018-02-16 DIAGNOSIS — Z96.7 PRESENCE OF OTHER BONE AND TENDON IMPLANTS: Chronic | ICD-10-CM

## 2018-02-16 DIAGNOSIS — Z86.018 PERSONAL HISTORY OF OTHER BENIGN NEOPLASM: Chronic | ICD-10-CM

## 2018-02-16 PROCEDURE — 74177 CT ABD & PELVIS W/CONTRAST: CPT | Mod: 26

## 2018-02-20 ENCOUNTER — OTHER (OUTPATIENT)
Age: 63
End: 2018-02-20

## 2018-02-23 ENCOUNTER — OUTPATIENT (OUTPATIENT)
Dept: OUTPATIENT SERVICES | Facility: HOSPITAL | Age: 63
LOS: 1 days | Discharge: HOME | End: 2018-02-23
Payer: COMMERCIAL

## 2018-02-23 DIAGNOSIS — C18.9 MALIGNANT NEOPLASM OF COLON, UNSPECIFIED: ICD-10-CM

## 2018-02-23 DIAGNOSIS — Z86.018 PERSONAL HISTORY OF OTHER BENIGN NEOPLASM: Chronic | ICD-10-CM

## 2018-02-23 DIAGNOSIS — Z96.7 PRESENCE OF OTHER BONE AND TENDON IMPLANTS: Chronic | ICD-10-CM

## 2018-02-23 PROCEDURE — 74270 X-RAY XM COLON 1CNTRST STD: CPT | Mod: 26

## 2018-02-26 ENCOUNTER — LABORATORY RESULT (OUTPATIENT)
Age: 63
End: 2018-02-26

## 2018-02-26 ENCOUNTER — APPOINTMENT (OUTPATIENT)
Dept: INTERNAL MEDICINE | Facility: CLINIC | Age: 63
End: 2018-02-26
Payer: COMMERCIAL

## 2018-02-26 VITALS
HEIGHT: 71 IN | OXYGEN SATURATION: 98 % | DIASTOLIC BLOOD PRESSURE: 70 MMHG | WEIGHT: 276 LBS | HEART RATE: 69 BPM | BODY MASS INDEX: 38.64 KG/M2 | SYSTOLIC BLOOD PRESSURE: 110 MMHG

## 2018-02-26 DIAGNOSIS — Z92.29 PERSONAL HISTORY OF OTHER DRUG THERAPY: ICD-10-CM

## 2018-02-26 PROCEDURE — 36415 COLL VENOUS BLD VENIPUNCTURE: CPT

## 2018-02-26 PROCEDURE — 99214 OFFICE O/P EST MOD 30 MIN: CPT | Mod: 25

## 2018-02-26 RX ORDER — ONDANSETRON 8 MG/1
8 TABLET, ORALLY DISINTEGRATING ORAL EVERY 8 HOURS
Qty: 90 | Refills: 3 | Status: DISCONTINUED | COMMUNITY
Start: 2017-05-11 | End: 2018-02-26

## 2018-02-26 RX ORDER — OXYCODONE 5 MG/1
5 TABLET ORAL EVERY 6 HOURS
Qty: 28 | Refills: 0 | Status: DISCONTINUED | COMMUNITY
Start: 2017-10-16 | End: 2018-02-26

## 2018-02-26 RX ORDER — METOPROLOL SUCCINATE 50 MG/1
50 TABLET, EXTENDED RELEASE ORAL EVERY MORNING
Refills: 0 | Status: DISCONTINUED | COMMUNITY
End: 2018-02-26

## 2018-02-26 RX ORDER — METOPROLOL TARTRATE 100 MG/1
100 TABLET, FILM COATED ORAL
Refills: 0 | Status: DISCONTINUED | COMMUNITY
End: 2018-02-26

## 2018-02-28 LAB
ALBUMIN SERPL ELPH-MCNC: 3.9 G/DL
ALP BLD-CCNC: 99 U/L
ALT SERPL-CCNC: 32 U/L
ANION GAP SERPL CALC-SCNC: 13 MMOL/L
AST SERPL-CCNC: 22 U/L
BILIRUB SERPL-MCNC: 0.4 MG/DL
BUN SERPL-MCNC: 23 MG/DL
CALCIUM SERPL-MCNC: 9.4 MG/DL
CHLORIDE SERPL-SCNC: 106 MMOL/L
CHOLEST SERPL-MCNC: 156 MG/DL
CHOLEST/HDLC SERPL: 5.6 RATIO
CO2 SERPL-SCNC: 19 MMOL/L
CREAT SERPL-MCNC: 1.07 MG/DL
GLUCOSE SERPL-MCNC: 90 MG/DL
HDLC SERPL-MCNC: 28 MG/DL
LDLC SERPL CALC-MCNC: NORMAL
POTASSIUM SERPL-SCNC: 4.3 MMOL/L
PROT SERPL-MCNC: 7 G/DL
SODIUM SERPL-SCNC: 138 MMOL/L
TRIGL SERPL-MCNC: 447 MG/DL

## 2018-03-05 ENCOUNTER — OTHER (OUTPATIENT)
Age: 63
End: 2018-03-05

## 2018-03-05 RX ORDER — BICALUTAMIDE 50 MG/1
50 TABLET ORAL
Qty: 90 | Refills: 2 | Status: COMPLETED | COMMUNITY
Start: 2017-06-20 | End: 2018-03-05

## 2018-03-07 ENCOUNTER — APPOINTMENT (OUTPATIENT)
Dept: RADIATION ONCOLOGY | Facility: CLINIC | Age: 63
End: 2018-03-07
Payer: COMMERCIAL

## 2018-03-08 ENCOUNTER — OUTPATIENT (OUTPATIENT)
Dept: OUTPATIENT SERVICES | Facility: HOSPITAL | Age: 63
LOS: 1 days | Discharge: HOME | End: 2018-03-08

## 2018-03-08 DIAGNOSIS — Z86.018 PERSONAL HISTORY OF OTHER BENIGN NEOPLASM: Chronic | ICD-10-CM

## 2018-03-08 DIAGNOSIS — Z96.7 PRESENCE OF OTHER BONE AND TENDON IMPLANTS: Chronic | ICD-10-CM

## 2018-03-08 DIAGNOSIS — C20 MALIGNANT NEOPLASM OF RECTUM: ICD-10-CM

## 2018-03-12 ENCOUNTER — RESULT REVIEW (OUTPATIENT)
Age: 63
End: 2018-03-12

## 2018-03-12 ENCOUNTER — APPOINTMENT (OUTPATIENT)
Dept: HEMATOLOGY ONCOLOGY | Facility: CLINIC | Age: 63
End: 2018-03-12
Payer: COMMERCIAL

## 2018-03-12 ENCOUNTER — LABORATORY RESULT (OUTPATIENT)
Age: 63
End: 2018-03-12

## 2018-03-12 VITALS
TEMPERATURE: 97.6 F | OXYGEN SATURATION: 96 % | HEART RATE: 69 BPM | WEIGHT: 290.33 LBS | DIASTOLIC BLOOD PRESSURE: 77 MMHG | SYSTOLIC BLOOD PRESSURE: 121 MMHG | BODY MASS INDEX: 40.49 KG/M2

## 2018-03-12 LAB
BASOPHILS # BLD AUTO: 0.1 K/UL — SIGNIFICANT CHANGE UP (ref 0–0.2)
BASOPHILS NFR BLD AUTO: 0.8 % — SIGNIFICANT CHANGE UP (ref 0–2)
EOSINOPHIL # BLD AUTO: 0.2 K/UL — SIGNIFICANT CHANGE UP (ref 0–0.5)
EOSINOPHIL NFR BLD AUTO: 2.8 % — SIGNIFICANT CHANGE UP (ref 0–6)
HCT VFR BLD CALC: 33.5 % — LOW (ref 39–50)
HGB BLD-MCNC: 11.8 G/DL — LOW (ref 13–17)
LYMPHOCYTES # BLD AUTO: 0.6 K/UL — LOW (ref 1–3.3)
LYMPHOCYTES # BLD AUTO: 9.9 % — LOW (ref 13–44)
MCHC RBC-ENTMCNC: 35.3 GM/DL — SIGNIFICANT CHANGE UP (ref 32–36)
MCHC RBC-ENTMCNC: 36.3 PG — HIGH (ref 27–34)
MCV RBC AUTO: 102.8 FL — HIGH (ref 80–100)
MONOCYTES # BLD AUTO: 0.4 K/UL — SIGNIFICANT CHANGE UP (ref 0–0.9)
MONOCYTES NFR BLD AUTO: 6.6 % — SIGNIFICANT CHANGE UP (ref 2–14)
NEUTROPHILS # BLD AUTO: 4.9 K/UL — SIGNIFICANT CHANGE UP (ref 1.8–7.4)
NEUTROPHILS NFR BLD AUTO: 79.9 % — HIGH (ref 43–77)
PLATELET # BLD AUTO: 148 K/UL — LOW (ref 150–400)
RBC # BLD: 3.26 M/UL — LOW (ref 4.2–5.8)
RBC # FLD: 14.8 % — HIGH (ref 10.3–14.5)
WBC # BLD: 6.2 K/UL — SIGNIFICANT CHANGE UP (ref 3.8–10.5)
WBC # FLD AUTO: 6.2 K/UL — SIGNIFICANT CHANGE UP (ref 3.8–10.5)

## 2018-03-12 PROCEDURE — 99215 OFFICE O/P EST HI 40 MIN: CPT

## 2018-03-13 LAB
ALBUMIN SERPL ELPH-MCNC: 3.9 G/DL
ALP BLD-CCNC: 89 U/L
ALT SERPL-CCNC: 19 U/L
ANION GAP SERPL CALC-SCNC: 15 MMOL/L
AST SERPL-CCNC: 17 U/L
BILIRUB SERPL-MCNC: 0.5 MG/DL
BUN SERPL-MCNC: 20 MG/DL
CALCIUM SERPL-MCNC: 9.2 MG/DL
CEA SERPL-MCNC: 0.6 NG/ML
CHLORIDE SERPL-SCNC: 105 MMOL/L
CO2 SERPL-SCNC: 20 MMOL/L
CREAT SERPL-MCNC: 1.05 MG/DL
GLUCOSE SERPL-MCNC: 106 MG/DL
MAGNESIUM SERPL-MCNC: 1.8 MG/DL
POTASSIUM SERPL-SCNC: 4.1 MMOL/L
PROT SERPL-MCNC: 6.6 G/DL
SODIUM SERPL-SCNC: 140 MMOL/L

## 2018-03-16 ENCOUNTER — OTHER (OUTPATIENT)
Age: 63
End: 2018-03-16

## 2018-03-20 ENCOUNTER — FORM ENCOUNTER (OUTPATIENT)
Age: 63
End: 2018-03-20

## 2018-03-21 ENCOUNTER — APPOINTMENT (OUTPATIENT)
Dept: RADIATION ONCOLOGY | Facility: CLINIC | Age: 63
End: 2018-03-21
Payer: COMMERCIAL

## 2018-03-21 ENCOUNTER — APPOINTMENT (OUTPATIENT)
Dept: CT IMAGING | Facility: CLINIC | Age: 63
End: 2018-03-21
Payer: COMMERCIAL

## 2018-03-21 ENCOUNTER — OUTPATIENT (OUTPATIENT)
Dept: OUTPATIENT SERVICES | Facility: HOSPITAL | Age: 63
LOS: 1 days | End: 2018-03-21

## 2018-03-21 VITALS
RESPIRATION RATE: 16 BRPM | DIASTOLIC BLOOD PRESSURE: 84 MMHG | HEART RATE: 83 BPM | TEMPERATURE: 97.4 F | WEIGHT: 283 LBS | BODY MASS INDEX: 39.62 KG/M2 | HEIGHT: 71 IN | SYSTOLIC BLOOD PRESSURE: 125 MMHG | OXYGEN SATURATION: 96 %

## 2018-03-21 DIAGNOSIS — Z96.7 PRESENCE OF OTHER BONE AND TENDON IMPLANTS: Chronic | ICD-10-CM

## 2018-03-21 DIAGNOSIS — Z86.018 PERSONAL HISTORY OF OTHER BENIGN NEOPLASM: Chronic | ICD-10-CM

## 2018-03-21 DIAGNOSIS — Z00.8 ENCOUNTER FOR OTHER GENERAL EXAMINATION: ICD-10-CM

## 2018-03-21 PROCEDURE — 99024 POSTOP FOLLOW-UP VISIT: CPT

## 2018-03-21 PROCEDURE — 71260 CT THORAX DX C+: CPT | Mod: 26

## 2018-03-21 RX ORDER — CAPECITABINE 500 MG/1
500 TABLET, FILM COATED ORAL TWICE DAILY
Qty: 120 | Refills: 6 | Status: COMPLETED | COMMUNITY
Start: 2017-12-21 | End: 2018-03-21

## 2018-03-27 ENCOUNTER — RX RENEWAL (OUTPATIENT)
Age: 63
End: 2018-03-27

## 2018-04-04 ENCOUNTER — OTHER (OUTPATIENT)
Age: 63
End: 2018-04-04

## 2018-04-05 ENCOUNTER — OUTPATIENT (OUTPATIENT)
Dept: OUTPATIENT SERVICES | Facility: HOSPITAL | Age: 63
LOS: 1 days | End: 2018-04-05
Payer: COMMERCIAL

## 2018-04-05 VITALS
SYSTOLIC BLOOD PRESSURE: 118 MMHG | HEART RATE: 76 BPM | HEIGHT: 71 IN | RESPIRATION RATE: 16 BRPM | TEMPERATURE: 98 F | WEIGHT: 288.81 LBS | DIASTOLIC BLOOD PRESSURE: 80 MMHG

## 2018-04-05 DIAGNOSIS — I10 ESSENTIAL (PRIMARY) HYPERTENSION: ICD-10-CM

## 2018-04-05 DIAGNOSIS — Z90.49 ACQUIRED ABSENCE OF OTHER SPECIFIED PARTS OF DIGESTIVE TRACT: Chronic | ICD-10-CM

## 2018-04-05 DIAGNOSIS — Z01.818 ENCOUNTER FOR OTHER PREPROCEDURAL EXAMINATION: ICD-10-CM

## 2018-04-05 DIAGNOSIS — Z93.2 ILEOSTOMY STATUS: ICD-10-CM

## 2018-04-05 DIAGNOSIS — Z29.9 ENCOUNTER FOR PROPHYLACTIC MEASURES, UNSPECIFIED: ICD-10-CM

## 2018-04-05 DIAGNOSIS — Z96.7 PRESENCE OF OTHER BONE AND TENDON IMPLANTS: Chronic | ICD-10-CM

## 2018-04-05 DIAGNOSIS — Z86.018 PERSONAL HISTORY OF OTHER BENIGN NEOPLASM: Chronic | ICD-10-CM

## 2018-04-05 DIAGNOSIS — I48.91 UNSPECIFIED ATRIAL FIBRILLATION: ICD-10-CM

## 2018-04-05 LAB
ALBUMIN SERPL ELPH-MCNC: 4.2 G/DL — SIGNIFICANT CHANGE UP (ref 3.3–5.2)
ALP SERPL-CCNC: 95 U/L — SIGNIFICANT CHANGE UP (ref 40–120)
ALT FLD-CCNC: 41 U/L — HIGH
ANION GAP SERPL CALC-SCNC: 14 MMOL/L — SIGNIFICANT CHANGE UP (ref 5–17)
AST SERPL-CCNC: 27 U/L — SIGNIFICANT CHANGE UP
BASOPHILS # BLD AUTO: 0 K/UL — SIGNIFICANT CHANGE UP (ref 0–0.2)
BASOPHILS NFR BLD AUTO: 0.3 % — SIGNIFICANT CHANGE UP (ref 0–2)
BILIRUB SERPL-MCNC: 0.5 MG/DL — SIGNIFICANT CHANGE UP (ref 0.4–2)
BLD GP AB SCN SERPL QL: SIGNIFICANT CHANGE UP
BUN SERPL-MCNC: 19 MG/DL — SIGNIFICANT CHANGE UP (ref 8–20)
CALCIUM SERPL-MCNC: 9 MG/DL — SIGNIFICANT CHANGE UP (ref 8.6–10.2)
CHLORIDE SERPL-SCNC: 101 MMOL/L — SIGNIFICANT CHANGE UP (ref 98–107)
CO2 SERPL-SCNC: 21 MMOL/L — LOW (ref 22–29)
CREAT SERPL-MCNC: 0.78 MG/DL — SIGNIFICANT CHANGE UP (ref 0.5–1.3)
EOSINOPHIL # BLD AUTO: 0.1 K/UL — SIGNIFICANT CHANGE UP (ref 0–0.5)
EOSINOPHIL NFR BLD AUTO: 2 % — SIGNIFICANT CHANGE UP (ref 0–5)
GLUCOSE SERPL-MCNC: 96 MG/DL — SIGNIFICANT CHANGE UP (ref 70–115)
HBA1C BLD-MCNC: 5 % — SIGNIFICANT CHANGE UP (ref 4–5.6)
HCT VFR BLD CALC: 38.3 % — LOW (ref 42–52)
HGB BLD-MCNC: 12.8 G/DL — LOW (ref 14–18)
LYMPHOCYTES # BLD AUTO: 0.6 K/UL — LOW (ref 1–4.8)
LYMPHOCYTES # BLD AUTO: 9.7 % — LOW (ref 20–55)
MCHC RBC-ENTMCNC: 33.3 PG — HIGH (ref 27–31)
MCHC RBC-ENTMCNC: 33.4 G/DL — SIGNIFICANT CHANGE UP (ref 32–36)
MCV RBC AUTO: 99.7 FL — HIGH (ref 80–94)
MONOCYTES # BLD AUTO: 0.5 K/UL — SIGNIFICANT CHANGE UP (ref 0–0.8)
MONOCYTES NFR BLD AUTO: 9 % — SIGNIFICANT CHANGE UP (ref 3–10)
NEUTROPHILS # BLD AUTO: 4.6 K/UL — SIGNIFICANT CHANGE UP (ref 1.8–8)
NEUTROPHILS NFR BLD AUTO: 78.8 % — HIGH (ref 37–73)
PLATELET # BLD AUTO: 201 K/UL — SIGNIFICANT CHANGE UP (ref 150–400)
POTASSIUM SERPL-MCNC: 4 MMOL/L — SIGNIFICANT CHANGE UP (ref 3.5–5.3)
POTASSIUM SERPL-SCNC: 4 MMOL/L — SIGNIFICANT CHANGE UP (ref 3.5–5.3)
PROT SERPL-MCNC: 7.4 G/DL — SIGNIFICANT CHANGE UP (ref 6.6–8.7)
RBC # BLD: 3.84 M/UL — LOW (ref 4.6–6.2)
RBC # FLD: 13.6 % — SIGNIFICANT CHANGE UP (ref 11–15.6)
SODIUM SERPL-SCNC: 136 MMOL/L — SIGNIFICANT CHANGE UP (ref 135–145)
TYPE + AB SCN PNL BLD: SIGNIFICANT CHANGE UP
WBC # BLD: 5.9 K/UL — SIGNIFICANT CHANGE UP (ref 4.8–10.8)
WBC # FLD AUTO: 5.9 K/UL — SIGNIFICANT CHANGE UP (ref 4.8–10.8)

## 2018-04-05 PROCEDURE — 82378 CARCINOEMBRYONIC ANTIGEN: CPT

## 2018-04-05 PROCEDURE — 86901 BLOOD TYPING SEROLOGIC RH(D): CPT

## 2018-04-05 PROCEDURE — 80053 COMPREHEN METABOLIC PANEL: CPT

## 2018-04-05 PROCEDURE — 36415 COLL VENOUS BLD VENIPUNCTURE: CPT

## 2018-04-05 PROCEDURE — 85027 COMPLETE CBC AUTOMATED: CPT

## 2018-04-05 PROCEDURE — G0463: CPT

## 2018-04-05 PROCEDURE — 83036 HEMOGLOBIN GLYCOSYLATED A1C: CPT

## 2018-04-05 PROCEDURE — 86900 BLOOD TYPING SEROLOGIC ABO: CPT

## 2018-04-05 PROCEDURE — 86850 RBC ANTIBODY SCREEN: CPT

## 2018-04-05 RX ORDER — BICALUTAMIDE 50 MG/1
1 TABLET, FILM COATED ORAL
Qty: 0 | Refills: 0 | COMMUNITY

## 2018-04-05 RX ORDER — LOPERAMIDE HCL 2 MG
1 TABLET ORAL
Qty: 0 | Refills: 0 | COMMUNITY

## 2018-04-05 RX ORDER — CEFOTETAN DISODIUM 1 G
2 VIAL (EA) INJECTION ONCE
Qty: 0 | Refills: 0 | Status: COMPLETED | OUTPATIENT
Start: 2018-04-16 | End: 2018-04-16

## 2018-04-05 NOTE — H&P PST ADULT - PROBLEM SELECTOR PLAN 1
Laparoscopic possible open reversal of ileostomy with small bowel resection anastomosis, lysis of  adhesions repair of parastomal hernia.

## 2018-04-05 NOTE — H&P PST ADULT - PSH
History of lipoma  x 3  S/P colectomy    S/P ORIF (open reduction internal fixation) fracture  Left ankle

## 2018-04-05 NOTE — PATIENT PROFILE ADULT. - HEALTHCARE INFORMATION NEEDED, PROFILE
"last time I was here, my HR was high and they had me on lopressor 150 mg for a month, then 100mg then 75mg"

## 2018-04-05 NOTE — H&P PST ADULT - HISTORY OF PRESENT ILLNESS
61 yo male with history of colon/rectal cancer, underwent colectomy in October now planning reversal of ileostomy, Pt underwent chemo and radiation.  Pt also had treatment for prostate cancer.

## 2018-04-05 NOTE — H&P PST ADULT - ASSESSMENT
61 yo male with history of rectal and colon cancer, ileostomy.  CAPRINI SCORE [CLOT]    AGE RELATED RISK FACTORS                                                       MOBILITY RELATED FACTORS  [ x] Age 41-60 years                                            (1 Point)                  [ ] Bed rest                                                        (1 Point)  [ ] Age: 61-74 years                                           (2 Points)                 [ ] Plaster cast                                                   (2 Points)  [ ] Age= 75 years                                              (3 Points)                 [ ] Bed bound for more than 72 hours                 (2 Points)    DISEASE RELATED RISK FACTORS                                               GENDER SPECIFIC FACTORS  [ ] Edema in the lower extremities                       (1 Point)                  [ ] Pregnancy                                                     (1 Point)  [ ] Varicose veins                                               (1 Point)                  [ ] Post-partum < 6 weeks                                   (1 Point)             [ x] BMI > 25 Kg/m2                                            (1 Point)                  [ ] Hormonal therapy  or oral contraception          (1 Point)                 [ ] Sepsis (in the previous month)                        (1 Point)                  [ ] History of pregnancy complications                 (1 point)  [ ] Pneumonia or serious lung disease                                               [ ] Unexplained or recurrent                     (1 Point)           (in the previous month)                               (1 Point)  [ ] Abnormal pulmonary function test                     (1 Point)                 SURGERY RELATED RISK FACTORS  [ ] Acute myocardial infarction                              (1 Point)                 [ ]  Section                                             (1 Point)  [ ] Congestive heart failure (in the previous month)  (1 Point)               [ ] Minor surgery                                                  (1 Point)   [ ] Inflammatory bowel disease                             (1 Point)                 [ ] Arthroscopic surgery                                        (2 Points)  [ ] Central venous access                                      (2 Points)                [x ] General surgery lasting more than 45 minutes   (2 Points)       [ ] Stroke (in the previous month)                          (5 Points)               [ ] Elective arthroplasty                                         (5 Points)                                                                                                                                               HEMATOLOGY RELATED FACTORS                                                 TRAUMA RELATED RISK FACTORS  [ ] Prior episodes of VTE                                     (3 Points)                 [ ] Fracture of the hip, pelvis, or leg                       (5 Points)  [ ] Positive family history for VTE                         (3 Points)                 [ ] Acute spinal cord injury (in the previous month)  (5 Points)  [ ] Prothrombin 53973 A                                     (3 Points)                 [ ] Paralysis  (less than 1 month)                             (5 Points)  [ ] Factor V Leiden                                             (3 Points)                  [ ] Multiple Trauma within 1 month                        (5 Points)  [ ] Lupus anticoagulants                                     (3 Points)                                                           [ ] Anticardiolipin antibodies                               (3 Points)                                                       [ ] High homocysteine in the blood                      (3 Points)                                             [ ] Other congenital or acquired thrombophilia      (3 Points)                                                [ ] Heparin induced thrombocytopenia                  (3 Points)                                          Total Score [   4       ]

## 2018-04-07 LAB — CEA SERPL-MCNC: 0.6 NG/ML — SIGNIFICANT CHANGE UP (ref 0–3.8)

## 2018-04-09 ENCOUNTER — APPOINTMENT (OUTPATIENT)
Dept: INTERNAL MEDICINE | Facility: CLINIC | Age: 63
End: 2018-04-09
Payer: COMMERCIAL

## 2018-04-09 VITALS
DIASTOLIC BLOOD PRESSURE: 80 MMHG | HEIGHT: 71 IN | WEIGHT: 281 LBS | OXYGEN SATURATION: 98 % | BODY MASS INDEX: 39.34 KG/M2 | HEART RATE: 55 BPM | SYSTOLIC BLOOD PRESSURE: 122 MMHG

## 2018-04-09 PROCEDURE — 99214 OFFICE O/P EST MOD 30 MIN: CPT

## 2018-04-09 RX ORDER — HYDROCHLOROTHIAZIDE 25 MG/1
25 TABLET ORAL
Refills: 0 | Status: DISCONTINUED | COMMUNITY
End: 2018-04-09

## 2018-04-15 ENCOUNTER — TRANSCRIPTION ENCOUNTER (OUTPATIENT)
Age: 63
End: 2018-04-15

## 2018-04-16 ENCOUNTER — INPATIENT (INPATIENT)
Facility: HOSPITAL | Age: 63
LOS: 2 days | Discharge: HOME | DRG: 330 | End: 2018-04-19
Attending: COLON & RECTAL SURGERY | Admitting: COLON & RECTAL SURGERY
Payer: COMMERCIAL

## 2018-04-16 ENCOUNTER — RESULT REVIEW (OUTPATIENT)
Age: 63
End: 2018-04-16

## 2018-04-16 ENCOUNTER — APPOINTMENT (OUTPATIENT)
Dept: COLORECTAL SURGERY | Facility: HOSPITAL | Age: 63
End: 2018-04-16
Payer: COMMERCIAL

## 2018-04-16 VITALS
WEIGHT: 279.11 LBS | RESPIRATION RATE: 16 BRPM | HEIGHT: 71 IN | HEART RATE: 92 BPM | OXYGEN SATURATION: 100 % | TEMPERATURE: 97 F | SYSTOLIC BLOOD PRESSURE: 116 MMHG | DIASTOLIC BLOOD PRESSURE: 83 MMHG

## 2018-04-16 DIAGNOSIS — Z86.018 PERSONAL HISTORY OF OTHER BENIGN NEOPLASM: Chronic | ICD-10-CM

## 2018-04-16 DIAGNOSIS — Z96.7 PRESENCE OF OTHER BONE AND TENDON IMPLANTS: Chronic | ICD-10-CM

## 2018-04-16 DIAGNOSIS — C20 MALIGNANT NEOPLASM OF RECTUM: ICD-10-CM

## 2018-04-16 DIAGNOSIS — Z90.49 ACQUIRED ABSENCE OF OTHER SPECIFIED PARTS OF DIGESTIVE TRACT: Chronic | ICD-10-CM

## 2018-04-16 LAB
ANION GAP SERPL CALC-SCNC: 13 MMOL/L — SIGNIFICANT CHANGE UP (ref 5–17)
BLD GP AB SCN SERPL QL: SIGNIFICANT CHANGE UP
BUN SERPL-MCNC: 20 MG/DL — SIGNIFICANT CHANGE UP (ref 8–20)
CALCIUM SERPL-MCNC: 8.9 MG/DL — SIGNIFICANT CHANGE UP (ref 8.6–10.2)
CHLORIDE SERPL-SCNC: 105 MMOL/L — SIGNIFICANT CHANGE UP (ref 98–107)
CO2 SERPL-SCNC: 23 MMOL/L — SIGNIFICANT CHANGE UP (ref 22–29)
CREAT SERPL-MCNC: 1 MG/DL — SIGNIFICANT CHANGE UP (ref 0.5–1.3)
GLUCOSE BLDC GLUCOMTR-MCNC: 103 MG/DL — HIGH (ref 70–99)
GLUCOSE SERPL-MCNC: 146 MG/DL — HIGH (ref 70–115)
MAGNESIUM SERPL-MCNC: 2 MG/DL — SIGNIFICANT CHANGE UP (ref 1.6–2.6)
POTASSIUM SERPL-MCNC: 4.8 MMOL/L — SIGNIFICANT CHANGE UP (ref 3.5–5.3)
POTASSIUM SERPL-SCNC: 4.8 MMOL/L — SIGNIFICANT CHANGE UP (ref 3.5–5.3)
SODIUM SERPL-SCNC: 141 MMOL/L — SIGNIFICANT CHANGE UP (ref 135–145)
TROPONIN T SERPL-MCNC: <0.01 NG/ML — SIGNIFICANT CHANGE UP (ref 0–0.06)
TYPE + AB SCN PNL BLD: SIGNIFICANT CHANGE UP

## 2018-04-16 PROCEDURE — 15734 MUSCLE-SKIN GRAFT TRUNK: CPT | Mod: AS

## 2018-04-16 PROCEDURE — 88304 TISSUE EXAM BY PATHOLOGIST: CPT | Mod: 26

## 2018-04-16 PROCEDURE — 88302 TISSUE EXAM BY PATHOLOGIST: CPT | Mod: 26

## 2018-04-16 PROCEDURE — 44227 LAP CLOSE ENTEROSTOMY: CPT

## 2018-04-16 PROCEDURE — 15734 MUSCLE-SKIN GRAFT TRUNK: CPT

## 2018-04-16 PROCEDURE — 44227 LAP CLOSE ENTEROSTOMY: CPT | Mod: AS

## 2018-04-16 PROCEDURE — 93010 ELECTROCARDIOGRAM REPORT: CPT

## 2018-04-16 RX ORDER — ALVIMOPAN 12 MG/1
12 CAPSULE ORAL
Qty: 0 | Refills: 0 | Status: DISCONTINUED | OUTPATIENT
Start: 2018-04-16 | End: 2018-04-18

## 2018-04-16 RX ORDER — METOPROLOL TARTRATE 50 MG
2.5 TABLET ORAL DAILY
Qty: 0 | Refills: 0 | Status: DISCONTINUED | OUTPATIENT
Start: 2018-04-16 | End: 2018-04-16

## 2018-04-16 RX ORDER — HEPARIN SODIUM 5000 [USP'U]/ML
5000 INJECTION INTRAVENOUS; SUBCUTANEOUS EVERY 8 HOURS
Qty: 0 | Refills: 0 | Status: DISCONTINUED | OUTPATIENT
Start: 2018-04-17 | End: 2018-04-17

## 2018-04-16 RX ORDER — METOPROLOL TARTRATE 50 MG
25 TABLET ORAL DAILY
Qty: 0 | Refills: 0 | Status: DISCONTINUED | OUTPATIENT
Start: 2018-04-16 | End: 2018-04-17

## 2018-04-16 RX ORDER — METOPROLOL TARTRATE 50 MG
2.5 TABLET ORAL ONCE
Qty: 0 | Refills: 0 | Status: COMPLETED | OUTPATIENT
Start: 2018-04-16 | End: 2018-04-16

## 2018-04-16 RX ORDER — METOPROLOL TARTRATE 50 MG
50 TABLET ORAL AT BEDTIME
Qty: 0 | Refills: 0 | Status: DISCONTINUED | OUTPATIENT
Start: 2018-04-16 | End: 2018-04-17

## 2018-04-16 RX ORDER — ALVIMOPAN 12 MG/1
12 CAPSULE ORAL ONCE
Qty: 0 | Refills: 0 | Status: COMPLETED | OUTPATIENT
Start: 2018-04-16 | End: 2018-04-16

## 2018-04-16 RX ORDER — SODIUM CHLORIDE 9 MG/ML
3 INJECTION INTRAMUSCULAR; INTRAVENOUS; SUBCUTANEOUS EVERY 8 HOURS
Qty: 0 | Refills: 0 | Status: DISCONTINUED | OUTPATIENT
Start: 2018-04-16 | End: 2018-04-16

## 2018-04-16 RX ORDER — ONDANSETRON 8 MG/1
4 TABLET, FILM COATED ORAL EVERY 6 HOURS
Qty: 0 | Refills: 0 | Status: DISCONTINUED | OUTPATIENT
Start: 2018-04-16 | End: 2018-04-19

## 2018-04-16 RX ORDER — LABETALOL HCL 100 MG
10 TABLET ORAL ONCE
Qty: 0 | Refills: 0 | Status: COMPLETED | OUTPATIENT
Start: 2018-04-16 | End: 2018-04-16

## 2018-04-16 RX ORDER — TIMOLOL 0.5 %
1 DROPS OPHTHALMIC (EYE)
Qty: 0 | Refills: 0 | Status: DISCONTINUED | OUTPATIENT
Start: 2018-04-16 | End: 2018-04-19

## 2018-04-16 RX ORDER — METOPROLOL TARTRATE 50 MG
5 TABLET ORAL EVERY 6 HOURS
Qty: 0 | Refills: 0 | Status: DISCONTINUED | OUTPATIENT
Start: 2018-04-16 | End: 2018-04-19

## 2018-04-16 RX ORDER — LATANOPROST 0.05 MG/ML
1 SOLUTION/ DROPS OPHTHALMIC; TOPICAL AT BEDTIME
Qty: 0 | Refills: 0 | Status: DISCONTINUED | OUTPATIENT
Start: 2018-04-16 | End: 2018-04-19

## 2018-04-16 RX ORDER — HYDROMORPHONE HYDROCHLORIDE 2 MG/ML
0.5 INJECTION INTRAMUSCULAR; INTRAVENOUS; SUBCUTANEOUS
Qty: 0 | Refills: 0 | Status: DISCONTINUED | OUTPATIENT
Start: 2018-04-16 | End: 2018-04-16

## 2018-04-16 RX ORDER — SODIUM CHLORIDE 9 MG/ML
1000 INJECTION, SOLUTION INTRAVENOUS
Qty: 0 | Refills: 0 | Status: DISCONTINUED | OUTPATIENT
Start: 2018-04-16 | End: 2018-04-18

## 2018-04-16 RX ORDER — CEFOTETAN DISODIUM 1 G
2 VIAL (EA) INJECTION ONCE
Qty: 0 | Refills: 0 | Status: COMPLETED | OUTPATIENT
Start: 2018-04-16 | End: 2018-04-16

## 2018-04-16 RX ORDER — HYDROMORPHONE HYDROCHLORIDE 2 MG/ML
30 INJECTION INTRAMUSCULAR; INTRAVENOUS; SUBCUTANEOUS
Qty: 0 | Refills: 0 | Status: DISCONTINUED | OUTPATIENT
Start: 2018-04-16 | End: 2018-04-18

## 2018-04-16 RX ORDER — HEPARIN SODIUM 5000 [USP'U]/ML
5000 INJECTION INTRAVENOUS; SUBCUTANEOUS ONCE
Qty: 0 | Refills: 0 | Status: COMPLETED | OUTPATIENT
Start: 2018-04-16 | End: 2018-04-16

## 2018-04-16 RX ORDER — SODIUM CHLORIDE 9 MG/ML
500 INJECTION, SOLUTION INTRAVENOUS ONCE
Qty: 0 | Refills: 0 | Status: COMPLETED | OUTPATIENT
Start: 2018-04-16 | End: 2018-04-16

## 2018-04-16 RX ORDER — ONDANSETRON 8 MG/1
4 TABLET, FILM COATED ORAL ONCE
Qty: 0 | Refills: 0 | Status: DISCONTINUED | OUTPATIENT
Start: 2018-04-16 | End: 2018-04-16

## 2018-04-16 RX ORDER — ACETAMINOPHEN 500 MG
1000 TABLET ORAL ONCE
Qty: 0 | Refills: 0 | Status: COMPLETED | OUTPATIENT
Start: 2018-04-16 | End: 2018-04-16

## 2018-04-16 RX ADMIN — LATANOPROST 1 DROP(S): 0.05 SOLUTION/ DROPS OPHTHALMIC; TOPICAL at 21:35

## 2018-04-16 RX ADMIN — Medication 10 MILLIGRAM(S): at 10:00

## 2018-04-16 RX ADMIN — HEPARIN SODIUM 5000 UNIT(S): 5000 INJECTION INTRAVENOUS; SUBCUTANEOUS at 07:07

## 2018-04-16 RX ADMIN — SODIUM CHLORIDE 1000 MILLILITER(S): 9 INJECTION, SOLUTION INTRAVENOUS at 20:06

## 2018-04-16 RX ADMIN — Medication 50 MILLIGRAM(S): at 16:56

## 2018-04-16 RX ADMIN — ALVIMOPAN 12 MILLIGRAM(S): 12 CAPSULE ORAL at 17:37

## 2018-04-16 RX ADMIN — HYDROMORPHONE HYDROCHLORIDE 30 MILLILITER(S): 2 INJECTION INTRAMUSCULAR; INTRAVENOUS; SUBCUTANEOUS at 19:26

## 2018-04-16 RX ADMIN — HYDROMORPHONE HYDROCHLORIDE 30 MILLILITER(S): 2 INJECTION INTRAMUSCULAR; INTRAVENOUS; SUBCUTANEOUS at 10:09

## 2018-04-16 RX ADMIN — Medication 2.5 MILLIGRAM(S): at 23:11

## 2018-04-16 RX ADMIN — ALVIMOPAN 12 MILLIGRAM(S): 12 CAPSULE ORAL at 07:07

## 2018-04-16 RX ADMIN — Medication 100 GRAM(S): at 20:56

## 2018-04-16 RX ADMIN — HYDROMORPHONE HYDROCHLORIDE 30 MILLILITER(S): 2 INJECTION INTRAMUSCULAR; INTRAVENOUS; SUBCUTANEOUS at 13:58

## 2018-04-16 RX ADMIN — Medication 1000 MILLIGRAM(S): at 00:00

## 2018-04-16 RX ADMIN — Medication 400 MILLIGRAM(S): at 17:38

## 2018-04-16 RX ADMIN — Medication 1 DROP(S): at 17:37

## 2018-04-16 RX ADMIN — Medication 30 MILLILITER(S): at 23:08

## 2018-04-16 NOTE — BRIEF OPERATIVE NOTE - POST-OP DX
Ileostomy present  04/16/2018    Active  Bill Damon  Parastomal hernia without obstruction or gangrene  04/16/2018    Active  Bill Damon

## 2018-04-16 NOTE — BRIEF OPERATIVE NOTE - PROCEDURE
<<-----Click on this checkbox to enter Procedure Ileostomy takedown  04/16/2018    Active  MBERRONESJR  Hernia repair  04/16/2018    Active  MBERRONESJR

## 2018-04-16 NOTE — CONSULT NOTE ADULT - SUBJECTIVE AND OBJECTIVE BOX
HPI:  63 yo male with history of colon/rectal cancer, underwent colectomy in October now planning reversal of ileostomy, Pt underwent chemo and radiation.  Pt also had treatment for prostate cancer. (05 Apr 2018 13:36)    Home Medications:   · 	Metoprolol Tartrate 50 mg oral tablet: Last Dose Taken:  , 1 tab(s) orally once a day (in the evening)  · 	aspirin 325 mg oral delayed release tablet: Last Dose Taken:  , 1 tab(s) orally once a day  · 	Betimol 0.5% ophthalmic solution: Last Dose Taken:  , 1 drop(s) to each affected eye 2 times a day  · 	Travatan 0.004% ophthalmic solution: Last Dose Taken:  , 1 drop(s) to each affected eye once a day (in the evening)  · 	hydroCHLOROthiazide 12.5 mg oral tablet: Last Dose Taken:  , 1 tab(s) orally once a day  · 	multivitamin: Last Dose Taken:  , 1 tab(s) orally once a day  · 	Metoprolol Tartrate 25 mg oral tablet: Last Dose Taken:  , 1 tab(s) orally once a day    PAST MEDICAL & SURGICAL HISTORY:  Atrial fibrillation  Rectal cancer  Prostate cancer  Hypertension  Glaucoma  S/P colectomy  S/P ORIF (open reduction internal fixation) fracture: Left ankle  History of lipoma: x 3    alvimopan 12 milliGRAM(s) Oral two times a day  cefoTEtan  IVPB 2 Gram(s) IV Intermittent once  cefoTEtan  IVPB 2 Gram(s) IV Intermittent once  HYDROmorphone PCA (1 mG/mL) 30 milliLiter(s) PCA Continuous PCA Continuous  lactated ringers. 1000 milliLiter(s) IV Continuous <Continuous>  latanoprost 0.005% Ophthalmic Solution 1 Drop(s) Both EYES at bedtime  metoprolol tartrate 25 milliGRAM(s) Oral daily  metoprolol tartrate 50 milliGRAM(s) Oral at bedtime  multivitamin 1 Tablet(s) Oral daily  ondansetron Injectable 4 milliGRAM(s) IV Push every 6 hours PRN  timolol 0.5% Solution 1 Drop(s) Both EYES two times a day    MEDICATIONS  (STANDING):  alvimopan 12 milliGRAM(s) Oral two times a day  cefoTEtan  IVPB 2 Gram(s) IV Intermittent once  cefoTEtan  IVPB 2 Gram(s) IV Intermittent once  HYDROmorphone PCA (1 mG/mL) 30 milliLiter(s) PCA Continuous PCA Continuous  lactated ringers. 1000 milliLiter(s) (150 mL/Hr) IV Continuous <Continuous>  latanoprost 0.005% Ophthalmic Solution 1 Drop(s) Both EYES at bedtime  metoprolol tartrate 25 milliGRAM(s) Oral daily  metoprolol tartrate 50 milliGRAM(s) Oral at bedtime  multivitamin 1 Tablet(s) Oral daily  timolol 0.5% Solution 1 Drop(s) Both EYES two times a day    MEDICATIONS  (PRN):  ondansetron Injectable 4 milliGRAM(s) IV Push every 6 hours PRN Nausea and/or Vomiting      Allergies    No Known Allergies    Intolerances    avoid acidic foods/tomato products (Unknown)      SOCIAL HISTORY:  No S/D/IVDU    FAMILY HISTORY:  Family history of CABG (Father)  Family history of prostate cancer (Father, Sibling)  Family history of cervical cancer (Mother)  Family history of coronary artery disease (Father)  Family history of hypertension (Father)          ROS  - Headache  - Neck Stiffness  - Chest Pain  - SOB  mild Abd pain  - Pelvic Pain  - Leg Pain    Vital Signs Last 24 Hrs  T(C): 36.5 (16 Apr 2018 20:00), Max: 36.5 (16 Apr 2018 09:36)  T(F): 97.7 (16 Apr 2018 20:00), Max: 97.7 (16 Apr 2018 09:36)  HR: 99 (16 Apr 2018 20:00) (84 - 131)  BP: 107/71 (16 Apr 2018 20:00) (100/84 - 138/101)  BP(mean): --  RR: 17 (16 Apr 2018 20:00) (12 - 21)  SpO2: 96% (16 Apr 2018 20:00) (95% - 100%)      HEENT: PEARLA  Neck: Supple  Cardio: S1 S2 No Murmur Irreg  Pulm: CTA No Rales or Ronchi  Abd: Soft NT ND BS dec, Incision clean  Rectal - refused  Ext: No DCT  Skin: No Rash  Neuro: Awake Pleasant    Atrial fibrillation - occasional Aflutter PO Lopressor given, IV fluids, Check electrolytes  Rectal cancer - Post Op Pain control and ambulation  Hypertension - cont  Glaucoma - drops HPI:  61 yo male with history of colon/rectal cancer, underwent colectomy in October now planning reversal of ileostomy, Pt underwent chemo and radiation.  Pt also had treatment for prostate cancer. (05 Apr 2018 13:36)    Home Medications:   · 	Metoprolol Tartrate 50 mg oral tablet: Last Dose Taken:  , 1 tab(s) orally once a day (in the evening)  · 	aspirin 325 mg oral delayed release tablet: Last Dose Taken:  , 1 tab(s) orally once a day  · 	Betimol 0.5% ophthalmic solution: Last Dose Taken:  , 1 drop(s) to each affected eye 2 times a day  · 	Travatan 0.004% ophthalmic solution: Last Dose Taken:  , 1 drop(s) to each affected eye once a day (in the evening)  · 	hydroCHLOROthiazide 12.5 mg oral tablet: Last Dose Taken:  , 1 tab(s) orally once a day  · 	multivitamin: Last Dose Taken:  , 1 tab(s) orally once a day  · 	Metoprolol Tartrate 25 mg oral tablet: Last Dose Taken:  , 1 tab(s) orally once a day    PAST MEDICAL & SURGICAL HISTORY:  Atrial fibrillation  Rectal cancer  Prostate cancer  Hypertension  Glaucoma  S/P colectomy  S/P ORIF (open reduction internal fixation) fracture: Left ankle  History of lipoma: x 3    alvimopan 12 milliGRAM(s) Oral two times a day  cefoTEtan  IVPB 2 Gram(s) IV Intermittent once  cefoTEtan  IVPB 2 Gram(s) IV Intermittent once  HYDROmorphone PCA (1 mG/mL) 30 milliLiter(s) PCA Continuous PCA Continuous  lactated ringers. 1000 milliLiter(s) IV Continuous <Continuous>  latanoprost 0.005% Ophthalmic Solution 1 Drop(s) Both EYES at bedtime  metoprolol tartrate 25 milliGRAM(s) Oral daily  metoprolol tartrate 50 milliGRAM(s) Oral at bedtime  multivitamin 1 Tablet(s) Oral daily  ondansetron Injectable 4 milliGRAM(s) IV Push every 6 hours PRN  timolol 0.5% Solution 1 Drop(s) Both EYES two times a day    MEDICATIONS  (STANDING):  alvimopan 12 milliGRAM(s) Oral two times a day  cefoTEtan  IVPB 2 Gram(s) IV Intermittent once  cefoTEtan  IVPB 2 Gram(s) IV Intermittent once  HYDROmorphone PCA (1 mG/mL) 30 milliLiter(s) PCA Continuous PCA Continuous  lactated ringers. 1000 milliLiter(s) (150 mL/Hr) IV Continuous <Continuous>  latanoprost 0.005% Ophthalmic Solution 1 Drop(s) Both EYES at bedtime  metoprolol tartrate 25 milliGRAM(s) Oral daily  metoprolol tartrate 50 milliGRAM(s) Oral at bedtime  multivitamin 1 Tablet(s) Oral daily  timolol 0.5% Solution 1 Drop(s) Both EYES two times a day    MEDICATIONS  (PRN):  ondansetron Injectable 4 milliGRAM(s) IV Push every 6 hours PRN Nausea and/or Vomiting      Allergies    No Known Allergies    Intolerances    avoid acidic foods/tomato products (Unknown)      SOCIAL HISTORY:  No S/D/IVDU    FAMILY HISTORY:  Family history of CABG (Father)  Family history of prostate cancer (Father, Sibling)  Family history of cervical cancer (Mother)  Family history of coronary artery disease (Father)  Family history of hypertension (Father)          ROS  - Headache  - Neck Stiffness  - Chest Pain  - SOB  mild Abd pain  - Pelvic Pain  - Leg Pain      EKG - Aflutter with AV block, no acute changes  Trop - Neg    Vital Signs Last 24 Hrs  T(C): 36.5 (16 Apr 2018 20:00), Max: 36.5 (16 Apr 2018 09:36)  T(F): 97.7 (16 Apr 2018 20:00), Max: 97.7 (16 Apr 2018 09:36)  HR: 99 (16 Apr 2018 20:00) (84 - 131)  BP: 107/71 (16 Apr 2018 20:00) (100/84 - 138/101)  BP(mean): --  RR: 17 (16 Apr 2018 20:00) (12 - 21)  SpO2: 96% RA      HEENT: PEARLA  Neck: Supple  Cardio: S1 S2 No Murmur Irreg  Pulm: CTA No Rales or Ronchi  Abd: Soft NT ND BS dec, Incision clean  Rectal - refused  Ext: No DCT  Skin: No Rash  Neuro: Awake Pleasant    Atrial fibrillation - asymptomatic, occasional Aflutter PO Lopressor given, IV fluids, Check electrolytes, Patient had similar episode after last surgery.  Rectal cancer - Post Op Pain control and ambulation  Hypertension - cont  Glaucoma - drops

## 2018-04-17 LAB
ANION GAP SERPL CALC-SCNC: 10 MMOL/L — SIGNIFICANT CHANGE UP (ref 5–17)
ANISOCYTOSIS BLD QL: SLIGHT — SIGNIFICANT CHANGE UP
BUN SERPL-MCNC: 15 MG/DL — SIGNIFICANT CHANGE UP (ref 8–20)
CALCIUM SERPL-MCNC: 8.9 MG/DL — SIGNIFICANT CHANGE UP (ref 8.6–10.2)
CHLORIDE SERPL-SCNC: 104 MMOL/L — SIGNIFICANT CHANGE UP (ref 98–107)
CO2 SERPL-SCNC: 25 MMOL/L — SIGNIFICANT CHANGE UP (ref 22–29)
CREAT SERPL-MCNC: 0.84 MG/DL — SIGNIFICANT CHANGE UP (ref 0.5–1.3)
ELLIPTOCYTES BLD QL SMEAR: SLIGHT — SIGNIFICANT CHANGE UP
GLUCOSE SERPL-MCNC: 127 MG/DL — HIGH (ref 70–115)
HCT VFR BLD CALC: 36.4 % — LOW (ref 42–52)
HGB BLD-MCNC: 12 G/DL — LOW (ref 14–18)
LYMPHOCYTES # BLD AUTO: 1 % — LOW (ref 20–55)
MACROCYTES BLD QL: SLIGHT — SIGNIFICANT CHANGE UP
MAGNESIUM SERPL-MCNC: 2 MG/DL — SIGNIFICANT CHANGE UP (ref 1.6–2.6)
MCHC RBC-ENTMCNC: 32.4 PG — HIGH (ref 27–31)
MCHC RBC-ENTMCNC: 33 G/DL — SIGNIFICANT CHANGE UP (ref 32–36)
MCV RBC AUTO: 98.4 FL — HIGH (ref 80–94)
MICROCYTES BLD QL: SLIGHT — SIGNIFICANT CHANGE UP
MONOCYTES NFR BLD AUTO: 5 % — SIGNIFICANT CHANGE UP (ref 3–10)
NEUTROPHILS NFR BLD AUTO: 94 % — HIGH (ref 37–73)
PHOSPHATE SERPL-MCNC: 3.5 MG/DL — SIGNIFICANT CHANGE UP (ref 2.4–4.7)
PLAT MORPH BLD: NORMAL — SIGNIFICANT CHANGE UP
PLATELET # BLD AUTO: 210 K/UL — SIGNIFICANT CHANGE UP (ref 150–400)
POIKILOCYTOSIS BLD QL AUTO: SLIGHT — SIGNIFICANT CHANGE UP
POTASSIUM SERPL-MCNC: 4.2 MMOL/L — SIGNIFICANT CHANGE UP (ref 3.5–5.3)
POTASSIUM SERPL-SCNC: 4.2 MMOL/L — SIGNIFICANT CHANGE UP (ref 3.5–5.3)
RBC # BLD: 3.7 M/UL — LOW (ref 4.6–6.2)
RBC # FLD: 13.1 % — SIGNIFICANT CHANGE UP (ref 11–15.6)
RBC BLD AUTO: ABNORMAL
SODIUM SERPL-SCNC: 139 MMOL/L — SIGNIFICANT CHANGE UP (ref 135–145)
WBC # BLD: 10.8 K/UL — SIGNIFICANT CHANGE UP (ref 4.8–10.8)
WBC # FLD AUTO: 10.8 K/UL — SIGNIFICANT CHANGE UP (ref 4.8–10.8)

## 2018-04-17 RX ORDER — DILTIAZEM HCL 120 MG
5 CAPSULE, EXT RELEASE 24 HR ORAL
Qty: 125 | Refills: 0 | Status: DISCONTINUED | OUTPATIENT
Start: 2018-04-17 | End: 2018-04-17

## 2018-04-17 RX ORDER — HEPARIN SODIUM 5000 [USP'U]/ML
5000 INJECTION INTRAVENOUS; SUBCUTANEOUS EVERY 6 HOURS
Qty: 0 | Refills: 0 | Status: DISCONTINUED | OUTPATIENT
Start: 2018-04-17 | End: 2018-04-18

## 2018-04-17 RX ORDER — AMIODARONE HYDROCHLORIDE 400 MG/1
400 TABLET ORAL
Qty: 0 | Refills: 0 | Status: DISCONTINUED | OUTPATIENT
Start: 2018-04-17 | End: 2018-04-19

## 2018-04-17 RX ORDER — HEPARIN SODIUM 5000 [USP'U]/ML
10000 INJECTION INTRAVENOUS; SUBCUTANEOUS EVERY 6 HOURS
Qty: 0 | Refills: 0 | Status: DISCONTINUED | OUTPATIENT
Start: 2018-04-17 | End: 2018-04-18

## 2018-04-17 RX ORDER — HEPARIN SODIUM 5000 [USP'U]/ML
10000 INJECTION INTRAVENOUS; SUBCUTANEOUS ONCE
Qty: 0 | Refills: 0 | Status: DISCONTINUED | OUTPATIENT
Start: 2018-04-17 | End: 2018-04-17

## 2018-04-17 RX ORDER — ENOXAPARIN SODIUM 100 MG/ML
40 INJECTION SUBCUTANEOUS ONCE
Qty: 0 | Refills: 0 | Status: DISCONTINUED | OUTPATIENT
Start: 2018-04-17 | End: 2018-04-18

## 2018-04-17 RX ORDER — METOPROLOL TARTRATE 50 MG
50 TABLET ORAL EVERY 8 HOURS
Qty: 0 | Refills: 0 | Status: DISCONTINUED | OUTPATIENT
Start: 2018-04-17 | End: 2018-04-19

## 2018-04-17 RX ORDER — HEPARIN SODIUM 5000 [USP'U]/ML
INJECTION INTRAVENOUS; SUBCUTANEOUS
Qty: 25000 | Refills: 0 | Status: DISCONTINUED | OUTPATIENT
Start: 2018-04-17 | End: 2018-04-17

## 2018-04-17 RX ADMIN — ALVIMOPAN 12 MILLIGRAM(S): 12 CAPSULE ORAL at 05:09

## 2018-04-17 RX ADMIN — HYDROMORPHONE HYDROCHLORIDE 30 MILLILITER(S): 2 INJECTION INTRAMUSCULAR; INTRAVENOUS; SUBCUTANEOUS at 07:29

## 2018-04-17 RX ADMIN — Medication 50 MILLIGRAM(S): at 22:19

## 2018-04-17 RX ADMIN — Medication 50 MILLIGRAM(S): at 12:12

## 2018-04-17 RX ADMIN — Medication 1 DROP(S): at 05:09

## 2018-04-17 RX ADMIN — ENOXAPARIN SODIUM 40 MILLIGRAM(S): 100 INJECTION SUBCUTANEOUS at 12:11

## 2018-04-17 RX ADMIN — Medication 1 TABLET(S): at 12:12

## 2018-04-17 RX ADMIN — Medication 5 MILLIGRAM(S): at 03:16

## 2018-04-17 RX ADMIN — ALVIMOPAN 12 MILLIGRAM(S): 12 CAPSULE ORAL at 17:19

## 2018-04-17 RX ADMIN — AMIODARONE HYDROCHLORIDE 400 MILLIGRAM(S): 400 TABLET ORAL at 22:20

## 2018-04-17 RX ADMIN — HEPARIN SODIUM 5000 UNIT(S): 5000 INJECTION INTRAVENOUS; SUBCUTANEOUS at 05:09

## 2018-04-17 RX ADMIN — Medication 25 MILLIGRAM(S): at 05:09

## 2018-04-17 RX ADMIN — AMIODARONE HYDROCHLORIDE 400 MILLIGRAM(S): 400 TABLET ORAL at 12:14

## 2018-04-17 RX ADMIN — SODIUM CHLORIDE 150 MILLILITER(S): 9 INJECTION, SOLUTION INTRAVENOUS at 03:25

## 2018-04-17 RX ADMIN — LATANOPROST 1 DROP(S): 0.05 SOLUTION/ DROPS OPHTHALMIC; TOPICAL at 22:19

## 2018-04-17 RX ADMIN — SODIUM CHLORIDE 100 MILLILITER(S): 9 INJECTION, SOLUTION INTRAVENOUS at 08:29

## 2018-04-17 RX ADMIN — HYDROMORPHONE HYDROCHLORIDE 30 MILLILITER(S): 2 INJECTION INTRAMUSCULAR; INTRAVENOUS; SUBCUTANEOUS at 19:07

## 2018-04-17 RX ADMIN — Medication 1 DROP(S): at 15:34

## 2018-04-17 NOTE — PROGRESS NOTE ADULT - SUBJECTIVE AND OBJECTIVE BOX
HPI:  61 yo male with history of colon/rectal cancer, underwent colectomy in October now planning reversal of ileostomy, Pt underwent chemo and radiation.  Pt also had treatment for prostate cancer. (05 Apr 2018 13:36)     Allergies    No Known Allergies    Intolerances    avoid acidic foods/tomato products (Unknown)    Atrial fibrillation  Rectal cancer  Colon cancer  Prostate cancer  Hypertension  Glaucoma    MEDICATIONS  (STANDING):  alvimopan 12 milliGRAM(s) Oral two times a day  amiodarone    Tablet 400 milliGRAM(s) Oral two times a day  enoxaparin Injectable 40 milliGRAM(s) SubCutaneous once  HYDROmorphone PCA (1 mG/mL) 30 milliLiter(s) PCA Continuous PCA Continuous  lactated ringers. 1000 milliLiter(s) (100 mL/Hr) IV Continuous <Continuous>  latanoprost 0.005% Ophthalmic Solution 1 Drop(s) Both EYES at bedtime  metoprolol tartrate 50 milliGRAM(s) Oral every 8 hours  multivitamin 1 Tablet(s) Oral daily  timolol 0.5% Solution 1 Drop(s) Both EYES two times a day    MEDICATIONS  (PRN):  aluminum hydroxide/magnesium hydroxide/simethicone Suspension 30 milliLiter(s) Oral every 6 hours PRN Dyspepsia  diltiazem Injectable 5 milliGRAM(s) IV Push every 6 hours PRN HR>130  heparin  Injectable 95407 Unit(s) IV Push every 6 hours PRN For aPTT less than 40  heparin  Injectable 5000 Unit(s) IV Push every 6 hours PRN For aPTT between 40 - 57  metoprolol tartrate Injectable 5 milliGRAM(s) IV Push every 6 hours PRN Pulse > 120  ondansetron Injectable 4 milliGRAM(s) IV Push every 6 hours PRN Nausea and/or Vomiting                           12.0   10.8  )-----------( 210      ( 17 Apr 2018 05:44 )             36.4     04-17    139  |  104  |  15.0  ----------------------------<  127<H>  4.2   |  25.0  |  0.84    Ca    8.9      17 Apr 2018 05:44  Phos  3.5     04-17  Mg     2.0     04-17        ;  Vital Signs Last 24 Hrs  T(C): 36.6 (17 Apr 2018 16:28), Max: 36.8 (17 Apr 2018 05:09)  T(F): 97.9 (17 Apr 2018 16:28), Max: 98.3 (17 Apr 2018 05:09)  HR: 140 (17 Apr 2018 16:28) (99 - 140)  BP: 104/68 (17 Apr 2018 16:28) (104/68 - 119/74)  BP(mean): --  RR: 18 (17 Apr 2018 16:28) (16 - 19)  SpO2: 98% (17 Apr 2018 16:28) (96% - 100%)  CAPILLARY BLOOD GLUCOSE      04-16 @ 07:01  -  04-17 @ 07:00  --------------------------------------------------------  IN: 2625 mL / OUT: 1450 mL / NET: 1175 mL    04-17 @ 07:01  - 04-17 @ 17:18  --------------------------------------------------------  IN: 1900 mL / OUT: 550 mL / NET: 1350 mL      Patient feeling better No CP, No SOB, No N/V    HEENT: PEARLA  Neck: Supple  Cardio: S1 S2 No Murmur Irreg  Pulm: CTA No Rales or Ronchi  Abd: Soft NT ND BS +, Incision clean  Rectal - refused  Ext: No DCT  Skin: No Rash  Neuro: Awake Pleasant    Atrial fibrillation - asymptomatic, Aflutter rate not controlled Cardio started Amiodarone with AC with heparin in am. Discussed AC options of coumadin or Xarelto  Rectal cancer - Post Op Pain control and ambulation  Hypertension - cont meds with parameters  Glaucoma - drops

## 2018-04-17 NOTE — PROGRESS NOTE ADULT - SUBJECTIVE AND OBJECTIVE BOX
Chief Complaint: abdominal pain    Patient seen and examined at bedside. 61 yo male POD 1, s/p ileostomy reversal. he is reporting pain is well controlled on current regimen. He reports using the PCA sparingly Pain is a 4/10 today. Denies side effects. +flatus. denies n/v.     PAST MEDICAL & SURGICAL HISTORY:  Atrial fibrillation  Rectal cancer  Prostate cancer  Hypertension  Glaucoma  S/P colectomy  S/P ORIF (open reduction internal fixation) fracture: Left ankle  History of lipoma: x 3      SOCIAL HISTORY:  [ ] Denies Smoking, Alcohol, or Drug Use    Allergies    No Known Allergies    Intolerances    avoid acidic foods/tomato products (Unknown)      PAIN MEDICATIONS:  HYDROmorphone PCA (1 mG/mL) 30 milliLiter(s) PCA Continuous PCA Continuous  ondansetron Injectable 4 milliGRAM(s) IV Push every 6 hours PRN    Heme:  heparin  Injectable 37696 Unit(s) IV Push every 6 hours PRN  heparin  Injectable 5000 Unit(s) IV Push every 6 hours PRN    Antibiotics:    Cardiac:  amiodarone    Tablet 400 milliGRAM(s) Oral two times a day  diltiazem Injectable 5 milliGRAM(s) IV Push every 6 hours PRN  metoprolol tartrate 50 milliGRAM(s) Oral every 8 hours  metoprolol tartrate Injectable 5 milliGRAM(s) IV Push every 6 hours PRN    Pulmonary:    Endocrine    GI:  aluminum hydroxide/magnesium hydroxide/simethicone Suspension 30 milliLiter(s) Oral every 6 hours PRN  alvimopan 12 milliGRAM(s) Oral two times a day    All Other Meds:  lactated ringers. 1000 milliLiter(s) IV Continuous <Continuous>  latanoprost 0.005% Ophthalmic Solution 1 Drop(s) Both EYES at bedtime  multivitamin 1 Tablet(s) Oral daily  timolol 0.5% Solution 1 Drop(s) Both EYES two times a day      REVIEW OF SYSTEMS:  CONSTITUTIONAL: Negative fever,chills  NECK: No pain or stiffness  RESPIRATORY: No cough, wheezing,  No shortness of breath  GASTROINTESTINAL: No abdominal, No nausea, vomiting; No diarrhea or constipation.   GENITOURINARY: No dysuria, frequency, or incontinence  NEUROLOGICAL: No headaches, memory loss, loss of strength, numbness, or  SKIN: No itching, burning, rashes, or lesions   MUSCULOSKELETAL: No joint pain or swelling; No muscle, back, or extremity pain    PHYSICAL EXAM:    Vital Signs Last 24 Hrs  T(C): 36.7 (17 Apr 2018 07:21), Max: 36.8 (17 Apr 2018 05:09)  T(F): 98.1 (17 Apr 2018 07:21), Max: 98.3 (17 Apr 2018 05:09)  HR: 135 (17 Apr 2018 07:21) (84 - 135)  BP: 113/85 (17 Apr 2018 07:21) (100/84 - 119/80)  BP(mean): --  RR: 18 (17 Apr 2018 07:21) (12 - 20)  SpO2: 96% (17 Apr 2018 07:21) (95% - 100%)    PAIN SCORE:    4     SCALE USED: (1-10 VNRS)       GENERAL: Comfortable, in no apparent distress  HEENT:  Atraumatic, Normocephalic, PERRL, EOMI  NECK: Supple  LUNG: Respiration even and unlabored, no distress noted  ABDOMEN: Soft, Nontender, Nondistended; Dressing C/D/I  BACK: No midline bony tenderness to palpation, no step off or deformity noted.   EXTREMITIES:  VOGT X 4; 2+ Peripheral Pulses, No clubbing, cyanosis, or edema  NEUROLOGIC: Awake, alert and oriented X3;  No focal deficits. Motor strength 5/5. Sensation intact to light touch  SKIN: Warm and Dry    LABS:                          12.0   10.8  )-----------( 210      ( 17 Apr 2018 05:44 )             36.4     04-17    139  |  104  |  15.0  ----------------------------<  127<H>  4.2   |  25.0  |  0.84    Ca    8.9      17 Apr 2018 05:44  Phos  3.5     04-17  Mg     2.0     04-17            Drug Screen:        RADIOLOGY:      [ ]  [place]  Reviewed and Copied to Chart

## 2018-04-17 NOTE — PROGRESS NOTE ADULT - PROBLEM SELECTOR PLAN 1
1. pain well controlled on regimen  2. meds       - continue PCA until diet is advanced, at current settings 0.2/6/4 mg lockout  3. will continue to monitor  4. call with questions

## 2018-04-17 NOTE — CONSULT NOTE ADULT - ASSESSMENT
63 y/o man with prostate cancer on casodex, and recently diagnoses colorectal cancer who was found to have atrial fibrillation on pre-operative testing who is now s/p low anterior resection of the rectum with loop ileostomy with post operative course complicated by atrial fibrillation with RVR who now presents for ileostomy reversal with post operative course complicated by atrial fibrillation with RVR.    Atrial fibrillation   - resume anticoagulation when cleared by surgery  - increase metoprolol to 50mg Q8hrs, holding parameters placed  - diltiazem 10mg IVQ6 hrs PRN  - will additionally start amiodarone 400mg PO Q12   - continue telemetry monitoring

## 2018-04-17 NOTE — CONSULT NOTE ADULT - SUBJECTIVE AND OBJECTIVE BOX
[facility], THE [facility]ENTER                [address] [address]                                     PHONE: [phone]                                       FAX: [phone]  [email or web address]    Reason for Consult: atrial fibrillation with RVR    HPI: 61 y/o man with prostate cancer on casodex, and recently diagnoses colorectal cancer who was found to have atrial fibrillation on pre-operative testing who is now s/p low anterior resection of the rectum with loop ileostomy with post operative course complicated by atrial fibrillation with RVR who now presents for ileostomy reversal with post operative course complicated by atrial fibrillation with RVR. He offers no concerns and denies chest pain, palpitations and shortness of breath.     PAST MEDICAL & SURGICAL HISTORY:  Atrial fibrillation  Rectal cancer  Prostate cancer  Hypertension  Glaucoma  S/P colectomy  S/P ORIF (open reduction internal fixation) fracture: Left ankle  History of lipoma: x 3    Telemetry: atrial fibrillation with -130     PREVIOUS DIAGNOSTIC TESTING:      EKG: atrial fibrillation with RVR    ECHO FINDINGS:  < from: TTE Echo Complete w/Doppler (10.06.17 @ 11:44) >   1.Technically difficult study.   2. Left ventricular ejection fraction, by visual estimation, is 55 to 60%.   3. Normal global left ventricular systolic function.   4. The mitral in-flow pattern reveals no discernable A-wave, therefore no comment on diastolic function can be made.   5. Normal left ventricular internal cavity size.   6. Normal right ventricular size and function.   7. Mildly enlarged left atrium.   8. Trace mitral valve regurgitation.   9. Normal trileaflet aortic valve with normal opening.  10. There is no evidence of pericardial effusion.    MEDICATIONS  (STANDING):  alvimopan 12 milliGRAM(s) Oral two times a day  cefoTEtan  IVPB 2 Gram(s) IV Intermittent once  diltiazem Infusion 5 mG/Hr (5 mL/Hr) IV Continuous <Continuous>  heparin  Injectable 5000 Unit(s) SubCutaneous every 8 hours  HYDROmorphone PCA (1 mG/mL) 30 milliLiter(s) PCA Continuous PCA Continuous  lactated ringers. 1000 milliLiter(s) (100 mL/Hr) IV Continuous <Continuous>  latanoprost 0.005% Ophthalmic Solution 1 Drop(s) Both EYES at bedtime  metoprolol tartrate 25 milliGRAM(s) Oral daily  metoprolol tartrate 50 milliGRAM(s) Oral at bedtime  multivitamin 1 Tablet(s) Oral daily  timolol 0.5% Solution 1 Drop(s) Both EYES two times a day    MEDICATIONS  (PRN):  aluminum hydroxide/magnesium hydroxide/simethicone Suspension 30 milliLiter(s) Oral every 6 hours PRN Dyspepsia  metoprolol tartrate Injectable 5 milliGRAM(s) IV Push every 6 hours PRN Pulse > 120  ondansetron Injectable 4 milliGRAM(s) IV Push every 6 hours PRN Nausea and/or Vomiting      FAMILY HISTORY:  Family history of CABG (Father)  Family history of prostate cancer (Father, Sibling)  Family history of cervical cancer (Mother)  Family history of coronary artery disease (Father)  Family history of hypertension (Father)    SOCIAL HISTORY:  CIGARETTES: denies  ALCOHOL: denies     ROS: Negative other than as mentioned in HPI.    Vital Signs Last 24 Hrs  T(C): 36.7 (17 Apr 2018 07:21), Max: 36.8 (17 Apr 2018 05:09)  T(F): 98.1 (17 Apr 2018 07:21), Max: 98.3 (17 Apr 2018 05:09)  HR: 135 (17 Apr 2018 07:21) (84 - 135)  BP: 113/85 (17 Apr 2018 07:21) (100/84 - 138/101)  BP(mean): --  RR: 18 (17 Apr 2018 07:21) (12 - 21)  SpO2: 96% (17 Apr 2018 07:21) (95% - 100%)    PHYSICAL EXAM:  General: Appears well developed, well nourished alert and cooperative.  HEENT: Head; normocephalic, atraumatic. sclera anicteric, MMM, no JVD, neck is supple   CARDIOVASCULAR; tachycardic, irregularly irregular, 1/6 SUZANNA, no rub, gallop or lift. Normal S1 and S2.  LUNGS; No rales, rhonchi or wheeze. Normal breath sounds bilaterally.  ABDOMEN ; Soft, nontender without mass or organomegaly. bowel sounds normoactive.  EXTREMITIES; No clubbing, cyanosis or edema. Distal pulses wnl. ROM normal.  SKIN; warm and dry with normal turgor.  NEURO; Alert/oriented x 3/normal motor exam.     PSYCH; normal affect.        I&O's Detail    16 Apr 2018 07:01  -  17 Apr 2018 07:00  --------------------------------------------------------  IN:    Lactated Ringers IV Bolus: 500 mL    lactated ringers.: 2075 mL    Solution: 50 mL  Total IN: 2625 mL    OUT:    Indwelling Catheter - Urethral: 1450 mL  Total OUT: 1450 mL    Total NET: 1175 mL          LABS:                        12.0   10.8  )-----------( 210      ( 17 Apr 2018 05:44 )             36.4     04-17    139  |  104  |  15.0  ----------------------------<  127<H>  4.2   |  25.0  |  0.84    Ca    8.9      17 Apr 2018 05:44  Phos  3.5     04-17  Mg     2.0     04-17      CARDIAC MARKERS ( 16 Apr 2018 20:59 )  x     / <0.01 ng/mL / x     / x     / x              I&O's Summary    16 Apr 2018 07:01  -  17 Apr 2018 07:00  --------------------------------------------------------  IN: 2625 mL / OUT: 1450 mL / NET: 1175 mL        RADIOLOGY & ADDITIONAL STUDIES: [facility], [facility]                [address] [address]                                     PHONE: [phone]                                       FAX: [phone]  [email or web address]    Reason for Consult: atrial fibrillation with RVR    HPI: 61 y/o man with prostate cancer s/p casodex, recent diagnosis of colorectal cancer who was found to have atrial fibrillation on pre-operative testing who is now s/p low anterior resection of the rectum with loop ileostomy with post operative course complicated by atrial fibrillation with RVR who now presents for ileostomy reversal with post operative course complicated by atrial fibrillation with RVR. He offers no concerns and denies chest pain, palpitations and shortness of breath.     PAST MEDICAL & SURGICAL HISTORY:  Atrial fibrillation  Rectal cancer  Prostate cancer  Hypertension  Glaucoma  S/P colectomy  S/P ORIF (open reduction internal fixation) fracture: Left ankle  History of lipoma: x 3    Telemetry: atrial fibrillation with -130     PREVIOUS DIAGNOSTIC TESTING:      EKG: atrial fibrillation with RVR    ECHO FINDINGS:  < from: TTE Echo Complete w/Doppler (10.06.17 @ 11:44) >   1.Technically difficult study.   2. Left ventricular ejection fraction, by visual estimation, is 55 to 60%.   3. Normal global left ventricular systolic function.   4. The mitral in-flow pattern reveals no discernable A-wave, therefore no comment on diastolic function can be made.   5. Normal left ventricular internal cavity size.   6. Normal right ventricular size and function.   7. Mildly enlarged left atrium.   8. Trace mitral valve regurgitation.   9. Normal trileaflet aortic valve with normal opening.  10. There is no evidence of pericardial effusion.    MEDICATIONS  (STANDING):  alvimopan 12 milliGRAM(s) Oral two times a day  cefoTEtan  IVPB 2 Gram(s) IV Intermittent once  diltiazem Infusion 5 mG/Hr (5 mL/Hr) IV Continuous <Continuous>  heparin  Injectable 5000 Unit(s) SubCutaneous every 8 hours  HYDROmorphone PCA (1 mG/mL) 30 milliLiter(s) PCA Continuous PCA Continuous  lactated ringers. 1000 milliLiter(s) (100 mL/Hr) IV Continuous <Continuous>  latanoprost 0.005% Ophthalmic Solution 1 Drop(s) Both EYES at bedtime  metoprolol tartrate 25 milliGRAM(s) Oral daily  metoprolol tartrate 50 milliGRAM(s) Oral at bedtime  multivitamin 1 Tablet(s) Oral daily  timolol 0.5% Solution 1 Drop(s) Both EYES two times a day    MEDICATIONS  (PRN):  aluminum hydroxide/magnesium hydroxide/simethicone Suspension 30 milliLiter(s) Oral every 6 hours PRN Dyspepsia  metoprolol tartrate Injectable 5 milliGRAM(s) IV Push every 6 hours PRN Pulse > 120  ondansetron Injectable 4 milliGRAM(s) IV Push every 6 hours PRN Nausea and/or Vomiting      FAMILY HISTORY:  Family history of CABG (Father)  Family history of prostate cancer (Father, Sibling)  Family history of cervical cancer (Mother)  Family history of coronary artery disease (Father)  Family history of hypertension (Father)    SOCIAL HISTORY:  CIGARETTES: denies  ALCOHOL: denies     ROS: Negative other than as mentioned in HPI.    Vital Signs Last 24 Hrs  T(C): 36.7 (17 Apr 2018 07:21), Max: 36.8 (17 Apr 2018 05:09)  T(F): 98.1 (17 Apr 2018 07:21), Max: 98.3 (17 Apr 2018 05:09)  HR: 135 (17 Apr 2018 07:21) (84 - 135)  BP: 113/85 (17 Apr 2018 07:21) (100/84 - 138/101)  BP(mean): --  RR: 18 (17 Apr 2018 07:21) (12 - 21)  SpO2: 96% (17 Apr 2018 07:21) (95% - 100%)    PHYSICAL EXAM:  General: Appears well developed, well nourished alert and cooperative.  HEENT: Head; normocephalic, atraumatic. sclera anicteric, MMM, no JVD, neck is supple   CARDIOVASCULAR; tachycardic, irregularly irregular, 1/6 SUZANNA, no rub, gallop or lift. Normal S1 and S2.  LUNGS; No rales, rhonchi or wheeze. Normal breath sounds bilaterally.  ABDOMEN ; Soft, nontender without mass or organomegaly. bowel sounds normoactive.  EXTREMITIES; No clubbing, cyanosis or edema. Distal pulses wnl. ROM normal.  SKIN; warm and dry with normal turgor.  NEURO; Alert/oriented x 3/normal motor exam.     PSYCH; normal affect.        I&O's Detail    16 Apr 2018 07:01  -  17 Apr 2018 07:00  --------------------------------------------------------  IN:    Lactated Ringers IV Bolus: 500 mL    lactated ringers.: 2075 mL    Solution: 50 mL  Total IN: 2625 mL    OUT:    Indwelling Catheter - Urethral: 1450 mL  Total OUT: 1450 mL    Total NET: 1175 mL          LABS:                        12.0   10.8  )-----------( 210      ( 17 Apr 2018 05:44 )             36.4     04-17    139  |  104  |  15.0  ----------------------------<  127<H>  4.2   |  25.0  |  0.84    Ca    8.9      17 Apr 2018 05:44  Phos  3.5     04-17  Mg     2.0     04-17      CARDIAC MARKERS ( 16 Apr 2018 20:59 )  x     / <0.01 ng/mL / x     / x     / x              I&O's Summary    16 Apr 2018 07:01  -  17 Apr 2018 07:00  --------------------------------------------------------  IN: 2625 mL / OUT: 1450 mL / NET: 1175 mL        RADIOLOGY & ADDITIONAL STUDIES:

## 2018-04-17 NOTE — PROGRESS NOTE ADULT - SUBJECTIVE AND OBJECTIVE BOX
POD 1, s/p ileostomy reversal  no n/v, +flatus  tachycardia, afib  no chest pain, or sob    Vital Signs Last 24 Hrs  T(C): 36.7 (17 Apr 2018 07:21), Max: 36.8 (17 Apr 2018 05:09)  T(F): 98.1 (17 Apr 2018 07:21), Max: 98.3 (17 Apr 2018 05:09)  HR: 135 (17 Apr 2018 07:21) (84 - 135)  BP: 113/85 (17 Apr 2018 07:21) (100/84 - 138/101)  BP(mean): --  RR: 18 (17 Apr 2018 07:21) (12 - 21)  SpO2: 96% (17 Apr 2018 07:21) (95% - 100%)    NAD  abd soft, Prevena in palce                          12.0   10.8  )-----------( 210      ( 17 Apr 2018 05:44 )             36.4   04-17    139  |  104  |  15.0  ----------------------------<  127<H>  4.2   |  25.0  |  0.84    Ca    8.9      17 Apr 2018 05:44  Phos  3.5     04-17  Mg     2.0     04-17

## 2018-04-17 NOTE — PROGRESS NOTE ADULT - SUBJECTIVE AND OBJECTIVE BOX
Spoke with cardiology and surgery heparin originally ordered today but surgery wants it for tomorrow to decrease post op bleeding risk.  Heparin order for am.

## 2018-04-18 LAB
ANION GAP SERPL CALC-SCNC: 12 MMOL/L — SIGNIFICANT CHANGE UP (ref 5–17)
BASOPHILS # BLD AUTO: 0 K/UL — SIGNIFICANT CHANGE UP (ref 0–0.2)
BASOPHILS NFR BLD AUTO: 0.1 % — SIGNIFICANT CHANGE UP (ref 0–2)
BUN SERPL-MCNC: 14 MG/DL — SIGNIFICANT CHANGE UP (ref 8–20)
CALCIUM SERPL-MCNC: 8.8 MG/DL — SIGNIFICANT CHANGE UP (ref 8.6–10.2)
CHLORIDE SERPL-SCNC: 102 MMOL/L — SIGNIFICANT CHANGE UP (ref 98–107)
CO2 SERPL-SCNC: 26 MMOL/L — SIGNIFICANT CHANGE UP (ref 22–29)
CREAT SERPL-MCNC: 1 MG/DL — SIGNIFICANT CHANGE UP (ref 0.5–1.3)
EOSINOPHIL # BLD AUTO: 0.1 K/UL — SIGNIFICANT CHANGE UP (ref 0–0.5)
EOSINOPHIL NFR BLD AUTO: 0.8 % — SIGNIFICANT CHANGE UP (ref 0–5)
GLUCOSE SERPL-MCNC: 93 MG/DL — SIGNIFICANT CHANGE UP (ref 70–115)
HCT VFR BLD CALC: 36.6 % — LOW (ref 42–52)
HGB BLD-MCNC: 12.1 G/DL — LOW (ref 14–18)
INR BLD: 1.05 RATIO — SIGNIFICANT CHANGE UP (ref 0.88–1.16)
LYMPHOCYTES # BLD AUTO: 0.6 K/UL — LOW (ref 1–4.8)
LYMPHOCYTES # BLD AUTO: 7.9 % — LOW (ref 20–55)
MAGNESIUM SERPL-MCNC: 2 MG/DL — SIGNIFICANT CHANGE UP (ref 1.6–2.6)
MCHC RBC-ENTMCNC: 33.1 G/DL — SIGNIFICANT CHANGE UP (ref 32–36)
MCHC RBC-ENTMCNC: 33.1 PG — HIGH (ref 27–31)
MCV RBC AUTO: 100 FL — HIGH (ref 80–94)
MONOCYTES # BLD AUTO: 0.6 K/UL — SIGNIFICANT CHANGE UP (ref 0–0.8)
MONOCYTES NFR BLD AUTO: 7.8 % — SIGNIFICANT CHANGE UP (ref 3–10)
NEUTROPHILS # BLD AUTO: 6.3 K/UL — SIGNIFICANT CHANGE UP (ref 1.8–8)
NEUTROPHILS NFR BLD AUTO: 83.1 % — HIGH (ref 37–73)
PHOSPHATE SERPL-MCNC: 3.1 MG/DL — SIGNIFICANT CHANGE UP (ref 2.4–4.7)
PLATELET # BLD AUTO: 179 K/UL — SIGNIFICANT CHANGE UP (ref 150–400)
POTASSIUM SERPL-MCNC: 3.9 MMOL/L — SIGNIFICANT CHANGE UP (ref 3.5–5.3)
POTASSIUM SERPL-SCNC: 3.9 MMOL/L — SIGNIFICANT CHANGE UP (ref 3.5–5.3)
PROTHROM AB SERPL-ACNC: 11.6 SEC — SIGNIFICANT CHANGE UP (ref 9.8–12.7)
RBC # BLD: 3.66 M/UL — LOW (ref 4.6–6.2)
RBC # FLD: 13.3 % — SIGNIFICANT CHANGE UP (ref 11–15.6)
SODIUM SERPL-SCNC: 140 MMOL/L — SIGNIFICANT CHANGE UP (ref 135–145)
SURGICAL PATHOLOGY FINAL REPORT - CH: SIGNIFICANT CHANGE UP
WBC # BLD: 7.6 K/UL — SIGNIFICANT CHANGE UP (ref 4.8–10.8)
WBC # FLD AUTO: 7.6 K/UL — SIGNIFICANT CHANGE UP (ref 4.8–10.8)

## 2018-04-18 RX ORDER — LOPERAMIDE HCL 2 MG
2 TABLET ORAL THREE TIMES A DAY
Qty: 0 | Refills: 0 | Status: DISCONTINUED | OUTPATIENT
Start: 2018-04-18 | End: 2018-04-19

## 2018-04-18 RX ORDER — ZINC OXIDE 200 MG/G
1 OINTMENT TOPICAL EVERY 6 HOURS
Qty: 0 | Refills: 0 | Status: DISCONTINUED | OUTPATIENT
Start: 2018-04-18 | End: 2018-04-19

## 2018-04-18 RX ORDER — MORPHINE SULFATE 50 MG/1
2 CAPSULE, EXTENDED RELEASE ORAL EVERY 6 HOURS
Qty: 0 | Refills: 0 | Status: DISCONTINUED | OUTPATIENT
Start: 2018-04-18 | End: 2018-04-19

## 2018-04-18 RX ORDER — RIVAROXABAN 15 MG-20MG
20 KIT ORAL EVERY 24 HOURS
Qty: 0 | Refills: 0 | Status: DISCONTINUED | OUTPATIENT
Start: 2018-04-18 | End: 2018-04-19

## 2018-04-18 RX ORDER — OXYCODONE AND ACETAMINOPHEN 5; 325 MG/1; MG/1
1 TABLET ORAL EVERY 6 HOURS
Qty: 0 | Refills: 0 | Status: DISCONTINUED | OUTPATIENT
Start: 2018-04-18 | End: 2018-04-19

## 2018-04-18 RX ORDER — PSYLLIUM SEED (WITH DEXTROSE)
1 POWDER (GRAM) ORAL DAILY
Qty: 0 | Refills: 0 | Status: DISCONTINUED | OUTPATIENT
Start: 2018-04-18 | End: 2018-04-19

## 2018-04-18 RX ORDER — OXYCODONE AND ACETAMINOPHEN 5; 325 MG/1; MG/1
2 TABLET ORAL EVERY 6 HOURS
Qty: 0 | Refills: 0 | Status: DISCONTINUED | OUTPATIENT
Start: 2018-04-18 | End: 2018-04-19

## 2018-04-18 RX ORDER — DIPHENHYDRAMINE HCL 50 MG
25 CAPSULE ORAL EVERY 4 HOURS
Qty: 0 | Refills: 0 | Status: DISCONTINUED | OUTPATIENT
Start: 2018-04-18 | End: 2018-04-19

## 2018-04-18 RX ADMIN — Medication 1 TABLET(S): at 11:09

## 2018-04-18 RX ADMIN — Medication 50 MILLIGRAM(S): at 06:01

## 2018-04-18 RX ADMIN — Medication 50 MILLIGRAM(S): at 21:31

## 2018-04-18 RX ADMIN — Medication 1 PACKET(S): at 13:43

## 2018-04-18 RX ADMIN — Medication 25 MILLIGRAM(S): at 23:04

## 2018-04-18 RX ADMIN — Medication 1 DROP(S): at 17:15

## 2018-04-18 RX ADMIN — RIVAROXABAN 20 MILLIGRAM(S): KIT at 17:14

## 2018-04-18 RX ADMIN — OXYCODONE AND ACETAMINOPHEN 2 TABLET(S): 5; 325 TABLET ORAL at 21:31

## 2018-04-18 RX ADMIN — LATANOPROST 1 DROP(S): 0.05 SOLUTION/ DROPS OPHTHALMIC; TOPICAL at 21:32

## 2018-04-18 RX ADMIN — HYDROMORPHONE HYDROCHLORIDE 30 MILLILITER(S): 2 INJECTION INTRAMUSCULAR; INTRAVENOUS; SUBCUTANEOUS at 07:15

## 2018-04-18 RX ADMIN — AMIODARONE HYDROCHLORIDE 400 MILLIGRAM(S): 400 TABLET ORAL at 23:04

## 2018-04-18 RX ADMIN — OXYCODONE AND ACETAMINOPHEN 2 TABLET(S): 5; 325 TABLET ORAL at 13:39

## 2018-04-18 RX ADMIN — ALVIMOPAN 12 MILLIGRAM(S): 12 CAPSULE ORAL at 06:01

## 2018-04-18 RX ADMIN — Medication 1 DROP(S): at 06:01

## 2018-04-18 RX ADMIN — Medication 50 MILLIGRAM(S): at 13:18

## 2018-04-18 RX ADMIN — AMIODARONE HYDROCHLORIDE 400 MILLIGRAM(S): 400 TABLET ORAL at 11:09

## 2018-04-18 RX ADMIN — OXYCODONE AND ACETAMINOPHEN 2 TABLET(S): 5; 325 TABLET ORAL at 22:01

## 2018-04-18 NOTE — PROGRESS NOTE ADULT - SUBJECTIVE AND OBJECTIVE BOX
Patient seen and examined at bedside. Pt is a 61 yo male POD 2, s/p ileostomy reversal. Pain is adequately controlled at this time; 2/10 on VNRS. He has only used the Dilaudid PCA 7 times since last night.  No side effects with current regimen.  +flatus. denies n/v.     NAD; resting comfortably in bed  VSS; afebrile  A&Ox3  CTAB  +BS  VOGT    Pt is a 61 yo male POD 2, s/p ileostomy reversal.  -D/C PCA  -Start Percocet 5/10mg Q6h PRN   -Morphine 2mg IVP Q6h PRN BTP  -Will sign off. Reconsult if needed.

## 2018-04-18 NOTE — PROGRESS NOTE ADULT - SUBJECTIVE AND OBJECTIVE BOX
HPI:  63 yo male with history of colon/rectal cancer, underwent colectomy in October now planning reversal of ileostomy, Pt underwent chemo and radiation.  Pt also had treatment for prostate cancer. (05 Apr 2018 13:36)     Allergies    No Known Allergies    Intolerances    avoid acidic foods/tomato products (Unknown)    Atrial fibrillation  Rectal cancer  Colon cancer  Prostate cancer  Hypertension  Glaucoma    MEDICATIONS  (STANDING):  amiodarone    Tablet 400 milliGRAM(s) Oral two times a day  latanoprost 0.005% Ophthalmic Solution 1 Drop(s) Both EYES at bedtime  metoprolol tartrate 50 milliGRAM(s) Oral every 8 hours  multivitamin 1 Tablet(s) Oral daily  psyllium Powder 1 Packet(s) Oral daily  rivaroxaban 20 milliGRAM(s) Oral every 24 hours  timolol 0.5% Solution 1 Drop(s) Both EYES two times a day    MEDICATIONS  (PRN):  aluminum hydroxide/magnesium hydroxide/simethicone Suspension 30 milliLiter(s) Oral every 6 hours PRN Dyspepsia  loperamide 2 milliGRAM(s) Oral three times a day PRN Diarrhea  metoprolol tartrate Injectable 5 milliGRAM(s) IV Push every 6 hours PRN Pulse > 120  morphine  - Injectable 2 milliGRAM(s) IV Push every 6 hours PRN BTP persisting 45 mi n after PO administration.  ondansetron Injectable 4 milliGRAM(s) IV Push every 6 hours PRN Nausea and/or Vomiting  oxyCODONE    5 mG/acetaminophen 325 mG 1 Tablet(s) Oral every 6 hours PRN Mild Pain (1 - 3)  oxyCODONE    5 mG/acetaminophen 325 mG 2 Tablet(s) Oral every 6 hours PRN Moderate Pain (4 - 6)  zinc oxide 40%/lanolin Ointment 1 Application(s) Topical every 6 hours PRN rectal irritation                           12.1   7.6   )-----------( 179      ( 18 Apr 2018 06:25 )             36.6     04-18    140  |  102  |  14.0  ----------------------------<  93  3.9   |  26.0  |  1.00    Ca    8.8      18 Apr 2018 06:25  Phos  3.1     04-18  Mg     2.0     04-18      PT/INR - ( 18 Apr 2018 06:25 )   PT: 11.6 sec;   INR: 1.05 ratio           ;  Vital Signs Last 24 Hrs  T(C): 36.9 (18 Apr 2018 19:21), Max: 37.1 (17 Apr 2018 23:51)  T(F): 98.5 (18 Apr 2018 19:21), Max: 98.8 (17 Apr 2018 23:51)  HR: 76 (18 Apr 2018 19:21) (66 - 135)  BP: 130/89 (18 Apr 2018 19:21) (101/64 - 134/95)  BP(mean): --  RR: 18 (18 Apr 2018 19:21) (18 - 18)  SpO2: 98% (18 Apr 2018 19:21) (95% - 98%)  CAPILLARY BLOOD GLUCOSE      04-17 @ 07:01  -  04-18 @ 07:00  --------------------------------------------------------  IN: 3850 mL / OUT: 1150 mL / NET: 2700 mL      Patient feeling better No CP, No SOB, No N/V Tolerating Regular diet    HEENT: PEARLA  Neck: Supple  Cardio: S1 S2 No Murmur Irreg  Pulm: CTA No Rales or Ronchi  Abd: Soft NT ND BS +, Incision clean  Rectal - refused  Ext: No DCT  Skin: No Rash  Neuro: Awake Pleasant    Atrial fibrillation - converted to sinus on  Xarelto  Rectal cancer - Post Op Pain control and ambulation  Hypertension - cont meds with parameters  Glaucoma - drops  Neuro: Awake Pleasant

## 2018-04-18 NOTE — PROGRESS NOTE ADULT - SUBJECTIVE AND OBJECTIVE BOX
Complains of diarrhea. HR still elevated, 135 this am. No nausea or vomiting    Exam:  Vital Signs Last 24 Hrs  T(C): 36.4 (18 Apr 2018 08:39), Max: 37.1 (17 Apr 2018 23:51)  T(F): 97.5 (18 Apr 2018 08:39), Max: 98.8 (17 Apr 2018 23:51)  HR: 135 (18 Apr 2018 08:39) (75 - 140)  BP: 129/96 (18 Apr 2018 08:39) (101/64 - 134/95)  RR: 18 (18 Apr 2018 08:39) (18 - 18)  SpO2: 96% (18 Apr 2018 08:39) (95% - 98%)    In no distress  Non labored breathing  Abdomen soft, non tender, non distended  Alert and oriented x 3                          12.1   7.6   )-----------( 179      ( 18 Apr 2018 06:25 )             36.6   04-18    140  |  102  |  14.0  ----------------------------<  93  3.9   |  26.0  |  1.00    Ca    8.8      18 Apr 2018 06:25  Phos  3.1     04-18  Mg     2.0     04-18

## 2018-04-18 NOTE — PROGRESS NOTE ADULT - SUBJECTIVE AND OBJECTIVE BOX
Chief Complaint: chart and hx reviewed.    HPI: 63 yo male sp ileostomy reversal (had rectal cancer resected)-hx of paroxysmal atrial fib/flutter-now again in atrial flutter-on lopressor and now amiodarone. PMH+ prostate ca rx'd with RT. No hx of MI/angina/hypertension/dm/cva/renal/heme hx. Hx of a "lesion" in liver.    PAST MEDICAL & SURGICAL HISTORY:  Atrial fibrillation  Rectal cancer  Prostate cancer  Hypertension  Glaucoma  S/P colectomy  S/P ORIF (open reduction internal fixation) fracture: Left ankle  History of lipoma: x 3      PREVIOUS DIAGNOSTIC TESTING:      ECHO  FINDINGS:    STRESS  FINDINGS:    CATHETERIZATION  FINDINGS:    MEDICATIONS  (STANDING):  alvimopan 12 milliGRAM(s) Oral two times a day  amiodarone    Tablet 400 milliGRAM(s) Oral two times a day  enoxaparin Injectable 40 milliGRAM(s) SubCutaneous once  HYDROmorphone PCA (1 mG/mL) 30 milliLiter(s) PCA Continuous PCA Continuous  lactated ringers. 1000 milliLiter(s) (100 mL/Hr) IV Continuous <Continuous>  latanoprost 0.005% Ophthalmic Solution 1 Drop(s) Both EYES at bedtime  metoprolol tartrate 50 milliGRAM(s) Oral every 8 hours  multivitamin 1 Tablet(s) Oral daily  timolol 0.5% Solution 1 Drop(s) Both EYES two times a day    MEDICATIONS  (PRN):  aluminum hydroxide/magnesium hydroxide/simethicone Suspension 30 milliLiter(s) Oral every 6 hours PRN Dyspepsia  heparin  Injectable 43765 Unit(s) IV Push every 6 hours PRN For aPTT less than 40  heparin  Injectable 5000 Unit(s) IV Push every 6 hours PRN For aPTT between 40 - 57  metoprolol tartrate Injectable 5 milliGRAM(s) IV Push every 6 hours PRN Pulse > 120  ondansetron Injectable 4 milliGRAM(s) IV Push every 6 hours PRN Nausea and/or Vomiting      FAMILY HISTORY:  Family history of CABG (Father)  Family history of prostate cancer (Father, Sibling)  Family history of cervical cancer (Mother)  Family history of coronary artery disease (Father)  Family history of hypertension (Father)      ROS: Negative other than as mentioned in HPI.    Vital Signs Last 24 Hrs  T(C): 36.4 (18 Apr 2018 08:39), Max: 37.1 (17 Apr 2018 23:51)  T(F): 97.5 (18 Apr 2018 08:39), Max: 98.8 (17 Apr 2018 23:51)  HR: 135 (18 Apr 2018 08:39) (75 - 140) irreg.  BP: 129/96 (18 Apr 2018 08:39) (101/64 - 134/95)  BP(mean): --  RR: 12 flat (18 Apr 2018 08:39) (18 - 18)  SpO2: 96% (18 Apr 2018 08:39) (95% - 98%)    PHYSICAL EXAM:  General: Appears well developed, well nourished alert and cooperative. Middle aged overweight afebrile male nad  HEENT: Head; normocephalic, atraumatic.  Eyes;   Pupils reactive, cornea wnl.  Neck; Supple, no nodes adenopathy, no NVD or carotid bruit or thyromegaly.  CARDIOVASCULAR; No murmur, rub, gallop or lift. Normal S1 and S2. irreg rhythm  LUNGS; No rales, rhonchi or wheeze. Normal breath sounds bilaterally.  ABDOMEN ; Soft, nontender without mass or organomegaly.  EXTREMITIES; No clubbing, cyanosis or edema. Distal pulses wnl. ROM normal.  SKIN; warm and dry with normal turgor.  NEURO; Alert/oriented x 3/normal motor exam.   PSYCH; normal affect.            INTERPRETATION OF TELEMETRY:    ECG: monitor strips reviewed. atrial flutter with variable but genrally fast atrial flutter. Single 2 second pause noted.    I&O's Detail    17 Apr 2018 07:01  -  18 Apr 2018 07:00  --------------------------------------------------------  IN:    lactated ringers.: 2400 mL    Oral Fluid: 1450 mL  Total IN: 3850 mL    OUT:    Voided: 1150 mL  Total OUT: 1150 mL    Total NET: 2700 mL          LABS:                        12.1   7.6   )-----------( 179      ( 18 Apr 2018 06:25 )             36.6     04-18    140  |  102  |  14.0  ----------------------------<  93  3.9   |  26.0  |  1.00    Ca    8.8      18 Apr 2018 06:25  Phos  3.1     04-18  Mg     2.0     04-18      CARDIAC MARKERS ( 16 Apr 2018 20:59 )  x     / <0.01 ng/mL / x     / x     / x          PT/INR - ( 18 Apr 2018 06:25 )   PT: 11.6 sec;   INR: 1.05 ratio             I&O's Summary    17 Apr 2018 07:01  -  18 Apr 2018 07:00  --------------------------------------------------------  IN: 3850 mL / OUT: 1150 mL / NET: 2700 mL        RADIOLOGY & ADDITIONAL STUDIES: Chief Complaint: chart and hx reviewed.    HPI: 63 yo male sp ileostomy reversal (had rectal cancer resected)-hx of paroxysmal atrial fib/flutter-now again in atrial flutter-on lopressor and now amiodarone. PMH+ prostate ca rx'd with RT. No hx of MI/angina/hypertension/dm/cva/renal/heme hx. Hx of a "lesion" in liver.    PAST MEDICAL & SURGICAL HISTORY:  Atrial fibrillation  Rectal cancer  Prostate cancer  Hypertension  Glaucoma  S/P colectomy  S/P ORIF (open reduction internal fixation) fracture: Left ankle  History of lipoma: x 3      PREVIOUS DIAGNOSTIC TESTING:      ECHO  FINDINGS: nl LVEF and valves.    STRESS  FINDINGS:    CATHETERIZATION  FINDINGS:    MEDICATIONS  (STANDING):  alvimopan 12 milliGRAM(s) Oral two times a day  amiodarone    Tablet 400 milliGRAM(s) Oral two times a day  enoxaparin Injectable 40 milliGRAM(s) SubCutaneous once  HYDROmorphone PCA (1 mG/mL) 30 milliLiter(s) PCA Continuous PCA Continuous  lactated ringers. 1000 milliLiter(s) (100 mL/Hr) IV Continuous <Continuous>  latanoprost 0.005% Ophthalmic Solution 1 Drop(s) Both EYES at bedtime  metoprolol tartrate 50 milliGRAM(s) Oral every 8 hours  multivitamin 1 Tablet(s) Oral daily  timolol 0.5% Solution 1 Drop(s) Both EYES two times a day    MEDICATIONS  (PRN):  aluminum hydroxide/magnesium hydroxide/simethicone Suspension 30 milliLiter(s) Oral every 6 hours PRN Dyspepsia  heparin  Injectable 76128 Unit(s) IV Push every 6 hours PRN For aPTT less than 40  heparin  Injectable 5000 Unit(s) IV Push every 6 hours PRN For aPTT between 40 - 57  metoprolol tartrate Injectable 5 milliGRAM(s) IV Push every 6 hours PRN Pulse > 120  ondansetron Injectable 4 milliGRAM(s) IV Push every 6 hours PRN Nausea and/or Vomiting      FAMILY HISTORY:  Family history of CABG (Father)  Family history of prostate cancer (Father, Sibling)  Family history of cervical cancer (Mother)  Family history of coronary artery disease (Father)  Family history of hypertension (Father)      ROS: Negative other than as mentioned in HPI.    Vital Signs Last 24 Hrs  T(C): 36.4 (18 Apr 2018 08:39), Max: 37.1 (17 Apr 2018 23:51)  T(F): 97.5 (18 Apr 2018 08:39), Max: 98.8 (17 Apr 2018 23:51)  HR: 135 (18 Apr 2018 08:39) (75 - 140) irreg.  BP: 129/96 (18 Apr 2018 08:39) (101/64 - 134/95)  BP(mean): --  RR: 12 flat (18 Apr 2018 08:39) (18 - 18)  SpO2: 96% (18 Apr 2018 08:39) (95% - 98%)    PHYSICAL EXAM:  General: Appears well developed, well nourished alert and cooperative. Middle aged overweight afebrile male nad  HEENT: Head; normocephalic, atraumatic.  Eyes;   Pupils reactive, cornea wnl.  Neck; Supple, no nodes adenopathy, no NVD or carotid bruit or thyromegaly.  CARDIOVASCULAR; No murmur, rub, gallop or lift. Normal S1 and S2. irreg rhythm  LUNGS; No rales, rhonchi or wheeze. Normal breath sounds bilaterally.  ABDOMEN ; Soft, nontender without mass or organomegaly.  EXTREMITIES; No clubbing, cyanosis or edema. Distal pulses wnl. ROM normal.  SKIN; warm and dry with normal turgor.  NEURO; Alert/oriented x 3/normal motor exam.   PSYCH; normal affect.            INTERPRETATION OF TELEMETRY:    ECG: monitor strips reviewed. atrial flutter with variable but genrally fast atrial flutter. Single 2 second pause noted.    I&O's Detail    17 Apr 2018 07:01  -  18 Apr 2018 07:00  --------------------------------------------------------  IN:    lactated ringers.: 2400 mL    Oral Fluid: 1450 mL  Total IN: 3850 mL    OUT:    Voided: 1150 mL  Total OUT: 1150 mL    Total NET: 2700 mL          LABS: abd ct neg.                        12.1   7.6   )-----------( 179      ( 18 Apr 2018 06:25 )             36.6     04-18    140  |  102  |  14.0  ----------------------------<  93  3.9   |  26.0  |  1.00    Ca    8.8      18 Apr 2018 06:25  Phos  3.1     04-18  Mg     2.0     04-18      CARDIAC MARKERS ( 16 Apr 2018 20:59 )  x     / <0.01 ng/mL / x     / x     / x          PT/INR - ( 18 Apr 2018 06:25 )   PT: 11.6 sec;   INR: 1.05 ratio             I&O's Summary    17 Apr 2018 07:01  -  18 Apr 2018 07:00  --------------------------------------------------------  IN: 3850 mL / OUT: 1150 mL / NET: 2700 mL        RADIOLOGY & ADDITIONAL STUDIES:

## 2018-04-19 ENCOUNTER — TRANSCRIPTION ENCOUNTER (OUTPATIENT)
Age: 63
End: 2018-04-19

## 2018-04-19 VITALS
OXYGEN SATURATION: 98 % | SYSTOLIC BLOOD PRESSURE: 118 MMHG | TEMPERATURE: 99 F | DIASTOLIC BLOOD PRESSURE: 77 MMHG | HEART RATE: 65 BPM | RESPIRATION RATE: 18 BRPM

## 2018-04-19 LAB
ANION GAP SERPL CALC-SCNC: 9 MMOL/L — SIGNIFICANT CHANGE UP (ref 5–17)
BASOPHILS # BLD AUTO: 0 K/UL — SIGNIFICANT CHANGE UP (ref 0–0.2)
BASOPHILS NFR BLD AUTO: 0.2 % — SIGNIFICANT CHANGE UP (ref 0–2)
BUN SERPL-MCNC: 16 MG/DL — SIGNIFICANT CHANGE UP (ref 8–20)
CALCIUM SERPL-MCNC: 9 MG/DL — SIGNIFICANT CHANGE UP (ref 8.6–10.2)
CHLORIDE SERPL-SCNC: 101 MMOL/L — SIGNIFICANT CHANGE UP (ref 98–107)
CO2 SERPL-SCNC: 29 MMOL/L — SIGNIFICANT CHANGE UP (ref 22–29)
CREAT SERPL-MCNC: 0.98 MG/DL — SIGNIFICANT CHANGE UP (ref 0.5–1.3)
EOSINOPHIL # BLD AUTO: 0.1 K/UL — SIGNIFICANT CHANGE UP (ref 0–0.5)
EOSINOPHIL NFR BLD AUTO: 2 % — SIGNIFICANT CHANGE UP (ref 0–5)
GLUCOSE SERPL-MCNC: 106 MG/DL — SIGNIFICANT CHANGE UP (ref 70–115)
HCT VFR BLD CALC: 36.2 % — LOW (ref 42–52)
HGB BLD-MCNC: 11.8 G/DL — LOW (ref 14–18)
LYMPHOCYTES # BLD AUTO: 0.6 K/UL — LOW (ref 1–4.8)
LYMPHOCYTES # BLD AUTO: 9 % — LOW (ref 20–55)
MAGNESIUM SERPL-MCNC: 2.1 MG/DL — SIGNIFICANT CHANGE UP (ref 1.6–2.6)
MCHC RBC-ENTMCNC: 32.6 G/DL — SIGNIFICANT CHANGE UP (ref 32–36)
MCHC RBC-ENTMCNC: 32.9 PG — HIGH (ref 27–31)
MCV RBC AUTO: 100.8 FL — HIGH (ref 80–94)
MONOCYTES # BLD AUTO: 0.6 K/UL — SIGNIFICANT CHANGE UP (ref 0–0.8)
MONOCYTES NFR BLD AUTO: 8.6 % — SIGNIFICANT CHANGE UP (ref 3–10)
NEUTROPHILS # BLD AUTO: 5.2 K/UL — SIGNIFICANT CHANGE UP (ref 1.8–8)
NEUTROPHILS NFR BLD AUTO: 79.9 % — HIGH (ref 37–73)
PHOSPHATE SERPL-MCNC: 4 MG/DL — SIGNIFICANT CHANGE UP (ref 2.4–4.7)
PLATELET # BLD AUTO: 152 K/UL — SIGNIFICANT CHANGE UP (ref 150–400)
POTASSIUM SERPL-MCNC: 4 MMOL/L — SIGNIFICANT CHANGE UP (ref 3.5–5.3)
POTASSIUM SERPL-SCNC: 4 MMOL/L — SIGNIFICANT CHANGE UP (ref 3.5–5.3)
RBC # BLD: 3.59 M/UL — LOW (ref 4.6–6.2)
RBC # FLD: 13.4 % — SIGNIFICANT CHANGE UP (ref 11–15.6)
SODIUM SERPL-SCNC: 139 MMOL/L — SIGNIFICANT CHANGE UP (ref 135–145)
WBC # BLD: 6.5 K/UL — SIGNIFICANT CHANGE UP (ref 4.8–10.8)
WBC # FLD AUTO: 6.5 K/UL — SIGNIFICANT CHANGE UP (ref 4.8–10.8)

## 2018-04-19 RX ORDER — AMIODARONE HYDROCHLORIDE 400 MG/1
2 TABLET ORAL
Qty: 120 | Refills: 0 | OUTPATIENT
Start: 2018-04-19 | End: 2018-05-18

## 2018-04-19 RX ORDER — METOPROLOL TARTRATE 50 MG
1 TABLET ORAL
Qty: 0 | Refills: 0 | COMMUNITY

## 2018-04-19 RX ORDER — PSYLLIUM SEED (WITH DEXTROSE)
1 POWDER (GRAM) ORAL
Qty: 60 | Refills: 0 | OUTPATIENT
Start: 2018-04-19 | End: 2018-05-18

## 2018-04-19 RX ORDER — RIVAROXABAN 15 MG-20MG
1 KIT ORAL
Qty: 30 | Refills: 0
Start: 2018-04-19 | End: 2018-05-18

## 2018-04-19 RX ORDER — METOPROLOL TARTRATE 50 MG
1 TABLET ORAL
Qty: 90 | Refills: 0 | OUTPATIENT
Start: 2018-04-19 | End: 2018-05-18

## 2018-04-19 RX ORDER — LOPERAMIDE HCL 2 MG
1 TABLET ORAL
Qty: 90 | Refills: 0 | OUTPATIENT
Start: 2018-04-19 | End: 2018-05-18

## 2018-04-19 RX ADMIN — OXYCODONE AND ACETAMINOPHEN 2 TABLET(S): 5; 325 TABLET ORAL at 06:23

## 2018-04-19 RX ADMIN — Medication 1 TABLET(S): at 11:38

## 2018-04-19 RX ADMIN — Medication 50 MILLIGRAM(S): at 05:38

## 2018-04-19 RX ADMIN — Medication 1 DROP(S): at 05:39

## 2018-04-19 RX ADMIN — OXYCODONE AND ACETAMINOPHEN 2 TABLET(S): 5; 325 TABLET ORAL at 11:42

## 2018-04-19 RX ADMIN — OXYCODONE AND ACETAMINOPHEN 2 TABLET(S): 5; 325 TABLET ORAL at 05:38

## 2018-04-19 RX ADMIN — AMIODARONE HYDROCHLORIDE 400 MILLIGRAM(S): 400 TABLET ORAL at 11:38

## 2018-04-19 RX ADMIN — Medication 1 PACKET(S): at 11:39

## 2018-04-19 NOTE — DISCHARGE NOTE ADULT - CARE PROVIDERS DIRECT ADDRESSES
[email or web address]

## 2018-04-19 NOTE — DISCHARGE NOTE ADULT - HOSPITAL COURSE
61 yo male with history of colon/rectal cancer, underwent colectomy in October now planning reversal of ileostomy, Pt underwent chemo and radiation.  Pt also had treatment for prostate cancer.    Pt underwent reversal of ileostomy and repair of parastomal hernia on 4/16/18 by Dr Hand.    Pt's post op course complicated by new onset A fib and A flutter with RVR. Pt seen by Cardiology. Pt given Amiodarone and Lopressor and Xarelto. Pt self cardioverted back to NSR.    Pt also c/o increased loose stools post op causing perianal skin irritation. Pt given imodium and Zinc oxide with relief.    Pt now POD #3: Prevena and underlying penrose removed from former ileostomy site, + void, + flatus, + Bm's, amb ok,     Pt ok for d/c home for outpt follow up with Dr Hand and with Cardiologist.

## 2018-04-19 NOTE — DISCHARGE NOTE ADULT - MEDICATION SUMMARY - MEDICATIONS TO TAKE
I will START or STAY ON the medications listed below when I get home from the hospital:    oxyCODONE-acetaminophen 5 mg-325 mg oral tablet  -- 1 tab(s) by mouth every 6 hours, As needed, Mild Pain (1 - 3) MDD:4 tabs  -- Indication: For pain    amiodarone 200 mg oral tablet  -- 2 tab(s) by mouth 2 times a day MDD:2 tabs 2 x/day  -- Indication: For Atrial fibrillation    rivaroxaban 20 mg oral tablet  -- 1 tab(s) by mouth every 24 hours MDD:1 tab  -- Indication: For Atrial fibrillation    loperamide 2 mg oral capsule  -- 1 cap(s) by mouth 3 times a day, As needed, Diarrhea MDD:3 tabs  -- Indication: For loose stools    metoprolol tartrate 50 mg oral tablet  -- 1 tab(s) by mouth every 8 hours MDD:3 tabs  -- Indication: For Atrial fibrillation    hydroCHLOROthiazide 12.5 mg oral tablet  -- 1 tab(s) by mouth once a day  -- Indication: For home med    psyllium 3.4 g/7 g oral powder for reconstitution  -- 1 dose(s) by mouth once a day, As Needed MDD:1 packet  -- Indication: For Stools    Betimol 0.5% ophthalmic solution  -- 1 drop(s) to each affected eye 2 times a day  -- Indication: For home med    Travatan 0.004% ophthalmic solution  -- 1 drop(s) to each affected eye once a day (in the evening)  -- Indication: For home med    multivitamin  -- 1 tab(s) by mouth once a day  -- Indication: For home med

## 2018-04-19 NOTE — DISCHARGE NOTE ADULT - PATIENT PORTAL LINK FT
You can access the [facility] Patient Portal, offered by [facility], by registering with the following website: [email or web address]

## 2018-04-19 NOTE — DISCHARGE NOTE ADULT - MEDICATION SUMMARY - MEDICATIONS TO STOP TAKING
I will STOP taking the medications listed below when I get home from the hospital:    aspirin 325 mg oral delayed release tablet  -- 1 tab(s) by mouth once a day    Metoprolol Tartrate 50 mg oral tablet  -- 1 tab(s) by mouth once a day (in the evening)    Metoprolol Tartrate 25 mg oral tablet  -- 1 tab(s) by mouth once a day

## 2018-04-19 NOTE — PROGRESS NOTE ADULT - SUBJECTIVE AND OBJECTIVE BOX
[facility], [facility]                                   [address] [address]                                                      PHONE: [phone]                                                         FAX: [phone]  ----------------------------------------------------------------------------------------------------    Pt seen and examined. FU for AF    Overnight events/Complaints: Pt without complains.    Vital Signs Last 24 Hrs  T(C): 37.1 (19 Apr 2018 08:34), Max: 37.1 (19 Apr 2018 08:34)  T(F): 98.7 (19 Apr 2018 08:34), Max: 98.7 (19 Apr 2018 08:34)  HR: 65 (19 Apr 2018 08:34) (65 - 76)  BP: 118/77 (19 Apr 2018 08:34) (108/73 - 130/89)  BP(mean): --  RR: 18 (19 Apr 2018 08:34) (18 - 18)  SpO2: 98% (19 Apr 2018 08:34) (95% - 99%)  I&O's Summary      PHYSICAL EXAM:  Constitutional: Appears well developed, well nourished; alert and co-operative  HEENT:     Head: Normocephalic and atraumatic  Eyes: Conjunctiva normal, No scleral icterus  Mouth/Throat: Mucous membranes are normal. Mucosa are not pale or dry.  Cardiovascular: Normal S1 S2, No murmurs  Respiratory: Lungs clear to auscultation; no crepitations, no wheeze  Gastrointestinal:  Soft, Non-tender, + BS, incision appears clean  Musculoskeletal: Normal range of motion. No edema  Skin: Warm and dry. No cyanosis . No diaphoresis.  Neurologic: Alert oriented to time place and person. Normal strength and no tremor.  Psychiatric: Normal mood and affect, Speech is normal and behavior is normal.        LABS:                        11.8   6.5   )-----------( 152      ( 19 Apr 2018 05:49 )             36.2     04-19    139  |  101  |  16.0  ----------------------------<  106  4.0   |  29.0  |  0.98    Ca    9.0      19 Apr 2018 05:49  Phos  4.0     04-19  Mg     2.1     04-19          PT/INR - ( 18 Apr 2018 06:25 )   PT: 11.6 sec;   INR: 1.05 ratio      RADIOLOGY & ADDITIONAL STUDIES:    CARDIOLOGY TESTING:     TELEMETRY: Remains in NSR    MEDICATIONS:  MEDICATIONS  (STANDING):  amiodarone    Tablet 400 milliGRAM(s) Oral two times a day  latanoprost 0.005% Ophthalmic Solution 1 Drop(s) Both EYES at bedtime  metoprolol tartrate 50 milliGRAM(s) Oral every 8 hours  multivitamin 1 Tablet(s) Oral daily  psyllium Powder 1 Packet(s) Oral daily  rivaroxaban 20 milliGRAM(s) Oral every 24 hours  timolol 0.5% Solution 1 Drop(s) Both EYES two times a day    MEDICATIONS  (PRN):  aluminum hydroxide/magnesium hydroxide/simethicone Suspension 30 milliLiter(s) Oral every 6 hours PRN Dyspepsia  diphenhydrAMINE   Capsule 25 milliGRAM(s) Oral every 4 hours PRN Insomnia  loperamide 2 milliGRAM(s) Oral three times a day PRN Diarrhea  metoprolol tartrate Injectable 5 milliGRAM(s) IV Push every 6 hours PRN Pulse > 120  morphine  - Injectable 2 milliGRAM(s) IV Push every 6 hours PRN BTP persisting 45 mi n after PO administration.  ondansetron Injectable 4 milliGRAM(s) IV Push every 6 hours PRN Nausea and/or Vomiting  oxyCODONE    5 mG/acetaminophen 325 mG 1 Tablet(s) Oral every 6 hours PRN Mild Pain (1 - 3)  oxyCODONE    5 mG/acetaminophen 325 mG 2 Tablet(s) Oral every 6 hours PRN Moderate Pain (4 - 6)  zinc oxide 40%/lanolin Ointment 1 Application(s) Topical every 6 hours PRN rectal irritation      ASSESSMENT AND PLAN:    63 yo male SP colostomy reversal for prior rectal ca. recurrent atrial flutter on  amio and metoprolol- Now back in NSR.    -  Continue current meds  -  Overall stable from cardiac standpoint. No further inpt cardiac work-up needed. Will arrange outpt FU post discharge.

## 2018-04-19 NOTE — PROGRESS NOTE ADULT - ASSESSMENT
POD 1, s/p ileostomy reversal  tachycardia  afib    clears liquids  dc alejandre  oob  IS  Cards consult for tachy/afib
POD 2 s/p laparoscopic ileostomy reversal.     Appreciate cards input on afib with RVR  Okay to initiate full anticoagulation  Low fiber diet  Wean fluids  Add metamucil and prn imodium to help stools. Stop Entereg
Patient seen and examined at bedside. 63 yo male POD 1, s/p ileostomy reversal. he is reporting pain is well controlled on current regimen. He reports using the PCA sparingly Pain is a 4/10 today. Denies side effects.
s/p ileostomy reversal    if cleared by cardiology may dc home today  imodium prn and metamucil  f/u in 2 weeks  cover abdominal area daily with gauze. may shower
1. 63 yo male with recurrent atrial flutter (fairly rapid VR but w/o sx's)-continue amio and metoprolol. Pt will need systemic AC when cleared by surgery. Would use a DOAC like Xarelto or Eliquis. If pt remains in atrial flutter consider electric cardioversion.  2. SP colostomy reversal for prior rectal ca.

## 2018-04-19 NOTE — DISCHARGE NOTE ADULT - CARE PLAN
Principal Discharge DX:	S/P ileostomy  Goal:	resume diet and activites of daily living  Assessment and plan of treatment:	stable from cardiac and surgical standpoint for discharge    follow up with dr Hand    follow up with Cardiologist

## 2018-04-19 NOTE — PROGRESS NOTE ADULT - SUBJECTIVE AND OBJECTIVE BOX
less diarrhea  tolerating diet  no n/v  pain controlled  afib converted    Vital Signs Last 24 Hrs  T(C): 37.1 (19 Apr 2018 08:34), Max: 37.1 (19 Apr 2018 08:34)  T(F): 98.7 (19 Apr 2018 08:34), Max: 98.7 (19 Apr 2018 08:34)  HR: 65 (19 Apr 2018 08:34) (65 - 76)  BP: 118/77 (19 Apr 2018 08:34) (108/73 - 130/89)  BP(mean): --  RR: 18 (19 Apr 2018 08:34) (18 - 18)  SpO2: 98% (19 Apr 2018 08:34) (95% - 99%)    NAD  abd soft, prevena removed  clean  incision penrose removed                          11.8   6.5   )-----------( 152      ( 19 Apr 2018 05:49 )             36.2   04-19    139  |  101  |  16.0  ----------------------------<  106  4.0   |  29.0  |  0.98    Ca    9.0      19 Apr 2018 05:49  Phos  4.0     04-19  Mg     2.1     04-19

## 2018-04-19 NOTE — DISCHARGE NOTE ADULT - NS AS ACTIVITY OBS
No Heavy lifting/straining/Do not make important decisions/Do not drive or operate machinery/Stairs allowed/Walking-Indoors allowed/Walking-Outdoors allowed/Showering allowed

## 2018-04-19 NOTE — DISCHARGE NOTE ADULT - CARE PROVIDER_API CALL
Quirino Hand), ColonRectal Surgery; Surgery  [address] [place] [address]  Phone: [phone]  Fax: [phone]    Raoul Persaud (MBSYED; MPH), Internal Medicine  [address] [place] [address]  Phone: [phone]  Fax: [phone]

## 2018-04-19 NOTE — DISCHARGE NOTE ADULT - PLAN OF CARE
resume diet and activites of daily living stable from cardiac and surgical standpoint for discharge    follow up with dr Hand    follow up with Cardiologist

## 2018-04-23 ENCOUNTER — OTHER (OUTPATIENT)
Age: 63
End: 2018-04-23

## 2018-04-23 DIAGNOSIS — Z98.890 OTHER SPECIFIED POSTPROCEDURAL STATES: ICD-10-CM

## 2018-05-04 ENCOUNTER — APPOINTMENT (OUTPATIENT)
Dept: COLORECTAL SURGERY | Facility: CLINIC | Age: 63
End: 2018-05-04
Payer: COMMERCIAL

## 2018-05-04 VITALS
HEIGHT: 71 IN | HEART RATE: 64 BPM | DIASTOLIC BLOOD PRESSURE: 72 MMHG | SYSTOLIC BLOOD PRESSURE: 124 MMHG | BODY MASS INDEX: 39.34 KG/M2 | WEIGHT: 281 LBS | TEMPERATURE: 98.1 F

## 2018-05-04 PROCEDURE — 99024 POSTOP FOLLOW-UP VISIT: CPT

## 2018-05-04 RX ORDER — AMOXICILLIN AND CLAVULANATE POTASSIUM 875; 125 MG/1; MG/1
875-125 TABLET, COATED ORAL TWICE DAILY
Qty: 14 | Refills: 0 | Status: COMPLETED | COMMUNITY
Start: 2018-05-04 | End: 2018-05-11

## 2018-05-15 ENCOUNTER — APPOINTMENT (OUTPATIENT)
Dept: COLORECTAL SURGERY | Facility: CLINIC | Age: 63
End: 2018-05-15

## 2018-05-21 LAB
24R-OH-CALCIDIOL SERPL-MCNC: 23.5 PG/ML
ALBUMIN SERPL ELPH-MCNC: 3.9 G/DL
ALP BLD-CCNC: 86 U/L
ALT SERPL-CCNC: 34 U/L
ANION GAP SERPL CALC-SCNC: 15 MMOL/L
APTT BLD: 27 SEC
AST SERPL-CCNC: 23 U/L
BASOPHILS # BLD AUTO: 0.03 K/UL
BASOPHILS NFR BLD AUTO: 0.5 %
BILIRUB DIRECT SERPL-MCNC: 0.1 MG/DL
BILIRUB INDIRECT SERPL-MCNC: 0.3 MG/DL
BILIRUB SERPL-MCNC: 0.4 MG/DL
BUN SERPL-MCNC: 21 MG/DL
CALCIUM SERPL-MCNC: 9.3 MG/DL
CHLORIDE SERPL-SCNC: 102 MMOL/L
CO2 SERPL-SCNC: 23 MMOL/L
CREAT SERPL-MCNC: 1.22 MG/DL
EOSINOPHIL # BLD AUTO: 0.18 K/UL
EOSINOPHIL NFR BLD AUTO: 3.3 %
GLUCOSE SERPL-MCNC: 81 MG/DL
HCT VFR BLD CALC: 40.1 %
HGB BLD-MCNC: 13 G/DL
IMM GRANULOCYTES NFR BLD AUTO: 0.2 %
INR PPP: 1.01 RATIO
LYMPHOCYTES # BLD AUTO: 0.73 K/UL
LYMPHOCYTES NFR BLD AUTO: 13.3 %
MAN DIFF?: NORMAL
MCHC RBC-ENTMCNC: 32.2 PG
MCHC RBC-ENTMCNC: 32.4 GM/DL
MCV RBC AUTO: 99.3 FL
MONOCYTES # BLD AUTO: 0.47 K/UL
MONOCYTES NFR BLD AUTO: 8.6 %
NEUTROPHILS # BLD AUTO: 4.07 K/UL
NEUTROPHILS NFR BLD AUTO: 74.1 %
PLATELET # BLD AUTO: 243 K/UL
POTASSIUM SERPL-SCNC: 4.5 MMOL/L
PROT SERPL-MCNC: 6.9 G/DL
PT BLD: 11.4 SEC
RBC # BLD: 4.04 M/UL
RBC # FLD: 13.5 %
SODIUM SERPL-SCNC: 140 MMOL/L
VIT B12 SERPL-MCNC: 614 PG/ML
WBC # FLD AUTO: 5.49 K/UL

## 2018-05-23 PROCEDURE — 84484 ASSAY OF TROPONIN QUANT: CPT

## 2018-05-23 PROCEDURE — 85027 COMPLETE CBC AUTOMATED: CPT

## 2018-05-23 PROCEDURE — 80048 BASIC METABOLIC PNL TOTAL CA: CPT

## 2018-05-23 PROCEDURE — 86900 BLOOD TYPING SEROLOGIC ABO: CPT

## 2018-05-23 PROCEDURE — 82962 GLUCOSE BLOOD TEST: CPT

## 2018-05-23 PROCEDURE — 88304 TISSUE EXAM BY PATHOLOGIST: CPT

## 2018-05-23 PROCEDURE — 36415 COLL VENOUS BLD VENIPUNCTURE: CPT

## 2018-05-23 PROCEDURE — 84100 ASSAY OF PHOSPHORUS: CPT

## 2018-05-23 PROCEDURE — 93005 ELECTROCARDIOGRAM TRACING: CPT

## 2018-05-23 PROCEDURE — 86850 RBC ANTIBODY SCREEN: CPT

## 2018-05-23 PROCEDURE — 88302 TISSUE EXAM BY PATHOLOGIST: CPT

## 2018-05-23 PROCEDURE — 85610 PROTHROMBIN TIME: CPT

## 2018-05-23 PROCEDURE — 86901 BLOOD TYPING SEROLOGIC RH(D): CPT

## 2018-05-23 PROCEDURE — 83735 ASSAY OF MAGNESIUM: CPT

## 2018-06-13 ENCOUNTER — APPOINTMENT (OUTPATIENT)
Dept: COLORECTAL SURGERY | Facility: CLINIC | Age: 63
End: 2018-06-13
Payer: COMMERCIAL

## 2018-06-13 VITALS
DIASTOLIC BLOOD PRESSURE: 70 MMHG | TEMPERATURE: 97.9 F | HEIGHT: 71 IN | SYSTOLIC BLOOD PRESSURE: 110 MMHG | WEIGHT: 281 LBS | BODY MASS INDEX: 39.34 KG/M2 | HEART RATE: 74 BPM

## 2018-06-13 DIAGNOSIS — Z09 ENCOUNTER FOR FOLLOW-UP EXAMINATION AFTER COMPLETED TREATMENT FOR CONDITIONS OTHER THAN MALIGNANT NEOPLASM: ICD-10-CM

## 2018-06-13 PROCEDURE — 99024 POSTOP FOLLOW-UP VISIT: CPT

## 2018-06-15 ENCOUNTER — APPOINTMENT (OUTPATIENT)
Dept: COLORECTAL SURGERY | Facility: CLINIC | Age: 63
End: 2018-06-15

## 2018-06-18 ENCOUNTER — OTHER (OUTPATIENT)
Age: 63
End: 2018-06-18

## 2018-06-21 ENCOUNTER — OUTPATIENT (OUTPATIENT)
Dept: OUTPATIENT SERVICES | Facility: HOSPITAL | Age: 63
LOS: 1 days | Discharge: HOME | End: 2018-06-21

## 2018-06-21 DIAGNOSIS — Z86.018 PERSONAL HISTORY OF OTHER BENIGN NEOPLASM: Chronic | ICD-10-CM

## 2018-06-21 DIAGNOSIS — Z90.49 ACQUIRED ABSENCE OF OTHER SPECIFIED PARTS OF DIGESTIVE TRACT: Chronic | ICD-10-CM

## 2018-06-21 DIAGNOSIS — Z96.7 PRESENCE OF OTHER BONE AND TENDON IMPLANTS: Chronic | ICD-10-CM

## 2018-06-21 DIAGNOSIS — C20 MALIGNANT NEOPLASM OF RECTUM: ICD-10-CM

## 2018-06-25 ENCOUNTER — APPOINTMENT (OUTPATIENT)
Dept: HEMATOLOGY ONCOLOGY | Facility: CLINIC | Age: 63
End: 2018-06-25
Payer: COMMERCIAL

## 2018-06-25 ENCOUNTER — RESULT REVIEW (OUTPATIENT)
Age: 63
End: 2018-06-25

## 2018-06-25 ENCOUNTER — LABORATORY RESULT (OUTPATIENT)
Age: 63
End: 2018-06-25

## 2018-06-25 VITALS
BODY MASS INDEX: 37.8 KG/M2 | OXYGEN SATURATION: 96 % | HEIGHT: 71 IN | DIASTOLIC BLOOD PRESSURE: 91 MMHG | SYSTOLIC BLOOD PRESSURE: 150 MMHG | HEART RATE: 50 BPM | WEIGHT: 270 LBS | TEMPERATURE: 97.8 F

## 2018-06-25 LAB
ALBUMIN SERPL ELPH-MCNC: 3.9 G/DL
ALP BLD-CCNC: 89 U/L
ALT SERPL-CCNC: 26 U/L
ANION GAP SERPL CALC-SCNC: 15 MMOL/L
AST SERPL-CCNC: 19 U/L
BASOPHILS # BLD AUTO: 0 K/UL — SIGNIFICANT CHANGE UP (ref 0–0.2)
BASOPHILS NFR BLD AUTO: 0.6 % — SIGNIFICANT CHANGE UP (ref 0–2)
BILIRUB SERPL-MCNC: 0.4 MG/DL
BUN SERPL-MCNC: 17 MG/DL
CALCIUM SERPL-MCNC: 8.5 MG/DL
CHLORIDE SERPL-SCNC: 106 MMOL/L
CO2 SERPL-SCNC: 22 MMOL/L
CREAT SERPL-MCNC: 1.08 MG/DL
EOSINOPHIL # BLD AUTO: 0.1 K/UL — SIGNIFICANT CHANGE UP (ref 0–0.5)
EOSINOPHIL NFR BLD AUTO: 1.4 % — SIGNIFICANT CHANGE UP (ref 0–6)
GLUCOSE SERPL-MCNC: 114 MG/DL
HCT VFR BLD CALC: 38.9 % — LOW (ref 39–50)
HGB BLD-MCNC: 13.2 G/DL — SIGNIFICANT CHANGE UP (ref 13–17)
LYMPHOCYTES # BLD AUTO: 0.6 K/UL — LOW (ref 1–3.3)
LYMPHOCYTES # BLD AUTO: 12 % — LOW (ref 13–44)
MAGNESIUM SERPL-MCNC: 2.1 MG/DL
MCHC RBC-ENTMCNC: 30.3 PG — SIGNIFICANT CHANGE UP (ref 27–34)
MCHC RBC-ENTMCNC: 34 GM/DL — SIGNIFICANT CHANGE UP (ref 32–36)
MCV RBC AUTO: 89.3 FL — SIGNIFICANT CHANGE UP (ref 80–100)
MONOCYTES # BLD AUTO: 0.3 K/UL — SIGNIFICANT CHANGE UP (ref 0–0.9)
MONOCYTES NFR BLD AUTO: 6.4 % — SIGNIFICANT CHANGE UP (ref 2–14)
NEUTROPHILS # BLD AUTO: 3.8 K/UL — SIGNIFICANT CHANGE UP (ref 1.8–7.4)
NEUTROPHILS NFR BLD AUTO: 79.7 % — HIGH (ref 43–77)
PLATELET # BLD AUTO: 149 K/UL — LOW (ref 150–400)
POTASSIUM SERPL-SCNC: 3.8 MMOL/L
PROT SERPL-MCNC: 6.6 G/DL
RBC # BLD: 4.36 M/UL — SIGNIFICANT CHANGE UP (ref 4.2–5.8)
RBC # FLD: 12.7 % — SIGNIFICANT CHANGE UP (ref 10.3–14.5)
SODIUM SERPL-SCNC: 143 MMOL/L
WBC # BLD: 4.7 K/UL — SIGNIFICANT CHANGE UP (ref 3.8–10.5)
WBC # FLD AUTO: 4.7 K/UL — SIGNIFICANT CHANGE UP (ref 3.8–10.5)

## 2018-06-25 PROCEDURE — 99215 OFFICE O/P EST HI 40 MIN: CPT

## 2018-06-26 ENCOUNTER — APPOINTMENT (OUTPATIENT)
Dept: RADIATION ONCOLOGY | Facility: CLINIC | Age: 63
End: 2018-06-26
Payer: COMMERCIAL

## 2018-06-26 VITALS
HEART RATE: 50 BPM | TEMPERATURE: 98.3 F | WEIGHT: 270 LBS | HEIGHT: 71 IN | DIASTOLIC BLOOD PRESSURE: 81 MMHG | SYSTOLIC BLOOD PRESSURE: 122 MMHG | RESPIRATION RATE: 16 BRPM | BODY MASS INDEX: 37.8 KG/M2 | OXYGEN SATURATION: 98 %

## 2018-06-26 DIAGNOSIS — Z92.3 PERSONAL HISTORY OF IRRADIATION: ICD-10-CM

## 2018-06-26 LAB — CEA SERPL-MCNC: 1.9 NG/ML

## 2018-06-26 PROCEDURE — 99213 OFFICE O/P EST LOW 20 MIN: CPT

## 2018-06-27 ENCOUNTER — APPOINTMENT (OUTPATIENT)
Dept: UROLOGY | Facility: CLINIC | Age: 63
End: 2018-06-27
Payer: COMMERCIAL

## 2018-06-27 PROCEDURE — 99214 OFFICE O/P EST MOD 30 MIN: CPT

## 2018-07-27 PROBLEM — C20 MALIGNANT NEOPLASM OF RECTUM: Chronic | Status: ACTIVE | Noted: 2017-09-01

## 2018-07-27 PROBLEM — I48.91 UNSPECIFIED ATRIAL FIBRILLATION: Chronic | Status: ACTIVE | Noted: 2018-04-05

## 2018-07-27 PROBLEM — C61 MALIGNANT NEOPLASM OF PROSTATE: Chronic | Status: ACTIVE | Noted: 2017-09-01

## 2018-07-27 PROBLEM — I10 ESSENTIAL (PRIMARY) HYPERTENSION: Chronic | Status: ACTIVE | Noted: 2017-09-01

## 2018-07-27 PROBLEM — H40.9 UNSPECIFIED GLAUCOMA: Chronic | Status: ACTIVE | Noted: 2017-03-23

## 2018-08-05 ENCOUNTER — FORM ENCOUNTER (OUTPATIENT)
Age: 63
End: 2018-08-05

## 2018-08-06 ENCOUNTER — OUTPATIENT (OUTPATIENT)
Dept: OUTPATIENT SERVICES | Facility: HOSPITAL | Age: 63
LOS: 1 days | End: 2018-08-06

## 2018-08-06 ENCOUNTER — APPOINTMENT (OUTPATIENT)
Dept: CT IMAGING | Facility: CLINIC | Age: 63
End: 2018-08-06
Payer: COMMERCIAL

## 2018-08-06 DIAGNOSIS — Z90.49 ACQUIRED ABSENCE OF OTHER SPECIFIED PARTS OF DIGESTIVE TRACT: Chronic | ICD-10-CM

## 2018-08-06 DIAGNOSIS — Z86.018 PERSONAL HISTORY OF OTHER BENIGN NEOPLASM: Chronic | ICD-10-CM

## 2018-08-06 DIAGNOSIS — Z96.7 PRESENCE OF OTHER BONE AND TENDON IMPLANTS: Chronic | ICD-10-CM

## 2018-08-06 DIAGNOSIS — C18.9 MALIGNANT NEOPLASM OF COLON, UNSPECIFIED: ICD-10-CM

## 2018-08-06 PROCEDURE — 71260 CT THORAX DX C+: CPT | Mod: 26

## 2018-08-06 PROCEDURE — 74177 CT ABD & PELVIS W/CONTRAST: CPT | Mod: 26

## 2018-08-09 ENCOUNTER — OUTPATIENT (OUTPATIENT)
Dept: OUTPATIENT SERVICES | Facility: HOSPITAL | Age: 63
LOS: 1 days | Discharge: HOME | End: 2018-08-09

## 2018-08-09 DIAGNOSIS — Z90.49 ACQUIRED ABSENCE OF OTHER SPECIFIED PARTS OF DIGESTIVE TRACT: Chronic | ICD-10-CM

## 2018-08-09 DIAGNOSIS — C20 MALIGNANT NEOPLASM OF RECTUM: ICD-10-CM

## 2018-08-09 DIAGNOSIS — Z96.7 PRESENCE OF OTHER BONE AND TENDON IMPLANTS: Chronic | ICD-10-CM

## 2018-08-09 DIAGNOSIS — Z86.018 PERSONAL HISTORY OF OTHER BENIGN NEOPLASM: Chronic | ICD-10-CM

## 2018-08-13 ENCOUNTER — APPOINTMENT (OUTPATIENT)
Dept: INTERNAL MEDICINE | Facility: CLINIC | Age: 63
End: 2018-08-13
Payer: COMMERCIAL

## 2018-08-13 VITALS
BODY MASS INDEX: 38.5 KG/M2 | DIASTOLIC BLOOD PRESSURE: 80 MMHG | HEART RATE: 52 BPM | HEIGHT: 71 IN | SYSTOLIC BLOOD PRESSURE: 122 MMHG | OXYGEN SATURATION: 97 % | WEIGHT: 275 LBS

## 2018-08-13 DIAGNOSIS — Z85.89 PERSONAL HISTORY OF MALIGNANT NEOPLASM OF OTHER ORGANS AND SYSTEMS: ICD-10-CM

## 2018-08-13 DIAGNOSIS — K94.13 ENTEROSTOMY MALFUNCTION: ICD-10-CM

## 2018-08-13 DIAGNOSIS — Z93.2 ILEOSTOMY STATUS: ICD-10-CM

## 2018-08-13 DIAGNOSIS — C20 PERSONAL HISTORY OF MALIGNANT NEOPLASM OF OTHER ORGANS AND SYSTEMS: ICD-10-CM

## 2018-08-13 DIAGNOSIS — Z43.2 ENCOUNTER FOR ATTENTION TO ILEOSTOMY: ICD-10-CM

## 2018-08-13 PROCEDURE — 99396 PREV VISIT EST AGE 40-64: CPT | Mod: 25

## 2018-08-13 PROCEDURE — 36415 COLL VENOUS BLD VENIPUNCTURE: CPT

## 2018-08-13 PROCEDURE — G0009: CPT

## 2018-08-13 PROCEDURE — 90732 PPSV23 VACC 2 YRS+ SUBQ/IM: CPT

## 2018-08-13 PROCEDURE — 90471 IMMUNIZATION ADMIN: CPT

## 2018-08-13 PROCEDURE — 90715 TDAP VACCINE 7 YRS/> IM: CPT

## 2018-08-13 RX ORDER — METHYLPREDNISOLONE 4 MG/1
4 TABLET ORAL
Qty: 21 | Refills: 0 | Status: DISCONTINUED | COMMUNITY
Start: 2018-04-25 | End: 2018-08-13

## 2018-08-13 RX ORDER — OXYCODONE AND ACETAMINOPHEN 5; 325 MG/1; MG/1
5-325 TABLET ORAL
Qty: 28 | Refills: 0 | Status: DISCONTINUED | COMMUNITY
Start: 2018-04-23 | End: 2018-08-13

## 2018-08-13 RX ORDER — LOPERAMIDE HYDROCHLORIDE 2 MG/1
2 CAPSULE ORAL
Qty: 90 | Refills: 0 | Status: DISCONTINUED | COMMUNITY
Start: 2018-04-19 | End: 2018-08-13

## 2018-08-13 RX ORDER — DIPHENOXYLATE HYDROCHLORIDE AND ATROPINE SULFATE 2.5; .025 MG/1; MG/1
2.5-0.025 TABLET ORAL
Qty: 60 | Refills: 0 | Status: DISCONTINUED | COMMUNITY
Start: 2018-05-04 | End: 2018-08-13

## 2018-08-13 RX ORDER — METOPROLOL TARTRATE 25 MG/1
25 TABLET, FILM COATED ORAL
Qty: 60 | Refills: 0 | Status: DISCONTINUED | COMMUNITY
Start: 2017-09-07 | End: 2018-08-13

## 2018-08-13 NOTE — PHYSICAL EXAM

## 2018-08-13 NOTE — HISTORY OF PRESENT ILLNESS
[de-identified] : Patient presents for CPE. History is significant for colon cancer s/p hemicolectomy, RT, chemo and prostate cancer s/p rectal RT, lupron. Other history is significant for A fib on amiodarone, metoprolol, xarelto, prediabetes, obesity. He had gout flare of right ankle after surgery, now has some stiffness in right ankle. Otherwise feeling well today.

## 2018-08-13 NOTE — ASSESSMENT
[FreeTextEntry1] : Blood pressure controlled. Continue current medications. \par Current with cardiology, hematology, urology.\par Colonoscopy in October.\par Further recommendations based on labs. \par Pneumovax, Tdap given today. \par Will look into insurance coverage of shingrix. \par Follow up in 6 months and prn.

## 2018-08-14 ENCOUNTER — APPOINTMENT (OUTPATIENT)
Dept: COLORECTAL SURGERY | Facility: CLINIC | Age: 63
End: 2018-08-14
Payer: COMMERCIAL

## 2018-08-14 VITALS
DIASTOLIC BLOOD PRESSURE: 82 MMHG | WEIGHT: 275 LBS | BODY MASS INDEX: 38.5 KG/M2 | TEMPERATURE: 97.9 F | HEART RATE: 82 BPM | SYSTOLIC BLOOD PRESSURE: 148 MMHG | HEIGHT: 71 IN

## 2018-08-14 LAB
ALBUMIN SERPL ELPH-MCNC: 4.1 G/DL
ALP BLD-CCNC: 88 U/L
ALT SERPL-CCNC: 22 U/L
ANION GAP SERPL CALC-SCNC: 12 MMOL/L
APPEARANCE: CLEAR
AST SERPL-CCNC: 19 U/L
BACTERIA: NEGATIVE
BASOPHILS # BLD AUTO: 0.03 K/UL
BASOPHILS NFR BLD AUTO: 0.6 %
BILIRUB SERPL-MCNC: 0.4 MG/DL
BILIRUBIN URINE: NEGATIVE
BLOOD URINE: NEGATIVE
BUN SERPL-MCNC: 20 MG/DL
CALCIUM SERPL-MCNC: 9 MG/DL
CHLORIDE SERPL-SCNC: 105 MMOL/L
CHOLEST SERPL-MCNC: 201 MG/DL
CHOLEST/HDLC SERPL: 5 RATIO
CO2 SERPL-SCNC: 25 MMOL/L
COLOR: YELLOW
CREAT SERPL-MCNC: 1.07 MG/DL
EOSINOPHIL # BLD AUTO: 0.12 K/UL
EOSINOPHIL NFR BLD AUTO: 2.5 %
GLUCOSE QUALITATIVE U: NEGATIVE MG/DL
GLUCOSE SERPL-MCNC: 135 MG/DL
HBA1C MFR BLD HPLC: 5.7 %
HCT VFR BLD CALC: 42.3 %
HDLC SERPL-MCNC: 40 MG/DL
HGB BLD-MCNC: 13.1 G/DL
HYALINE CASTS: 1 /LPF
IMM GRANULOCYTES NFR BLD AUTO: 0.2 %
KETONES URINE: NEGATIVE
LDLC SERPL CALC-MCNC: 111 MG/DL
LEUKOCYTE ESTERASE URINE: NEGATIVE
LYMPHOCYTES # BLD AUTO: 0.6 K/UL
LYMPHOCYTES NFR BLD AUTO: 12.3 %
MAGNESIUM SERPL-MCNC: 2.1 MG/DL
MAN DIFF?: NORMAL
MCHC RBC-ENTMCNC: 29.5 PG
MCHC RBC-ENTMCNC: 31 GM/DL
MCV RBC AUTO: 95.3 FL
MICROSCOPIC-UA: NORMAL
MONOCYTES # BLD AUTO: 0.43 K/UL
MONOCYTES NFR BLD AUTO: 8.8 %
NEUTROPHILS # BLD AUTO: 3.67 K/UL
NEUTROPHILS NFR BLD AUTO: 75.6 %
NITRITE URINE: NEGATIVE
PH URINE: 5.5
PLATELET # BLD AUTO: 171 K/UL
POTASSIUM SERPL-SCNC: 4.4 MMOL/L
PROT SERPL-MCNC: 6.7 G/DL
PROTEIN URINE: NEGATIVE MG/DL
PSA SERPL-MCNC: 0.04 NG/ML
RBC # BLD: 4.44 M/UL
RBC # FLD: 16.1 %
RED BLOOD CELLS URINE: 1 /HPF
SODIUM SERPL-SCNC: 142 MMOL/L
SPECIFIC GRAVITY URINE: 1.02
SQUAMOUS EPITHELIAL CELLS: 1 /HPF
TESTOST SERPL-MCNC: 79 NG/DL
TRIGL SERPL-MCNC: 248 MG/DL
TSH SERPL-ACNC: 4.05 UIU/ML
URATE SERPL-MCNC: 4.3 MG/DL
UROBILINOGEN URINE: NEGATIVE MG/DL
WBC # FLD AUTO: 4.86 K/UL
WHITE BLOOD CELLS URINE: 1 /HPF

## 2018-08-14 PROCEDURE — 99214 OFFICE O/P EST MOD 30 MIN: CPT

## 2018-08-22 ENCOUNTER — LABORATORY RESULT (OUTPATIENT)
Age: 63
End: 2018-08-22

## 2018-08-22 ENCOUNTER — APPOINTMENT (OUTPATIENT)
Dept: HEMATOLOGY ONCOLOGY | Facility: CLINIC | Age: 63
End: 2018-08-22
Payer: COMMERCIAL

## 2018-08-22 ENCOUNTER — RESULT REVIEW (OUTPATIENT)
Age: 63
End: 2018-08-22

## 2018-08-22 VITALS
HEART RATE: 53 BPM | WEIGHT: 279.54 LBS | DIASTOLIC BLOOD PRESSURE: 76 MMHG | SYSTOLIC BLOOD PRESSURE: 127 MMHG | BODY MASS INDEX: 39.14 KG/M2 | HEIGHT: 71 IN | OXYGEN SATURATION: 95 %

## 2018-08-22 LAB
BASOPHILS # BLD AUTO: 0 K/UL — SIGNIFICANT CHANGE UP (ref 0–0.2)
BASOPHILS NFR BLD AUTO: 0.7 % — SIGNIFICANT CHANGE UP (ref 0–2)
EOSINOPHIL # BLD AUTO: 0.1 K/UL — SIGNIFICANT CHANGE UP (ref 0–0.5)
EOSINOPHIL NFR BLD AUTO: 1.9 % — SIGNIFICANT CHANGE UP (ref 0–6)
HCT VFR BLD CALC: 39.2 % — SIGNIFICANT CHANGE UP (ref 39–50)
HGB BLD-MCNC: 13.5 G/DL — SIGNIFICANT CHANGE UP (ref 13–17)
LYMPHOCYTES # BLD AUTO: 0.8 K/UL — LOW (ref 1–3.3)
LYMPHOCYTES # BLD AUTO: 13 % — SIGNIFICANT CHANGE UP (ref 13–44)
MCHC RBC-ENTMCNC: 30.6 PG — SIGNIFICANT CHANGE UP (ref 27–34)
MCHC RBC-ENTMCNC: 34.4 GM/DL — SIGNIFICANT CHANGE UP (ref 32–36)
MCV RBC AUTO: 88.8 FL — SIGNIFICANT CHANGE UP (ref 80–100)
MONOCYTES # BLD AUTO: 0.4 K/UL — SIGNIFICANT CHANGE UP (ref 0–0.9)
MONOCYTES NFR BLD AUTO: 6.5 % — SIGNIFICANT CHANGE UP (ref 2–14)
NEUTROPHILS # BLD AUTO: 5.1 K/UL — SIGNIFICANT CHANGE UP (ref 1.8–7.4)
NEUTROPHILS NFR BLD AUTO: 77.8 % — HIGH (ref 43–77)
PLATELET # BLD AUTO: 175 K/UL — SIGNIFICANT CHANGE UP (ref 150–400)
RBC # BLD: 4.42 M/UL — SIGNIFICANT CHANGE UP (ref 4.2–5.8)
RBC # FLD: 14.1 % — SIGNIFICANT CHANGE UP (ref 10.3–14.5)
WBC # BLD: 6.5 K/UL — SIGNIFICANT CHANGE UP (ref 3.8–10.5)
WBC # FLD AUTO: 6.5 K/UL — SIGNIFICANT CHANGE UP (ref 3.8–10.5)

## 2018-08-22 PROCEDURE — 99214 OFFICE O/P EST MOD 30 MIN: CPT

## 2018-08-23 LAB
ALBUMIN SERPL ELPH-MCNC: 4.2 G/DL
ALP BLD-CCNC: 84 U/L
ALT SERPL-CCNC: 27 U/L
ANION GAP SERPL CALC-SCNC: 13 MMOL/L
AST SERPL-CCNC: 24 U/L
BILIRUB SERPL-MCNC: 0.4 MG/DL
BUN SERPL-MCNC: 13 MG/DL
CALCIUM SERPL-MCNC: 8.8 MG/DL
CHLORIDE SERPL-SCNC: 103 MMOL/L
CO2 SERPL-SCNC: 24 MMOL/L
CREAT SERPL-MCNC: 1.19 MG/DL
GLUCOSE SERPL-MCNC: 94 MG/DL
MAGNESIUM SERPL-MCNC: 2 MG/DL
POTASSIUM SERPL-SCNC: 4.4 MMOL/L
PROT SERPL-MCNC: 6.8 G/DL
SODIUM SERPL-SCNC: 140 MMOL/L

## 2018-09-05 ENCOUNTER — OTHER (OUTPATIENT)
Age: 63
End: 2018-09-05

## 2018-11-07 NOTE — DISCHARGE NOTE ADULT - NS AS DC STROKE DX YN
Quality 265: Biopsy Follow-Up: Biopsy results reviewed, communicated, tracked, and documented Quality 130: Documentation Of Current Medications In The Medical Record: Current Medications Documented Detail Level: Detailed Quality 226: Preventive Care And Screening: Tobacco Use: Screening And Cessation Intervention: Patient screened for tobacco use and is an ex/non-smoker Quality 110: Preventive Care And Screening: Influenza Immunization: Influenza Immunization previously received during influenza season no

## 2018-11-30 NOTE — PROGRESS NOTE ADULT - ASSESSMENT
Pt's abrasion to right hand cleansed with normal saline and neosporin applied with band aid. 62 yo male s/p LAR with lymph node dissection and ileostomy creation 10/2/17, POD#6  - advance diet to regular   -monitor ileostomy output   - continue pain management regimen   - OOB ambulating   - plan discussed with Dr. Mo

## 2018-12-12 ENCOUNTER — APPOINTMENT (OUTPATIENT)
Dept: RADIATION ONCOLOGY | Facility: CLINIC | Age: 63
End: 2018-12-12

## 2019-02-04 ENCOUNTER — FORM ENCOUNTER (OUTPATIENT)
Age: 64
End: 2019-02-04

## 2019-02-05 ENCOUNTER — OUTPATIENT (OUTPATIENT)
Dept: OUTPATIENT SERVICES | Facility: HOSPITAL | Age: 64
LOS: 1 days | End: 2019-02-05
Payer: COMMERCIAL

## 2019-02-05 ENCOUNTER — APPOINTMENT (OUTPATIENT)
Dept: CT IMAGING | Facility: CLINIC | Age: 64
End: 2019-02-05
Payer: MEDICAID

## 2019-02-05 ENCOUNTER — APPOINTMENT (OUTPATIENT)
Dept: ULTRASOUND IMAGING | Facility: CLINIC | Age: 64
End: 2019-02-05
Payer: MEDICAID

## 2019-02-05 DIAGNOSIS — C61 MALIGNANT NEOPLASM OF PROSTATE: ICD-10-CM

## 2019-02-05 DIAGNOSIS — C20 MALIGNANT NEOPLASM OF RECTUM: ICD-10-CM

## 2019-02-05 DIAGNOSIS — Z96.7 PRESENCE OF OTHER BONE AND TENDON IMPLANTS: Chronic | ICD-10-CM

## 2019-02-05 DIAGNOSIS — Z90.49 ACQUIRED ABSENCE OF OTHER SPECIFIED PARTS OF DIGESTIVE TRACT: Chronic | ICD-10-CM

## 2019-02-05 DIAGNOSIS — Z86.018 PERSONAL HISTORY OF OTHER BENIGN NEOPLASM: Chronic | ICD-10-CM

## 2019-02-05 PROCEDURE — 76536 US EXAM OF HEAD AND NECK: CPT | Mod: 26

## 2019-02-05 PROCEDURE — 74177 CT ABD & PELVIS W/CONTRAST: CPT | Mod: 26

## 2019-02-05 PROCEDURE — 76536 US EXAM OF HEAD AND NECK: CPT

## 2019-02-05 PROCEDURE — 82565 ASSAY OF CREATININE: CPT

## 2019-02-05 PROCEDURE — 74177 CT ABD & PELVIS W/CONTRAST: CPT

## 2019-02-12 ENCOUNTER — APPOINTMENT (OUTPATIENT)
Dept: COLORECTAL SURGERY | Facility: CLINIC | Age: 64
End: 2019-02-12

## 2019-02-28 ENCOUNTER — APPOINTMENT (OUTPATIENT)
Dept: ELECTROPHYSIOLOGY | Facility: CLINIC | Age: 64
End: 2019-02-28
Payer: MEDICAID

## 2019-02-28 VITALS
HEART RATE: 60 BPM | HEIGHT: 71 IN | BODY MASS INDEX: 39.2 KG/M2 | OXYGEN SATURATION: 97 % | DIASTOLIC BLOOD PRESSURE: 76 MMHG | WEIGHT: 280 LBS | SYSTOLIC BLOOD PRESSURE: 136 MMHG

## 2019-02-28 PROCEDURE — 93000 ELECTROCARDIOGRAM COMPLETE: CPT

## 2019-02-28 PROCEDURE — 99244 OFF/OP CNSLTJ NEW/EST MOD 40: CPT

## 2019-02-28 NOTE — REVIEW OF SYSTEMS
[Shortness Of Breath] : no shortness of breath [Dyspnea on exertion] : not dyspnea during exertion [Chest Pain] : no chest pain [Lower Ext Edema] : no extremity edema [Palpitations] : palpitations [Dizziness] : dizziness [Convulsions] : no convulsions [Confusion] : no confusion was observed [Anxiety] : no anxiety [Easy Bleeding] : no tendency for easy bleeding [Easy Bruising] : no tendency for easy bruising [Negative] : Integumentary

## 2019-02-28 NOTE — REASON FOR VISIT
[Consultation] : a consultation regarding [Atrial Fibrillation] : atrial fibrillation [FreeTextEntry1] : ref Dr Robledo

## 2019-02-28 NOTE — HISTORY OF PRESENT ILLNESS
[FreeTextEntry1] : 63 year old gentleman with history of obesity, HTN, paroxysmal atrial fibrillation, glaucoma, prostate and colorectal cancer s/p partial colon resection. \par He was initially found to have atrial fibrillation on evaluation prior to his colon surgery in 10/2017. Following that surgery he had difficutly with atrial fibrillation and rapid ventricular rates and was treated with amiodarone in addition to rate control. He was on anticoagulation at that time, but ultimately amiodarone and anticoagulation were stopped. \par He underwent another colon surgery in 4/2018, and subsequently was again noted in AF. \par Holter monitor 5/4/18 revealed atrial fibrillation lasting for 14 hours and otherwise sinus rhythm/sinus bradycardia with average HR 78 bpm (range  bpm). Recently he wore another monitor from Dr Sebastian office which apparently showed further pAF. \par He was recently restarted on anticoagulation with Xarelto, and his metoprolol has been increased to 100mg bid. He has not been restarted on antiarrhythmic medication, and has not required DCCV. \par \par He has mild exertional dyspnea, and notes occasional palpitation and unwell feeling in the chest. He has recently had episodes of lightheadedness which he attributes to the Xarelto. He denies syncope. He does snore, but has not been evaluated for sleep apnea. He denies bleeding complications. \par ECG today reveals sinus bradycardia at 56 bpm with first degree AVB ( ms) and narrow QRS. \par \par

## 2019-02-28 NOTE — PHYSICAL EXAM
[General Appearance - Well Developed] : well developed [Well Groomed] : well groomed [General Appearance - Well Nourished] : well nourished [General Appearance - In No Acute Distress] : no acute distress [Normal Conjunctiva] : the conjunctiva exhibited no abnormalities [Normal Oral Mucosa] : normal oral mucosa [Normal Jugular Venous V Waves Present] : normal jugular venous V waves present [Heart Rate And Rhythm] : heart rate and rhythm were normal [Heart Sounds] : normal S1 and S2 [Murmurs] : no murmurs present [Edema] : no peripheral edema present [Respiration, Rhythm And Depth] : normal respiratory rhythm and effort [Auscultation Breath Sounds / Voice Sounds] : lungs were clear to auscultation bilaterally [Abdomen Soft] : soft [Abdomen Tenderness] : non-tender [Abnormal Walk] : normal gait [Nail Clubbing] : no clubbing of the fingernails [Cyanosis, Localized] : no localized cyanosis [] : no rash [Oriented To Time, Place, And Person] : oriented to person, place, and time [Impaired Insight] : insight and judgment were intact [No Anxiety] : not feeling anxious

## 2019-03-06 ENCOUNTER — OUTPATIENT (OUTPATIENT)
Dept: OUTPATIENT SERVICES | Facility: HOSPITAL | Age: 64
LOS: 1 days | Discharge: HOME | End: 2019-03-06

## 2019-03-06 DIAGNOSIS — Z86.018 PERSONAL HISTORY OF OTHER BENIGN NEOPLASM: Chronic | ICD-10-CM

## 2019-03-06 DIAGNOSIS — C20 MALIGNANT NEOPLASM OF RECTUM: ICD-10-CM

## 2019-03-06 DIAGNOSIS — Z96.7 PRESENCE OF OTHER BONE AND TENDON IMPLANTS: Chronic | ICD-10-CM

## 2019-03-06 DIAGNOSIS — Z90.49 ACQUIRED ABSENCE OF OTHER SPECIFIED PARTS OF DIGESTIVE TRACT: Chronic | ICD-10-CM

## 2019-03-13 ENCOUNTER — RESULT REVIEW (OUTPATIENT)
Age: 64
End: 2019-03-13

## 2019-03-13 ENCOUNTER — APPOINTMENT (OUTPATIENT)
Dept: HEMATOLOGY ONCOLOGY | Facility: CLINIC | Age: 64
End: 2019-03-13
Payer: MEDICAID

## 2019-03-13 VITALS
HEIGHT: 71 IN | OXYGEN SATURATION: 96 % | TEMPERATURE: 97.6 F | DIASTOLIC BLOOD PRESSURE: 88 MMHG | BODY MASS INDEX: 43.12 KG/M2 | SYSTOLIC BLOOD PRESSURE: 146 MMHG | WEIGHT: 308 LBS | HEART RATE: 80 BPM

## 2019-03-13 DIAGNOSIS — R63.5 ABNORMAL WEIGHT GAIN: ICD-10-CM

## 2019-03-13 LAB
BASOPHILS # BLD AUTO: 0.1 K/UL — SIGNIFICANT CHANGE UP (ref 0–0.2)
BASOPHILS NFR BLD AUTO: 1.2 % — SIGNIFICANT CHANGE UP (ref 0–2)
EOSINOPHIL # BLD AUTO: 0.1 K/UL — SIGNIFICANT CHANGE UP (ref 0–0.5)
EOSINOPHIL NFR BLD AUTO: 1.4 % — SIGNIFICANT CHANGE UP (ref 0–6)
HCT VFR BLD CALC: 43 % — SIGNIFICANT CHANGE UP (ref 39–50)
HGB BLD-MCNC: 14 G/DL — SIGNIFICANT CHANGE UP (ref 13–17)
LYMPHOCYTES # BLD AUTO: 0.8 K/UL — LOW (ref 1–3.3)
LYMPHOCYTES # BLD AUTO: 13 % — SIGNIFICANT CHANGE UP (ref 13–44)
MCHC RBC-ENTMCNC: 30 PG — SIGNIFICANT CHANGE UP (ref 27–34)
MCHC RBC-ENTMCNC: 32.5 G/DL — SIGNIFICANT CHANGE UP (ref 32–36)
MCV RBC AUTO: 92.4 FL — SIGNIFICANT CHANGE UP (ref 80–100)
MONOCYTES # BLD AUTO: 0.5 K/UL — SIGNIFICANT CHANGE UP (ref 0–0.9)
MONOCYTES NFR BLD AUTO: 7.7 % — SIGNIFICANT CHANGE UP (ref 2–14)
NEUTROPHILS # BLD AUTO: 4.5 K/UL — SIGNIFICANT CHANGE UP (ref 1.8–7.4)
NEUTROPHILS NFR BLD AUTO: 76.8 % — SIGNIFICANT CHANGE UP (ref 43–77)
PLATELET # BLD AUTO: 174 K/UL — SIGNIFICANT CHANGE UP (ref 150–400)
RBC # BLD: 4.66 M/UL — SIGNIFICANT CHANGE UP (ref 4.2–5.8)
RBC # FLD: 12.5 % — SIGNIFICANT CHANGE UP (ref 10.3–14.5)
WBC # BLD: 5.9 K/UL — SIGNIFICANT CHANGE UP (ref 3.8–10.5)
WBC # FLD AUTO: 5.9 K/UL — SIGNIFICANT CHANGE UP (ref 3.8–10.5)

## 2019-03-13 PROCEDURE — 99215 OFFICE O/P EST HI 40 MIN: CPT

## 2019-03-14 LAB
ALBUMIN SERPL ELPH-MCNC: 4 G/DL
ALP BLD-CCNC: 71 U/L
ALT SERPL-CCNC: 22 U/L
ANION GAP SERPL CALC-SCNC: 13 MMOL/L
AST SERPL-CCNC: 17 U/L
BILIRUB SERPL-MCNC: 0.3 MG/DL
BUN SERPL-MCNC: 17 MG/DL
CALCIUM SERPL-MCNC: 9.4 MG/DL
CEA SERPL-MCNC: <0.6 NG/ML
CHLORIDE SERPL-SCNC: 105 MMOL/L
CO2 SERPL-SCNC: 23 MMOL/L
CREAT SERPL-MCNC: 1.12 MG/DL
GLUCOSE SERPL-MCNC: 91 MG/DL
MAGNESIUM SERPL-MCNC: 2.2 MG/DL
POTASSIUM SERPL-SCNC: 4.4 MMOL/L
PROT SERPL-MCNC: 6.9 G/DL
SODIUM SERPL-SCNC: 141 MMOL/L

## 2019-03-18 ENCOUNTER — APPOINTMENT (OUTPATIENT)
Dept: INTERNAL MEDICINE | Facility: CLINIC | Age: 64
End: 2019-03-18
Payer: COMMERCIAL

## 2019-03-18 VITALS
HEART RATE: 85 BPM | WEIGHT: 307 LBS | OXYGEN SATURATION: 96 % | HEIGHT: 71 IN | SYSTOLIC BLOOD PRESSURE: 122 MMHG | DIASTOLIC BLOOD PRESSURE: 80 MMHG | BODY MASS INDEX: 42.98 KG/M2

## 2019-03-18 DIAGNOSIS — Z92.29 PERSONAL HISTORY OF OTHER DRUG THERAPY: ICD-10-CM

## 2019-03-18 DIAGNOSIS — Z23 ENCOUNTER FOR IMMUNIZATION: ICD-10-CM

## 2019-03-18 PROCEDURE — 36415 COLL VENOUS BLD VENIPUNCTURE: CPT

## 2019-03-18 PROCEDURE — 99214 OFFICE O/P EST MOD 30 MIN: CPT | Mod: 25

## 2019-03-19 PROBLEM — Z92.29 HISTORY OF PNEUMOCOCCAL VACCINATION: Status: RESOLVED | Noted: 2018-08-13 | Resolved: 2019-03-19

## 2019-03-19 PROBLEM — Z23 NEED FOR TDAP VACCINATION: Status: RESOLVED | Noted: 2018-08-13 | Resolved: 2019-03-19

## 2019-03-19 RX ORDER — COLCHICINE 0.6 MG/1
0.6 CAPSULE ORAL
Qty: 60 | Refills: 0 | Status: DISCONTINUED | COMMUNITY
Start: 2018-04-25 | End: 2019-03-19

## 2019-03-19 RX ORDER — AMIODARONE HYDROCHLORIDE 200 MG/1
200 TABLET ORAL
Qty: 120 | Refills: 0 | Status: DISCONTINUED | COMMUNITY
Start: 2018-04-19 | End: 2019-03-19

## 2019-03-19 RX ORDER — ASPIRIN 325 MG/1
325 TABLET, FILM COATED ORAL
Refills: 0 | Status: DISCONTINUED | COMMUNITY
End: 2019-03-19

## 2019-03-19 RX ORDER — HYDROCHLOROTHIAZIDE 12.5 MG/1
12.5 TABLET ORAL
Qty: 90 | Refills: 3 | Status: DISCONTINUED | COMMUNITY
Start: 2017-04-25 | End: 2019-03-19

## 2019-03-19 NOTE — HISTORY OF PRESENT ILLNESS
[de-identified] : Patient presents for follow up of hyperlipidemia, prediabtes, A fib, obesity. His only medications are metoprolol and xarelto for A fib.He is on travatan and betimol eye drops for glaucoma. He has colon cancer s/p hemicolectomy and RT and prostate cancer s/p RT and lupron. He complains of urinary and fecal incontinence worse with straining. He takes metamucil BID for constipation and fecal incontinence. \par Had thyroid nodule seen on recent scan, biopsy planned by ENT.\par Also reports that he will have difficulty making his upcoming doctor appointments and tests due to change in insurance with a high deductible starting 4/1.

## 2019-03-19 NOTE — ASSESSMENT
[FreeTextEntry1] : Blood pressure controlled. \par Continue current medications. \par Labs drawn in office. Further recommendations based on labs.\par Current with specialists. \par Will follow up with GI, urology regarding fecal and urinary incontinence. \par Declined flu vaccine.\par Follow up in 4 months.

## 2019-03-19 NOTE — PHYSICAL EXAM
[No Acute Distress] : no acute distress [Well Nourished] : well nourished [Well Developed] : well developed [No Respiratory Distress] : no respiratory distress  [Clear to Auscultation] : lungs were clear to auscultation bilaterally [No Accessory Muscle Use] : no accessory muscle use [Regular Rhythm] : with a regular rhythm [Normal S1, S2] : normal S1 and S2 [No Murmur] : no murmur heard [No Edema] : there was no peripheral edema [Normal Affect] : the affect was normal [Normal Insight/Judgement] : insight and judgment were intact

## 2019-03-19 NOTE — REVIEW OF SYSTEMS
[Recent Change In Weight] : ~T recent weight change [Incontinence] : incontinence [Negative] : Respiratory [FreeTextEntry2] : weight gain [FreeTextEntry7] : fecal incontinence

## 2019-03-21 ENCOUNTER — FORM ENCOUNTER (OUTPATIENT)
Age: 64
End: 2019-03-21

## 2019-03-22 ENCOUNTER — APPOINTMENT (OUTPATIENT)
Dept: GASTROENTEROLOGY | Facility: CLINIC | Age: 64
End: 2019-03-22
Payer: MEDICAID

## 2019-03-22 ENCOUNTER — APPOINTMENT (OUTPATIENT)
Dept: ULTRASOUND IMAGING | Facility: CLINIC | Age: 64
End: 2019-03-22
Payer: MEDICAID

## 2019-03-22 ENCOUNTER — RESULT REVIEW (OUTPATIENT)
Age: 64
End: 2019-03-22

## 2019-03-22 ENCOUNTER — OUTPATIENT (OUTPATIENT)
Dept: OUTPATIENT SERVICES | Facility: HOSPITAL | Age: 64
LOS: 1 days | End: 2019-03-22
Payer: COMMERCIAL

## 2019-03-22 ENCOUNTER — TRANSCRIPTION ENCOUNTER (OUTPATIENT)
Age: 64
End: 2019-03-22

## 2019-03-22 VITALS
BODY MASS INDEX: 43.12 KG/M2 | SYSTOLIC BLOOD PRESSURE: 154 MMHG | HEART RATE: 93 BPM | DIASTOLIC BLOOD PRESSURE: 98 MMHG | WEIGHT: 308 LBS | HEIGHT: 71 IN

## 2019-03-22 DIAGNOSIS — H40.9 UNSPECIFIED GLAUCOMA: ICD-10-CM

## 2019-03-22 DIAGNOSIS — Z96.7 PRESENCE OF OTHER BONE AND TENDON IMPLANTS: Chronic | ICD-10-CM

## 2019-03-22 DIAGNOSIS — Z90.49 ACQUIRED ABSENCE OF OTHER SPECIFIED PARTS OF DIGESTIVE TRACT: Chronic | ICD-10-CM

## 2019-03-22 DIAGNOSIS — E04.1 NONTOXIC SINGLE THYROID NODULE: ICD-10-CM

## 2019-03-22 DIAGNOSIS — Z86.018 PERSONAL HISTORY OF OTHER BENIGN NEOPLASM: Chronic | ICD-10-CM

## 2019-03-22 DIAGNOSIS — M10.9 GOUT, UNSPECIFIED: ICD-10-CM

## 2019-03-22 DIAGNOSIS — Z00.8 ENCOUNTER FOR OTHER GENERAL EXAMINATION: ICD-10-CM

## 2019-03-22 LAB
CHOLEST SERPL-MCNC: 234 MG/DL
CHOLEST/HDLC SERPL: 9 RATIO
HBA1C MFR BLD HPLC: 5.9 %
HDLC SERPL-MCNC: 26 MG/DL
LDLC SERPL CALC-MCNC: NORMAL MG/DL
PSA SERPL-MCNC: 0.1 NG/ML
TRIGL SERPL-MCNC: 583 MG/DL
TSH SERPL-ACNC: 1.79 UIU/ML

## 2019-03-22 PROCEDURE — 99214 OFFICE O/P EST MOD 30 MIN: CPT

## 2019-03-22 PROCEDURE — 88173 CYTOPATH EVAL FNA REPORT: CPT | Mod: 26

## 2019-03-22 PROCEDURE — 76942 ECHO GUIDE FOR BIOPSY: CPT | Mod: 26

## 2019-03-22 PROCEDURE — 88173 CYTOPATH EVAL FNA REPORT: CPT

## 2019-03-22 PROCEDURE — 60100 BIOPSY OF THYROID: CPT

## 2019-03-22 PROCEDURE — 76942 ECHO GUIDE FOR BIOPSY: CPT

## 2019-03-22 RX ORDER — METOPROLOL TARTRATE 50 MG/1
50 TABLET, FILM COATED ORAL
Qty: 60 | Refills: 0 | Status: DISCONTINUED | COMMUNITY
Start: 2017-10-28 | End: 2019-03-22

## 2019-03-22 NOTE — HISTORY OF PRESENT ILLNESS
[FreeTextEntry1] : 63-year-old white male with history of hypertension, obesity, and glaucoma who was found to have a stage II rectal cancer in 4/17 on screening colonoscopy. He also had a small tubular adenoma removed from the right colon. He was subsequently seen for chemotherapy and radiation therapy and ultimately came to surgical resection, on 10/17. He was continued on further chemotherapy with Xeloda until 3/18. Ileostomy was reversed 4/18. He subsequently was diagnosed with prostate cancer and received chemoradiation for 8 months. He has completed his therapy per is now referred the oncology for a surveillance colonoscopy.\par Patient reports that his stool frequency, urgency, and occasional incontinence. He alleges that he has rectal discomfort and swelling. Bowel movements may occur to 20 times per day. Small-volume with urgency and frequency. He has some mucus. He denies abdominal pain or weight loss. There is several nocturnal bowel movements. No history of smoking or alcohol abuse.\par Patient has history of atrial fibrillation and has been followed by Dr. Nick BOYER of cardiology. He previously was treated with metoprolol, amiodarone, and was noted still to have PAF 2 months. Most of his PAF is asymptomatic. He has now been started on Xarelto and is awaiting cardiac ablation on 3/29/19. Its outside hospital with Dr. Shelton.

## 2019-03-22 NOTE — RESULTS/DATA
[FreeTextEntry1] : 3/22/19 Thyroid bx:\par \par 2/5/19 CT A/P: No evidence of metastatic disease.\par \par 2/5/19 Ultrasound Thyroid: Dominant right thyroid nodule for which fine-needle aspiration is recommended if not previously performed.\par \par 8/6/18 CT C/A/P:  No evidence of recurrent or metastatic disease in the thorax, abdomen or  pelvis.    Interval reversal of the loop ileostomy.    Interval development of a small amount of pelvic ascites.\par \par 3/21/18 CT Chest:  2 mm nonspecific left upper lobe pulmonary nodule is  unchanged since August 16, 2017.\par \par 2/16/18 CT A/P: Unchanged 1.1 cm hypodensity in segment 7/8, previously characterized as an atypical hemangioma by MRI. No new lesion. No evidence of metastatic disease.\par \par Final Diagnosis\par 10/2/17 path:  \par 1     Abdominal pelvic cyst:- Simple  mesothelial  cyst.\par 2     Sigmoid colon and rectum:-  Segment of colon with mucosal ulceration extending the submucosal and muscularis propria, with focal serosal adhesions and serositis.  Negative for dysplasia/neoplasia- Histologically unremarkable surgical resection margins.\par 3     Colorectal /anal anastomosis distal donut:- Histologically unremarkable segment of colon.\par 4     Colorectal/anal anastomosis proximal donut Histologically unremarkable segment of colon.\par \par 8/23/17 MRI Pelvis: Since  5/9/2017,  the  primary  tumor  and  extramural  disease  shows  near  complete response. Posttreatment  stage:  ymrT1/2  (tumor  confined  to  rectal  wall),  ymrN0  Mesorectal  fascia:  Clear  (tumor  margin  >  2  mm).  Sphincter  involvement:  Absent.\par \par 8/22/17 PET/CT: Resolved  non-FDG  avid  subcentimeter  right  perirectal lymph  nodes.  Considerable  decrease  in  soft  tissue  thickening and  FDG  avidity  in  the  rectosigmoid  colon  (SUV  4.0 previous  SUV 11.1) suggestive  of  therapy  response.  Sigmoid  diverticulosis.  Decreased  FDG  avid  areas  of  subcutaneous  infiltration  in  the left  anterolateral  chest  wall  and  left  shoulder  region,  possibly inflammatory.  No  FDG  avid  distant  metastatic  disease.\par \par 8/16/17 CT C/A/P: There  is  a  stable  3  mm  nodule  in  the  right  lower lobe.  Again  noted  is  the  right  lobe  low-attenuation  lesion  measuring  1.4  cm previously  characterized  on  MRI  dated  4/25/2017  as  probable  atypical hemangioma.  There  is  a  stable  0.6  cm  left  adrenal  nodule,  adenoma  on  4/25/2017 MRI.  Mild  rectal  wall  thickening  again  noted.  There  is  no  bowel obstruction.  There  are  scattered  colonic  diverticula.  No  enlarged  mesenteric lymph  nodes. There  has  been  interval  development  of  a  small  amount  of  fluid in  the  pelvis. No  enlarged  retroperitoneal  or  pelvic  nodes.\par \par Prostate biopsy: 5/24/17:  Core Lab Biopsy\par Final Diagnosis\par 1. Prostate, MRI target lesion, biopsy\par -Adenocarcinoma of the prostate, Prognostic Grade Group 2 (Harrells score 3+4=7) involving 100%, 90% and 90% (10 mm, 9 mm and 6 mm in length) of 3 of 3 core(s). \par -Perineural invasion is identified.  Note: Harrells pattern 4 comprises approximately 10% of total tumor volume.\par 2. Prostate, right base, biopsy -Adenocarcinoma of the prostate, Prognostic Grade Group 1 (Harrells score 3+3=6) involving 5% (0.5 mm in length) of 1 of 2 core(s).\par -Perineural invasion is identified.\par 3. Prostate, right mid, biopsy -Adenocarcinoma of the prostate, Prognostic Grade Group1 (Harrells score 3+3=6) involving 80% and 50% (8 mm and 5 mm in length) of 2 of 2 core(s).  -Perineural invasion is identified.\par 4. Prostate, right apex, biopsy -Adenocarcinoma of the prostate, Prognostic Grade Group1 (Boo score 3+3=6) involving 10% (1.5 mm in length) of 1 of 2 core(s).\par 5. Prostate, left base, biopsy -Benign prostate tissue.\par 6. Prostate, left mid, biopsy -Adenocarcinoma of the prostate, Prognostic Grade Group 2 (Boo score 3+4=7) involving 25% (2 mm in length) of 1 of 2 core(s).\par -Perineural invasion is identified.  Note: Harrells pattern 4 comprises approximately 5% of total tumor volume.\par 7. Prostate, left apex, biopsy -Benign prostate tissue.\par       \par MRI prostate 5/10/17\par Prostate gland:\par Size:  3.8 x 3.1 x 4.3 cm.   Volume 26 cc.   Suspicious lesion in the left peripheral zone\par \par MRI pelvis/rectum: 5/10/17 A 3.6 cm lesion involving the anterior and right lateral walls of the  rectum \par \par May 2, 2017 PET/CT:   No discrete FDG avidity associated with a segment 7/8 lesion, better characterized on dedicated MRI as atypical hemangioma.  Liver background SUV mean as a reference for comparing studies is 3.1.  Tiny non-FDG avid right perirectal lymph nodes, for example measuring 0.4 cm, and 0.2 cm.  FDG avid rectosigmoid colon mass (SUV 11.1).\par \par April 27, 2017 Dr. Hand performed a flexible sigmoidoscopy which demonstrated a large cancer of the mid rectum approximately extending from 7-10 cm from the dentate line.\par \par April 25, 2017 MRI of abdomen: T2 hyperintense lesion in segment 7/8, measuring 1.3 x 1.3 cm with peripheral discontinuous enhancement, probably an atypical hemangioma.  Consider follow-up MRI in 3 months.\par \par April 17, 2017 CT abd/pelvis:  Rectal wall thickening, possibly known neoplasm.  Indeterminate lesion in hepatic segment 8/7, measuring 1.4 cm, suspicious for metastasis.\par \par April 14, 2017 Colonoscopy with biopsy of rectal tumor (Dr. Armstrong): Biopsy of rectal tumor at 15 cm shows in situ and invasive colonic adenocarcinoma.  The polyp sigmoid at 30 cm showed tubular adenoma.  \par \par March 30, 2017 Right flank; left flank; left shoulder Lipoma removal:  Fibrofatty tissue consistent with lipoma.  \par \par January 26, 2017 u/s of palpable areas of LE and abdomen:  In the right mid back region in the area of palpable concern, there is a subcutaneous isoechoic mass measuring 8.9 x 3.1 x 7.6 cm whose appearance is most compatible with a lipoma.  In the area of palpable concern in the left upper ventral abdominal wall, there is a subcutaneous echogenic mass measuring 10.6 x 6.5 x 8.3 cm whose appearance is most compatible with a lipoma.\par

## 2019-03-22 NOTE — ADDENDUM
[FreeTextEntry1] : I, George Sanchez, acted solely as a scribe for Dr. Francie Mejia on this date 3/13/19.

## 2019-03-22 NOTE — PHYSICAL EXAM
[General Appearance - Alert] : alert [General Appearance - In No Acute Distress] : in no acute distress [Outer Ear] : the ears and nose were normal in appearance [Oropharynx] : the oropharynx was normal [Neck Appearance] : the appearance of the neck was normal [Neck Cervical Mass (___cm)] : no neck mass was observed [Jugular Venous Distention Increased] : there was no jugular-venous distention [Thyroid Diffuse Enlargement] : the thyroid was not enlarged [Thyroid Nodule] : there were no palpable thyroid nodules [Auscultation Breath Sounds / Voice Sounds] : lungs were clear to auscultation bilaterally [Heart Rate And Rhythm] : heart rate was normal and rhythm regular [Heart Sounds] : normal S1 and S2 [Heart Sounds Gallop] : no gallops [Murmurs] : no murmurs [Heart Sounds Pericardial Friction Rub] : no pericardial rub [Bowel Sounds] : normal bowel sounds [Abdomen Soft] : soft [Abdomen Tenderness] : non-tender [] : no hepato-splenomegaly [Abdomen Mass (___ Cm)] : no abdominal mass palpated [Normal Sphincter Tone] : normal sphincter tone [No Rectal Mass] : no rectal mass [Occult Blood Positive] : stool was negative for occult blood

## 2019-03-22 NOTE — PHYSICAL EXAM
[Fully active, able to carry on all pre-disease performance without restriction] : Status 0 - Fully active, able to carry on all pre-disease performance without restriction [Normal] : affect appropriate [de-identified] : hyperpigmentation of lips [de-identified] : vertical incision with staples in place

## 2019-03-22 NOTE — ASSESSMENT
[FreeTextEntry1] : Personal history of rectal cancer, status post radiation and chemotherapy, and surgical intervention for a stage II lesion. Patient is awaiting colonoscopy for cancer surveillance.\par Personal history of obesity and paroxysmal atrial fibrillation. That has previously been chemically refractory period. Patient is awaiting an ablation and is currently on full anticoagulation with a Xarelto. Patient will ultimately need cardiac clearance prior to colonoscopy once the ablation has been completed. \par Office reevaluation here in 2 months to assess stability for colonoscopy

## 2019-03-22 NOTE — ASSESSMENT
[FreeTextEntry1] : 60 yo with rectal cancer and concurrent prostate cancer.\par \par Rectal cancer\par -Continue capecitabine 2000mg BID\par -Continue XRT daily as scheduled\par -Encourage continued moisturizer and skin care\par -Monitor blood counts\par \par Prostate Cancer\par -Continue Bicalutamide 50mg daily\par -Lupron monthly\par -F/u with Dr. Kim as scheduled.\par \par OV in 2 weeks

## 2019-03-22 NOTE — HISTORY OF PRESENT ILLNESS
[Disease: _____________________] : Disease: [unfilled] [T: ___] : T[unfilled] [N: ___] : N[unfilled] [M: ___] : M[unfilled] [AJCC Stage: ____] : AJCC Stage: [unfilled] [de-identified] : The patient was diagnosed with adenocarcinoma of the rectum in April 2017 at the age of 61.  He began experiencing hematochezia post narcotic-induced constipation after a fracture of his left lower leg.  He was referred to GI Dr. Armstrong who performed a colonoscopy on April 14, 2017.  A rectal mass was discovered and the biopsy of the rectal tumor at 15 cm showed in situ and invasive colonic adenocarcinoma.  On April 17, 2017 CT of the abdomen and  pelvis showed rectal wall thickening, and a lesion in the hepatic segment 8/7 measuring 1.4 cm suspicious for metastasis.  To better evaluate the hepatic findings he had an MRI of the abdomen on April 25, 2017 which showed T2 hyperintense lesion in segment 7/8, measuring 1.3 x 1.3 cm with peripheral discontinuous enhancement, probably consistent with an atypical hemangioma.  He was then referred to Dr. Hand on April 27, 2017 where he performed a flexible sigmoidoscopy which demonstrated a large cancer of the mid rectum approximately extending from 7-10 cm from the dentate line. On May 2, 2017 PET/CT showed no discrete FDG avidity associated with a segment 7/8 lesion.  FDG avid rectosigmoid colon mass with SUV of 11.1.  At the time of his consultation he had been evaluated by Dr. Soliz of radiation therapy.   [de-identified] : He had an MRI of the pelvis on 5/9/17, and he was scheduled for a prostate MRI today ordered by Dr. Kim.  \par PSA- Jan 2017 5.69; repeat 3/25/17 5.7 [de-identified] : The patient is s/p 6 cycles adjuvant xeloda (11/9/17 - 3/7/18) and CRT for rectal caner completed 6/19/17 - 7/27/17.  He completed RT for the prostate cancer on 1/8/17 - 2/5/17.  He is s/p reversal of colostomy on 4/16/18 with Dr Hand. + Intermittent stool incontinence with formed or semi formed stool.  Worse with any lifting or position change. Still with days of at least 6 BMs/day not including incontinence.  + Intermittent nausea.  Appetite is very good, gained significant weight since the summer, likely due to some anxiety.  Still occasional BRBPR, feels likely due to internal hemorrhoids. He is followed by Dr. Kim for concurrent diagnosis of prostate cancer.

## 2019-03-24 ENCOUNTER — FORM ENCOUNTER (OUTPATIENT)
Age: 64
End: 2019-03-24

## 2019-03-25 ENCOUNTER — OUTPATIENT (OUTPATIENT)
Dept: OUTPATIENT SERVICES | Facility: HOSPITAL | Age: 64
LOS: 1 days | End: 2019-03-25
Payer: COMMERCIAL

## 2019-03-25 ENCOUNTER — TRANSCRIPTION ENCOUNTER (OUTPATIENT)
Age: 64
End: 2019-03-25

## 2019-03-25 VITALS
RESPIRATION RATE: 20 BRPM | DIASTOLIC BLOOD PRESSURE: 64 MMHG | SYSTOLIC BLOOD PRESSURE: 119 MMHG | TEMPERATURE: 98 F | HEART RATE: 60 BPM

## 2019-03-25 DIAGNOSIS — Z90.49 ACQUIRED ABSENCE OF OTHER SPECIFIED PARTS OF DIGESTIVE TRACT: Chronic | ICD-10-CM

## 2019-03-25 DIAGNOSIS — I48.0 PAROXYSMAL ATRIAL FIBRILLATION: ICD-10-CM

## 2019-03-25 DIAGNOSIS — Z96.7 PRESENCE OF OTHER BONE AND TENDON IMPLANTS: Chronic | ICD-10-CM

## 2019-03-25 DIAGNOSIS — Z86.018 PERSONAL HISTORY OF OTHER BENIGN NEOPLASM: Chronic | ICD-10-CM

## 2019-03-25 LAB
ALBUMIN SERPL ELPH-MCNC: 3.9 G/DL — SIGNIFICANT CHANGE UP (ref 3.3–5.2)
ALP SERPL-CCNC: 64 U/L — SIGNIFICANT CHANGE UP (ref 40–120)
ALT FLD-CCNC: 20 U/L — SIGNIFICANT CHANGE UP
ANION GAP SERPL CALC-SCNC: 9 MMOL/L — SIGNIFICANT CHANGE UP (ref 5–17)
APTT BLD: 35.5 SEC — SIGNIFICANT CHANGE UP (ref 27.5–36.3)
AST SERPL-CCNC: 17 U/L — SIGNIFICANT CHANGE UP
BASOPHILS # BLD AUTO: 0 K/UL — SIGNIFICANT CHANGE UP (ref 0–0.2)
BASOPHILS NFR BLD AUTO: 0.4 % — SIGNIFICANT CHANGE UP (ref 0–2)
BILIRUB SERPL-MCNC: 0.5 MG/DL — SIGNIFICANT CHANGE UP (ref 0.4–2)
BLD GP AB SCN SERPL QL: SIGNIFICANT CHANGE UP
BUN SERPL-MCNC: 16 MG/DL — SIGNIFICANT CHANGE UP (ref 8–20)
CALCIUM SERPL-MCNC: 8.8 MG/DL — SIGNIFICANT CHANGE UP (ref 8.6–10.2)
CHLORIDE SERPL-SCNC: 106 MMOL/L — SIGNIFICANT CHANGE UP (ref 98–107)
CO2 SERPL-SCNC: 26 MMOL/L — SIGNIFICANT CHANGE UP (ref 22–29)
CREAT SERPL-MCNC: 1.01 MG/DL — SIGNIFICANT CHANGE UP (ref 0.5–1.3)
EOSINOPHIL # BLD AUTO: 0.1 K/UL — SIGNIFICANT CHANGE UP (ref 0–0.5)
EOSINOPHIL NFR BLD AUTO: 2.1 % — SIGNIFICANT CHANGE UP (ref 0–5)
GLUCOSE SERPL-MCNC: 103 MG/DL — SIGNIFICANT CHANGE UP (ref 70–115)
HCT VFR BLD CALC: 40.2 % — LOW (ref 42–52)
HGB BLD-MCNC: 13.2 G/DL — LOW (ref 14–18)
INR BLD: 1.07 RATIO — SIGNIFICANT CHANGE UP (ref 0.88–1.16)
LYMPHOCYTES # BLD AUTO: 0.8 K/UL — LOW (ref 1–4.8)
LYMPHOCYTES # BLD AUTO: 14.2 % — LOW (ref 20–55)
MCHC RBC-ENTMCNC: 30.2 PG — SIGNIFICANT CHANGE UP (ref 27–31)
MCHC RBC-ENTMCNC: 32.8 G/DL — SIGNIFICANT CHANGE UP (ref 32–36)
MCV RBC AUTO: 92 FL — SIGNIFICANT CHANGE UP (ref 80–94)
MONOCYTES # BLD AUTO: 0.4 K/UL — SIGNIFICANT CHANGE UP (ref 0–0.8)
MONOCYTES NFR BLD AUTO: 7.5 % — SIGNIFICANT CHANGE UP (ref 3–10)
NEUTROPHILS # BLD AUTO: 4 K/UL — SIGNIFICANT CHANGE UP (ref 1.8–8)
NEUTROPHILS NFR BLD AUTO: 75.6 % — HIGH (ref 37–73)
PLATELET # BLD AUTO: 176 K/UL — SIGNIFICANT CHANGE UP (ref 150–400)
POTASSIUM SERPL-MCNC: 3.9 MMOL/L — SIGNIFICANT CHANGE UP (ref 3.5–5.3)
POTASSIUM SERPL-SCNC: 3.9 MMOL/L — SIGNIFICANT CHANGE UP (ref 3.5–5.3)
PROT SERPL-MCNC: 6.9 G/DL — SIGNIFICANT CHANGE UP (ref 6.6–8.7)
PROTHROM AB SERPL-ACNC: 12.3 SEC — SIGNIFICANT CHANGE UP (ref 10–12.9)
RBC # BLD: 4.37 M/UL — LOW (ref 4.6–6.2)
RBC # FLD: 13.3 % — SIGNIFICANT CHANGE UP (ref 11–15.6)
SODIUM SERPL-SCNC: 141 MMOL/L — SIGNIFICANT CHANGE UP (ref 135–145)
T4 AB SER-ACNC: 5.4 UG/DL — SIGNIFICANT CHANGE UP (ref 4.5–12)
TSH SERPL-MCNC: 1.24 UIU/ML — SIGNIFICANT CHANGE UP (ref 0.27–4.2)
TYPE + AB SCN PNL BLD: SIGNIFICANT CHANGE UP
WBC # BLD: 5.3 K/UL — SIGNIFICANT CHANGE UP (ref 4.8–10.8)
WBC # FLD AUTO: 5.3 K/UL — SIGNIFICANT CHANGE UP (ref 4.8–10.8)

## 2019-03-25 PROCEDURE — 93320 DOPPLER ECHO COMPLETE: CPT | Mod: 26

## 2019-03-25 PROCEDURE — 85027 COMPLETE CBC AUTOMATED: CPT

## 2019-03-25 PROCEDURE — 84443 ASSAY THYROID STIM HORMONE: CPT

## 2019-03-25 PROCEDURE — 93312 ECHO TRANSESOPHAGEAL: CPT

## 2019-03-25 PROCEDURE — 36415 COLL VENOUS BLD VENIPUNCTURE: CPT

## 2019-03-25 PROCEDURE — 85610 PROTHROMBIN TIME: CPT

## 2019-03-25 PROCEDURE — 93320 DOPPLER ECHO COMPLETE: CPT

## 2019-03-25 PROCEDURE — 93010 ELECTROCARDIOGRAM REPORT: CPT

## 2019-03-25 PROCEDURE — 93005 ELECTROCARDIOGRAM TRACING: CPT

## 2019-03-25 PROCEDURE — 86901 BLOOD TYPING SEROLOGIC RH(D): CPT

## 2019-03-25 PROCEDURE — 76376 3D RENDER W/INTRP POSTPROCES: CPT | Mod: 26

## 2019-03-25 PROCEDURE — 93325 DOPPLER ECHO COLOR FLOW MAPG: CPT

## 2019-03-25 PROCEDURE — 85730 THROMBOPLASTIN TIME PARTIAL: CPT

## 2019-03-25 PROCEDURE — 84436 ASSAY OF TOTAL THYROXINE: CPT

## 2019-03-25 PROCEDURE — 93312 ECHO TRANSESOPHAGEAL: CPT | Mod: 26

## 2019-03-25 PROCEDURE — 86900 BLOOD TYPING SEROLOGIC ABO: CPT

## 2019-03-25 PROCEDURE — 86850 RBC ANTIBODY SCREEN: CPT

## 2019-03-25 PROCEDURE — 80053 COMPREHEN METABOLIC PANEL: CPT

## 2019-03-25 PROCEDURE — 93325 DOPPLER ECHO COLOR FLOW MAPG: CPT | Mod: 26

## 2019-03-25 RX ORDER — ENOXAPARIN SODIUM 100 MG/ML
150 INJECTION SUBCUTANEOUS
Qty: 1 | Refills: 0 | OUTPATIENT
Start: 2019-03-25

## 2019-03-25 RX ORDER — ENOXAPARIN SODIUM 100 MG/ML
150 INJECTION SUBCUTANEOUS ONCE
Qty: 0 | Refills: 0 | Status: DISCONTINUED | OUTPATIENT
Start: 2019-03-25 | End: 2019-03-25

## 2019-03-25 RX ORDER — ENOXAPARIN SODIUM 100 MG/ML
140 INJECTION SUBCUTANEOUS ONCE
Qty: 0 | Refills: 0 | Status: COMPLETED | OUTPATIENT
Start: 2019-03-25 | End: 2019-03-25

## 2019-03-25 RX ADMIN — ENOXAPARIN SODIUM 140 MILLIGRAM(S): 100 INJECTION SUBCUTANEOUS at 11:01

## 2019-03-25 NOTE — H&P PST ADULT - HISTORY OF PRESENT ILLNESS
63 year old gentleman with history of obesity, HTN, paroxysmal atrial fibrillation, glaucoma, prostate and colorectal cancer s/p partial colon resection. He was initially found to have atrial fibrillation on evaluation prior to his colon surgery in 10/2017. Following surgery, he had recurrent atrial fibrillation with rapid ventricular rates and was treated briefly with amiodarone. He was on anticoagulation at that time, but ultimately amiodarone and anticoagulation were stopped. He underwent colostomy reversal 4/2018, and subsequently was again noted to be in AF.   Holter monitor 5/4/18 revealed atrial fibrillation lasting for 14 hours and otherwise sinus rhythm/sinus bradycardia with average HR 78 bpm (range  bpm), and further holter monitoring more recently again showed paroxysmal atrial fibrillation. He now presents for TOM in anticipation of atrial fibrillation ablation.     Stress Test: 12/6/17, no ischemia. LVEF 53%, nml perfusion   Echo: 11/26/18, LVEF 60-65%, mild conc LVH, mild-mod MR, trace TR, trace PI

## 2019-03-25 NOTE — H&P PST ADULT - NSICDXFAMILYHX_GEN_ALL_CORE_FT
FAMILY HISTORY:  Father  Still living? No  Family history of CABG, Age at diagnosis: Age Unknown  Family history of coronary artery disease, Age at diagnosis: 71-80  Family history of hypertension, Age at diagnosis: Age Unknown  Family history of prostate cancer, Age at diagnosis: Age Unknown    Mother  Still living? No  Family history of cervical cancer, Age at diagnosis: 61-70    Sibling  Still living? Yes, Estimated age: Age Unknown  Family history of prostate cancer, Age at diagnosis: 51-60

## 2019-03-25 NOTE — DISCHARGE NOTE NURSING/CASE MANAGEMENT/SOCIAL WORK - NSDCDPATPORTLINK_GEN_ALL_CORE
You can access the [facility], offered by [facility], by registering with the following website: [email or web address]

## 2019-03-25 NOTE — H&P PST ADULT - NSICDXPASTSURGICALHX_GEN_ALL_CORE_FT
PAST SURGICAL HISTORY:  History of lipoma x 3    S/P colectomy w/ ostomy reversal 4/2018    S/P ORIF (open reduction internal fixation) fracture Left ankle

## 2019-03-25 NOTE — PROGRESS NOTE ADULT - SUBJECTIVE AND OBJECTIVE BOX
Pt is now status post TOM pre AF ablation.  Pt tolerated procedure well and offers no complaints post procedure.      TOM Summary:   1. Pre-ablation TOM   2. Normal biventricular systolic function. Left ventricular ejection   fraction, by visual estimation, is 55 to 60%.   3. Mild mitral regurgitation.   4. No intracardiac thrombus or shunts   5. Mild, non-mobile atheroma   6. No pericardial effusion   7. ** No prior TEEs available for comparison.    Charlee Mayfield DO, Electronically signed on 3/25/2019     Plan:   Ablation 3/29/2019 (9:30AM arrival)   - Last dose Xarelto 3/27/19 PM. Lovenox 150 mg x 1 dose 3/28/19 PM.    - NPO after midnight prior except meds w/ sips of water.    - No Lovenox or Xarelto on 3/29/19   Discharge teaching initiated.

## 2019-03-25 NOTE — DISCHARGE NOTE PROVIDER - NSDCCPTREATMENT_GEN_ALL_CORE_FT
PRINCIPAL PROCEDURE  Procedure: US echo transesophageal  Findings and Treatment: Notify your doctor if you develop a fever, cough up blood, have any chest pain, palpitations or difficulty breathing. You may take a throat lozenge if you develop a sore throat or try warm salt water gargle. Resume your diet with soft foods first. Follow up with your cardiologist within 2 weeks for a follow up or sooner with any concerns. Report to the nearest ER with any emergencies.

## 2019-03-25 NOTE — DISCHARGE NOTE PROVIDER - CARE PROVIDERS DIRECT ADDRESSES
[email or web address]

## 2019-03-25 NOTE — H&P PST ADULT - NSICDXPASTMEDICALHX_GEN_ALL_CORE_FT
PAST MEDICAL HISTORY:  Atrial fibrillation     Glaucoma     Hypertension     Prostate cancer     Rectal cancer

## 2019-03-25 NOTE — H&P PST ADULT - NSALCOHOLPROBLEMSRELYN_GEN_A_CORE_SD
Airway patent, Nasal mucosa clear but with mild scattered 2mm superficial ulcerations bilaterally. Mouth with normal mucosa. Throat has no vesicles, no oropharyngeal exudates and uvula is midline. Bilateral TM clear with no perf.
no

## 2019-03-25 NOTE — H&P PST ADULT - ASSESSMENT
63 year old gentleman with history of obesity, HTN, prostate and colorectal cancer s/p partial colon resection, and paroxysmal atrial fibrillation. He has had relatively mild symptoms of dyspnea, palpitation and occasional lightheadedness which may be attributable to atrial fibrillation. His AF has been recurrent despite prior treatment with amiodarone and now high dose beta blockade, and he is maintained on Xarelto for CVA prophylaxis (CHADS-VASC = 1 – HTN). He now presents for TOM in anticipation of elective AF RF ablation.   Plan:   Pt did not take Xarelto last PM - he was concerned he should hold it for procedure today. Will give Lovenox 150mg SQ now & pt will resume Xarelto this PM.     Pre-procedure instructions provided (verbal & written) as follows:  Ablation 3/29/2019 (0930 arrival)   - Last dose Xarelto 3/27/19 PM. Lovenox 150 mg x 1 dose 3/28/19 PM.    - NPO after midnight prior except meds w/ sips of water.     Discharge teaching initiated.

## 2019-03-26 ENCOUNTER — APPOINTMENT (OUTPATIENT)
Dept: ELECTROPHYSIOLOGY | Facility: CLINIC | Age: 64
End: 2019-03-26

## 2019-03-29 ENCOUNTER — INPATIENT (INPATIENT)
Facility: HOSPITAL | Age: 64
LOS: 0 days | Discharge: HOME | DRG: 274 | End: 2019-03-30
Attending: STUDENT IN AN ORGANIZED HEALTH CARE EDUCATION/TRAINING PROGRAM | Admitting: STUDENT IN AN ORGANIZED HEALTH CARE EDUCATION/TRAINING PROGRAM
Payer: MEDICAID

## 2019-03-29 ENCOUNTER — TRANSCRIPTION ENCOUNTER (OUTPATIENT)
Age: 64
End: 2019-03-29

## 2019-03-29 VITALS
OXYGEN SATURATION: 96 % | TEMPERATURE: 97 F | RESPIRATION RATE: 18 BRPM | DIASTOLIC BLOOD PRESSURE: 92 MMHG | SYSTOLIC BLOOD PRESSURE: 138 MMHG | HEART RATE: 80 BPM | WEIGHT: 298.06 LBS | HEIGHT: 71 IN

## 2019-03-29 DIAGNOSIS — Z90.49 ACQUIRED ABSENCE OF OTHER SPECIFIED PARTS OF DIGESTIVE TRACT: Chronic | ICD-10-CM

## 2019-03-29 DIAGNOSIS — Z86.018 PERSONAL HISTORY OF OTHER BENIGN NEOPLASM: Chronic | ICD-10-CM

## 2019-03-29 DIAGNOSIS — Z96.7 PRESENCE OF OTHER BONE AND TENDON IMPLANTS: Chronic | ICD-10-CM

## 2019-03-29 DIAGNOSIS — I48.0 PAROXYSMAL ATRIAL FIBRILLATION: ICD-10-CM

## 2019-03-29 LAB
BLD GP AB SCN SERPL QL: SIGNIFICANT CHANGE UP
TYPE + AB SCN PNL BLD: SIGNIFICANT CHANGE UP

## 2019-03-29 PROCEDURE — 93613 INTRACARDIAC EPHYS 3D MAPG: CPT

## 2019-03-29 PROCEDURE — 93655 ICAR CATH ABLTJ DSCRT ARRHYT: CPT

## 2019-03-29 PROCEDURE — 93623 PRGRMD STIMJ&PACG IV RX NFS: CPT | Mod: 26

## 2019-03-29 PROCEDURE — 93662 INTRACARDIAC ECG (ICE): CPT | Mod: 26

## 2019-03-29 PROCEDURE — 93656 COMPRE EP EVAL ABLTJ ATR FIB: CPT

## 2019-03-29 PROCEDURE — 93657 TX L/R ATRIAL FIB ADDL: CPT

## 2019-03-29 RX ORDER — KETOROLAC TROMETHAMINE 30 MG/ML
30 SYRINGE (ML) INJECTION EVERY 8 HOURS
Qty: 0 | Refills: 0 | Status: DISCONTINUED | OUTPATIENT
Start: 2019-03-29 | End: 2019-03-30

## 2019-03-29 RX ORDER — FUROSEMIDE 40 MG
10 TABLET ORAL ONCE
Qty: 0 | Refills: 0 | Status: COMPLETED | OUTPATIENT
Start: 2019-03-29 | End: 2019-03-29

## 2019-03-29 RX ORDER — SUCRALFATE 1 G
1 TABLET ORAL
Qty: 0 | Refills: 0 | Status: DISCONTINUED | OUTPATIENT
Start: 2019-03-29 | End: 2019-03-30

## 2019-03-29 RX ORDER — RIVAROXABAN 15 MG-20MG
20 KIT ORAL EVERY 24 HOURS
Qty: 0 | Refills: 0 | Status: DISCONTINUED | OUTPATIENT
Start: 2019-03-29 | End: 2019-03-30

## 2019-03-29 RX ORDER — ACETAMINOPHEN 500 MG
1000 TABLET ORAL ONCE
Qty: 0 | Refills: 0 | Status: COMPLETED | OUTPATIENT
Start: 2019-03-29 | End: 2019-03-29

## 2019-03-29 RX ORDER — FUROSEMIDE 40 MG
20 TABLET ORAL DAILY
Qty: 0 | Refills: 0 | Status: DISCONTINUED | OUTPATIENT
Start: 2019-03-29 | End: 2019-03-30

## 2019-03-29 RX ORDER — ALPRAZOLAM 0.25 MG
0.25 TABLET ORAL EVERY 6 HOURS
Qty: 0 | Refills: 0 | Status: DISCONTINUED | OUTPATIENT
Start: 2019-03-29 | End: 2019-03-30

## 2019-03-29 RX ORDER — BENZOCAINE AND MENTHOL 5; 1 G/100ML; G/100ML
1 LIQUID ORAL
Qty: 0 | Refills: 0 | Status: DISCONTINUED | OUTPATIENT
Start: 2019-03-29 | End: 2019-03-30

## 2019-03-29 RX ORDER — METOPROLOL TARTRATE 50 MG
50 TABLET ORAL
Qty: 0 | Refills: 0 | Status: DISCONTINUED | OUTPATIENT
Start: 2019-03-29 | End: 2019-03-30

## 2019-03-29 RX ORDER — FENTANYL CITRATE 50 UG/ML
50 INJECTION INTRAVENOUS
Qty: 0 | Refills: 0 | Status: DISCONTINUED | OUTPATIENT
Start: 2019-03-29 | End: 2019-03-30

## 2019-03-29 RX ORDER — PANTOPRAZOLE SODIUM 20 MG/1
40 TABLET, DELAYED RELEASE ORAL
Qty: 0 | Refills: 0 | Status: DISCONTINUED | OUTPATIENT
Start: 2019-03-29 | End: 2019-03-30

## 2019-03-29 RX ORDER — LATANOPROST 0.05 MG/ML
1 SOLUTION/ DROPS OPHTHALMIC; TOPICAL AT BEDTIME
Qty: 0 | Refills: 0 | Status: DISCONTINUED | OUTPATIENT
Start: 2019-03-29 | End: 2019-03-30

## 2019-03-29 RX ORDER — ACETAMINOPHEN 500 MG
650 TABLET ORAL EVERY 6 HOURS
Qty: 0 | Refills: 0 | Status: DISCONTINUED | OUTPATIENT
Start: 2019-03-29 | End: 2019-03-30

## 2019-03-29 RX ADMIN — Medication 10 MILLIGRAM(S): at 17:26

## 2019-03-29 RX ADMIN — Medication 1 GRAM(S): at 17:36

## 2019-03-29 RX ADMIN — Medication 30 MILLIGRAM(S): at 13:10

## 2019-03-29 RX ADMIN — Medication 1000 MILLIGRAM(S): at 12:15

## 2019-03-29 RX ADMIN — LATANOPROST 1 DROP(S): 0.05 SOLUTION/ DROPS OPHTHALMIC; TOPICAL at 18:45

## 2019-03-29 RX ADMIN — PANTOPRAZOLE SODIUM 40 MILLIGRAM(S): 20 TABLET, DELAYED RELEASE ORAL at 17:36

## 2019-03-29 RX ADMIN — RIVAROXABAN 20 MILLIGRAM(S): KIT at 15:08

## 2019-03-29 RX ADMIN — Medication 50 MILLIGRAM(S): at 17:36

## 2019-03-29 RX ADMIN — Medication 30 MILLIGRAM(S): at 12:45

## 2019-03-29 RX ADMIN — Medication 400 MILLIGRAM(S): at 11:45

## 2019-03-29 NOTE — DISCHARGE NOTE PROVIDER - NSDCFUSCHEDAPPT_GEN_ALL_CORE_FT
ASHLEY SORENSON ; 04/04/2019 ; [facility] ASHLEY SORENSON ; 04/04/2019 ; [facility]

## 2019-03-29 NOTE — DISCHARGE NOTE PROVIDER - HOSPITAL COURSE
63 year old gentleman with history of obesity, HTN, prostate and colorectal cancer s/p partial colon resection, and symptomatic paroxysmal atrial fibrillation.  He presented electively 3/29/19 and underwent uncomplicated radiofrequency ablation of atrial fibrillation (WACA/PVI, posterior wall isolation, CTI line). 63 year old gentleman with history of obesity, HTN, prostate and colorectal cancer s/p partial colon resection, and symptomatic paroxysmal atrial fibrillation.  He presented electively 3/29/19 and underwent uncomplicated radiofrequency ablation of atrial fibrillation (WACA/PVI, posterior wall isolation, CTI line).t s/p AF RFA via RFV and LFV   tolerated procedure well    Pt reports feeling well no chest pain , SOB or palpitations Ambulating in unit     No events overnight     Vital Signs Last 24 Hrs    HR: 61 (30 Mar 2019 07:41) (53 - 90)    BP: 126/72 (30 Mar 2019 07:41) (88/54 - 130/72)    RR: 16 (30 Mar 2019 07:41) (16 - 20)    SpO2: 98% (30 Mar 2019 06:41) (96% - 100%)        ECG: sinus rhythm 62  T wave flat in II, II and AVF     Telemetry :  SR SB  53 - 90 No ectopy overnight         General: A/ox 3, No acute Distress    Neck: Supple, NO JVD    Cardiac: S1 S2, No M/R/G    Pulmonary: CTAB, Breathing unlabored, No Rhonchi/Rales/Wheezing    Abdomen: Soft, Non -tender, +BS     Bilateral groin sutures removed without incident C/D/I; no bleeding, hematoma, erythema, exudate or edema    Extremities: No Rashes, No edema    Neuro: A/o x 3, No focal deficits    Psch: normal mood , normal affect    s/p Af ablation     Bilateral sutures removed without incident     LABS, EKG Telemetry reviewed     Decrease metoprolol to 50mg BID.     Continue other home medications.     Start Protonix 40mg twice daily x 2 weeks, then once daily x 6 weeks.     Carafate 1gm BID x 2 weeks.     Procedure results meds and follow up reviewed     Outpt follow up in 2-4 weeks  with Dr Shelton

## 2019-03-29 NOTE — PROGRESS NOTE ADULT - SUBJECTIVE AND OBJECTIVE BOX
PROCEDURE(S): Radiofrequency Ablation of Atrial Fibrillation & Atrial Flutter    ELECTRPHYSIOLOGIST(S): Gera Shelton MD         COMPLICATIONS:  none        DISPOSITION:  observation     CONDITION: stable      Pt doing well s/p atrial fibrillation/atrial flutter ablation (WACA/PVI, posterior wall isolation, CTI line). Reports right shoulder pain/stiffness; otherwise denies complaint.       MEDICATIONS  (STANDING):  acetaminophen  IVPB .. 1000 milliGRAM(s) IV Intermittent once  latanoprost 0.005% Ophthalmic Solution 1 Drop(s) Both EYES at bedtime  metoprolol tartrate 50 milliGRAM(s) Oral two times a day  multivitamin 1 Tablet(s) Oral daily  pantoprazole    Tablet 40 milliGRAM(s) Oral two times a day  rivaroxaban 20 milliGRAM(s) Oral every 24 hours  sucralfate suspension 1 Gram(s) Oral two times a day    MEDICATIONS  (PRN):  acetaminophen   Tablet .. 650 milliGRAM(s) Oral every 6 hours PRN Mild Pain (1 - 3)  ALPRAZolam 0.25 milliGRAM(s) Oral every 6 hours PRN anxiety and/or insomnia  aluminum hydroxide/magnesium hydroxide/simethicone Suspension 30 milliLiter(s) Oral every 4 hours PRN Dyspepsia  benzocaine 15 mG/menthol 3.6 mG (Sugar-Free) Lozenge 1 Lozenge Oral every 2 hours PRN Sore Throat  fentaNYL    Injectable 50 MICROGram(s) IV Push every 30 minutes PRN Severe Pain (7 - 10)      Allergies  No Known Allergies    Intolerances  avoid acidic foods/tomato products (Unknown)        Exam:   T(C): 36.3 (03-29-19 @ 07:56), Max: 36.3 (03-29-19 @ 07:56)  HR: 54 (03-29-19 @ 12:15) (53 - 80)  BP: 92/64 (03-29-19 @ 12:15) (88/54 - 138/92)  RR: 18 (03-29-19 @ 12:15) (18 - 18)  SpO2: 99% (03-29-19 @ 12:15) (96% - 99%)    VSS, NAD, A&O x 3  Card: S1/S2, RRR, no m/g/r  Resp: lungs CTA b/l  Abd: S/NT/ND  Groins: hemostatic sutures in place; sites C/D/I; no bleeding, hematoma, erythema, exudate or edema  Ext: no edema; distal pulses intact    I/Os: net + 1535 mL    ECG: sinus rhythm @50s bpm w/ intact conduction; normal axis & intervals; no acute changes    Assessment:   63 year old gentleman with history of obesity, HTN, prostate and colorectal cancer s/p partial colon resection, and symptomatic paroxysmal atrial fibrillation.  Now status post uncomplicated radiofrequency ablation of atrial fibrillation (WACA/PVI, posterior wall isolation, CTI line).      Plan:   Bedrest x 4 hours, then OOB with assistance and progress as tolerated.   Groin sutures to be removed by EP service in AM.   Radial art line to be removed once pt fully awake with stable vitals >1 hour post op.   Pending groin status: Xarelto 20mg PO qPM to resume at 1500.   Lasix 10mg IV x 1 dose once ambulating, then Lasix 20mg PO daily x 3 days.   Decrease metoprolol to 50mg BID.   Continue other home medications.   Start Protonix 40mg twice daily x 2 weeks, then once daily x 6 weeks.   Carafate 1gm BID x 2 weeks.   Strict I/Os.  Please encourage incentive spirometry and ambulation once able.  Observation and monitoring on telemetry overnight with anticipated discharge in the AM and outpt follow up in 2-4 weeks.

## 2019-03-29 NOTE — DISCHARGE NOTE PROVIDER - NSDCFUADDINST_GEN_ALL_CORE_FT
Follow up with Dr. Shelton in 2-4 weeks. Our office will contact you in 3-5 days to schedule this appointment. Please call [phone] with questions or concerns.

## 2019-03-29 NOTE — DISCHARGE NOTE PROVIDER - NSDCCPCAREPLAN_GEN_ALL_CORE_FT
PRINCIPAL DISCHARGE DIAGNOSIS  Diagnosis: AF (paroxysmal atrial fibrillation)  Assessment and Plan of Treatment: Continue Xarelto without interruption.   Decrease metoprolol to 50mg BID PRINCIPAL DISCHARGE DIAGNOSIS  Diagnosis: AF (paroxysmal atrial fibrillation)  Assessment and Plan of Treatment: Continue Xarelto without interruption.   Decrease metoprolol to 50mg BID  Carafate 1 gram twice daily for two weeks   Lasix ( Furosemide) 20 mg by mouth for 3 days   Protonix 40 mg twice a day for 2 weeks  then once daily for 6 weeks

## 2019-03-29 NOTE — DISCHARGE NOTE PROVIDER - CARE PROVIDER_API CALL
Gera Shelton)  Cardiology; Internal Medicine  [address] [facility], [address] [place] [address]  Phone: [phone]  Fax: [phone]  Follow Up Time:

## 2019-03-29 NOTE — PROGRESS NOTE ADULT - SUBJECTIVE AND OBJECTIVE BOX
pt w/ continued c/o right should pain/stiffness after ofirmev 1gm. Otherwise stable and w/o complaint.   Give Toradol 30mg IV now.   Continue monitoring.

## 2019-03-29 NOTE — PROGRESS NOTE ADULT - SUBJECTIVE AND OBJECTIVE BOX
Pt presents for elective AF ablation. Denies changes in health status or medical regimen since PST/TOM. Patient specifically denies chest pain, shortness of breath at rest or with exertion, near/true syncope, fevers/chills, N/V/D, or other cardiac or constitutional symptoms.   Last dose Xarelto 3/27 w/ Lovenox bridge x 1 dose 3/28PM.   Pt NPO.   R/B/A reviewed w/ pt. All questions answered to pt's expressed satisfaction & informed consent contained.

## 2019-03-29 NOTE — DISCHARGE NOTE PROVIDER - NSDCCPTREATMENT_GEN_ALL_CORE_FT
PRINCIPAL PROCEDURE  Procedure: Cardiac ablation  Findings and Treatment: - Bruising at the groin, sometimes extending down the leg, and/or a small lump under the skin at the groin access site is normal and will resolve within 2 – 3 weeks.   - Occasional skipped beats or palpitations that last for a few beats are common and generally resolve within 1-2 months.   - You may walk and take stairs at a regular pace.   - Do not perform any exercise more strenuous than walking for 1 week.   - Do not strain or lift heavy objects for 1 week.  - You may shower the day after the procedure.  - Do not soak in water (such as tub baths, hot tubs, swimming, etc.) for 1 week.   - You may resume all other activities the day after the procedure.  Call your doctor if:   - you notice bleeding, redness, drainage, swelling, increased tenderness or a hot sensation around the catheter insertion site.   - your temperature is greater than 100 degrees F for more than 24 hours.  - your rapid heart rhythm returns.  - you have any questions or concerns regarding the procedure.  If significant bleeding and/or a large lump (the size of a golf ball or bigger) occurs:  - Lie flat and apply continuous direct pressure just above the puncture site for at least 10 minutes  - If the issue resolves, notify your physician immediately.    - If the bleeding cannot be controlled, please seek immediate medical attention.  If you experience increased difficulty breathing or chest pain, or if you faint or have dizzy spells, please seek immediate medical attention.

## 2019-03-30 ENCOUNTER — TRANSCRIPTION ENCOUNTER (OUTPATIENT)
Age: 64
End: 2019-03-30

## 2019-03-30 VITALS — HEART RATE: 60 BPM | DIASTOLIC BLOOD PRESSURE: 69 MMHG | SYSTOLIC BLOOD PRESSURE: 111 MMHG | RESPIRATION RATE: 16 BRPM

## 2019-03-30 LAB
ANION GAP SERPL CALC-SCNC: 14 MMOL/L — SIGNIFICANT CHANGE UP (ref 5–17)
BUN SERPL-MCNC: 19 MG/DL — SIGNIFICANT CHANGE UP (ref 8–20)
CALCIUM SERPL-MCNC: 8.1 MG/DL — LOW (ref 8.6–10.2)
CHLORIDE SERPL-SCNC: 105 MMOL/L — SIGNIFICANT CHANGE UP (ref 98–107)
CO2 SERPL-SCNC: 22 MMOL/L — SIGNIFICANT CHANGE UP (ref 22–29)
CREAT SERPL-MCNC: 1.02 MG/DL — SIGNIFICANT CHANGE UP (ref 0.5–1.3)
GLUCOSE SERPL-MCNC: 117 MG/DL — HIGH (ref 70–115)
HCT VFR BLD CALC: 36.3 % — LOW (ref 42–52)
HGB BLD-MCNC: 11.7 G/DL — LOW (ref 14–18)
MAGNESIUM SERPL-MCNC: 2.1 MG/DL — SIGNIFICANT CHANGE UP (ref 1.6–2.6)
MCHC RBC-ENTMCNC: 29.8 PG — SIGNIFICANT CHANGE UP (ref 27–31)
MCHC RBC-ENTMCNC: 32.2 G/DL — SIGNIFICANT CHANGE UP (ref 32–36)
MCV RBC AUTO: 92.6 FL — SIGNIFICANT CHANGE UP (ref 80–94)
PLATELET # BLD AUTO: 168 K/UL — SIGNIFICANT CHANGE UP (ref 150–400)
POTASSIUM SERPL-MCNC: 3.8 MMOL/L — SIGNIFICANT CHANGE UP (ref 3.5–5.3)
POTASSIUM SERPL-SCNC: 3.8 MMOL/L — SIGNIFICANT CHANGE UP (ref 3.5–5.3)
RBC # BLD: 3.92 M/UL — LOW (ref 4.6–6.2)
RBC # FLD: 13.7 % — SIGNIFICANT CHANGE UP (ref 11–15.6)
SODIUM SERPL-SCNC: 141 MMOL/L — SIGNIFICANT CHANGE UP (ref 135–145)
WBC # BLD: 8.4 K/UL — SIGNIFICANT CHANGE UP (ref 4.8–10.8)
WBC # FLD AUTO: 8.4 K/UL — SIGNIFICANT CHANGE UP (ref 4.8–10.8)

## 2019-03-30 PROCEDURE — 93010 ELECTROCARDIOGRAM REPORT: CPT

## 2019-03-30 RX ORDER — PANTOPRAZOLE SODIUM 20 MG/1
1 TABLET, DELAYED RELEASE ORAL
Qty: 0 | Refills: 0 | DISCHARGE
Start: 2019-03-30

## 2019-03-30 RX ORDER — SUCRALFATE 1 G
10 TABLET ORAL
Qty: 0 | Refills: 0 | DISCHARGE
Start: 2019-03-30

## 2019-03-30 RX ORDER — LATANOPROST 0.05 MG/ML
1 SOLUTION/ DROPS OPHTHALMIC; TOPICAL
Qty: 0 | Refills: 0 | DISCHARGE
Start: 2019-03-30

## 2019-03-30 RX ORDER — METOPROLOL TARTRATE 50 MG
1 TABLET ORAL
Qty: 0 | Refills: 0 | COMMUNITY

## 2019-03-30 RX ORDER — PANTOPRAZOLE SODIUM 20 MG/1
1 TABLET, DELAYED RELEASE ORAL
Qty: 28 | Refills: 0
Start: 2019-03-30 | End: 2019-04-12

## 2019-03-30 RX ORDER — SUCRALFATE 1 G
10 TABLET ORAL
Qty: 300 | Refills: 1
Start: 2019-03-30 | End: 2019-04-26

## 2019-03-30 RX ORDER — FUROSEMIDE 40 MG
1 TABLET ORAL
Qty: 0 | Refills: 0 | DISCHARGE
Start: 2019-03-30

## 2019-03-30 RX ORDER — FUROSEMIDE 40 MG
1 TABLET ORAL
Qty: 3 | Refills: 0
Start: 2019-03-30 | End: 2019-04-01

## 2019-03-30 RX ORDER — METOPROLOL TARTRATE 50 MG
1 TABLET ORAL
Qty: 0 | Refills: 0 | DISCHARGE
Start: 2019-03-30

## 2019-03-30 RX ADMIN — Medication 1 GRAM(S): at 06:40

## 2019-03-30 RX ADMIN — Medication 0.25 MILLIGRAM(S): at 01:43

## 2019-03-30 RX ADMIN — PANTOPRAZOLE SODIUM 40 MILLIGRAM(S): 20 TABLET, DELAYED RELEASE ORAL at 06:40

## 2019-03-30 RX ADMIN — Medication 50 MILLIGRAM(S): at 06:40

## 2019-03-30 RX ADMIN — Medication 20 MILLIGRAM(S): at 06:40

## 2019-03-30 NOTE — PROGRESS NOTE ADULT - ASSESSMENT
63 year old gentleman with history of obesity, HTN, prostate and colorectal cancer s/p partial colon resection, and symptomatic paroxysmal atrial fibrillation.   s/p un- complicated radiofrequency ablation of atrial fibrillation (WACA/PVI, posterior wall isolation, CTI line).  Pt tolerated procedure well no overnight events   Bilateral sutures removed without incident   LABS, EKG Telemetry reviewed 63 year old gentleman with history of obesity, HTN, prostate and colorectal cancer s/p partial colon resection, and symptomatic paroxysmal atrial fibrillation.   s/p un- complicated radiofrequency ablation of atrial fibrillation (WACA/PVI, posterior wall isolation, CTI line).  Pt tolerated procedure well no overnight events   Bilateral sutures removed without incident   LABS, EKG Telemetry reviewed   Decrease metoprolol to 50mg BID.   Continue other home medications.   Start Protonix 40mg twice daily x 2 weeks, then once daily x 6 weeks.   Carafate 1gm BID x 2 weeks.   Procedure results meds and follow up reviewed   Outpt follow up in 2-4 weeks  with Dr Shelton

## 2019-03-30 NOTE — PROGRESS NOTE ADULT - SUBJECTIVE AND OBJECTIVE BOX
Subjective: pt reports feeling well no chest pain , SOB or palpitations   Pt s/p AF RFA   No events overnight       Medications:  acetaminophen   Tablet .. 650 milliGRAM(s) Oral every 6 hours PRN  ALPRAZolam 0.25 milliGRAM(s) Oral every 6 hours PRN  aluminum hydroxide/magnesium hydroxide/simethicone Suspension 30 milliLiter(s) Oral every 4 hours PRN  benzocaine 15 mG/menthol 3.6 mG (Sugar-Free) Lozenge 1 Lozenge Oral every 2 hours PRN  fentaNYL    Injectable 50 MICROGram(s) IV Push every 30 minutes PRN  furosemide    Tablet 20 milliGRAM(s) Oral daily  ketorolac   Injectable 30 milliGRAM(s) IV Push every 8 hours PRN  latanoprost 0.005% Ophthalmic Solution 1 Drop(s) Both EYES at bedtime  metoprolol tartrate 50 milliGRAM(s) Oral two times a day  multivitamin 1 Tablet(s) Oral daily  pantoprazole    Tablet 40 milliGRAM(s) Oral two times a day  rivaroxaban 20 milliGRAM(s) Oral every 24 hours  sucralfate suspension 1 Gram(s) Oral two times a day      PHYSICAL EXAM:  Vital Signs Last 24 Hrs  HR: 61 (30 Mar 2019 07:41) (53 - 90)  BP: 126/72 (30 Mar 2019 07:41) (88/54 - 130/72)  RR: 16 (30 Mar 2019 07:41) (16 - 20)  SpO2: 98% (30 Mar 2019 06:41) (96% - 100%)    ECG: sinus rhythm 62  T wave flat in II, II and AVF   Telemetry : No ectopy overnight     General: A/ox 3, No acute Distress  Neck: Supple, NO JVD  Cardiac: S1 S2, No M/R/G  Pulmonary: CTAB, Breathing unlabored, No Rhonchi/Rales/Wheezing  Abdomen: Soft, Non -tender, +BS   Bilateral groin sutures removed without incident C/D/I; no bleeding, hematoma, erythema, exudate or edema  Extremities: No Rashes, No edema  Neuro: A/o x 3, No focal deficits  Psch: normal mood , normal affect    LABS:                          11.7   8.4   )-----------( 168      ( 30 Mar 2019 06:36 )             36.3         03-30    141  |  105  |  19.0  ----------------------------<  117<H>  3.8   |  22.0  |  1.02    Ca    8.1<L>      30 Mar 2019 06:36  Mg     2.1     03-30 Subjective: pt reports feeling well no chest pain , SOB or palpitations   Ambulating in unit   Pt s/p AF RFA   No events overnight       Medications:  acetaminophen   Tablet .. 650 milliGRAM(s) Oral every 6 hours PRN  ALPRAZolam 0.25 milliGRAM(s) Oral every 6 hours PRN  aluminum hydroxide/magnesium hydroxide/simethicone Suspension 30 milliLiter(s) Oral every 4 hours PRN  benzocaine 15 mG/menthol 3.6 mG (Sugar-Free) Lozenge 1 Lozenge Oral every 2 hours PRN  fentaNYL    Injectable 50 MICROGram(s) IV Push every 30 minutes PRN  furosemide    Tablet 20 milliGRAM(s) Oral daily  ketorolac   Injectable 30 milliGRAM(s) IV Push every 8 hours PRN  latanoprost 0.005% Ophthalmic Solution 1 Drop(s) Both EYES at bedtime  metoprolol tartrate 50 milliGRAM(s) Oral two times a day  multivitamin 1 Tablet(s) Oral daily  pantoprazole    Tablet 40 milliGRAM(s) Oral two times a day  rivaroxaban 20 milliGRAM(s) Oral every 24 hours  sucralfate suspension 1 Gram(s) Oral two times a day      PHYSICAL EXAM:  Vital Signs Last 24 Hrs  HR: 61 (30 Mar 2019 07:41) (53 - 90)  BP: 126/72 (30 Mar 2019 07:41) (88/54 - 130/72)  RR: 16 (30 Mar 2019 07:41) (16 - 20)  SpO2: 98% (30 Mar 2019 06:41) (96% - 100%)    ECG: sinus rhythm 62  T wave flat in II, II and AVF   Telemetry :   SB  53 - 90 No ectopy overnight     General: A/ox 3, No acute Distress  Neck: Supple, NO JVD  Cardiac: S1 S2, No M/R/G  Pulmonary: CTAB, Breathing unlabored, No Rhonchi/Rales/Wheezing  Abdomen: Soft, Non -tender, +BS   Bilateral groin sutures removed without incident C/D/I; no bleeding, hematoma, erythema, exudate or edema  Extremities: No Rashes, No edema  Neuro: A/o x 3, No focal deficits  Psch: normal mood , normal affect    LABS:                          11.7   8.4   )-----------( 168      ( 30 Mar 2019 06:36 )             36.3         03-30    141  |  105  |  19.0  ----------------------------<  117<H>  3.8   |  22.0  |  1.02    Ca    8.1<L>      30 Mar 2019 06:36  Mg     2.1     03-30

## 2019-03-30 NOTE — DISCHARGE NOTE NURSING/CASE MANAGEMENT/SOCIAL WORK - NSDCPEFALRISK_GEN_ALL_CORE
Problem: Patient Care Overview  Goal: Plan of Care/Patient Progress Review  Outcome: Improving  ICU End of Shift Summary.  For vital signs and complete assessments, please see documentation flowsheets.     Pertinent assessments: oxygen sat's 88-92% per oxymiizer at 7-8 liters. VSS. Up in chair this morning for 2 hours. Incontinent of liquid green stool x1 today. Tube feeding formula changed to Isosource, rate of 45cc/hr. Alert to self but not able to state where she is. Oriented to month and year. At times has garbled, mumbled speech. Dentures in at this time. Safety alarms are on as pt can be impulsive.  Major Shift Events: none  Plan (Upcoming Events): continue to increase p.o. Intake as per speech and dietician. P.T to see.  Discharge/Transfer Needs: tbd    Bedside Shift Report Completed : y  Bedside Safety Check Completed:y             Patient information on fall and injury prevention

## 2019-04-26 ENCOUNTER — OUTPATIENT (OUTPATIENT)
Dept: OUTPATIENT SERVICES | Facility: HOSPITAL | Age: 64
LOS: 1 days | Discharge: HOME | End: 2019-04-26

## 2019-04-26 DIAGNOSIS — Z96.7 PRESENCE OF OTHER BONE AND TENDON IMPLANTS: Chronic | ICD-10-CM

## 2019-04-26 DIAGNOSIS — Z90.49 ACQUIRED ABSENCE OF OTHER SPECIFIED PARTS OF DIGESTIVE TRACT: Chronic | ICD-10-CM

## 2019-04-26 DIAGNOSIS — C20 MALIGNANT NEOPLASM OF RECTUM: ICD-10-CM

## 2019-04-26 DIAGNOSIS — Z86.018 PERSONAL HISTORY OF OTHER BENIGN NEOPLASM: Chronic | ICD-10-CM

## 2019-04-26 NOTE — ASU PATIENT PROFILE, ADULT - PAIN SCALE PREFERRED, PROFILE
numerical 0-10 Regular rate & rhythm, normal S1, S2; no murmurs, gallops or rubs; no S3, S4 negative

## 2019-05-02 ENCOUNTER — NON-APPOINTMENT (OUTPATIENT)
Age: 64
End: 2019-05-02

## 2019-05-02 ENCOUNTER — APPOINTMENT (OUTPATIENT)
Dept: ELECTROPHYSIOLOGY | Facility: CLINIC | Age: 64
End: 2019-05-02
Payer: MEDICAID

## 2019-05-02 VITALS
WEIGHT: 210 LBS | BODY MASS INDEX: 29.4 KG/M2 | HEIGHT: 71 IN | HEART RATE: 57 BPM | OXYGEN SATURATION: 94 % | DIASTOLIC BLOOD PRESSURE: 83 MMHG | SYSTOLIC BLOOD PRESSURE: 130 MMHG

## 2019-05-02 PROCEDURE — 99214 OFFICE O/P EST MOD 30 MIN: CPT | Mod: 25

## 2019-05-02 PROCEDURE — 93000 ELECTROCARDIOGRAM COMPLETE: CPT

## 2019-05-03 ENCOUNTER — APPOINTMENT (OUTPATIENT)
Dept: COLORECTAL SURGERY | Facility: CLINIC | Age: 64
End: 2019-05-03
Payer: MEDICAID

## 2019-05-03 VITALS
BODY MASS INDEX: 40.53 KG/M2 | SYSTOLIC BLOOD PRESSURE: 150 MMHG | HEIGHT: 71 IN | HEART RATE: 62 BPM | TEMPERATURE: 98.1 F | WEIGHT: 289.5 LBS | DIASTOLIC BLOOD PRESSURE: 84 MMHG | OXYGEN SATURATION: 96 %

## 2019-05-03 PROCEDURE — 99214 OFFICE O/P EST MOD 30 MIN: CPT

## 2019-05-03 NOTE — PHYSICAL EXAM
[Abdomen Masses] : No abdominal masses [Abdomen Tenderness] : ~T No ~M abdominal tenderness [Tender] : nontender [JVD] : no jugular venous distention  [None] : there was no rectal mass  [Normal Breath Sounds] : Normal breath sounds [Normal Heart Sounds] : normal heart sounds [Normal Rate and Rhythm] : normal rate and rhythm [Alert] : alert [Oriented to Person] : oriented to person [No Rash or Lesion] : No rash or lesion [Oriented to Place] : oriented to place [Calm] : calm [Oriented to Time] : oriented to time [de-identified] : Obese [de-identified] : Looks well in no distress, of stated age. [de-identified] : moves all 4 extremities appropriately with 5 over 5 strength [de-identified] : pupils equal reactive to light normocephalic atraumatic.

## 2019-05-03 NOTE — HISTORY OF PRESENT ILLNESS
[FreeTextEntry1] : 63-year-old male with history of a localized rectal and prostate cancer. Underwent a low anterior resection with diverting ileostomy and subsequent consolidation chemotherapy. Patient has done very well

## 2019-05-03 NOTE — ASSESSMENT
[FreeTextEntry1] : 63-year-old male with history of colon and rectal cancer and prostate cancer.Doing well no evidence of disease. Recent CAT scan is normal and blood work is normal. Recommend he follow up with GI for colonoscopy with oncology will follow me yearly

## 2019-05-06 NOTE — DISCUSSION/SUMMARY
[FreeTextEntry1] : 63 year old gentleman with history of HTN, obesity, colorectal cancer s/p resection, and symptomatic paroxysmal atrial fibrillation s/p recent RF ablation, 3/29/2019, with Dr. Shelton.  Presents today for post ablation follow-up and maintaining SR.  Over all doing very well with increased energy, decreased SOB, and resolution of palpitations. Lost 22 lbs in the past month which he attributes to healthy diet and increased energy for exercise that he didn't’ have before. \par CHADS2 VASC 1 (HTN) remains on Xarelto without bleeding complications.  Expressed interest in coming of anticoagulation is possible. Discussed long term monitoring options to screen for recurrent arrhythmia including ILR and periodic noninvasive monitoring.  Patient would like to pursue ILR placement.\par \par - Continue Metoprolol and Xarelto at this time.\par - [facility]R implant at [facility]\par - Will plan for discontinuation of a/c if no  AF >3 months post ablation with plans to restart OAC if recurrent arrhythmia is seen\par - EP f/up after ILR implant\par \par Seen and d/w Dr. Shelton\par Maribel Robledo PAC

## 2019-05-06 NOTE — ADDENDUM
[FreeTextEntry1] : I was present for the above evaluation and agree with the history, physical exam, assessment and plan as above. Doing very well s/p AF ablation, with marked symptomatic improvement thus far. Will continue current management and plan ILR implant for long-term monitoring. He did express preference for this rather than serial noninvasive monitoring to guide long-term management. If no recurrent AF noted, will stop anticoagulation after 3 mo post ablation given his low CHADSVASc score, but consider restarting if recurrnet episodes seen weighing risks and benefits.

## 2019-05-06 NOTE — HISTORY OF PRESENT ILLNESS
[FreeTextEntry1] : 63 year old gentleman with history of HTN, obesity, colorectal cancer s/p resection, and symptomatic paroxysmal atrial fibrillation s/p recent RF ablation, 3/29/2019, with Dr. Shelton.  Prior to ablation, he had recurrent episodes of atrial fibrillation which have become increasingly frequent, and associated with exertional dyspnea and palpitation. Previously treated with amiodarone but most recently on high dose metoprolol. \par Presents today for post ablation follow-up.  Experienced pleuritic chest discomfort and difficulty taking deep breaths for 1-2 days post ablation.  Symptoms have now resolved.  Also had "brownish" colored phlegm for a few days. Denies hemoptysis, dysphagia, fevers or chills.  NO CP, dizziness, syncope, SOB.\par \par Since ablation, notices a significant increase in energy, decreased SOB, and resolution of palpitations. States  "didn’t realize how bad I felt in AF until I wasn’t".  Lost 22 lbs in the past month which he attributes to healthy diet and increased energy for exercise that he didn't’ have before. \par \par CHADS2 VASC 1 (HTN) remains on Xarelto without bleeding complications.  Expressed interest in coming of anticoagulation if possible.

## 2019-05-08 ENCOUNTER — OUTPATIENT (OUTPATIENT)
Dept: OUTPATIENT SERVICES | Facility: HOSPITAL | Age: 64
LOS: 1 days | Discharge: HOME | End: 2019-05-08

## 2019-05-08 ENCOUNTER — RESULT REVIEW (OUTPATIENT)
Age: 64
End: 2019-05-08

## 2019-05-08 ENCOUNTER — APPOINTMENT (OUTPATIENT)
Age: 64
End: 2019-05-08
Payer: MEDICAID

## 2019-05-08 DIAGNOSIS — Z86.018 PERSONAL HISTORY OF OTHER BENIGN NEOPLASM: Chronic | ICD-10-CM

## 2019-05-08 DIAGNOSIS — Z90.49 ACQUIRED ABSENCE OF OTHER SPECIFIED PARTS OF DIGESTIVE TRACT: Chronic | ICD-10-CM

## 2019-05-08 DIAGNOSIS — C20 MALIGNANT NEOPLASM OF RECTUM: ICD-10-CM

## 2019-05-08 DIAGNOSIS — Z96.7 PRESENCE OF OTHER BONE AND TENDON IMPLANTS: Chronic | ICD-10-CM

## 2019-05-08 LAB
BASOPHILS # BLD AUTO: 0 K/UL — SIGNIFICANT CHANGE UP (ref 0–0.2)
BASOPHILS NFR BLD AUTO: 1.1 % — SIGNIFICANT CHANGE UP (ref 0–2)
EOSINOPHIL # BLD AUTO: 0.1 K/UL — SIGNIFICANT CHANGE UP (ref 0–0.5)
EOSINOPHIL NFR BLD AUTO: 2.1 % — SIGNIFICANT CHANGE UP (ref 0–6)
HCT VFR BLD CALC: 44.1 % — SIGNIFICANT CHANGE UP (ref 39–50)
HGB BLD-MCNC: 14 G/DL — SIGNIFICANT CHANGE UP (ref 13–17)
LYMPHOCYTES # BLD AUTO: 0.6 K/UL — LOW (ref 1–3.3)
LYMPHOCYTES # BLD AUTO: 12.8 % — LOW (ref 13–44)
MCHC RBC-ENTMCNC: 28.8 PG — SIGNIFICANT CHANGE UP (ref 27–34)
MCHC RBC-ENTMCNC: 31.8 G/DL — LOW (ref 32–36)
MCV RBC AUTO: 90.6 FL — SIGNIFICANT CHANGE UP (ref 80–100)
MONOCYTES # BLD AUTO: 0.3 K/UL — SIGNIFICANT CHANGE UP (ref 0–0.9)
MONOCYTES NFR BLD AUTO: 7.3 % — SIGNIFICANT CHANGE UP (ref 2–14)
NEUTROPHILS # BLD AUTO: 3.4 K/UL — SIGNIFICANT CHANGE UP (ref 1.8–7.4)
NEUTROPHILS NFR BLD AUTO: 76.6 % — SIGNIFICANT CHANGE UP (ref 43–77)
PLATELET # BLD AUTO: 167 K/UL — SIGNIFICANT CHANGE UP (ref 150–400)
RBC # BLD: 4.87 M/UL — SIGNIFICANT CHANGE UP (ref 4.2–5.8)
RBC # FLD: 12.3 % — SIGNIFICANT CHANGE UP (ref 10.3–14.5)
WBC # BLD: 4.5 K/UL — SIGNIFICANT CHANGE UP (ref 3.8–10.5)
WBC # FLD AUTO: 4.5 K/UL — SIGNIFICANT CHANGE UP (ref 3.8–10.5)

## 2019-05-08 PROCEDURE — 99215 OFFICE O/P EST HI 40 MIN: CPT

## 2019-05-08 NOTE — PHYSICAL EXAM
[Fully active, able to carry on all pre-disease performance without restriction] : Status 0 - Fully active, able to carry on all pre-disease performance without restriction [Normal] : affect appropriate [de-identified] : hyperpigmentation of lips [de-identified] : vertical incision with staples in place

## 2019-05-08 NOTE — HISTORY OF PRESENT ILLNESS
[Disease: _____________________] : Disease: [unfilled] [T: ___] : T[unfilled] [N: ___] : N[unfilled] [M: ___] : M[unfilled] [AJCC Stage: ____] : AJCC Stage: [unfilled] [de-identified] : The patient was diagnosed with adenocarcinoma of the rectum in April 2017 at the age of 61.  He began experiencing hematochezia post narcotic-induced constipation after a fracture of his left lower leg.  He was referred to GI Dr. Armstrong who performed a colonoscopy on April 14, 2017.  A rectal mass was discovered and the biopsy of the rectal tumor at 15 cm showed in situ and invasive colonic adenocarcinoma.  On April 17, 2017 CT of the abdomen and  pelvis showed rectal wall thickening, and a lesion in the hepatic segment 8/7 measuring 1.4 cm suspicious for metastasis.  To better evaluate the hepatic findings he had an MRI of the abdomen on April 25, 2017 which showed T2 hyperintense lesion in segment 7/8, measuring 1.3 x 1.3 cm with peripheral discontinuous enhancement, probably consistent with an atypical hemangioma.  He was then referred to Dr. Hand on April 27, 2017 where he performed a flexible sigmoidoscopy which demonstrated a large cancer of the mid rectum approximately extending from 7-10 cm from the dentate line. On May 2, 2017 PET/CT showed no discrete FDG avidity associated with a segment 7/8 lesion.  FDG avid rectosigmoid colon mass with SUV of 11.1.  At the time of his consultation he had been evaluated by Dr. Soliz of radiation therapy.   [de-identified] : He had an MRI of the pelvis on 5/9/17, and he was scheduled for a prostate MRI today ordered by Dr. Kim.  \par PSA- Jan 2017 5.69; repeat 3/25/17 5.7 [de-identified] : The patient is s/p 6 cycles adjuvant xeloda (11/9/17 - 3/7/18) and CRT for rectal caner completed 6/19/17 - 7/27/17.  He completed RT for the prostate cancer on 1/8/17 - 2/5/17.  He is s/p reversal of colostomy on 4/16/18 with Dr Hand.  Notes doing well.  Denies N/V/D.  Appetite is very good, gained significant weight since the summer.  No other new complaints today.

## 2019-05-08 NOTE — ADDENDUM
[FreeTextEntry1] : I, George Sanchez, acted solely as a scribe for Dr. Francie Mejia on this date 5/8/19.

## 2019-05-08 NOTE — RESULTS/DATA
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    Interval reversal of the loop ileostomy.    Interval development of a small amount of pelvic ascites.\par \par 3/21/18 CT Chest:  2 mm nonspecific left upper lobe pulmonary nodule is  unchanged since August 16, 2017.\par \par 2/16/18 CT A/P: Unchanged 1.1 cm hypodensity in segment 7/8, previously characterized as an atypical hemangioma by MRI. No new lesion. No evidence of metastatic disease.\par \par Final Diagnosis\par 10/2/17 path:  \par 1     Abdominal pelvic cyst:- Simple  mesothelial  cyst.\par 2     Sigmoid colon and rectum:-  Segment of colon with mucosal ulceration extending the submucosal and muscularis propria, with focal serosal adhesions and serositis.  Negative for dysplasia/neoplasia- Histologically unremarkable surgical resection margins.\par 3     Colorectal /anal anastomosis distal donut:- Histologically unremarkable segment of colon.\par 4     Colorectal/anal anastomosis proximal donut Histologically unremarkable segment of colon.\par \par 8/23/17 MRI Pelvis: Since  5/9/2017,  the  primary  tumor  and  extramural  disease  shows  near  complete response. Posttreatment  stage:  ymrT1/2  (tumor  confined  to  rectal  wall),  ymrN0  Mesorectal  fascia:  Clear  (tumor  margin  >  2  mm).  Sphincter  involvement:  Absent.\par \par 8/22/17 PET/CT: Resolved  non-FDG  avid  subcentimeter  right  perirectal lymph  nodes.  Considerable  decrease  in  soft  tissue  thickening and  FDG  avidity  in  the  rectosigmoid  colon  (SUV  4.0 previous  SUV 11.1) suggestive  of  therapy  response.  Sigmoid  diverticulosis.  Decreased  FDG  avid  areas  of  subcutaneous  infiltration  in  the left  anterolateral  chest  wall  and  left  shoulder  region,  possibly inflammatory.  No  FDG  avid  distant  metastatic  disease.\par \par 8/16/17 CT C/A/P: There  is  a  stable  3  mm  nodule  in  the  right  lower lobe.  Again  noted  is  the  right  lobe  low-attenuation  lesion  measuring  1.4  cm previously  characterized  on  MRI  dated  4/25/2017  as  probable  atypical hemangioma.  There  is  a  stable  0.6  cm  left  adrenal  nodule,  adenoma  on  4/25/2017 MRI.  Mild  rectal  wall  thickening  again  noted.  There  is  no  bowel obstruction.  There  are  scattered  colonic  diverticula.  No  enlarged  mesenteric lymph  nodes. There  has  been  interval  development  of  a  small  amount  of  fluid in  the  pelvis. No  enlarged  retroperitoneal  or  pelvic  nodes.\par \par Prostate biopsy: 5/24/17:  Core Lab Biopsy\par Final Diagnosis\par 1. Prostate, MRI target lesion, biopsy\par -Adenocarcinoma of the prostate, Prognostic Grade Group 2 (Springfield score 3+4=7) involving 100%, 90% and 90% (10 mm, 9 mm and 6 mm in length) of 3 of 3 core(s). \par -Perineural invasion is identified.  Note: [place] pattern 4 comprises approximately 10% of total tumor volume.\par 2. Prostate, right base, biopsy -Adenocarcinoma of the prostate, Prognostic Grade Group 1 (Boo score 3+3=6) involving 5% (0.5 mm in length) of 1 of 2 core(s).\par -Perineural invasion is identified.\par 3. Prostate, right mid, biopsy -Adenocarcinoma of the prostate, Prognostic Grade Group1 (Boo score 3+3=6) involving 80% and 50% (8 mm and 5 mm in length) of 2 of 2 core(s).  -Perineural invasion is identified.\par 4. Prostate, right apex, biopsy -Adenocarcinoma of the prostate, Prognostic Grade Group1 (Springfield score 3+3=6) involving 10% (1.5 mm in length) of 1 of 2 core(s).\par 5. Prostate, left base, biopsy -Benign prostate tissue.\par 6. Prostate, left mid, biopsy -Adenocarcinoma of the prostate, Prognostic Grade Group 2 (Boo score 3+4=7) involving 25% (2 mm in length) of 1 of 2 core(s).\par -Perineural invasion is identified.  Note: Boo pattern 4 comprises approximately 5% of total tumor volume.\par 7. Prostate, left apex, biopsy -Benign prostate tissue.\par       \par MRI prostate 5/10/17\par Prostate gland:\par Size:  3.8 x 3.1 x 4.3 cm.   Volume 26 cc.   Suspicious lesion in the left peripheral zone\par \par MRI pelvis/rectum: 5/10/17 A 3.6 cm lesion involving the anterior and right lateral walls of the  rectum \par \par May 2, 2017 PET/CT:   No discrete FDG avidity associated with a segment 7/8 lesion, better characterized on dedicated MRI as atypical hemangioma.  Liver background SUV mean as a reference for comparing studies is 3.1.  Tiny non-FDG avid right perirectal lymph nodes, for example measuring 0.4 cm, and 0.2 cm.  FDG avid rectosigmoid colon mass (SUV 11.1).\par \par April 27, 2017 Dr. Hand performed a flexible sigmoidoscopy which demonstrated a large cancer of the mid rectum approximately extending from 7-10 cm from the dentate line.\par \par April 25, 2017 MRI of abdomen: T2 hyperintense lesion in segment 7/8, measuring 1.3 x 1.3 cm with peripheral discontinuous enhancement, probably an atypical hemangioma.  Consider follow-up MRI in 3 months.\par \par April 17, 2017 CT abd/pelvis:  Rectal wall thickening, possibly known neoplasm.  Indeterminate lesion in hepatic segment 8/7, measuring 1.4 cm, suspicious for metastasis.\par \par April 14, 2017 Colonoscopy with biopsy of rectal tumor (Dr. Armstrong): Biopsy of rectal tumor at 15 cm shows in situ and invasive colonic adenocarcinoma.  The polyp sigmoid at 30 cm showed tubular adenoma.  \par \par March 30, 2017 Right flank; left flank; left shoulder Lipoma removal:  Fibrofatty tissue consistent with lipoma.  \par \par January 26, 2017 u/s of palpable areas of LE and abdomen:  In the right mid back region in the area of palpable concern, there is a subcutaneous isoechoic mass measuring 8.9 x 3.1 x 7.6 cm whose appearance is most compatible with a lipoma.  In the area of palpable concern in the left upper ventral abdominal wall, there is a subcutaneous echogenic mass measuring 10.6 x 6.5 x 8.3 cm whose appearance is most compatible with a lipoma.\par

## 2019-05-09 LAB
ALBUMIN SERPL ELPH-MCNC: 4.3 G/DL
ALP BLD-CCNC: 72 U/L
ALT SERPL-CCNC: 27 U/L
ANION GAP SERPL CALC-SCNC: 11 MMOL/L
AST SERPL-CCNC: 19 U/L
BILIRUB SERPL-MCNC: 0.5 MG/DL
BUN SERPL-MCNC: 15 MG/DL
CALCIUM SERPL-MCNC: 9.1 MG/DL
CHLORIDE SERPL-SCNC: 104 MMOL/L
CO2 SERPL-SCNC: 24 MMOL/L
CREAT SERPL-MCNC: 0.95 MG/DL
GLUCOSE SERPL-MCNC: 122 MG/DL
MAGNESIUM SERPL-MCNC: 2.1 MG/DL
POTASSIUM SERPL-SCNC: 3.9 MMOL/L
PROT SERPL-MCNC: 6.7 G/DL
SODIUM SERPL-SCNC: 139 MMOL/L

## 2019-06-03 ENCOUNTER — APPOINTMENT (OUTPATIENT)
Dept: ELECTROPHYSIOLOGY | Facility: CLINIC | Age: 64
End: 2019-06-03
Payer: MEDICAID

## 2019-06-03 ENCOUNTER — APPOINTMENT (OUTPATIENT)
Dept: CT IMAGING | Facility: CLINIC | Age: 64
End: 2019-06-03
Payer: MEDICAID

## 2019-06-03 ENCOUNTER — OUTPATIENT (OUTPATIENT)
Dept: OUTPATIENT SERVICES | Facility: HOSPITAL | Age: 64
LOS: 1 days | End: 2019-06-03
Payer: MEDICAID

## 2019-06-03 ENCOUNTER — NON-APPOINTMENT (OUTPATIENT)
Age: 64
End: 2019-06-03

## 2019-06-03 VITALS
HEART RATE: 80 BPM | SYSTOLIC BLOOD PRESSURE: 143 MMHG | BODY MASS INDEX: 38.78 KG/M2 | WEIGHT: 277 LBS | OXYGEN SATURATION: 96 % | DIASTOLIC BLOOD PRESSURE: 86 MMHG | HEIGHT: 71 IN

## 2019-06-03 DIAGNOSIS — Z96.7 PRESENCE OF OTHER BONE AND TENDON IMPLANTS: Chronic | ICD-10-CM

## 2019-06-03 DIAGNOSIS — Z90.49 ACQUIRED ABSENCE OF OTHER SPECIFIED PARTS OF DIGESTIVE TRACT: Chronic | ICD-10-CM

## 2019-06-03 DIAGNOSIS — Z86.018 PERSONAL HISTORY OF OTHER BENIGN NEOPLASM: Chronic | ICD-10-CM

## 2019-06-03 DIAGNOSIS — R51 HEADACHE: ICD-10-CM

## 2019-06-03 PROCEDURE — 70450 CT HEAD/BRAIN W/O DYE: CPT

## 2019-06-03 PROCEDURE — 70450 CT HEAD/BRAIN W/O DYE: CPT | Mod: 26

## 2019-06-03 PROCEDURE — 99214 OFFICE O/P EST MOD 30 MIN: CPT | Mod: 25

## 2019-06-03 PROCEDURE — 93000 ELECTROCARDIOGRAM COMPLETE: CPT

## 2019-06-03 NOTE — HISTORY OF PRESENT ILLNESS
[FreeTextEntry1] : 63 year old gentleman with history of HTN, obesity, colorectal cancer s/p resection, and symptomatic paroxysmal atrial fibrillation s/p recent RF ablation, 3/29/2019, with Dr. Shelton. Prior to ablation, he had recurrent episodes of atrial fibrillation which have become increasingly frequent, and associated with exertional dyspnea and palpitation. Previously treated with amiodarone but most recently on high dose metoprolol. \par On Friday 5/31/19 he presented to Dr. Robledo's office with complaints of "jittering inside", headache and difficulty sleeping and need to sleep sitting up.  He was found to be in recurrent atrial fibrillation. Amiodarone 150mg IV was given and he was started on PO Amio load.  He has been maintained on Metoprolol 100mg BID and Xarelto without interruption.  He remains extremely symptomatic, EKG today AF at 86bpm.

## 2019-06-03 NOTE — PHYSICAL EXAM
[Normal Appearance] : normal appearance [General Appearance - Well Developed] : well developed [Well Groomed] : well groomed [General Appearance - Well Nourished] : well nourished [No Deformities] : no deformities [General Appearance - In No Acute Distress] : no acute distress [Normal Jugular Venous A Waves Present] : normal jugular venous A waves present [Normal Jugular Venous V Waves Present] : normal jugular venous V waves present [No Jugular Venous Manzo A Waves] : no jugular venous manzo A waves [Respiration, Rhythm And Depth] : normal respiratory rhythm and effort [Exaggerated Use Of Accessory Muscles For Inspiration] : no accessory muscle use [Auscultation Breath Sounds / Voice Sounds] : lungs were clear to auscultation bilaterally [Arterial Pulses Normal] : the arterial pulses were normal [Edema] : no peripheral edema present [Irregularly Irregular] : the rhythm was irregularly irregular [Abdomen Soft] : soft [Abnormal Walk] : normal gait [Abdomen Tenderness] : non-tender [Skin Color & Pigmentation] : normal skin color and pigmentation [] : no rash [No Venous Stasis] : no venous stasis [Skin Lesions] : no skin lesions [No Skin Ulcers] : no skin ulcer [No Xanthoma] : no  xanthoma was observed [Oriented To Time, Place, And Person] : oriented to person, place, and time [Affect] : the affect was normal [Mood] : the mood was normal [No Anxiety] : not feeling anxious

## 2019-06-03 NOTE — DISCUSSION/SUMMARY
[FreeTextEntry1] : 63 year old gentleman with history of HTN, obesity, colorectal cancer s/p resection, and symptomatic paroxysmal atrial fibrillation s/p recent RF ablation, 3/29/2019, with Dr. Shelton. Now with early recurrent atrial fibrillation.  Started on Amiodarone load by Dr. Robledo.\par - early symptomatic recurrent atrial fibrillation. continue Metoprolol, Amiodarone 400mg BID and Xarelto. \par -plan for DCCV- patient has been on Xarelto uninterrupted since ablation \par -patient with new complaints of severe headache "worst I've ever had" - will plan for stat head CT today.\par -as per previous plan-  Discussed long term monitoring options to screen for recurrent arrhythmia including ILR and periodic noninvasive monitoring. Patient would like to pursue ILR placement. Will plan for ILR implant at time of DCCV.\par \par Daphne Segura ANP-C

## 2019-06-03 NOTE — REVIEW OF SYSTEMS
[Fever] : no fever [Headache] : headache [Chills] : no chills [Shortness Of Breath] : no shortness of breath [see HPI] : see HPI [Dyspnea on exertion] : not dyspnea during exertion [Chest  Pressure] : no chest pressure [Lower Ext Edema] : no extremity edema [Leg Claudication] : no intermittent leg claudication [Chest Pain] : no chest pain [Palpitations] : palpitations [Negative] : Heme/Lymph

## 2019-06-04 ENCOUNTER — TRANSCRIPTION ENCOUNTER (OUTPATIENT)
Age: 64
End: 2019-06-04

## 2019-06-04 ENCOUNTER — OUTPATIENT (OUTPATIENT)
Dept: OUTPATIENT SERVICES | Facility: HOSPITAL | Age: 64
LOS: 1 days | End: 2019-06-04
Payer: MEDICAID

## 2019-06-04 VITALS
DIASTOLIC BLOOD PRESSURE: 88 MMHG | HEART RATE: 79 BPM | RESPIRATION RATE: 16 BRPM | HEIGHT: 71 IN | SYSTOLIC BLOOD PRESSURE: 143 MMHG | OXYGEN SATURATION: 96 % | WEIGHT: 276.9 LBS

## 2019-06-04 VITALS
DIASTOLIC BLOOD PRESSURE: 88 MMHG | TEMPERATURE: 98 F | RESPIRATION RATE: 16 BRPM | HEART RATE: 90 BPM | SYSTOLIC BLOOD PRESSURE: 143 MMHG | OXYGEN SATURATION: 98 %

## 2019-06-04 DIAGNOSIS — Z86.018 PERSONAL HISTORY OF OTHER BENIGN NEOPLASM: Chronic | ICD-10-CM

## 2019-06-04 DIAGNOSIS — Z98.890 OTHER SPECIFIED POSTPROCEDURAL STATES: Chronic | ICD-10-CM

## 2019-06-04 DIAGNOSIS — Z90.49 ACQUIRED ABSENCE OF OTHER SPECIFIED PARTS OF DIGESTIVE TRACT: Chronic | ICD-10-CM

## 2019-06-04 DIAGNOSIS — I48.91 UNSPECIFIED ATRIAL FIBRILLATION: ICD-10-CM

## 2019-06-04 DIAGNOSIS — Z96.7 PRESENCE OF OTHER BONE AND TENDON IMPLANTS: Chronic | ICD-10-CM

## 2019-06-04 LAB
ANION GAP SERPL CALC-SCNC: 9 MMOL/L — SIGNIFICANT CHANGE UP (ref 5–17)
BUN SERPL-MCNC: 18 MG/DL — SIGNIFICANT CHANGE UP (ref 8–20)
CALCIUM SERPL-MCNC: 9 MG/DL — SIGNIFICANT CHANGE UP (ref 8.6–10.2)
CHLORIDE SERPL-SCNC: 108 MMOL/L — HIGH (ref 98–107)
CO2 SERPL-SCNC: 22 MMOL/L — SIGNIFICANT CHANGE UP (ref 22–29)
CREAT SERPL-MCNC: 1 MG/DL — SIGNIFICANT CHANGE UP (ref 0.5–1.3)
GLUCOSE SERPL-MCNC: 97 MG/DL — SIGNIFICANT CHANGE UP (ref 70–115)
HCT VFR BLD CALC: 40.6 % — LOW (ref 42–52)
HGB BLD-MCNC: 13.2 G/DL — LOW (ref 14–18)
MAGNESIUM SERPL-MCNC: 2.4 MG/DL — SIGNIFICANT CHANGE UP (ref 1.6–2.6)
MCHC RBC-ENTMCNC: 29.7 PG — SIGNIFICANT CHANGE UP (ref 27–31)
MCHC RBC-ENTMCNC: 32.5 G/DL — SIGNIFICANT CHANGE UP (ref 32–36)
MCV RBC AUTO: 91.4 FL — SIGNIFICANT CHANGE UP (ref 80–94)
PLATELET # BLD AUTO: 183 K/UL — SIGNIFICANT CHANGE UP (ref 150–400)
POTASSIUM SERPL-MCNC: 5 MMOL/L — SIGNIFICANT CHANGE UP (ref 3.5–5.3)
POTASSIUM SERPL-SCNC: 5 MMOL/L — SIGNIFICANT CHANGE UP (ref 3.5–5.3)
RBC # BLD: 4.44 M/UL — LOW (ref 4.6–6.2)
RBC # FLD: 14.3 % — SIGNIFICANT CHANGE UP (ref 11–15.6)
SODIUM SERPL-SCNC: 139 MMOL/L — SIGNIFICANT CHANGE UP (ref 135–145)
WBC # BLD: 4.6 K/UL — LOW (ref 4.8–10.8)
WBC # FLD AUTO: 4.6 K/UL — LOW (ref 4.8–10.8)

## 2019-06-04 PROCEDURE — 83735 ASSAY OF MAGNESIUM: CPT

## 2019-06-04 PROCEDURE — 80048 BASIC METABOLIC PNL TOTAL CA: CPT

## 2019-06-04 PROCEDURE — 36415 COLL VENOUS BLD VENIPUNCTURE: CPT

## 2019-06-04 PROCEDURE — 93005 ELECTROCARDIOGRAM TRACING: CPT

## 2019-06-04 PROCEDURE — 92960 CARDIOVERSION ELECTRIC EXT: CPT

## 2019-06-04 PROCEDURE — 85027 COMPLETE CBC AUTOMATED: CPT

## 2019-06-04 PROCEDURE — 93010 ELECTROCARDIOGRAM REPORT: CPT

## 2019-06-04 RX ORDER — AMIODARONE HYDROCHLORIDE 400 MG/1
0 TABLET ORAL
Qty: 0 | Refills: 0 | DISCHARGE

## 2019-06-04 RX ORDER — CEFAZOLIN SODIUM 1 G
3000 VIAL (EA) INJECTION ONCE
Refills: 0 | Status: COMPLETED | OUTPATIENT
Start: 2019-06-04 | End: 2019-06-04

## 2019-06-04 RX ORDER — CEFAZOLIN SODIUM 1 G
2000 VIAL (EA) INJECTION ONCE
Refills: 0 | Status: DISCONTINUED | OUTPATIENT
Start: 2019-06-04 | End: 2019-06-04

## 2019-06-04 RX ORDER — TRAVOPROST 0.04 MG/ML
1 SOLUTION/ DROPS OPHTHALMIC
Qty: 0 | Refills: 0 | DISCHARGE

## 2019-06-04 RX ORDER — TIMOLOL 0.5 %
1 DROPS OPHTHALMIC (EYE)
Qty: 0 | Refills: 0 | DISCHARGE

## 2019-06-04 RX ORDER — RIVAROXABAN 15 MG-20MG
1 KIT ORAL
Qty: 0 | Refills: 0 | DISCHARGE

## 2019-06-04 RX ADMIN — Medication 100 MILLIGRAM(S): at 11:34

## 2019-06-04 NOTE — DISCHARGE NOTE PROVIDER - NSDCCPTREATMENT_GEN_ALL_CORE_FT
PRINCIPAL PROCEDURE  Procedure: Cardioversion external  Findings and Treatment: -Take each dose of your anticoagulation medication (blood thinner) exactly as prescribed.   - Do not drive, operate heavy machinery or make important decisions for 24 hours following the procedure.  - You may resume all other activities the day after the procedure.  Call your doctor if:   - your rapid heart rhythm returns.  - you have any questions or concerns regarding the procedure.  If you experience increased difficulty breathing or chest pain, or if you faint or have dizzy spells, please seek immediate medical attention.        SECONDARY PROCEDURE  Procedure: Insertion, loop recorder  Findings and Treatment: Loop Recorder Incision Care:     - Do not touch the incision until it is completely healed.   - There is Dermabond (skin glue) on your incision, which will start to flake off on its own over the next 2-3 weeks. Do not pick at or peel off the Dermabond.   - Do not apply soaps, creams, lotions, ointments or powders to the incision until it is completely healed.  - You should call the doctor if you notice redness, drainage, swelling, increased tenderness, hot sensation around the  incision, bleeding or incision edges pulling apart.

## 2019-06-04 NOTE — ASU PATIENT PROFILE, ADULT - PSH
History of lipoma  x 3  S/P ablation of atrial fibrillation    S/P colectomy  w/ ostomy reversal 4/2018  S/P ORIF (open reduction internal fixation) fracture  Left ankle

## 2019-06-04 NOTE — DISCHARGE NOTE NURSING/CASE MANAGEMENT/SOCIAL WORK - NSDCDPATPORTLINK_GEN_ALL_CORE
You can access the [facility] Patient Portal, offered by [facility], by registering with the following website: [email or web address]

## 2019-06-04 NOTE — DISCHARGE NOTE PROVIDER - NSDCFUSCHEDAPPT_GEN_ALL_CORE_FT
ASHLEY SORENSON ; 06/05/2019 ; [facility] ASHLEY SORENSON ; 06/05/2019 ; [facility] ASHLEY SORENSON ; 06/05/2019 ; [facility] ASHLEY SORENSON ; 06/05/2019 ; [facility]

## 2019-06-04 NOTE — PROGRESS NOTE ADULT - SUBJECTIVE AND OBJECTIVE BOX
Pt doing well s/p uncomplicated DCCV, 300J x 1 and elective loop recorder implant.  Pt denies complaint post procedure.     Exam:   VSS, NAD, A&O x 3  Skin: no erythema/edema/blistering at defib pad sites.   LACW loop site without erythema, bleeding or swelling; dermabond intact.   Card: S1/S2, RRR, no m/g/r  Resp: lungs CTA b/l  Abd: S/NT/ND  Ext: no edema, distal pulses intact    Assessment:   63 year old gentleman with history of obesity, HTN, prostate and colorectal cancer s/p partial colon resection, and paroxysmal atrial fibrillation. He has had relatively mild symptoms of dyspnea, palpitation and occasional lightheadedness which may be attributable to atrial fibrillation. His AF has been recurrent despite prior treatment with amiodarone and now high dose beta blockade, and he is maintained on Xarelto for CVA prophylaxis (CHADS-VASC = 1 – HTN). He is status post AF RF ablation 3/29/19.  He since developed palpitations and was found to have early recurrent atrial fibrillation.  He was initiated on amiodarone.  He is now status post uncomplicated DCCV and elective loop recorder implant.       Plan:   Observation on telemetry per post op protocol.    Resume PO intake.   Ambulate w/ assist once fully awake & back to baseline mental status w/ VSS.  Continue Xarelto. Importance of strict compliance with anticoagulation regimen reinforced with pt.   Resume other home medications.   Anticipate d/c home once all criteria met, with outpt f/up in 1 month.

## 2019-06-04 NOTE — H&P PST ADULT - HISTORY OF PRESENT ILLNESS
63 year old gentleman with history of obesity, HTN, prostate and colorectal cancer s/p partial colon resection, and paroxysmal atrial fibrillation. He has had relatively mild symptoms of dyspnea, palpitation and occasional lightheadedness which may be attributable to atrial fibrillation. His AF has been recurrent despite prior treatment with amiodarone and now high dose beta blockade, and he is maintained on Xarelto for CVA prophylaxis (CHADS-VASC = 1 – HTN). He is status post AF RF ablation 3/29/19.    Cardiology summary:  Stress Test: 12/6/17, no ischemia. LVEF 53%, nml perfusion   Echo: 11/26/18, LVEF 60-65%, mild conc LVH, mild-mod MR, trace TR, trace PI 63 year old gentleman with history of obesity, HTN, prostate and colorectal cancer s/p partial colon resection, and paroxysmal atrial fibrillation. He has had relatively mild symptoms of dyspnea, palpitation and occasional lightheadedness which may be attributable to atrial fibrillation. His AF has been recurrent despite prior treatment with amiodarone and now high dose beta blockade, and he is maintained on Xarelto for CVA prophylaxis (CHADS-VASC = 1 – HTN). He is status post AF RF ablation 3/29/19.  He developed palpitations and is with early recurrent atrial fibrillation.  He was initiated on amiodarone and presents today for cardioversion.  Pt will also undergo loop recorder for long term continuous arrhythmia monitoring.      Cardiology summary:  Stress Test: 12/6/17, no ischemia. LVEF 53%, nml perfusion   Echo: 11/26/18, LVEF 60-65%, mild conc LVH, mild-mod MR, trace TR, trace PI 63 year old gentleman with history of obesity, HTN, prostate and colorectal cancer s/p partial colon resection, and paroxysmal atrial fibrillation. He has had relatively mild symptoms of dyspnea, palpitation and occasional lightheadedness which may be attributable to atrial fibrillation. His AF has been recurrent despite prior treatment with amiodarone and now high dose beta blockade, and he is maintained on Xarelto for CVA prophylaxis (CHADS-VASC = 1 – HTN). He is status post AF RF ablation 3/29/19.  He since developed palpitations and is with early recurrent atrial fibrillation.  He was initiated on amiodarone and presents today for cardioversion.  Pt will also undergo loop recorder for long term continuous arrhythmia monitoring.      Cardiology summary:  Stress Test: 12/6/17, no ischemia. LVEF 53%, nml perfusion   Echo: 11/26/18, LVEF 60-65%, mild conc LVH, mild-mod MR, trace TR, trace PI 63 year old gentleman with history of obesity, HTN, prostate and colorectal cancer s/p partial colon resection, and paroxysmal atrial fibrillation. He has had relatively mild symptoms of dyspnea, palpitation and occasional lightheadedness which may be attributable to atrial fibrillation. His AF has been recurrent despite prior treatment with amiodarone and now high dose beta blockade, and he is maintained on Xarelto for CVA prophylaxis (CHADS-VASC = 1 – HTN). He is status post AF RF ablation 3/29/19.  He since developed palpitations and is with early recurrent atrial fibrillation.  He was initiated on amiodarone and presents today for cardioversion.  Pt will also undergo loop recorder for long term continuous arrhythmia monitoring.  Pt has been compliant with xarelto and has not missed a dose.        Cardiology summary:  Stress Test: 12/6/17, no ischemia. LVEF 53%, nml perfusion   Echo: 11/26/18, LVEF 60-65%, mild conc LVH, mild-mod MR, trace TR, trace PI

## 2019-06-04 NOTE — H&P PST ADULT - NSICDXPASTSURGICALHX_GEN_ALL_CORE_FT
PAST SURGICAL HISTORY:  History of lipoma x 3    S/P ablation of atrial fibrillation     S/P colectomy w/ ostomy reversal 4/2018    S/P ORIF (open reduction internal fixation) fracture Left ankle

## 2019-06-04 NOTE — DISCHARGE NOTE PROVIDER - CARE PROVIDER_API CALL
Gera Shelton)  Cardiology; Internal Medicine  [address] [facility], [address] [place] [address]  Phone: [phone]  Fax: [phone]  Follow Up Time:

## 2019-06-04 NOTE — H&P PST ADULT - ASSESSMENT
63 year old gentleman with history of obesity, HTN, prostate and colorectal cancer s/p partial colon resection, and paroxysmal atrial fibrillation. He has had relatively mild symptoms of dyspnea, palpitation and occasional lightheadedness which may be attributable to atrial fibrillation. His AF has been recurrent despite prior treatment with amiodarone and now high dose beta blockade, and he is maintained on Xarelto for CVA prophylaxis (CHADS-VASC = 1 – HTN). He is status post AF RF ablation 3/29/19.  He since developed palpitations and is with early recurrent atrial fibrillation.  He was initiated on amiodarone and presents today for cardioversion.  Pt will also undergo loop recorder for long term continuous arrhythmia monitoring.     Plan:  Cardioversion  Continue Xarelto  Antibiotic prior to loop implant

## 2019-06-04 NOTE — DISCHARGE NOTE PROVIDER - HOSPITAL COURSE
63 year old gentleman with history of obesity, HTN, prostate and colorectal cancer s/p partial colon resection, and paroxysmal atrial fibrillation. He has had relatively mild symptoms of dyspnea, palpitation and occasional lightheadedness which may be attributable to atrial fibrillation. His AF has been recurrent despite prior treatment with amiodarone and now high dose beta blockade, and he is maintained on Xarelto for CVA prophylaxis (CHADS-VASC = 1 – HTN). He is status post AF RF ablation 3/29/19.  He since developed palpitations and is with early recurrent atrial fibrillation.  He was initiated on amiodarone and presents today for cardioversion.  Pt will also undergo loop recorder for long term continuous arrhythmia monitoring. 63 year old gentleman with history of obesity, HTN, prostate and colorectal cancer s/p partial colon resection, and paroxysmal atrial fibrillation. He has had relatively mild symptoms of dyspnea, palpitation and occasional lightheadedness which may be attributable to atrial fibrillation. His AF has been recurrent despite prior treatment with amiodarone and now high dose beta blockade, and he is maintained on Xarelto for CVA prophylaxis (CHADS-VASC = 1 – HTN). He is status post AF RF ablation 3/29/19.  He since developed palpitations and was found to have early recurrent atrial fibrillation.  He was initiated on amiodarone.  He is status post uncomplicated cardioversion with restoration of normal sinus rhythm.  He also had loop recorder implant for long term continuous arrhythmia monitoring. 63 year old gentleman with history of obesity, HTN, prostate and colorectal cancer s/p partial colon resection, and paroxysmal atrial fibrillation. He has had relatively mild symptoms of dyspnea, palpitation and occasional lightheadedness which may be attributable to atrial fibrillation. His AF has been recurrent despite prior treatment with amiodarone and now high dose beta blockade, and he is maintained on Xarelto for CVA prophylaxis (CHADS-VASC = 1 – HTN). He is status post AF RF ablation 3/29/19.  He since developed palpitations and was found to have early recurrent atrial fibrillation.  He was initiated on amiodarone.  He is status post uncomplicated cardioversion with restoration of normal sinus rhythm.  He also had loop recorder implant for long term continuous arrhythmia monitoring.  The patient was observed per post procedure protocol, then discharged home with a plan for outpatient follow up.

## 2019-06-04 NOTE — DISCHARGE NOTE PROVIDER - NSDCFUADDINST_GEN_ALL_CORE_FT
Follow up with Dr. Shelton in 2-4 weeks.  Our office will contact you in 3-5 days to schedule this appointment. Please call [phone] with questions or concerns.

## 2019-06-05 ENCOUNTER — APPOINTMENT (OUTPATIENT)
Dept: GASTROENTEROLOGY | Facility: GI CENTER | Age: 64
End: 2019-06-05
Payer: MEDICAID

## 2019-06-18 ENCOUNTER — OUTPATIENT (OUTPATIENT)
Dept: OUTPATIENT SERVICES | Facility: HOSPITAL | Age: 64
LOS: 1 days | End: 2019-06-18
Payer: MEDICAID

## 2019-06-18 DIAGNOSIS — Z98.890 OTHER SPECIFIED POSTPROCEDURAL STATES: Chronic | ICD-10-CM

## 2019-06-18 DIAGNOSIS — Z96.7 PRESENCE OF OTHER BONE AND TENDON IMPLANTS: Chronic | ICD-10-CM

## 2019-06-18 DIAGNOSIS — G47.30 SLEEP APNEA, UNSPECIFIED: ICD-10-CM

## 2019-06-18 DIAGNOSIS — Z90.49 ACQUIRED ABSENCE OF OTHER SPECIFIED PARTS OF DIGESTIVE TRACT: Chronic | ICD-10-CM

## 2019-06-18 DIAGNOSIS — Z86.018 PERSONAL HISTORY OF OTHER BENIGN NEOPLASM: Chronic | ICD-10-CM

## 2019-06-18 PROCEDURE — 95810 POLYSOM 6/> YRS 4/> PARAM: CPT | Mod: 26

## 2019-06-18 PROCEDURE — 95810 POLYSOM 6/> YRS 4/> PARAM: CPT

## 2019-06-27 NOTE — H&P PST ADULT - NSANTHOSAYNRD_GEN_A_CORE
poor balance No. ALFONSO screening performed.  STOP BANG Legend: 0-2 = LOW Risk; 3-4 = INTERMEDIATE Risk; 5-8 = HIGH Risk

## 2019-07-02 ENCOUNTER — RX RENEWAL (OUTPATIENT)
Age: 64
End: 2019-07-02

## 2019-07-05 ENCOUNTER — APPOINTMENT (OUTPATIENT)
Dept: ELECTROPHYSIOLOGY | Facility: CLINIC | Age: 64
End: 2019-07-05
Payer: MEDICAID

## 2019-07-05 PROCEDURE — 93299: CPT

## 2019-07-05 PROCEDURE — 93298 REM INTERROG DEV EVAL SCRMS: CPT

## 2019-07-11 ENCOUNTER — APPOINTMENT (OUTPATIENT)
Dept: ELECTROPHYSIOLOGY | Facility: CLINIC | Age: 64
End: 2019-07-11

## 2019-07-25 ENCOUNTER — APPOINTMENT (OUTPATIENT)
Dept: ELECTROPHYSIOLOGY | Facility: CLINIC | Age: 64
End: 2019-07-25
Payer: MEDICAID

## 2019-07-25 VITALS
BODY MASS INDEX: 38.5 KG/M2 | HEART RATE: 53 BPM | DIASTOLIC BLOOD PRESSURE: 71 MMHG | WEIGHT: 275 LBS | OXYGEN SATURATION: 98 % | SYSTOLIC BLOOD PRESSURE: 129 MMHG | HEIGHT: 71 IN

## 2019-07-25 PROCEDURE — 99214 OFFICE O/P EST MOD 30 MIN: CPT

## 2019-07-25 RX ORDER — METOPROLOL TARTRATE 100 MG/1
100 TABLET, FILM COATED ORAL TWICE DAILY
Qty: 60 | Refills: 0 | Status: DISCONTINUED | COMMUNITY
End: 2019-07-25

## 2019-07-30 NOTE — PHYSICAL EXAM
[General Appearance - Well Developed] : well developed [General Appearance - Well Nourished] : well nourished [Normal Appearance] : normal appearance [General Appearance - In No Acute Distress] : no acute distress [Exaggerated Use Of Accessory Muscles For Inspiration] : no accessory muscle use [Respiration, Rhythm And Depth] : normal respiratory rhythm and effort [Chest Palpation] : palpation of the chest revealed no abnormalities [Auscultation Breath Sounds / Voice Sounds] : lungs were clear to auscultation bilaterally [Heart Rate And Rhythm] : heart rate and rhythm were normal [Heart Sounds] : normal S1 and S2 [Edema] : no peripheral edema present [Murmurs] : no murmurs present [Bowel Sounds] : normal bowel sounds [Abdomen Soft] : soft [Skin Color & Pigmentation] : normal skin color and pigmentation [Abnormal Walk] : normal gait [Oriented To Time, Place, And Person] : oriented to person, place, and time [] : no rash [No Anxiety] : not feeling anxious

## 2019-07-31 NOTE — REVIEW OF SYSTEMS
[Negative] : Genitourinary [Shortness Of Breath] : no shortness of breath [Dyspnea on exertion] : not dyspnea during exertion [Chest Pain] : no chest pain [Palpitations] : no palpitations [Lower Ext Edema] : no extremity edema [Cough] : no cough [Joint Pain] : no joint pain [Skin: A Rash] : no rash: [Skin Lesions] : no skin lesions [Dizziness] : no dizziness [Confusion] : no confusion was observed [Anxiety] : no anxiety [Easy Bleeding] : no tendency for easy bleeding [Easy Bruising] : no tendency for easy bruising

## 2019-07-31 NOTE — HISTORY OF PRESENT ILLNESS
[FreeTextEntry1] : 62 y/o M with pmhx obesity, HTN, prostate and colorectal cancer s/p partial colon resection, and symptomatic paroxysmal atrial fibrillation despite prior treatment with Amiodarone and high dose BB s/p RF ablation 3/29/19.  Subsequently presented with early recurrent symptomatic AF s/p DCCV, ILR implant and Amiodarone reinitiation (6/4/2019).  One month ago, Amiodarone was decreased to 100mg by primary cardiologist, Dr. Robledo.\par Presents today for routine EP f/up.  Doing well and denies recurrent symptoms of palpitations, dizziness, SOB/MAJOR or fatigue.  Denies CP, orthopnea, or LE edema.\par CHADS2 VASC 1 for HTN, now controlled. Remains on a/c without bleeding complications.\par \par

## 2019-07-31 NOTE — DISCUSSION/SUMMARY
[FreeTextEntry1] : 64 y/o M with pmhx obesity, HTN, prostate and colorectal cancer s/p partial colon resection, and symptomatic paroxysmal atrial fibrillation despite prior treatment with Amiodarone and high dose BB s/p RF ablation 3/29/19.  Subsequently presented with early recurrent symptomatic AF s/p DCCV, ILR implant and Amiodarone reinitiation (6/4/2019).  One month ago, Amiodarone was decreased to 100mg by primary cardiologist, Dr. Robledo.  Maintaining SR on ILR, no recurrent AF.  Remains asymptomatic. \par \par - Will d/c Amiodarone today.  Continue \par - CHADS2 VASC (HTN) and eager to come off a/c if possible  \par - Will Continue a/c for 1 months and d/c if no recurrent AF on ILR.  Consider restarting if recurrent arrhythmias is subsuently seen\par \par D/w Dr. Shelton\par Maribel Robledo PAC

## 2019-08-05 ENCOUNTER — APPOINTMENT (OUTPATIENT)
Dept: ELECTROPHYSIOLOGY | Facility: CLINIC | Age: 64
End: 2019-08-05
Payer: MEDICAID

## 2019-08-05 PROCEDURE — 93298 REM INTERROG DEV EVAL SCRMS: CPT

## 2019-08-05 PROCEDURE — 93299: CPT

## 2019-08-07 ENCOUNTER — FORM ENCOUNTER (OUTPATIENT)
Age: 64
End: 2019-08-07

## 2019-08-08 ENCOUNTER — APPOINTMENT (OUTPATIENT)
Dept: PULMONOLOGY | Facility: CLINIC | Age: 64
End: 2019-08-08
Payer: MEDICAID

## 2019-08-08 ENCOUNTER — APPOINTMENT (OUTPATIENT)
Dept: CT IMAGING | Facility: CLINIC | Age: 64
End: 2019-08-08
Payer: MEDICAID

## 2019-08-08 ENCOUNTER — OUTPATIENT (OUTPATIENT)
Dept: OUTPATIENT SERVICES | Facility: HOSPITAL | Age: 64
LOS: 1 days | End: 2019-08-08
Payer: MEDICAID

## 2019-08-08 VITALS — SYSTOLIC BLOOD PRESSURE: 138 MMHG | HEART RATE: 61 BPM | DIASTOLIC BLOOD PRESSURE: 86 MMHG | OXYGEN SATURATION: 95 %

## 2019-08-08 VITALS — HEIGHT: 70.5 IN | BODY MASS INDEX: 37.8 KG/M2 | WEIGHT: 267 LBS

## 2019-08-08 DIAGNOSIS — Z85.048 PERSONAL HISTORY OF OTHER MALIGNANT NEOPLASM OF RECTUM, RECTOSIGMOID JUNCTION, AND ANUS: ICD-10-CM

## 2019-08-08 DIAGNOSIS — Z86.79 PERSONAL HISTORY OF OTHER DISEASES OF THE CIRCULATORY SYSTEM: ICD-10-CM

## 2019-08-08 DIAGNOSIS — Z90.49 ACQUIRED ABSENCE OF OTHER SPECIFIED PARTS OF DIGESTIVE TRACT: Chronic | ICD-10-CM

## 2019-08-08 DIAGNOSIS — Z96.7 PRESENCE OF OTHER BONE AND TENDON IMPLANTS: Chronic | ICD-10-CM

## 2019-08-08 DIAGNOSIS — Z98.890 OTHER SPECIFIED POSTPROCEDURAL STATES: Chronic | ICD-10-CM

## 2019-08-08 DIAGNOSIS — Z86.69 PERSONAL HISTORY OF OTHER DISEASES OF THE NERVOUS SYSTEM AND SENSE ORGANS: ICD-10-CM

## 2019-08-08 DIAGNOSIS — Z87.891 PERSONAL HISTORY OF NICOTINE DEPENDENCE: ICD-10-CM

## 2019-08-08 DIAGNOSIS — Z86.018 PERSONAL HISTORY OF OTHER BENIGN NEOPLASM: Chronic | ICD-10-CM

## 2019-08-08 DIAGNOSIS — C20 MALIGNANT NEOPLASM OF RECTUM: ICD-10-CM

## 2019-08-08 PROCEDURE — 94010 BREATHING CAPACITY TEST: CPT

## 2019-08-08 PROCEDURE — 71260 CT THORAX DX C+: CPT

## 2019-08-08 PROCEDURE — 74177 CT ABD & PELVIS W/CONTRAST: CPT

## 2019-08-08 PROCEDURE — 71260 CT THORAX DX C+: CPT | Mod: 26

## 2019-08-08 PROCEDURE — 99204 OFFICE O/P NEW MOD 45 MIN: CPT | Mod: 25

## 2019-08-08 PROCEDURE — 82565 ASSAY OF CREATININE: CPT

## 2019-08-08 PROCEDURE — 74177 CT ABD & PELVIS W/CONTRAST: CPT | Mod: 26

## 2019-08-08 RX ORDER — POLYETHYLENE GLYOCOL 3350, SODIUM CHLORIDE, SODIUM BICARBONATE AND POTASSIUM CHLORIDE 420; 11.2; 5.72; 1.48 G/4L; G/4L; G/4L; G/4L
420 POWDER, FOR SOLUTION NASOGASTRIC; ORAL
Qty: 1 | Refills: 0 | Status: DISCONTINUED | COMMUNITY
Start: 2019-03-22 | End: 2019-08-08

## 2019-08-08 RX ORDER — AMIODARONE HYDROCHLORIDE 200 MG/1
200 TABLET ORAL DAILY
Qty: 30 | Refills: 2 | Status: DISCONTINUED | COMMUNITY
Start: 2019-06-03 | End: 2019-08-08

## 2019-08-08 NOTE — DISCUSSION/SUMMARY
[FreeTextEntry1] : \par #1. Spirometry performed today is essentially normal\par #2. The patient does not appear to require chronic BD therapy at this time\par #3. SOBOE is likely related to weight or deconditioning given normal spirometry\par #4. Diet and exercise for weight loss\par #5. Chest CT to f/u 2 mm CLAUDETTE nodule per oncology; pt to go for CT today\par #6. CPAP titration study to treat moderate ALFONSO; otherwise could consider autoCPAP for therapy\par #7. F/u one month after starting CPAP therapy\par

## 2019-08-08 NOTE — CONSULT LETTER

## 2019-08-08 NOTE — PHYSICAL EXAM
[Normal Appearance] : normal appearance [General Appearance - Well Developed] : well developed [General Appearance - In No Acute Distress] : no acute distress [Normal Conjunctiva] : the conjunctiva exhibited no abnormalities [Low Lying Soft Palate] : low lying soft palate [Elongated Uvula] : elongated uvula [Enlarged Base of the Tongue] : enlargement of the base of the tongue [II] : II [Neck Appearance] : the appearance of the neck was normal [Heart Rate And Rhythm] : heart rate and rhythm were normal [Heart Sounds] : normal S1 and S2 [Murmurs] : no murmurs present [Edema] : no peripheral edema present [] : no respiratory distress [Respiration, Rhythm And Depth] : normal respiratory rhythm and effort [Exaggerated Use Of Accessory Muscles For Inspiration] : no accessory muscle use [Auscultation Breath Sounds / Voice Sounds] : lungs were clear to auscultation bilaterally [Abdomen Soft] : soft [Abdomen Tenderness] : non-tender [Abnormal Walk] : normal gait [Nail Clubbing] : no clubbing of the fingernails [No Focal Deficits] : no focal deficits [Cyanosis, Localized] : no localized cyanosis [Oriented To Time, Place, And Person] : oriented to person, place, and time [FreeTextEntry1] : No abnormalities.

## 2019-08-08 NOTE — HISTORY OF PRESENT ILLNESS
[Snoring] : snoring [Excess Weight] : excess weight [Initial Evaluation] : an initial evaluation of [Currently Experiencing] : The patient is currently experiencing symptoms. [Dyspnea] : dyspnea [Low Calorie Diet] : low calorie diet [Fair Compliance] : fair compliance with treatment [Fair Tolerance] : fair tolerance of treatment [Poor Symptom Control] : poor symptom control [Sleep Apnea] : sleep apnea [High] : high [Hypertension] : hypertension [Low Calorie] : low calorie [Infrequently] : exercises infrequently [Well Balanced Diet] : well balanced meals [Aerobic Conditioning] : aerobic conditioning [On ___] : performed on [unfilled] [Patient] : the patient [Indication ___] : for an indication of [unfilled] [None] : no new symptoms reported [FreeTextEntry1] : Pt with remote smoking hx of 1 ppd x 4 years but quit in 1976.\par Pt denies respiratory complaints other than mild SOBOE with extreme exertion.\par Pt with h/o AF s/p ablation and subsequent cardioversion and now with loop recorder.\par He has a h/o prostate cancer s/p chemo and h/o rectal cancer s/p resection. He is followed by oncology.\par  [Excessive Daytime Sleepiness] : no excessive daytime sleepiness [Witnessed Apnea During Sleep] : no witnessed apnea during sleep [Witnessed Gasping During Sleep] : no witnessed gasping during sleep [Unrefreshing Sleep] : no unrefreshing sleep [Sleepy When Sedentary] : no sleepy when sedentary [Knee Pain] : no knee pain [Back Pain] : no back pain [Joint Pain] : no joint pain [de-identified] : AF [FreeTextEntry9] : Chest CT [FreeTextEntry8] : stable 2 mm CLAUDETTE nodule c/w prior study

## 2019-08-08 NOTE — REVIEW OF SYSTEMS
[Hypertension] : ~T hypertension [Dysrhythmia] : dysrhythmia [As Noted in HPI] : as noted in HPI [Fever] : no fever [Chills] : no chills [Dry Eyes] : no dryness of the eyes [Eye Irritation] : no ~T irritation of the eyes [Nasal Congestion] : no nasal congestion [Epistaxis] : no nosebleeds [Postnasal Drip] : no postnasal drip [Sinus Problems] : no sinus problems [Cough] : no cough [Sputum] : not coughing up ~M sputum [Dyspnea] : no dyspnea [Chest Tightness] : no chest tightness [Pleuritic Pain] : no pleuritic pain [Wheezing] : no wheezing [Chest Discomfort] : no chest discomfort [Murmurs] : no murmurs were heard [Palpitations] : no palpitations [Edema] : ~T edema was not present [Hay Fever] : no hay fever [Itchy Eyes] : no itching of ~T the eyes [Reflux] : no reflux [Nausea] : no nausea [Vomiting] : no vomiting [Constipation] : no constipation [Diarrhea] : no diarrhea [Abdominal Pain] : no abdominal pain [Trauma] : no ~T physical trauma [Fracture] : no fracture [Anemia] : no anemia [Headache] : no headache [Dizziness] : no dizziness [Syncope] : no fainting [Numbness] : no numbness [Paralysis] : no paralysis was seen [Depression] : no depression [Seizures] : no seizures [Thyroid Problem] : no thyroid problem [Diabetes] : no diabetes mellitus [Anxiety] : no anxiety

## 2019-08-08 NOTE — REASON FOR VISIT
[Initial Evaluation] : an initial evaluation [Shortness of Breath] : shortness of Breath [Sleep Apnea] : sleep apnea [FreeTextEntry2] : obesity, remote smoking hx

## 2019-08-15 ENCOUNTER — RX RENEWAL (OUTPATIENT)
Age: 64
End: 2019-08-15

## 2019-08-19 ENCOUNTER — LABORATORY RESULT (OUTPATIENT)
Age: 64
End: 2019-08-19

## 2019-08-19 ENCOUNTER — APPOINTMENT (OUTPATIENT)
Dept: INTERNAL MEDICINE | Facility: CLINIC | Age: 64
End: 2019-08-19
Payer: MEDICAID

## 2019-08-19 VITALS
HEART RATE: 51 BPM | DIASTOLIC BLOOD PRESSURE: 83 MMHG | BODY MASS INDEX: 39.08 KG/M2 | OXYGEN SATURATION: 97 % | HEIGHT: 70 IN | SYSTOLIC BLOOD PRESSURE: 138 MMHG | WEIGHT: 273 LBS
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
## 2019-08-19 DIAGNOSIS — R73.09 OTHER ABNORMAL GLUCOSE: ICD-10-CM

## 2019-08-19 DIAGNOSIS — Z00.00 ENCOUNTER FOR GENERAL ADULT MEDICAL EXAMINATION W/OUT ABNORMAL FINDINGS: ICD-10-CM

## 2019-08-19 PROCEDURE — 36415 COLL VENOUS BLD VENIPUNCTURE: CPT

## 2019-08-19 PROCEDURE — 99396 PREV VISIT EST AGE 40-64: CPT | Mod: 25

## 2019-08-19 PROCEDURE — 96127 BRIEF EMOTIONAL/BEHAV ASSMT: CPT

## 2019-08-19 NOTE — PHYSICAL EXAM
[No Acute Distress] : no acute distress [Well Nourished] : well nourished [Well Developed] : well developed [Well-Appearing] : well-appearing [Normal Sclera/Conjunctiva] : normal sclera/conjunctiva [PERRL] : pupils equal round and reactive to light [EOMI] : extraocular movements intact [Normal Outer Ear/Nose] : the outer ears and nose were normal in appearance [Normal Oropharynx] : the oropharynx was normal [No JVD] : no jugular venous distention [No Lymphadenopathy] : no lymphadenopathy [Supple] : supple [Thyroid Normal, No Nodules] : the thyroid was normal and there were no nodules present [No Accessory Muscle Use] : no accessory muscle use [No Respiratory Distress] : no respiratory distress  [Clear to Auscultation] : lungs were clear to auscultation bilaterally [Normal Rate] : normal rate  [Regular Rhythm] : with a regular rhythm [Normal S1, S2] : normal S1 and S2 [No Carotid Bruits] : no carotid bruits [No Murmur] : no murmur heard [No Abdominal Bruit] : a ~M bruit was not heard ~T in the abdomen [Pedal Pulses Present] : the pedal pulses are present [No Varicosities] : no varicosities [No Palpable Aorta] : no palpable aorta [No Edema] : there was no peripheral edema [No Extremity Clubbing/Cyanosis] : no extremity clubbing/cyanosis [Soft] : abdomen soft [Non Tender] : non-tender [Non-distended] : non-distended [No Masses] : no abdominal mass palpated [No HSM] : no HSM [Normal Bowel Sounds] : normal bowel sounds [Normal Posterior Cervical Nodes] : no posterior cervical lymphadenopathy [Normal Anterior Cervical Nodes] : no anterior cervical lymphadenopathy [No CVA Tenderness] : no CVA  tenderness [No Spinal Tenderness] : no spinal tenderness [No Joint Swelling] : no joint swelling [Grossly Normal Strength/Tone] : grossly normal strength/tone [No Rash] : no rash [Coordination Grossly Intact] : coordination grossly intact [No Focal Deficits] : no focal deficits [Normal Gait] : normal gait [Normal Affect] : the affect was normal [Normal Insight/Judgement] : insight and judgment were intact

## 2019-08-21 ENCOUNTER — TRANSCRIPTION ENCOUNTER (OUTPATIENT)
Age: 64
End: 2019-08-21

## 2019-08-21 ENCOUNTER — OUTPATIENT (OUTPATIENT)
Dept: OUTPATIENT SERVICES | Facility: HOSPITAL | Age: 64
LOS: 1 days | Discharge: HOME | End: 2019-08-21

## 2019-08-21 DIAGNOSIS — Z98.890 OTHER SPECIFIED POSTPROCEDURAL STATES: Chronic | ICD-10-CM

## 2019-08-21 DIAGNOSIS — Z90.49 ACQUIRED ABSENCE OF OTHER SPECIFIED PARTS OF DIGESTIVE TRACT: Chronic | ICD-10-CM

## 2019-08-21 DIAGNOSIS — Z86.018 PERSONAL HISTORY OF OTHER BENIGN NEOPLASM: Chronic | ICD-10-CM

## 2019-08-21 DIAGNOSIS — Z96.7 PRESENCE OF OTHER BONE AND TENDON IMPLANTS: Chronic | ICD-10-CM

## 2019-08-21 DIAGNOSIS — C20 MALIGNANT NEOPLASM OF RECTUM: ICD-10-CM

## 2019-08-21 LAB
ALBUMIN SERPL ELPH-MCNC: 4.1 G/DL
ALP BLD-CCNC: 64 U/L
ALT SERPL-CCNC: 20 U/L
ANION GAP SERPL CALC-SCNC: 12 MMOL/L
AST SERPL-CCNC: 18 U/L
B BURGDOR IGG+IGM SER QL IB: NORMAL
BASOPHILS # BLD AUTO: 0.03 K/UL
BASOPHILS NFR BLD AUTO: 0.5 %
BILIRUB SERPL-MCNC: 0.4 MG/DL
BUN SERPL-MCNC: 17 MG/DL
CALCIUM SERPL-MCNC: 9 MG/DL
CHLORIDE SERPL-SCNC: 107 MMOL/L
CHOLEST SERPL-MCNC: 209 MG/DL
CHOLEST/HDLC SERPL: 5.2 RATIO
CO2 SERPL-SCNC: 21 MMOL/L
CREAT SERPL-MCNC: 0.9 MG/DL
EOSINOPHIL # BLD AUTO: 0.13 K/UL
EOSINOPHIL NFR BLD AUTO: 2.3 %
ESTIMATED AVERAGE GLUCOSE: 117 MG/DL
GLUCOSE SERPL-MCNC: 101 MG/DL
HBA1C MFR BLD HPLC: 5.7 %
HCT VFR BLD CALC: 44.7 %
HDLC SERPL-MCNC: 40 MG/DL
HGB BLD-MCNC: 14.7 G/DL
IMM GRANULOCYTES NFR BLD AUTO: 0.5 %
LDLC SERPL CALC-MCNC: 121 MG/DL
LYMPHOCYTES # BLD AUTO: 0.64 K/UL
LYMPHOCYTES NFR BLD AUTO: 11.5 %
MAN DIFF?: NORMAL
MCHC RBC-ENTMCNC: 30.6 PG
MCHC RBC-ENTMCNC: 32.9 GM/DL
MCV RBC AUTO: 93.1 FL
MONOCYTES # BLD AUTO: 0.46 K/UL
MONOCYTES NFR BLD AUTO: 8.3 %
NEUTROPHILS # BLD AUTO: 4.28 K/UL
NEUTROPHILS NFR BLD AUTO: 76.9 %
PLATELET # BLD AUTO: 193 K/UL
POTASSIUM SERPL-SCNC: 4.2 MMOL/L
PROT SERPL-MCNC: 6.8 G/DL
PSA SERPL-MCNC: 0.23 NG/ML
RBC # BLD: 4.8 M/UL
RBC # FLD: 14.2 %
SODIUM SERPL-SCNC: 140 MMOL/L
TRIGL SERPL-MCNC: 238 MG/DL
TSH SERPL-ACNC: 1.54 UIU/ML
WBC # FLD AUTO: 5.57 K/UL
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
## 2019-08-22 PROBLEM — R73.09 ELEVATED HEMOGLOBIN A1C MEASUREMENT: Status: ACTIVE | Noted: 2017-01-30

## 2019-08-22 NOTE — REVIEW OF SYSTEMS
[Recent Change In Weight] : ~T recent weight change [FreeTextEntry2] : weight loss [Negative] : Genitourinary

## 2019-08-22 NOTE — ASSESSMENT
[FreeTextEntry1] : Blood pressure controlled. \par Continue current medications. \par Labs drawn in office. Further recommendations based on labs.\par Current with specialists. \par Follow up in 6 months.

## 2019-08-22 NOTE — ADDENDUM
[FreeTextEntry1] : I, Kellie Ashraf, acted solely as a scribe for Dr. Swenson on this date [8/19/2019].

## 2019-08-22 NOTE — HEALTH RISK ASSESSMENT
[Yes] : Yes [2 - 4 times a month (2 pts)] : 2-4 times a month (2 points) [1 or 2 (0 pts)] : 1 or 2 (0 points) [Never (0 pts)] : Never (0 points) [No] : In the past 12 months have you used drugs other than those required for medical reasons? No [0] : 1) Little interest or pleasure doing things: Not at all (0) [HIV test declined] : HIV test declined [Audit-CScore] : 2 [] : No [de-identified] : push ups and landscaping [[id number]] : 0

## 2019-08-22 NOTE — END OF VISIT
[FreeTextEntry3] : All medical record entries made by the Scribe were at my, Dr. Swenson's, direction and personally dictated by me on [8/19/2019]. I have reviewed the chart and agree that the record accurately reflects my personal performance of the history, physical exam, assessment and plan. I have also personally directed, reviewed and agreed with the chart.

## 2019-08-22 NOTE — COUNSELING
[Potential consequences of obesity discussed] : Potential consequences of obesity discussed [Encouraged to maintain food diary] : Encouraged to maintain food diary [Benefits of weight loss discussed] : Benefits of weight loss discussed [Encouraged to increase physical activity] : Encouraged to increase physical activity

## 2019-08-22 NOTE — HISTORY OF PRESENT ILLNESS
[de-identified] : Patient presents for follow CPE. History is significant for hyperlipidemia, prediabetes, A fib, obesity, ALFONSO. His only medications are metoprolol and Xarelto for A fib. He is on Travatan and Betimol eye drops for glaucoma. He is in the process of getting a CPAP for ALFONSO. He has colon cancer s/p hemicolectomy and RT and prostate cancer s/p RT and Lupron. He takes Metamucil BID for constipation and fecal incontinence. He reports recent weight loss due to dietary changes. He had a loop monitor placed on 6/24.\par He reports a tick bite several months ago and he requests to be tested for Lyme disease.\par

## 2019-08-23 ENCOUNTER — APPOINTMENT (OUTPATIENT)
Dept: HEMATOLOGY ONCOLOGY | Facility: CLINIC | Age: 64
End: 2019-08-23

## 2019-09-09 ENCOUNTER — APPOINTMENT (OUTPATIENT)
Dept: ELECTROPHYSIOLOGY | Facility: CLINIC | Age: 64
End: 2019-09-09
Payer: MEDICAID

## 2019-09-09 PROCEDURE — 93298 REM INTERROG DEV EVAL SCRMS: CPT

## 2019-09-09 PROCEDURE — 93299: CPT

## 2019-09-11 ENCOUNTER — RESULT REVIEW (OUTPATIENT)
Age: 64
End: 2019-09-11

## 2019-09-11 ENCOUNTER — APPOINTMENT (OUTPATIENT)
Dept: HEMATOLOGY ONCOLOGY | Facility: CLINIC | Age: 64
End: 2019-09-11
Payer: MEDICAID

## 2019-09-11 VITALS
HEIGHT: 70 IN | HEART RATE: 61 BPM | DIASTOLIC BLOOD PRESSURE: 86 MMHG | OXYGEN SATURATION: 94 % | BODY MASS INDEX: 39.23 KG/M2 | TEMPERATURE: 97.8 F | WEIGHT: 274.02 LBS | SYSTOLIC BLOOD PRESSURE: 127 MMHG

## 2019-09-11 LAB
BASOPHILS # BLD AUTO: 0 K/UL — SIGNIFICANT CHANGE UP (ref 0–0.2)
BASOPHILS NFR BLD AUTO: 1 % — SIGNIFICANT CHANGE UP (ref 0–2)
EOSINOPHIL # BLD AUTO: 0.1 K/UL — SIGNIFICANT CHANGE UP (ref 0–0.5)
EOSINOPHIL NFR BLD AUTO: 2 % — SIGNIFICANT CHANGE UP (ref 0–6)
HCT VFR BLD CALC: 45 % — SIGNIFICANT CHANGE UP (ref 39–50)
HGB BLD-MCNC: 15.4 G/DL — SIGNIFICANT CHANGE UP (ref 13–17)
LYMPHOCYTES # BLD AUTO: 0.8 K/UL — LOW (ref 1–3.3)
LYMPHOCYTES # BLD AUTO: 15.1 % — SIGNIFICANT CHANGE UP (ref 13–44)
MCHC RBC-ENTMCNC: 30.8 PG — SIGNIFICANT CHANGE UP (ref 27–34)
MCHC RBC-ENTMCNC: 34.1 G/DL — SIGNIFICANT CHANGE UP (ref 32–36)
MCV RBC AUTO: 90.3 FL — SIGNIFICANT CHANGE UP (ref 80–100)
MONOCYTES # BLD AUTO: 0.5 K/UL — SIGNIFICANT CHANGE UP (ref 0–0.9)
MONOCYTES NFR BLD AUTO: 10.2 % — SIGNIFICANT CHANGE UP (ref 2–14)
NEUTROPHILS # BLD AUTO: 3.6 K/UL — SIGNIFICANT CHANGE UP (ref 1.8–7.4)
NEUTROPHILS NFR BLD AUTO: 71.8 % — SIGNIFICANT CHANGE UP (ref 43–77)
PLATELET # BLD AUTO: 167 K/UL — SIGNIFICANT CHANGE UP (ref 150–400)
RBC # BLD: 4.98 M/UL — SIGNIFICANT CHANGE UP (ref 4.2–5.8)
RBC # FLD: 13 % — SIGNIFICANT CHANGE UP (ref 10.3–14.5)
WBC # BLD: 5 K/UL — SIGNIFICANT CHANGE UP (ref 3.8–10.5)
WBC # FLD AUTO: 5 K/UL — SIGNIFICANT CHANGE UP (ref 3.8–10.5)

## 2019-09-11 PROCEDURE — 99215 OFFICE O/P EST HI 40 MIN: CPT

## 2019-09-12 LAB
ALBUMIN SERPL ELPH-MCNC: 4.3 G/DL
ALP BLD-CCNC: 64 U/L
ALT SERPL-CCNC: 25 U/L
ANION GAP SERPL CALC-SCNC: 12 MMOL/L
AST SERPL-CCNC: 21 U/L
BILIRUB SERPL-MCNC: 0.5 MG/DL
BUN SERPL-MCNC: 13 MG/DL
CALCIUM SERPL-MCNC: 9.5 MG/DL
CEA SERPL-MCNC: 0.7 NG/ML
CHLORIDE SERPL-SCNC: 103 MMOL/L
CO2 SERPL-SCNC: 26 MMOL/L
CREAT SERPL-MCNC: 1.03 MG/DL
GLUCOSE SERPL-MCNC: 76 MG/DL
MAGNESIUM SERPL-MCNC: 2.1 MG/DL
POTASSIUM SERPL-SCNC: 4.5 MMOL/L
PROT SERPL-MCNC: 7.1 G/DL
SODIUM SERPL-SCNC: 141 MMOL/L

## 2019-09-12 NOTE — HISTORY OF PRESENT ILLNESS
[Disease: _____________________] : Disease: [unfilled] [T: ___] : T[unfilled] [N: ___] : N[unfilled] [M: ___] : M[unfilled] [AJCC Stage: ____] : AJCC Stage: [unfilled] [de-identified] : The patient was diagnosed with adenocarcinoma of the rectum in April 2017 at the age of 61.  He began experiencing hematochezia post narcotic-induced constipation after a fracture of his left lower leg.  He was referred to GI Dr. Armstrong who performed a colonoscopy on April 14, 2017.  A rectal mass was discovered and the biopsy of the rectal tumor at 15 cm showed in situ and invasive colonic adenocarcinoma.  On April 17, 2017 CT of the abdomen and  pelvis showed rectal wall thickening, and a lesion in the hepatic segment 8/7 measuring 1.4 cm suspicious for metastasis.  To better evaluate the hepatic findings he had an MRI of the abdomen on April 25, 2017 which showed T2 hyperintense lesion in segment 7/8, measuring 1.3 x 1.3 cm with peripheral discontinuous enhancement, probably consistent with an atypical hemangioma.  He was then referred to Dr. Hand on April 27, 2017 where he performed a flexible sigmoidoscopy which demonstrated a large cancer of the mid rectum approximately extending from 7-10 cm from the dentate line. On May 2, 2017 PET/CT showed no discrete FDG avidity associated with a segment 7/8 lesion.  FDG avid rectosigmoid colon mass with SUV of 11.1.  At the time of his consultation he had been evaluated by Dr. Soliz of radiation therapy.   [de-identified] : He had an MRI of the pelvis on 5/9/17, and he was scheduled for a prostate MRI today ordered by Dr. Kim.  \par PSA- Jan 2017 5.69; repeat 3/25/17 5.7 [de-identified] : The patient is s/p 6 cycles adjuvant xeloda (11/9/17 - 3/7/18) and CRT for rectal caner completed 6/19/17 - 7/27/17.  He completed RT for the prostate cancer on 1/8/17 - 2/5/17.  He is s/p reversal of colostomy on 4/16/18 with Dr Hand. Notes doing well. Denies N/V/D. Appetite is very good, gained significant weight since the summer. No other new complaints today. Lost 50 lbs voluntarily through diet and exercise.

## 2019-09-12 NOTE — RESULTS/DATA
[FreeTextEntry1] : 8/8/19 CT C/A/P: No adenopathy in the thorax, abdomen or pelvis. 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    Interval reversal of the loop ileostomy.    Interval development of a small amount of pelvic ascites.\par \par 3/21/18 CT Chest:  2 mm nonspecific left upper lobe pulmonary nodule is  unchanged since August 16, 2017.\par \par 2/16/18 CT A/P: Unchanged 1.1 cm hypodensity in segment 7/8, previously characterized as an atypical hemangioma by MRI. No new lesion. No evidence of metastatic disease.\par \par Final Diagnosis\par 10/2/17 path:  \par 1     Abdominal pelvic cyst:- Simple  mesothelial  cyst.\par 2     Sigmoid colon and rectum:-  Segment of colon with mucosal ulceration extending the submucosal and muscularis propria, with focal serosal adhesions and serositis.  Negative for dysplasia/neoplasia- Histologically unremarkable surgical resection margins.\par 3     Colorectal /anal anastomosis distal donut:- Histologically unremarkable segment of colon.\par 4     Colorectal/anal anastomosis proximal donut Histologically unremarkable segment of colon.\par \par 8/23/17 MRI Pelvis: Since  5/9/2017,  the  primary  tumor  and  extramural  disease  shows  near  complete response. Posttreatment  stage:  ymrT1/2  (tumor  confined  to  rectal  wall),  ymrN0  Mesorectal  fascia:  Clear  (tumor  margin  >  2  mm).  Sphincter  involvement:  Absent.\par \par 8/22/17 PET/CT: Resolved  non-FDG  avid  subcentimeter  right  perirectal lymph  nodes.  Considerable  decrease  in  soft  tissue  thickening and  FDG  avidity  in  the  rectosigmoid  colon  (SUV  4.0 previous  SUV 11.1) suggestive  of  therapy  response.  Sigmoid  diverticulosis.  Decreased  FDG  avid  areas  of  subcutaneous  infiltration  in  the left  anterolateral  chest  wall  and  left  shoulder  region,  possibly inflammatory.  No  FDG  avid  distant  metastatic  disease.\par \par 8/16/17 CT C/A/P: There  is  a  stable  3  mm  nodule  in  the  right  lower lobe.  Again  noted  is  the  right  lobe  low-attenuation  lesion  measuring  1.4  cm previously  characterized  on  MRI  dated  4/25/2017  as  probable  atypical hemangioma.  There  is  a  stable  0.6  cm  left  adrenal  nodule,  adenoma  on  4/25/2017 MRI.  Mild  rectal  wall  thickening  again  noted.  There  is  no  bowel obstruction.  There  are  scattered  colonic  diverticula.  No  enlarged  mesenteric lymph  nodes. There  has  been  interval  development  of  a  small  amount  of  fluid in  the  pelvis. No  enlarged  retroperitoneal  or  pelvic  nodes.\par \par Prostate biopsy: 5/24/17:  Core Lab Biopsy\par Final Diagnosis\par 1. Prostate, MRI target lesion, biopsy\par -Adenocarcinoma of the prostate, Prognostic Grade Group 2 (Dorset score 3+4=7) involving 100%, 90% and 90% (10 mm, 9 mm and 6 mm in length) of 3 of 3 core(s). \par -Perineural invasion is identified.  Note: Dorset pattern 4 comprises approximately 10% of total tumor volume.\par 2. Prostate, right base, biopsy -Adenocarcinoma of the prostate, Prognostic Grade Group 1 (Boo score 3+3=6) involving 5% (0.5 mm in length) of 1 of 2 core(s).\par -Perineural invasion is identified.\par 3. Prostate, right mid, biopsy -Adenocarcinoma of the prostate, Prognostic Grade Group1 (Dorset score 3+3=6) involving 80% and 50% (8 mm and 5 mm in length) of 2 of 2 core(s).  -Perineural invasion is identified.\par 4. Prostate, right apex, biopsy -Adenocarcinoma of the prostate, Prognostic Grade Group1 (Dorset score 3+3=6) involving 10% (1.5 mm in length) of 1 of 2 core(s).\par 5. Prostate, left base, biopsy -Benign prostate tissue.\par 6. Prostate, left mid, biopsy -Adenocarcinoma of the prostate, Prognostic Grade Group 2 (Boo score 3+4=7) involving 25% (2 mm in length) of 1 of 2 core(s).\par -Perineural invasion is identified.  Note: Dorset pattern 4 comprises approximately 5% of total tumor volume.\par 7. Prostate, left apex, biopsy -Benign prostate tissue.\par       \par MRI prostate 5/10/17\par Prostate gland:\par Size:  3.8 x 3.1 x 4.3 cm.   Volume 26 cc.   Suspicious lesion in the left peripheral zone\par \par MRI pelvis/rectum: 5/10/17 A 3.6 cm lesion involving the anterior and right lateral walls of the  rectum \par \par May 2, 2017 PET/CT:   No discrete FDG avidity associated with a segment 7/8 lesion, better characterized on dedicated MRI as atypical hemangioma.  Liver background SUV mean as a reference for comparing studies is 3.1.  Tiny non-FDG avid right perirectal lymph nodes, for example measuring 0.4 cm, and 0.2 cm.  FDG avid rectosigmoid colon mass (SUV 11.1).\par \par April 27, 2017 Dr. Hand performed a flexible sigmoidoscopy which demonstrated a large cancer of the mid rectum approximately extending from 7-10 cm from the dentate line.\par \par April 25, 2017 MRI of abdomen: T2 hyperintense lesion in segment 7/8, measuring 1.3 x 1.3 cm with peripheral discontinuous enhancement, probably an atypical hemangioma.  Consider follow-up MRI in 3 months.\par \par April 17, 2017 CT abd/pelvis:  Rectal wall thickening, possibly known neoplasm.  Indeterminate lesion in hepatic segment 8/7, measuring 1.4 cm, suspicious for metastasis.\par \par April 14, 2017 Colonoscopy with biopsy of rectal tumor (Dr. Armstrong): Biopsy of rectal tumor at 15 cm shows in situ and invasive colonic adenocarcinoma.  The polyp sigmoid at 30 cm showed tubular adenoma.  \par \par March 30, 2017 Right flank; left flank; left shoulder Lipoma removal:  Fibrofatty tissue consistent with lipoma.  \par \par January 26, 2017 u/s of palpable areas of LE and abdomen:  In the right mid back region in the area of palpable concern, there is a subcutaneous isoechoic mass measuring 8.9 x 3.1 x 7.6 cm whose appearance is most compatible with a lipoma.  In the area of palpable concern in the left upper ventral abdominal wall, there is a subcutaneous echogenic mass measuring 10.6 x 6.5 x 8.3 cm whose appearance is most compatible with a lipoma.\par

## 2019-09-12 NOTE — PHYSICAL EXAM
[Fully active, able to carry on all pre-disease performance without restriction] : Status 0 - Fully active, able to carry on all pre-disease performance without restriction [Normal] : affect appropriate [de-identified] : hyperpigmentation of lips [de-identified] : vertical incision with staples in place

## 2019-09-17 ENCOUNTER — OUTPATIENT (OUTPATIENT)
Dept: OUTPATIENT SERVICES | Facility: HOSPITAL | Age: 64
LOS: 1 days | End: 2019-09-17
Payer: MEDICAID

## 2019-09-17 ENCOUNTER — APPOINTMENT (OUTPATIENT)
Dept: GASTROENTEROLOGY | Facility: GI CENTER | Age: 64
End: 2019-09-17
Payer: MEDICAID

## 2019-09-17 DIAGNOSIS — Z90.49 ACQUIRED ABSENCE OF OTHER SPECIFIED PARTS OF DIGESTIVE TRACT: Chronic | ICD-10-CM

## 2019-09-17 DIAGNOSIS — Z96.7 PRESENCE OF OTHER BONE AND TENDON IMPLANTS: Chronic | ICD-10-CM

## 2019-09-17 DIAGNOSIS — C18.9 MALIGNANT NEOPLASM OF COLON, UNSPECIFIED: ICD-10-CM

## 2019-09-17 DIAGNOSIS — Z86.018 PERSONAL HISTORY OF OTHER BENIGN NEOPLASM: Chronic | ICD-10-CM

## 2019-09-17 DIAGNOSIS — K64.4 RESIDUAL HEMORRHOIDAL SKIN TAGS: ICD-10-CM

## 2019-09-17 DIAGNOSIS — Z98.890 OTHER SPECIFIED POSTPROCEDURAL STATES: Chronic | ICD-10-CM

## 2019-09-17 PROCEDURE — 45378 DIAGNOSTIC COLONOSCOPY: CPT

## 2019-09-17 PROCEDURE — G0105: CPT

## 2019-09-17 NOTE — PROCEDURE
[Hx of Colon Cancer] : history of colon cancer [Procedure Explained] : The procedure was explained [Allergies Reviewed] : allergies reviewed. [Risks] : Risks [Benefits] : benefits [Alternatives] : alternatives [Patient] : the patient [Consent Obtained] : written consent was obtained prior to the procedure and is detailed in the patient's record [Bowel Prep Kit] : the patient took the appropriate bowel preparation kit as directed [Automated Blood Pressure Cuff] : automated blood pressure cuff [Approved Diet Followed] : the patient avoided solid foods and adhered to the approved diet list for 24 hours prior to the procedure [Cardiac Monitor] : cardiac monitor [Pulse Oximeter] : pulse oximeter [Propofol ___ mg IV] : Propofol [unfilled] ~Umg intravenously [Prep Qualtiy: ___] : Prep Quality:  [unfilled] [2] : 2 [Withdrawal Time: ___] : Withdrawal Time:  [unfilled] [Left Lateral Decubitus] : The patient was positioned in the left lateral decubitus position [Abnormal Rectum] : a normal rectum [External Hemorrhoids] : no external hemorrhoids [Normal Prostate] : a normal prostate [Cecum (Landmarks/Transillum)] : and guided to the cecum which was identified by the anatomic landmarks of the appendiceal orifice and ileocecal valve and by transillumination in the right lower quadrant [Terminal Ileum via Ileocecal Valve] : and the terminal ileum was examined by entering the ileocecal valve [Minimal Difficulty] : with minimal difficulty [Insufflated] : insufflated [Multiple Passes Needed] : after multiple passes [Retroflex View] : a retroflex view of the rectum was performed [Diverticulosis] : diverticulosis [Normal] : Normal [Tolerated Well] : the patient tolerated the procedure well [Vital Signs Stable] : the vital signs were stable [de-identified] : [id number] [No Complications] : There were no complications [de-identified] : Trilyte; Split [de-identified] : TI / ICV: normal.  [de-identified] : A single diverticulum was noted.   [de-identified] : Rectal anastamosis at 12 cm noted.  Clean/ dry.  No recurrence.  No proctitis or neoplasia.  \par Small Grade 1 internal hemorrhoids.  Inactive.  \par Single large inactive fibrotic external hemorrhoid.     [de-identified] : No local recurrence.  Normal rectal anastamosis.  Int / Ext Hemorrhoids.  single diverticulum in Sig colon.

## 2019-09-17 NOTE — HISTORY OF PRESENT ILLNESS
[FreeTextEntry1] : 64 yo WM with rectal ca and prostate ca.  Prior PAF ; resolved with ablation.  Off AC.  Only on anti-platelet therapy.  Cardiac cleared.  All recent BW was normal.

## 2019-09-17 NOTE — PHYSICAL EXAM
[General Appearance - Alert] : alert [General Appearance - In No Acute Distress] : in no acute distress [Outer Ear] : the ears and nose were normal in appearance [Oropharynx] : the oropharynx was normal [Neck Appearance] : the appearance of the neck was normal [Neck Cervical Mass (___cm)] : no neck mass was observed [Jugular Venous Distention Increased] : there was no jugular-venous distention [Thyroid Diffuse Enlargement] : the thyroid was not enlarged [Thyroid Nodule] : there were no palpable thyroid nodules [Auscultation Breath Sounds / Voice Sounds] : lungs were clear to auscultation bilaterally [Heart Rate And Rhythm] : heart rate was normal and rhythm regular [Heart Sounds Gallop] : no gallops [Heart Sounds] : normal S1 and S2 [Heart Sounds Pericardial Friction Rub] : no pericardial rub [Murmurs] : no murmurs [Bowel Sounds] : normal bowel sounds [Abdomen Soft] : soft [Abdomen Tenderness] : non-tender [] : no hepato-splenomegaly [Abdomen Mass (___ Cm)] : no abdominal mass palpated [Normal Sphincter Tone] : normal sphincter tone [No Rectal Mass] : no rectal mass [Occult Blood Positive] : stool was negative for occult blood

## 2019-09-17 NOTE — PHYSICAL EXAM
[General Appearance - Alert] : alert [General Appearance - In No Acute Distress] : in no acute distress [Outer Ear] : the ears and nose were normal in appearance [Oropharynx] : the oropharynx was normal [Neck Appearance] : the appearance of the neck was normal [Neck Cervical Mass (___cm)] : no neck mass was observed [Jugular Venous Distention Increased] : there was no jugular-venous distention [Thyroid Diffuse Enlargement] : the thyroid was not enlarged [Thyroid Nodule] : there were no palpable thyroid nodules [Auscultation Breath Sounds / Voice Sounds] : lungs were clear to auscultation bilaterally [Heart Rate And Rhythm] : heart rate was normal and rhythm regular [Heart Sounds] : normal S1 and S2 [Heart Sounds Gallop] : no gallops [Heart Sounds Pericardial Friction Rub] : no pericardial rub [Murmurs] : no murmurs [Bowel Sounds] : normal bowel sounds [] : no hepato-splenomegaly [Abdomen Tenderness] : non-tender [Abdomen Soft] : soft [Abdomen Mass (___ Cm)] : no abdominal mass palpated [Normal Sphincter Tone] : normal sphincter tone [No Rectal Mass] : no rectal mass [Occult Blood Positive] : stool was negative for occult blood

## 2019-09-17 NOTE — ASSESSMENT
[FreeTextEntry1] : No local recurrence:  Repeat colonoscopy in 1 yr.  \par Internal / external hemorrhoids:  HF diet:  Topicals prn.  \par Diverticulosis coli:  HF diet.

## 2019-09-17 NOTE — PROCEDURE
[Hx of Colon Cancer] : history of colon cancer [Procedure Explained] : The procedure was explained [Allergies Reviewed] : allergies reviewed. [Risks] : Risks [Benefits] : benefits [Alternatives] : alternatives [Consent Obtained] : written consent was obtained prior to the procedure and is detailed in the patient's record [Patient] : the patient [Bowel Prep Kit] : the patient took the appropriate bowel preparation kit as directed [Approved Diet Followed] : the patient avoided solid foods and adhered to the approved diet list for 24 hours prior to the procedure [Automated Blood Pressure Cuff] : automated blood pressure cuff [Cardiac Monitor] : cardiac monitor [Pulse Oximeter] : pulse oximeter [Propofol ___ mg IV] : Propofol [unfilled] ~Umg intravenously [2] : 2 [Prep Qualtiy: ___] : Prep Quality:  [unfilled] [Withdrawal Time: ___] : Withdrawal Time:  [unfilled] [Left Lateral Decubitus] : The patient was positioned in the left lateral decubitus position [Abnormal Rectum] : a normal rectum [External Hemorrhoids] : no external hemorrhoids [Normal Prostate] : a normal prostate [Cecum (Landmarks/Transillum)] : and guided to the cecum which was identified by the anatomic landmarks of the appendiceal orifice and ileocecal valve and by transillumination in the right lower quadrant [Terminal Ileum via Ileocecal Valve] : and the terminal ileum was examined by entering the ileocecal valve [Minimal Difficulty] : with minimal difficulty [Insufflated] : insufflated [Retroflex View] : a retroflex view of the rectum was performed [Multiple Passes Needed] : after multiple passes [Diverticulosis] : diverticulosis [Normal] : Normal [Tolerated Well] : the patient tolerated the procedure well [Vital Signs Stable] : the vital signs were stable [de-identified] : [id number] [No Complications] : There were no complications [de-identified] : Trilyte; Split [de-identified] : TI / ICV: normal.  [de-identified] : A single diverticulum was noted.   [de-identified] : Rectal anastamosis at 12 cm noted.  Clean/ dry.  No recurrence.  No proctitis or neoplasia.  \par Small Grade 1 internal hemorrhoids.  Inactive.  \par Single large inactive fibrotic external hemorrhoid.     [de-identified] : No local recurrence.  Normal rectal anastamosis.  Int / Ext Hemorrhoids.  single diverticulum in Sig colon.

## 2019-09-17 NOTE — REASON FOR VISIT
[Follow-Up: _____] : a [unfilled] follow-up visit [Colonoscopy] : a colonoscopy [FreeTextEntry2] : PH of rectal cancer.

## 2019-10-11 ENCOUNTER — APPOINTMENT (OUTPATIENT)
Dept: ELECTROPHYSIOLOGY | Facility: CLINIC | Age: 64
End: 2019-10-11
Payer: MEDICAID

## 2019-10-11 PROCEDURE — 93299: CPT

## 2019-10-11 PROCEDURE — 93298 REM INTERROG DEV EVAL SCRMS: CPT

## 2019-10-31 PROCEDURE — 86901 BLOOD TYPING SEROLOGIC RH(D): CPT

## 2019-10-31 PROCEDURE — 80048 BASIC METABOLIC PNL TOTAL CA: CPT

## 2019-10-31 PROCEDURE — 93656 COMPRE EP EVAL ABLTJ ATR FIB: CPT

## 2019-10-31 PROCEDURE — 85027 COMPLETE CBC AUTOMATED: CPT

## 2019-10-31 PROCEDURE — 93005 ELECTROCARDIOGRAM TRACING: CPT

## 2019-10-31 PROCEDURE — C1894: CPT

## 2019-10-31 PROCEDURE — 86900 BLOOD TYPING SEROLOGIC ABO: CPT

## 2019-10-31 PROCEDURE — 93657 TX L/R ATRIAL FIB ADDL: CPT

## 2019-10-31 PROCEDURE — 86923 COMPATIBILITY TEST ELECTRIC: CPT

## 2019-10-31 PROCEDURE — 36415 COLL VENOUS BLD VENIPUNCTURE: CPT

## 2019-10-31 PROCEDURE — 86850 RBC ANTIBODY SCREEN: CPT

## 2019-10-31 PROCEDURE — C1732: CPT

## 2019-10-31 PROCEDURE — C1731: CPT

## 2019-10-31 PROCEDURE — 93623 PRGRMD STIMJ&PACG IV RX NFS: CPT

## 2019-10-31 PROCEDURE — C1766: CPT

## 2019-10-31 PROCEDURE — 93613 INTRACARDIAC EPHYS 3D MAPG: CPT

## 2019-10-31 PROCEDURE — 83735 ASSAY OF MAGNESIUM: CPT

## 2019-10-31 PROCEDURE — C1759: CPT

## 2019-10-31 PROCEDURE — 93655 ICAR CATH ABLTJ DSCRT ARRHYT: CPT

## 2019-10-31 PROCEDURE — C1889: CPT

## 2019-10-31 PROCEDURE — 93662 INTRACARDIAC ECG (ICE): CPT

## 2019-11-05 ENCOUNTER — OUTPATIENT (OUTPATIENT)
Dept: OUTPATIENT SERVICES | Facility: HOSPITAL | Age: 64
LOS: 1 days | End: 2019-11-05
Payer: MEDICAID

## 2019-11-05 DIAGNOSIS — Z98.890 OTHER SPECIFIED POSTPROCEDURAL STATES: Chronic | ICD-10-CM

## 2019-11-05 DIAGNOSIS — Z96.7 PRESENCE OF OTHER BONE AND TENDON IMPLANTS: Chronic | ICD-10-CM

## 2019-11-05 DIAGNOSIS — Z90.49 ACQUIRED ABSENCE OF OTHER SPECIFIED PARTS OF DIGESTIVE TRACT: Chronic | ICD-10-CM

## 2019-11-05 DIAGNOSIS — G47.33 OBSTRUCTIVE SLEEP APNEA (ADULT) (PEDIATRIC): ICD-10-CM

## 2019-11-05 DIAGNOSIS — Z86.018 PERSONAL HISTORY OF OTHER BENIGN NEOPLASM: Chronic | ICD-10-CM

## 2019-11-05 PROCEDURE — 95811 POLYSOM 6/>YRS CPAP 4/> PARM: CPT | Mod: 26

## 2019-11-05 PROCEDURE — 95811 POLYSOM 6/>YRS CPAP 4/> PARM: CPT

## 2019-11-11 ENCOUNTER — APPOINTMENT (OUTPATIENT)
Dept: ELECTROPHYSIOLOGY | Facility: CLINIC | Age: 64
End: 2019-11-11
Payer: MEDICAID

## 2019-11-11 PROCEDURE — 93298 REM INTERROG DEV EVAL SCRMS: CPT

## 2019-11-11 PROCEDURE — 93299: CPT

## 2019-12-02 ENCOUNTER — RX RENEWAL (OUTPATIENT)
Age: 64
End: 2019-12-02

## 2019-12-03 ENCOUNTER — APPOINTMENT (OUTPATIENT)
Dept: DERMATOLOGY | Facility: CLINIC | Age: 64
End: 2019-12-03
Payer: MEDICAID

## 2019-12-03 PROCEDURE — 99203 OFFICE O/P NEW LOW 30 MIN: CPT

## 2019-12-13 ENCOUNTER — APPOINTMENT (OUTPATIENT)
Dept: ELECTROPHYSIOLOGY | Facility: CLINIC | Age: 64
End: 2019-12-13
Payer: MEDICAID

## 2019-12-13 PROCEDURE — 93298 REM INTERROG DEV EVAL SCRMS: CPT

## 2019-12-13 PROCEDURE — 93299: CPT

## 2020-01-07 NOTE — DISCHARGE NOTE ADULT - IF YOU ARE A SMOKER, IT IS IMPORTANT FOR YOUR HEALTH TO STOP SMOKING. PLEASE BE AWARE THAT SECOND HAND SMOKE IS ALSO HARMFUL.
Patient called in and stated he is having his MRI done at another facility to decrease the out of pocket cost.  I rescheduled his follow up for 1/24/20 at 8:00am at [facility] to review the MRI. I did instruct him to bring the disc from the facility and did give him our office fax number to have them fax the report as well. He did ask me to call our radiology dept and cancel his MRI that is scheduled her at [facility], I preceded with that request and cancelled that appt. Patient verbalized understanding and confirmed his follow up appt.     Electronically signed by Dany Pascal RN on 1/7/2020 at 10:35 AM
Statement Selected

## 2020-01-13 ENCOUNTER — APPOINTMENT (OUTPATIENT)
Dept: ELECTROPHYSIOLOGY | Facility: CLINIC | Age: 65
End: 2020-01-13
Payer: MEDICAID

## 2020-01-13 PROCEDURE — G2066: CPT

## 2020-01-13 PROCEDURE — 93298 REM INTERROG DEV EVAL SCRMS: CPT

## 2020-02-04 ENCOUNTER — APPOINTMENT (OUTPATIENT)
Dept: INTERNAL MEDICINE | Facility: CLINIC | Age: 65
End: 2020-02-04

## 2020-02-11 ENCOUNTER — APPOINTMENT (OUTPATIENT)
Dept: UROLOGY | Facility: CLINIC | Age: 65
End: 2020-02-11
Payer: MEDICAID

## 2020-02-11 VITALS
SYSTOLIC BLOOD PRESSURE: 152 MMHG | TEMPERATURE: 97.6 F | DIASTOLIC BLOOD PRESSURE: 83 MMHG | RESPIRATION RATE: 16 BRPM | HEIGHT: 71 IN | WEIGHT: 273 LBS | BODY MASS INDEX: 38.22 KG/M2 | HEART RATE: 60 BPM

## 2020-02-11 PROCEDURE — 99214 OFFICE O/P EST MOD 30 MIN: CPT

## 2020-02-11 RX ORDER — SILDENAFIL 50 MG/1
50 TABLET ORAL
Qty: 18 | Refills: 6 | Status: COMPLETED | COMMUNITY
Start: 2018-06-27 | End: 2020-02-11

## 2020-02-11 NOTE — HISTORY OF PRESENT ILLNESS
[FreeTextEntry1] : Here for follow-up visit.  Has previous diagnosis of both rectal cancer and prostate cancer.  He status post radiation therapy for his prostate cancer.  He was previous treated with androgen deprivation therapy while he was undergoing treatment for his rectal cancer.  He is completed now both treatments about 2 years ago.\par \par His last PSA August 2019 was 0.23 ng/mL.  He is doing excellent from a urinary standpoint.  Has good urinary flow complete bladder emptying.  No significant nocturia.  No significant frequency urgency.  No stress incontinence.\par \par Primary issue is erectile dysfunction.  Has been using sildenafil but with minimal success.  Has occasionally achieved a full erection but usually is unable to.  No pain or curvature with erections.

## 2020-02-11 NOTE — ASSESSMENT
[FreeTextEntry1] : Prostate PSA, serum testosterone ordered today.\par Recommend to start tadalafil 20 mg tablets as needed.\par Goals of medication reviewed.  Discussed the potential adverse effects of the medication.  Discussed the proper administration of the medication.\par \par We will call with results of labs.  Follow-up in 6 months.

## 2020-02-14 ENCOUNTER — APPOINTMENT (OUTPATIENT)
Dept: ELECTROPHYSIOLOGY | Facility: CLINIC | Age: 65
End: 2020-02-14
Payer: MEDICAID

## 2020-02-14 LAB
PSA SERPL-MCNC: 0.27 NG/ML
TESTOST BND SERPL-MCNC: 6.3 PG/ML
TESTOST SERPL-MCNC: 279 NG/DL

## 2020-02-14 PROCEDURE — 93298 REM INTERROG DEV EVAL SCRMS: CPT

## 2020-02-14 PROCEDURE — G2066: CPT

## 2020-03-18 ENCOUNTER — APPOINTMENT (OUTPATIENT)
Dept: ELECTROPHYSIOLOGY | Facility: CLINIC | Age: 65
End: 2020-03-18
Payer: MEDICAID

## 2020-03-18 PROCEDURE — 93298 REM INTERROG DEV EVAL SCRMS: CPT

## 2020-03-18 PROCEDURE — G2066: CPT

## 2020-04-02 NOTE — DISCHARGE NOTE PROVIDER - CARE PROVIDER_API CALL
Gera Shelton)  Cardiology; Internal Medicine  [address], [address]  [place] [address]  Phone: [phone]  Fax: [phone]  Follow Up Time:     Anjel Robledo)  Cardiovascular Disease; Internal Medicine  [address] [place] [address]  Phone: [phone]  Fax: [phone]  Follow Up Time:
No

## 2020-04-15 ENCOUNTER — APPOINTMENT (OUTPATIENT)
Dept: PULMONOLOGY | Facility: CLINIC | Age: 65
End: 2020-04-15

## 2020-04-22 ENCOUNTER — APPOINTMENT (OUTPATIENT)
Dept: PULMONOLOGY | Facility: CLINIC | Age: 65
End: 2020-04-22
Payer: MEDICAID

## 2020-04-22 ENCOUNTER — APPOINTMENT (OUTPATIENT)
Dept: ELECTROPHYSIOLOGY | Facility: CLINIC | Age: 65
End: 2020-04-22
Payer: MEDICAID

## 2020-04-22 DIAGNOSIS — R93.89 ABNORMAL FINDINGS ON DIAGNOSTIC IMAGING OF OTHER SPECIFIED BODY STRUCTURES: ICD-10-CM

## 2020-04-22 PROCEDURE — G2066: CPT

## 2020-04-22 PROCEDURE — 99213 OFFICE O/P EST LOW 20 MIN: CPT | Mod: 95

## 2020-04-22 NOTE — REVIEW OF SYSTEMS
[Hypertension] : ~T hypertension [Dysrhythmia] : dysrhythmia [As Noted in HPI] : as noted in HPI [Chills] : no chills [Fever] : no fever [Epistaxis] : no nosebleeds [Nasal Congestion] : no nasal congestion [Dry Eyes] : no dryness of the eyes [Eye Irritation] : no ~T irritation of the eyes [Sinus Problems] : no sinus problems [Cough] : no cough [Postnasal Drip] : no postnasal drip [Dyspnea] : no dyspnea [Chest Tightness] : no chest tightness [Sputum] : not coughing up ~M sputum [Chest Discomfort] : no chest discomfort [Wheezing] : no wheezing [Pleuritic Pain] : no pleuritic pain [Edema] : ~T edema was not present [Palpitations] : no palpitations [Hay Fever] : no hay fever [Murmurs] : no murmurs were heard [Reflux] : no reflux [Itchy Eyes] : no itching of ~T the eyes [Nausea] : no nausea [Vomiting] : no vomiting [Abdominal Pain] : no abdominal pain [Diarrhea] : no diarrhea [Constipation] : no constipation [Anemia] : no anemia [Fracture] : no fracture [Trauma] : no ~T physical trauma [Headache] : no headache [Dizziness] : no dizziness [Seizures] : no seizures [Syncope] : no fainting [Numbness] : no numbness [Paralysis] : no paralysis was seen [Diabetes] : no diabetes mellitus [Depression] : no depression [Anxiety] : no anxiety [Thyroid Problem] : no thyroid problem

## 2020-04-22 NOTE — DISCUSSION/SUMMARY
[FreeTextEntry1] : \par #1. Spirometry performed previously was essentially normal\par #2. The patient does not appear to require chronic BD therapy at this time\par #3. SOBOE is likely related to weight or deconditioning given normal spirometry\par #4. Diet and exercise for weight loss\par #5. Chest CT to f/u 2-3 mm ? new RLL nodule per oncology; pt likely with repeat CT in 8/2020 for f/u\par #6. Continue autoCPAP for moderate ALFONSO; Replace equipment as needed; ordered 4/22/20\par #7. F/u 4 months with chest CT if ordered by oncology\par

## 2020-04-22 NOTE — CONSULT LETTER
[Dear  ___] : Dear  [unfilled], [Consult Letter:] : I had the pleasure of evaluating your patient, [unfilled]. [Please see my note below.] : Please see my note below. [Consult Closing:] : Thank you very much for allowing me to participate in the care of this patient.  If you have any questions, please do not hesitate to contact me. [Sincerely,] : Sincerely, [DrCaroline  ___] : Dr. ESTRADA [FreeTextEntry3] : Eric Dumont MD, FCCP, D. ABSM\par Pulmonary and Sleep Medicine\par [facility] [place]

## 2020-04-22 NOTE — HISTORY OF PRESENT ILLNESS
[Snoring] : snoring [Initial Evaluation] : an initial evaluation of [Currently Experiencing] : The patient is currently experiencing symptoms. [Dyspnea] : dyspnea [Excess Weight] : excess weight [Low Calorie Diet] : low calorie diet [Fair Compliance] : fair compliance with treatment [Fair Tolerance] : fair tolerance of treatment [Poor Symptom Control] : poor symptom control [Sleep Apnea] : sleep apnea [High] : high [Hypertension] : hypertension [Low Calorie] : low calorie [Well Balanced Diet] : well balanced meals [Infrequently] : exercises infrequently [Aerobic Conditioning] : aerobic conditioning [Indication ___] : for an indication of [unfilled] [On ___] : performed on [unfilled] [None] : no new symptoms reported [Home] : at home, [unfilled] , at the time of the visit. [Self] : self [Medical Office: ([facility])___] : at the medical office located in  [Patient] : the patient [FreeTextEntry1] : Pt with remote smoking hx of 1 ppd x 4 years but quit in 1976.\par Pt denies respiratory complaints other than mild SOBOE with extreme exertion.\par Pt with h/o AF s/p ablation and subsequent cardioversion and now with loop recorder.\par He has a h/o prostate cancer s/p chemo and h/o rectal cancer s/p resection. He is followed by oncology.\par  [Excessive Daytime Sleepiness] : no excessive daytime sleepiness [Witnessed Gasping During Sleep] : no witnessed gasping during sleep [Witnessed Apnea During Sleep] : no witnessed apnea during sleep [Unrefreshing Sleep] : no unrefreshing sleep [Knee Pain] : no knee pain [Sleepy When Sedentary] : no sleepy when sedentary [Joint Pain] : no joint pain [de-identified] : AF [Back Pain] : no back pain [FreeTextEntry8] : stable 2 mm CLAUDETTE nodule c/w prior study [FreeTextEntry9] : Chest CT

## 2020-04-22 NOTE — PHYSICAL EXAM
[Normal Appearance] : normal appearance [General Appearance - Well Developed] : well developed [General Appearance - In No Acute Distress] : no acute distress [Normal Conjunctiva] : the conjunctiva exhibited no abnormalities [Neck Appearance] : the appearance of the neck was normal [] : no respiratory distress [Oriented To Time, Place, And Person] : oriented to person, place, and time

## 2020-05-06 ENCOUNTER — APPOINTMENT (OUTPATIENT)
Dept: COLORECTAL SURGERY | Facility: CLINIC | Age: 65
End: 2020-05-06

## 2020-05-12 ENCOUNTER — OUTPATIENT (OUTPATIENT)
Dept: OUTPATIENT SERVICES | Facility: HOSPITAL | Age: 65
LOS: 1 days | Discharge: HOME | End: 2020-05-12

## 2020-05-12 DIAGNOSIS — Z96.7 PRESENCE OF OTHER BONE AND TENDON IMPLANTS: Chronic | ICD-10-CM

## 2020-05-12 DIAGNOSIS — Z90.49 ACQUIRED ABSENCE OF OTHER SPECIFIED PARTS OF DIGESTIVE TRACT: Chronic | ICD-10-CM

## 2020-05-12 DIAGNOSIS — Z86.018 PERSONAL HISTORY OF OTHER BENIGN NEOPLASM: Chronic | ICD-10-CM

## 2020-05-12 DIAGNOSIS — Z98.890 OTHER SPECIFIED POSTPROCEDURAL STATES: Chronic | ICD-10-CM

## 2020-05-12 DIAGNOSIS — C20 MALIGNANT NEOPLASM OF RECTUM: ICD-10-CM

## 2020-05-18 ENCOUNTER — APPOINTMENT (OUTPATIENT)
Dept: HEMATOLOGY ONCOLOGY | Facility: CLINIC | Age: 65
End: 2020-05-18
Payer: MEDICAID

## 2020-05-18 DIAGNOSIS — R91.1 SOLITARY PULMONARY NODULE: ICD-10-CM

## 2020-05-18 DIAGNOSIS — R06.02 SHORTNESS OF BREATH: ICD-10-CM

## 2020-05-18 PROCEDURE — 99214 OFFICE O/P EST MOD 30 MIN: CPT | Mod: 95

## 2020-05-18 NOTE — RESULTS/DATA
[FreeTextEntry1] : 8/8/19 CT C/A/P: No adenopathy in the thorax, abdomen or pelvis. 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    Interval reversal of the loop ileostomy.    Interval development of a small amount of pelvic ascites.\par \par 3/21/18 CT Chest:  2 mm nonspecific left upper lobe pulmonary nodule is  unchanged since August 16, 2017.\par \par 2/16/18 CT A/P: Unchanged 1.1 cm hypodensity in segment 7/8, previously characterized as an atypical hemangioma by MRI. No new lesion. No evidence of metastatic disease.\par \par Final Diagnosis\par 10/2/17 path:  \par 1     Abdominal pelvic cyst:- Simple  mesothelial  cyst.\par 2     Sigmoid colon and rectum:-  Segment of colon with mucosal ulceration extending the submucosal and muscularis propria, with focal serosal adhesions and serositis.  Negative for dysplasia/neoplasia- Histologically unremarkable surgical resection margins.\par 3     Colorectal /anal anastomosis distal donut:- Histologically unremarkable segment of colon.\par 4     Colorectal/anal anastomosis proximal donut Histologically unremarkable segment of colon.\par \par 8/23/17 MRI Pelvis: Since  5/9/2017,  the  primary  tumor  and  extramural  disease  shows  near  complete response. Posttreatment  stage:  ymrT1/2  (tumor  confined  to  rectal  wall),  ymrN0  Mesorectal  fascia:  Clear  (tumor  margin  >  2  mm).  Sphincter  involvement:  Absent.\par \par 8/22/17 PET/CT: Resolved  non-FDG  avid  subcentimeter  right  perirectal lymph  nodes.  Considerable  decrease  in  soft  tissue  thickening and  FDG  avidity  in  the  rectosigmoid  colon  (SUV  4.0 previous  SUV 11.1) suggestive  of  therapy  response.  Sigmoid  diverticulosis.  Decreased  FDG  avid  areas  of  subcutaneous  infiltration  in  the left  anterolateral  chest  wall  and  left  shoulder  region,  possibly inflammatory.  No  FDG  avid  distant  metastatic  disease.\par \par 8/16/17 CT C/A/P: There  is  a  stable  3  mm  nodule  in  the  right  lower lobe.  Again  noted  is  the  right  lobe  low-attenuation  lesion  measuring  1.4  cm previously  characterized  on  MRI  dated  4/25/2017  as  probable  atypical hemangioma.  There  is  a  stable  0.6  cm  left  adrenal  nodule,  adenoma  on  4/25/2017 MRI.  Mild  rectal  wall  thickening  again  noted.  There  is  no  bowel obstruction.  There  are  scattered  colonic  diverticula.  No  enlarged  mesenteric lymph  nodes. There  has  been  interval  development  of  a  small  amount  of  fluid in  the  pelvis. No  enlarged  retroperitoneal  or  pelvic  nodes.\par \par Prostate biopsy: 5/24/17:  Core Lab Biopsy\par Final Diagnosis\par 1. Prostate, MRI target lesion, biopsy\par -Adenocarcinoma of the prostate, Prognostic Grade Group 2 (Valrico score 3+4=7) involving 100%, 90% and 90% (10 mm, 9 mm and 6 mm in length) of 3 of 3 core(s). \par -Perineural invasion is identified.  Note: Valrico pattern 4 comprises approximately 10% of total tumor volume.\par 2. Prostate, right base, biopsy -Adenocarcinoma of the prostate, Prognostic Grade Group 1 (Boo score 3+3=6) involving 5% (0.5 mm in length) of 1 of 2 core(s).\par -Perineural invasion is identified.\par 3. Prostate, right mid, biopsy -Adenocarcinoma of the prostate, Prognostic Grade Group1 (Valrico score 3+3=6) involving 80% and 50% (8 mm and 5 mm in length) of 2 of 2 core(s).  -Perineural invasion is identified.\par 4. Prostate, right apex, biopsy -Adenocarcinoma of the prostate, Prognostic Grade Group1 (Valrico score 3+3=6) involving 10% (1.5 mm in length) of 1 of 2 core(s).\par 5. Prostate, left base, biopsy -Benign prostate tissue.\par 6. Prostate, left mid, biopsy -Adenocarcinoma of the prostate, Prognostic Grade Group 2 (Boo score 3+4=7) involving 25% (2 mm in length) of 1 of 2 core(s).\par -Perineural invasion is identified.  Note: Valrico pattern 4 comprises approximately 5% of total tumor volume.\par 7. Prostate, left apex, biopsy -Benign prostate tissue.\par       \par MRI prostate 5/10/17\par Prostate gland:\par Size:  3.8 x 3.1 x 4.3 cm.   Volume 26 cc.   Suspicious lesion in the left peripheral zone\par \par MRI pelvis/rectum: 5/10/17 A 3.6 cm lesion involving the anterior and right lateral walls of the  rectum \par \par May 2, 2017 PET/CT:   No discrete FDG avidity associated with a segment 7/8 lesion, better characterized on dedicated MRI as atypical hemangioma.  Liver background SUV mean as a reference for comparing studies is 3.1.  Tiny non-FDG avid right perirectal lymph nodes, for example measuring 0.4 cm, and 0.2 cm.  FDG avid rectosigmoid colon mass (SUV 11.1).\par \par April 27, 2017 Dr. Hand performed a flexible sigmoidoscopy which demonstrated a large cancer of the mid rectum approximately extending from 7-10 cm from the dentate line.\par \par April 25, 2017 MRI of abdomen: T2 hyperintense lesion in segment 7/8, measuring 1.3 x 1.3 cm with peripheral discontinuous enhancement, probably an atypical hemangioma.  Consider follow-up MRI in 3 months.\par \par April 17, 2017 CT abd/pelvis:  Rectal wall thickening, possibly known neoplasm.  Indeterminate lesion in hepatic segment 8/7, measuring 1.4 cm, suspicious for metastasis.\par \par April 14, 2017 Colonoscopy with biopsy of rectal tumor (Dr. Armstrong): Biopsy of rectal tumor at 15 cm shows in situ and invasive colonic adenocarcinoma.  The polyp sigmoid at 30 cm showed tubular adenoma.  \par \par March 30, 2017 Right flank; left flank; left shoulder Lipoma removal:  Fibrofatty tissue consistent with lipoma.  \par \par January 26, 2017 u/s of palpable areas of LE and abdomen:  In the right mid back region in the area of palpable concern, there is a subcutaneous isoechoic mass measuring 8.9 x 3.1 x 7.6 cm whose appearance is most compatible with a lipoma.  In the area of palpable concern in the left upper ventral abdominal wall, there is a subcutaneous echogenic mass measuring 10.6 x 6.5 x 8.3 cm whose appearance is most compatible with a lipoma.\par

## 2020-05-18 NOTE — ASSESSMENT
[FreeTextEntry1] : Telehealth consult conducted for patient in lieu of scheduled in person visit in order to minimize nonessential travel/exposure for the patient during COVID 19 pandemic.  The patient gave verbal consent.

## 2020-05-18 NOTE — HISTORY OF PRESENT ILLNESS
[Disease: _____________________] : Disease: [unfilled] [T: ___] : T[unfilled] [N: ___] : N[unfilled] [M: ___] : M[unfilled] [AJCC Stage: ____] : AJCC Stage: [unfilled] [de-identified] : The patient was diagnosed with adenocarcinoma of the rectum in April 2017 at the age of 61.  He began experiencing hematochezia post narcotic-induced constipation after a fracture of his left lower leg.  He was referred to GI Dr. Armstrong who performed a colonoscopy on April 14, 2017.  A rectal mass was discovered and the biopsy of the rectal tumor at 15 cm showed in situ and invasive colonic adenocarcinoma.  On April 17, 2017 CT of the abdomen and  pelvis showed rectal wall thickening, and a lesion in the hepatic segment 8/7 measuring 1.4 cm suspicious for metastasis.  To better evaluate the hepatic findings he had an MRI of the abdomen on April 25, 2017 which showed T2 hyperintense lesion in segment 7/8, measuring 1.3 x 1.3 cm with peripheral discontinuous enhancement, probably consistent with an atypical hemangioma.  He was then referred to Dr. Hand on April 27, 2017 where he performed a flexible sigmoidoscopy which demonstrated a large cancer of the mid rectum approximately extending from 7-10 cm from the dentate line. On May 2, 2017 PET/CT showed no discrete FDG avidity associated with a segment 7/8 lesion.  FDG avid rectosigmoid colon mass with SUV of 11.1.  At the time of his consultation he had been evaluated by Dr. Soliz of radiation therapy.   [de-identified] : He had an MRI of the pelvis on 5/9/17, and he was scheduled for a prostate MRI today ordered by Dr. Kim.  \par PSA- Jan 2017 5.69; repeat 3/25/17 5.7 [de-identified] : The patient is s/p 6 cycles adjuvant xeloda (11/9/17 - 3/7/18) and CRT for rectal caner completed 6/19/17 - 7/27/17.  He completed RT for the prostate cancer on 1/8/17 - 2/5/17.  He is s/p reversal of colostomy on 4/16/18 with Dr Hand. Notes doing well. Denies N/V/D. Appetite is very good, gained significant weight since the summer.  He is doing excellent from a urinary standpoint. Has good urinary flow complete bladder emptying. No significant nocturia. No significant frequency urgency. No stress incontinence.\par No other new complaints today. Lost 50 lbs voluntarily through diet and exercise.

## 2020-05-18 NOTE — PHYSICAL EXAM
[Fully active, able to carry on all pre-disease performance without restriction] : Status 0 - Fully active, able to carry on all pre-disease performance without restriction [Normal] : affect appropriate [de-identified] : hyperpigmentation of lips [de-identified] : vertical incision with staples in place

## 2020-05-27 ENCOUNTER — APPOINTMENT (OUTPATIENT)
Dept: ELECTROPHYSIOLOGY | Facility: CLINIC | Age: 65
End: 2020-05-27
Payer: MEDICAID

## 2020-05-27 PROCEDURE — 93298 REM INTERROG DEV EVAL SCRMS: CPT

## 2020-05-27 PROCEDURE — G2066: CPT

## 2020-05-28 ENCOUNTER — OUTPATIENT (OUTPATIENT)
Dept: OUTPATIENT SERVICES | Facility: HOSPITAL | Age: 65
LOS: 1 days | End: 2020-05-28
Payer: MEDICAID

## 2020-05-28 ENCOUNTER — APPOINTMENT (OUTPATIENT)
Dept: CT IMAGING | Facility: CLINIC | Age: 65
End: 2020-05-28
Payer: MEDICAID

## 2020-05-28 DIAGNOSIS — Z96.7 PRESENCE OF OTHER BONE AND TENDON IMPLANTS: Chronic | ICD-10-CM

## 2020-05-28 DIAGNOSIS — Z98.890 OTHER SPECIFIED POSTPROCEDURAL STATES: Chronic | ICD-10-CM

## 2020-05-28 DIAGNOSIS — Z90.49 ACQUIRED ABSENCE OF OTHER SPECIFIED PARTS OF DIGESTIVE TRACT: Chronic | ICD-10-CM

## 2020-05-28 DIAGNOSIS — C61 MALIGNANT NEOPLASM OF PROSTATE: ICD-10-CM

## 2020-05-28 DIAGNOSIS — Z00.00 ENCOUNTER FOR GENERAL ADULT MEDICAL EXAMINATION WITHOUT ABNORMAL FINDINGS: ICD-10-CM

## 2020-05-28 DIAGNOSIS — Z86.018 PERSONAL HISTORY OF OTHER BENIGN NEOPLASM: Chronic | ICD-10-CM

## 2020-05-28 PROCEDURE — 71260 CT THORAX DX C+: CPT | Mod: 26

## 2020-05-28 PROCEDURE — 71260 CT THORAX DX C+: CPT

## 2020-05-28 PROCEDURE — 74177 CT ABD & PELVIS W/CONTRAST: CPT

## 2020-05-28 PROCEDURE — 82565 ASSAY OF CREATININE: CPT

## 2020-05-28 PROCEDURE — 74177 CT ABD & PELVIS W/CONTRAST: CPT | Mod: 26

## 2020-06-18 NOTE — DISCUSSION/SUMMARY
Medical Week 3 Survey      Responses   [facility] patient discharged from?  [place]   COVID-19 Test Status  Negative   Does the patient have one of the following disease processes/diagnoses(primary or secondary)?  Other   Week 3 attempt successful?  No   Unsuccessful attempts  Attempt 1          Winsome Marcano RN   [FreeTextEntry1] : 63 year old gentleman with history of obesity, HTN, prostate and colorectal cancer s/p partial colon resection, presenting for evaluation of paroxysmal atrial fibrillation. He has had relatively mild symptoms of dyspnea, palpitation and occasional lightheadedness which may be attributable to atrial fibrillation. His AF has been recurrent despite prior treatment with amiodarone and now high dose beta blockade. He has been started on anticoagulation in light of CHADSVASc =1. We reviewed management options for atrial fibrillation in detail, including potential for conservative management, medical management and antiarrhythmic medication, and catheter ablation. Given his young age and desire to avoid long-term antiarrhythmic medications and avoid progressive AF and associated morbidity, he would like to proceed with AF ablation. We reviewed the risks and benefits of AF ablation in detail, including procedure related risks of bleeding, vascular injury, stroke, MI, cardiac perforation, esophageal injury and death. He expressed understanding, and would like to proceed. \par -Will plan AF ablation. Will likely get TOM pre ablation given history of not being on anticoagulation. Hold Xarelto 1 day prior to ablation and bridge with lovenox. \par -Continue Xarelto and Metoprolol for now. Given CHADSVASc=1, will continue Xarelto for at least 2 months following ablation- after that if he demonstrates lack of recurrent AF, he long-term AF related thromboembolic risk is likely low, and can reevaluate long-term risks and benefits of anticoagulation following ablation, particularly given his stated desire to avoid long-term anticoagulation if possible. \par -Will get sleep study to evaluate for ALFONSO. Discussed importance of treatment of sleep apnea if present. \par -lifestyle modifications including diet and exercise with focus on weight loss encouraged. \par \par

## 2020-06-26 NOTE — ASU PATIENT PROFILE, ADULT - CIGARETTE, PACK YRS
Pt reports SOB and wheezing.  Wheezing audible without auscultation.  Given PRN albuterol 2 puffs, pt sat up.  Pt reports he began feeling symptoms after antibiotic hung earlier in the day.  And onset of wheezing following metronidazole beginning.  Metronidazole stopped. eICU contacted.  Orders received.  Pt reports relief following benedryl. Minimal wheezes heard following dosing. eICU okay to restart metronidazole and monitor.     2

## 2020-07-02 ENCOUNTER — APPOINTMENT (OUTPATIENT)
Dept: ELECTROPHYSIOLOGY | Facility: CLINIC | Age: 65
End: 2020-07-02
Payer: MEDICAID

## 2020-07-02 PROCEDURE — 93298 REM INTERROG DEV EVAL SCRMS: CPT

## 2020-07-02 PROCEDURE — G2066: CPT

## 2020-08-11 ENCOUNTER — APPOINTMENT (OUTPATIENT)
Dept: UROLOGY | Facility: CLINIC | Age: 65
End: 2020-08-11
Payer: MEDICAID

## 2020-08-11 VITALS — TEMPERATURE: 98.2 F

## 2020-08-11 PROCEDURE — 99213 OFFICE O/P EST LOW 20 MIN: CPT

## 2020-08-12 LAB
PSA SERPL-MCNC: 0.31 NG/ML
TESTOST SERPL-MCNC: 280 NG/DL

## 2020-08-13 ENCOUNTER — RX RENEWAL (OUTPATIENT)
Age: 65
End: 2020-08-13

## 2020-08-19 NOTE — DISCHARGE NOTE PROVIDER - NSDCCPCAREPLAN_GEN_ALL_CORE_FT
PRINCIPAL DISCHARGE DIAGNOSIS  Diagnosis: Atrial fibrillation  Assessment and Plan of Treatment: Star Wedge Flap Text: The defect edges were debeveled with a #15 scalpel blade.  Given the location of the defect, shape of the defect and the proximity to free margins a star wedge flap was deemed most appropriate.  Using a sterile surgical marker, an appropriate rotation flap was drawn incorporating the defect and placing the expected incisions within the relaxed skin tension lines where possible. The area thus outlined was incised deep to adipose tissue with a #15 scalpel blade.  The skin margins were undermined to an appropriate distance in all directions utilizing iris scissors.

## 2020-08-23 NOTE — HISTORY OF PRESENT ILLNESS
[FreeTextEntry1] : Here for follow-up visit.  \par \mariano Has previous diagnosis of both rectal cancer and prostate cancer.  He status post radiation therapy for his prostate cancer.  He was previous treated with androgen deprivation therapy while he was undergoing treatment for his rectal cancer.  He is completed now both treatments about 2 years ago.\par \par He is doing excellent from a urinary standpoint.  Has good urinary flow complete bladder emptying.  No significant nocturia.  No significant frequency urgency.  No stress incontinence.\par \par Has noted an improvement in erectile function.  Continues on tadalafil 10/20 mg prn. No pain or curvature with erections.

## 2020-08-24 ENCOUNTER — APPOINTMENT (OUTPATIENT)
Dept: INTERNAL MEDICINE | Facility: CLINIC | Age: 65
End: 2020-08-24
Payer: MEDICAID

## 2020-08-24 ENCOUNTER — NON-APPOINTMENT (OUTPATIENT)
Age: 65
End: 2020-08-24

## 2020-08-24 VITALS
SYSTOLIC BLOOD PRESSURE: 110 MMHG | HEIGHT: 71 IN | TEMPERATURE: 97.1 F | BODY MASS INDEX: 38.64 KG/M2 | DIASTOLIC BLOOD PRESSURE: 76 MMHG | OXYGEN SATURATION: 98 % | RESPIRATION RATE: 16 BRPM | HEART RATE: 61 BPM | WEIGHT: 276 LBS

## 2020-08-24 VITALS
HEART RATE: 61 BPM | SYSTOLIC BLOOD PRESSURE: 110 MMHG | WEIGHT: 276 LBS | HEIGHT: 71 IN | BODY MASS INDEX: 38.64 KG/M2 | OXYGEN SATURATION: 98 % | DIASTOLIC BLOOD PRESSURE: 76 MMHG | RESPIRATION RATE: 16 BRPM | TEMPERATURE: 97.1 F

## 2020-08-24 DIAGNOSIS — Z85.46 PERSONAL HISTORY OF MALIGNANT NEOPLASM OF PROSTATE: ICD-10-CM

## 2020-08-24 DIAGNOSIS — Z86.69 PERSONAL HISTORY OF OTHER DISEASES OF THE NERVOUS SYSTEM AND SENSE ORGANS: ICD-10-CM

## 2020-08-24 PROCEDURE — 93000 ELECTROCARDIOGRAM COMPLETE: CPT

## 2020-08-24 PROCEDURE — 36415 COLL VENOUS BLD VENIPUNCTURE: CPT

## 2020-08-24 PROCEDURE — 99386 PREV VISIT NEW AGE 40-64: CPT | Mod: 25

## 2020-08-24 NOTE — HEALTH RISK ASSESSMENT
[Good] : ~his/her~ current health as good [0] : 2) Feeling down, depressed, or hopeless: Not at all (0) [] : No [Patient declined Retinal Exam] : Patient declined Retinal Exam. [[id number]] : 0 [Patient declined Low Dose CT Scan] : Patient declined Low Dose CT Scan

## 2020-08-24 NOTE — HISTORY OF PRESENT ILLNESS
[FreeTextEntry1] : Patient is here as a new patient for yearly physical. Patient also complaining of left ear pain that started 6 days ago. Sometimes he gets water in the ear, but he states this time it is persistent and getting worse by the day.  [de-identified] : Mr. ASHLEY SORENSON is a 64 year male with a PMH of HTN,  Prostate and rectal cancer comes to the office for physical exam. Patient denies fever, cough SOB. No other complaints at this time.

## 2020-08-24 NOTE — PHYSICAL EXAM
[No Acute Distress] : no acute distress [Well Developed] : well developed [Well Nourished] : well nourished [Well-Appearing] : well-appearing [PERRL] : pupils equal round and reactive to light [Normal Sclera/Conjunctiva] : normal sclera/conjunctiva [Normal Outer Ear/Nose] : the outer ears and nose were normal in appearance [EOMI] : extraocular movements intact [Normal Oropharynx] : the oropharynx was normal [Supple] : supple [No Lymphadenopathy] : no lymphadenopathy [No JVD] : no jugular venous distention [Thyroid Normal, No Nodules] : the thyroid was normal and there were no nodules present [No Respiratory Distress] : no respiratory distress  [No Accessory Muscle Use] : no accessory muscle use [Normal Rate] : normal rate  [Clear to Auscultation] : lungs were clear to auscultation bilaterally [Regular Rhythm] : with a regular rhythm [Normal S1, S2] : normal S1 and S2 [No Carotid Bruits] : no carotid bruits [No Abdominal Bruit] : a ~M bruit was not heard ~T in the abdomen [No Murmur] : no murmur heard [No Varicosities] : no varicosities [No Edema] : there was no peripheral edema [Pedal Pulses Present] : the pedal pulses are present [No Palpable Aorta] : no palpable aorta [No Extremity Clubbing/Cyanosis] : no extremity clubbing/cyanosis [Non Tender] : non-tender [Soft] : abdomen soft [No Masses] : no abdominal mass palpated [Non-distended] : non-distended [No HSM] : no HSM [Normal Posterior Cervical Nodes] : no posterior cervical lymphadenopathy [Normal Bowel Sounds] : normal bowel sounds [Normal Anterior Cervical Nodes] : no anterior cervical lymphadenopathy [No CVA Tenderness] : no CVA  tenderness [Grossly Normal Strength/Tone] : grossly normal strength/tone [No Spinal Tenderness] : no spinal tenderness [No Joint Swelling] : no joint swelling [No Rash] : no rash [Coordination Grossly Intact] : coordination grossly intact [No Focal Deficits] : no focal deficits [Normal Gait] : normal gait [Normal Affect] : the affect was normal [Normal Insight/Judgement] : insight and judgment were intact

## 2020-08-24 NOTE — PLAN
[FreeTextEntry1] : In regards to patients Physical exam, routine blood work drawn, will review results with patient. EKG reviewed in office\par \par Patient will continue to follow with Heme/Onc and Urologist\par Patient will continue to follow with cardiologist. \par \par Counseling included abnormal lab results, differential diagnoses, treatment options, risks and benefits, lifestyle changes, current condition, medications, and dose adjustments. \par The patient was interactive, attentive, asked questions, and verbalized understanding \par

## 2020-08-25 LAB
ALBUMIN SERPL ELPH-MCNC: 4.3 G/DL
ALP BLD-CCNC: 62 U/L
ALT SERPL-CCNC: 24 U/L
ANION GAP SERPL CALC-SCNC: 13 MMOL/L
AST SERPL-CCNC: 21 U/L
BASOPHILS # BLD AUTO: 0.02 K/UL
BASOPHILS NFR BLD AUTO: 0.3 %
BILIRUB SERPL-MCNC: 0.4 MG/DL
BUN SERPL-MCNC: 17 MG/DL
CALCIUM SERPL-MCNC: 9 MG/DL
CHLORIDE SERPL-SCNC: 106 MMOL/L
CHOLEST SERPL-MCNC: 215 MG/DL
CHOLEST/HDLC SERPL: 6.4 RATIO
CO2 SERPL-SCNC: 20 MMOL/L
CREAT SERPL-MCNC: 1.04 MG/DL
EOSINOPHIL # BLD AUTO: 0.1 K/UL
EOSINOPHIL NFR BLD AUTO: 1.7 %
ESTIMATED AVERAGE GLUCOSE: 108 MG/DL
GLUCOSE SERPL-MCNC: 91 MG/DL
HBA1C MFR BLD HPLC: 5.4 %
HCT VFR BLD CALC: 46.1 %
HDLC SERPL-MCNC: 33 MG/DL
HGB BLD-MCNC: 14.5 G/DL
IMM GRANULOCYTES NFR BLD AUTO: 0.3 %
LDLC SERPL CALC-MCNC: NORMAL MG/DL
LYMPHOCYTES # BLD AUTO: 0.72 K/UL
LYMPHOCYTES NFR BLD AUTO: 12.4 %
MAN DIFF?: NORMAL
MCHC RBC-ENTMCNC: 30.5 PG
MCHC RBC-ENTMCNC: 31.5 GM/DL
MCV RBC AUTO: 96.8 FL
MONOCYTES # BLD AUTO: 0.53 K/UL
MONOCYTES NFR BLD AUTO: 9.1 %
NEUTROPHILS # BLD AUTO: 4.41 K/UL
NEUTROPHILS NFR BLD AUTO: 76.2 %
PLATELET # BLD AUTO: 177 K/UL
POTASSIUM SERPL-SCNC: 4.4 MMOL/L
PROT SERPL-MCNC: 6.9 G/DL
RBC # BLD: 4.76 M/UL
RBC # FLD: 13.1 %
SODIUM SERPL-SCNC: 140 MMOL/L
TRIGL SERPL-MCNC: 450 MG/DL
TSH SERPL-ACNC: 0.54 UIU/ML
WBC # FLD AUTO: 5.8 K/UL

## 2020-08-25 RX ORDER — ASPIRIN 81 MG/1
81 TABLET, CHEWABLE ORAL
Qty: 30 | Refills: 0 | Status: DISCONTINUED | COMMUNITY
Start: 2019-09-12 | End: 2020-08-25

## 2020-08-26 NOTE — HEALTH RISK ASSESSMENT
[Good] : ~his/her~  mood as  good [[id number]] : 0 [0] : 2) Feeling down, depressed, or hopeless: Not at all (0) [] : No [HIV test declined] : HIV test declined [Hepatitis C test declined] : Hepatitis C test declined

## 2020-08-26 NOTE — HISTORY OF PRESENT ILLNESS
[FreeTextEntry1] : Physical exam.  [de-identified] : Mr. ASHLEY SORENSON is a 64 year male with a PMH of HTN, Prostate and rectal cancer comes to the office for physical exam. Patient denies fever, cough SOB. No other complaints at this time. \par

## 2020-08-27 ENCOUNTER — APPOINTMENT (OUTPATIENT)
Dept: ELECTROPHYSIOLOGY | Facility: CLINIC | Age: 65
End: 2020-08-27
Payer: MEDICAID

## 2020-08-27 VITALS
HEART RATE: 61 BPM | SYSTOLIC BLOOD PRESSURE: 142 MMHG | WEIGHT: 276 LBS | BODY MASS INDEX: 52.11 KG/M2 | HEIGHT: 61 IN | TEMPERATURE: 97.1 F | OXYGEN SATURATION: 95 % | DIASTOLIC BLOOD PRESSURE: 89 MMHG

## 2020-08-27 PROCEDURE — 93000 ELECTROCARDIOGRAM COMPLETE: CPT

## 2020-08-27 PROCEDURE — 99214 OFFICE O/P EST MOD 30 MIN: CPT

## 2020-08-27 RX ORDER — RIVAROXABAN 20 MG/1
20 TABLET, FILM COATED ORAL
Qty: 30 | Refills: 0 | Status: DISCONTINUED | COMMUNITY
Start: 2020-08-25 | End: 2020-08-27

## 2020-08-27 RX ORDER — HYDROXYCHLOROQUINE SULFATE 200 MG/1
200 TABLET, FILM COATED ORAL
Qty: 12 | Refills: 0 | Status: COMPLETED | COMMUNITY
Start: 2020-03-28

## 2020-08-27 RX ORDER — AZITHROMYCIN 250 MG/1
250 TABLET, FILM COATED ORAL
Qty: 6 | Refills: 0 | Status: COMPLETED | COMMUNITY
Start: 2020-03-28

## 2020-08-27 RX ORDER — METOPROLOL TARTRATE 25 MG/1
25 TABLET, FILM COATED ORAL DAILY
Qty: 60 | Refills: 5 | Status: DISCONTINUED | COMMUNITY
End: 2020-08-27

## 2020-08-27 NOTE — DISCUSSION/SUMMARY
[FreeTextEntry1] : Mr. washington is a 63 y/o M with pmhx obesity, HTN, prostate and colorectal cancer s/p partial colon resection, and symptomatic paroxysmal atrial fibrillation despite prior treatment with Amiodarone and high dose BB s/p RF ablation 3/29/19. Subsequently presented with early recurrent symptomatic AF s/p DCCV, ILR implant and Amiodarone reinitiation (6/4/2019). Amiodarone discontinued 7/25/19.Had been doing well without recurrence of AF until ILR monitoring revealed PAF 8/24/20 with two conversion pauses up to 10 seconds in duration during which patient reports he was sleeping. Had been in usual state of health, except for ear infection being treated with augmentin. Patient was instructed to hold beta blocker therapy, resume Xarelto and present today for EP follow up. \par \par He reports that since last follow up he has been feeling well. Was asymptomatic during time of events, likely sleeping during conversion pauses. Has had foggy sensation related to fluid in the ear with severe congestion to left sinuses. Denies lightheadedness, dizziness, near syncope, syncope. \par \par Recommendation: \par \par -Suspect low burden PAF secondary to infectious process/sinus congestion, asymptomatic during time of events. To continue to hold metoprolol. Continue aspirin 81mg as chads2 vasc 1, and discussed that if repeat intervention needed then may need anticoagulation in the future. Counseled to report any development of symptoms, and ILR to be monitored remotely closely. \par -EP follow up in 3 months.\par \par Christelle BARONE

## 2020-08-27 NOTE — ADDENDUM
[FreeTextEntry1] : I was present for the above evaluation and agree with the history, physical exam, assessment and plan as above. He had not had recurrent AF for >1 yr post ablation, and recent episode of pAF and long conversion pause in setting of new acute medical issue as above. \par Will continue close monitoring of ILR- if further AF noted, will consider further rhythm control as needed, and if recurrent prolonged/symptomatic pauses a ppm will be considered.

## 2020-08-27 NOTE — HISTORY OF PRESENT ILLNESS
[de-identified] : Mr. washington is a 65 y/o M with pmhx obesity, HTN, prostate and colorectal cancer s/p partial colon resection, and symptomatic paroxysmal atrial fibrillation despite prior treatment with Amiodarone and high dose BB s/p RF ablation 3/29/19. Subsequently presented with early recurrent symptomatic AF s/p DCCV, ILR implant and Amiodarone reinitiation (6/4/2019). Amiodarone discontinued 7/25/19.Had been doing well without recurrence of AF until ILR monitoring revealed PAF 8/24/20 with two conversion pauses up to 10 seconds in duration during which patient reports he was sleeping. Had been in usual state of health, except for ear infection being treated with augmentin. Patient was instructed to hold beta blocker therapy, resume Xarelto and present today for EP follow up. \par \par He reports that since last follow up he has been feeling well. Was asymptomatic during time of events, likely sleeping during conversion pauses. Has had foggy sensation related to fluid in the ear with severe congestion to left sinuses. Denies lightheadedness, dizziness, near syncope, syncope. \par \par  \par

## 2020-08-31 ENCOUNTER — NON-APPOINTMENT (OUTPATIENT)
Age: 65
End: 2020-08-31

## 2020-08-31 ENCOUNTER — APPOINTMENT (OUTPATIENT)
Dept: OTOLARYNGOLOGY | Facility: CLINIC | Age: 65
End: 2020-08-31
Payer: MEDICAID

## 2020-08-31 VITALS — BODY MASS INDEX: 38.64 KG/M2 | HEIGHT: 71 IN | TEMPERATURE: 98.1 F | WEIGHT: 276 LBS

## 2020-08-31 DIAGNOSIS — H90.12 CONDUCTIVE HEARING LOSS, UNILATERAL, LEFT EAR, WITH UNRESTRICTED HEARING ON THE CONTRALATERAL SIDE: ICD-10-CM

## 2020-08-31 DIAGNOSIS — H91.92 UNSPECIFIED HEARING LOSS, LEFT EAR: ICD-10-CM

## 2020-08-31 PROCEDURE — 99204 OFFICE O/P NEW MOD 45 MIN: CPT | Mod: 25

## 2020-08-31 PROCEDURE — 69210 REMOVE IMPACTED EAR WAX UNI: CPT

## 2020-08-31 NOTE — PHYSICAL EXAM
[FreeTextEntry1] : overweight  [de-identified] : right eac normal ; left eac swollen and tender - appears to be some cerumen in left eac  [de-identified] : cerumen against right tm - could not remove due to pain.  Left tm not visualized due to swelling of eac - wick inserted and started on ciprodex  [Midline] : trachea located in midline position [Normal] : no rashes

## 2020-08-31 NOTE — REASON FOR VISIT
[Initial Consultation] : an initial consultation for [FreeTextEntry2] : left ear, off-balance and no sleep (left side) down in front the ear

## 2020-08-31 NOTE — HISTORY OF PRESENT ILLNESS
[de-identified] : c/o problem with left ear.  Problem started slightly over 2 weeks.  C/o clogged left ear.  Saw primary care last week.  Told of normal ear exam.  Felt ? of sinus issue.  Treated with augmentin 875.  C/o decreased hearing in left ear. Also feels problem with off balance.  No prior ear problems \par Patient does use q tips.

## 2020-08-31 NOTE — ASSESSMENT
[FreeTextEntry1] : Patient with c/o hearing loss in left ear and pain in left ear.  Currently on augmentin from primary care.  Patient with cerumen against right tm which could not be removed - stuck to tm;  left eac swollen and painful.  Wick inserted in left ear and patient started on ciprodex otic.  Also started on ear wax drops in right ear.\par Advised to keep ears dry and follow up in about 1 week.  Further treatment pending findings after.  Will do audio when ears clear.

## 2020-09-08 ENCOUNTER — APPOINTMENT (OUTPATIENT)
Dept: OTOLARYNGOLOGY | Facility: CLINIC | Age: 65
End: 2020-09-08
Payer: MEDICAID

## 2020-09-08 VITALS — HEIGHT: 71 IN | TEMPERATURE: 98.1 F | WEIGHT: 276 LBS | BODY MASS INDEX: 38.64 KG/M2

## 2020-09-08 DIAGNOSIS — H61.21 IMPACTED CERUMEN, RIGHT EAR: ICD-10-CM

## 2020-09-08 DIAGNOSIS — H90.3 SENSORINEURAL HEARING LOSS, BILATERAL: ICD-10-CM

## 2020-09-08 PROCEDURE — 99213 OFFICE O/P EST LOW 20 MIN: CPT | Mod: 25

## 2020-09-08 PROCEDURE — 92567 TYMPANOMETRY: CPT

## 2020-09-08 PROCEDURE — 92557 COMPREHENSIVE HEARING TEST: CPT

## 2020-09-08 PROCEDURE — G0268 REMOVAL OF IMPACTED WAX MD: CPT

## 2020-09-08 NOTE — PHYSICAL EXAM
[Midline] : trachea located in midline position [Normal] : orientation to person, place, and time: normal [FreeTextEntry1] : overweight  [de-identified] : sl swelling of left eac  [de-identified] : left wick out - sl debris suctioned and tm appears normal - ba instilled; also sl cerumen of right ear removed with suction

## 2020-09-08 NOTE — ASSESSMENT
[FreeTextEntry1] : Patient with resolved left OE today - right ear had cerumen and removed.   Patient concerned about hearing.  Audio today bilat a tymp with mild/mod snhl .  Recommended yearly audio and follow up as necessary and 6 mos for cerumen. Would also keep left ear dry for several more weeks.

## 2020-09-08 NOTE — HISTORY OF PRESENT ILLNESS
[de-identified] : Patient for follow up of left OE and right cerumen. Has wick in left ear. No pain. Patient unsure of hearing

## 2020-09-08 NOTE — REASON FOR VISIT
[Subsequent Evaluation] : a subsequent evaluation for [Spouse] : spouse [FreeTextEntry2] : drops for infection (pain is gone)

## 2020-09-09 ENCOUNTER — APPOINTMENT (OUTPATIENT)
Dept: ELECTROPHYSIOLOGY | Facility: CLINIC | Age: 65
End: 2020-09-09

## 2020-09-11 ENCOUNTER — APPOINTMENT (OUTPATIENT)
Dept: ELECTROPHYSIOLOGY | Facility: CLINIC | Age: 65
End: 2020-09-11
Payer: MEDICAID

## 2020-09-11 PROCEDURE — G2066: CPT

## 2020-09-11 PROCEDURE — 93298 REM INTERROG DEV EVAL SCRMS: CPT

## 2020-10-16 ENCOUNTER — APPOINTMENT (OUTPATIENT)
Dept: ELECTROPHYSIOLOGY | Facility: CLINIC | Age: 65
End: 2020-10-16
Payer: MEDICAID

## 2020-10-16 PROCEDURE — 93298 REM INTERROG DEV EVAL SCRMS: CPT

## 2020-10-16 PROCEDURE — G2066: CPT

## 2020-11-12 ENCOUNTER — OUTPATIENT (OUTPATIENT)
Dept: OUTPATIENT SERVICES | Facility: HOSPITAL | Age: 65
LOS: 1 days | Discharge: HOME | End: 2020-11-12

## 2020-11-12 ENCOUNTER — APPOINTMENT (OUTPATIENT)
Dept: ELECTROPHYSIOLOGY | Facility: CLINIC | Age: 65
End: 2020-11-12
Payer: MEDICAID

## 2020-11-12 VITALS
OXYGEN SATURATION: 95 % | SYSTOLIC BLOOD PRESSURE: 144 MMHG | HEIGHT: 71 IN | WEIGHT: 283 LBS | DIASTOLIC BLOOD PRESSURE: 78 MMHG | HEART RATE: 58 BPM | RESPIRATION RATE: 18 BRPM | BODY MASS INDEX: 39.62 KG/M2

## 2020-11-12 VITALS — SYSTOLIC BLOOD PRESSURE: 138 MMHG | DIASTOLIC BLOOD PRESSURE: 84 MMHG

## 2020-11-12 DIAGNOSIS — Z96.7 PRESENCE OF OTHER BONE AND TENDON IMPLANTS: Chronic | ICD-10-CM

## 2020-11-12 DIAGNOSIS — Z90.49 ACQUIRED ABSENCE OF OTHER SPECIFIED PARTS OF DIGESTIVE TRACT: Chronic | ICD-10-CM

## 2020-11-12 DIAGNOSIS — Z86.018 PERSONAL HISTORY OF OTHER BENIGN NEOPLASM: Chronic | ICD-10-CM

## 2020-11-12 DIAGNOSIS — Z98.890 OTHER SPECIFIED POSTPROCEDURAL STATES: Chronic | ICD-10-CM

## 2020-11-12 DIAGNOSIS — C20 MALIGNANT NEOPLASM OF RECTUM: ICD-10-CM

## 2020-11-12 PROCEDURE — 93000 ELECTROCARDIOGRAM COMPLETE: CPT

## 2020-11-12 PROCEDURE — 99072 ADDL SUPL MATRL&STAF TM PHE: CPT

## 2020-11-12 PROCEDURE — 99214 OFFICE O/P EST MOD 30 MIN: CPT

## 2020-11-12 RX ORDER — CIPROFLOXACIN AND DEXAMETHASONE 3; 1 MG/ML; MG/ML
0.3-0.1 SUSPENSION/ DROPS AURICULAR (OTIC) TWICE DAILY
Qty: 1 | Refills: 1 | Status: DISCONTINUED | COMMUNITY
Start: 2020-08-31 | End: 2020-11-12

## 2020-11-12 RX ORDER — METOPROLOL TARTRATE 75 MG/1
TABLET, FILM COATED ORAL
Refills: 0 | Status: DISCONTINUED | COMMUNITY
End: 2020-11-12

## 2020-11-12 RX ORDER — AMOXICILLIN AND CLAVULANATE POTASSIUM 875; 125 MG/1; MG/1
875-125 TABLET, COATED ORAL TWICE DAILY
Qty: 20 | Refills: 0 | Status: DISCONTINUED | COMMUNITY
Start: 2020-08-24 | End: 2020-11-12

## 2020-11-12 RX ORDER — NAPHAZOLINE HCL/PHENIRAMINE .0268-.315
6.5 DROPS OPHTHALMIC (EYE)
Qty: 1 | Refills: 1 | Status: DISCONTINUED | COMMUNITY
Start: 2020-08-31 | End: 2020-11-12

## 2020-11-12 NOTE — PHYSICAL EXAM
[General Appearance - Well Developed] : well developed [] : no respiratory distress [Respiration, Rhythm And Depth] : normal respiratory rhythm and effort [Exaggerated Use Of Accessory Muscles For Inspiration] : no accessory muscle use [Heart Rate And Rhythm] : heart rate and rhythm were normal [Heart Sounds] : normal S1 and S2 [Murmurs] : no murmurs present

## 2020-11-15 ENCOUNTER — NON-APPOINTMENT (OUTPATIENT)
Age: 65
End: 2020-11-15

## 2020-11-16 NOTE — ADDENDUM
[FreeTextEntry1] : I was present for the above evaluation and agree with the history, physical exam, assessment and plan as above. Pt feeling well, without further AF recurrence. Will continue management as above.

## 2020-11-16 NOTE — HISTORY OF PRESENT ILLNESS
[FreeTextEntry1] : 63 y/o M with pmhx obesity, HTN, prostate and colorectal cancer s/p partial colon resection, and symptomatic paroxysmal atrial fibrillation despite prior treatment with Amiodarone and high dose BB s/p RF ablation 3/29/19. Subsequently presented with early recurrent symptomatic AF s/p DCCV, ILR implant and Amiodarone reinitiation (6/4/2019). Amiodarone discontinued 7/25/19.\par Had been doing well without recurrence of AF for over a year, until ILR showed one episode of PAF (6 hours, 8/24/20) with two prolonged conversion pauses (one possible 20sec) during sleeping. PAF event occurred in the setting of severe sinus infection which resulted in significant discomfort, inability to use CPAP and sleep deprevation for several weeks. \par Patient was instructed to hold beta blocker therapy, resume Xarelto and present today for EP follow up. \par \mariano Has been doing well since last follow-up. After stopping BB, patient had symptoms of "pounding in neck" where he felt his BP was elevated.  He restarted Metoprolol 12.5mg daily without discussing with a physician. Remains asymptomatic.  NO recurrent AF or bradyarrhythmias noted on ILR.\par

## 2020-11-16 NOTE — REVIEW OF SYSTEMS
[Negative] : Gastrointestinal [Fever] : no fever [Chills] : no chills [Feeling Fatigued] : not feeling fatigued [Shortness Of Breath] : no shortness of breath [Dyspnea on exertion] : not dyspnea during exertion [Chest  Pressure] : no chest pressure [Chest Pain] : no chest pain [Lower Ext Edema] : no extremity edema [Palpitations] : no palpitations

## 2020-11-16 NOTE — REASON FOR VISIT
[Follow-Up - Clinic] : a clinic follow-up of [Atrial Fibrillation] : atrial fibrillation [FreeTextEntry1] : Cardiology:  Dr. Robledo

## 2020-11-16 NOTE — DISCUSSION/SUMMARY
[FreeTextEntry1] : 63 y/o M with pmhx obesity, HTN, prostate and colorectal cancer s/p partial colon resection, and symptomatic paroxysmal atrial fibrillation despite prior treatment with Amiodarone and high dose BB s/p RF ablation 3/29/19. Subsequently presented with early recurrent symptomatic AF s/p DCCV, ILR implant and Amiodarone reinitiation (6/4/2019). Amiodarone discontinued 7/25/19.\par Had been doing well without recurrence of AF for over a year, until ILR showed one episode of PAF (6 hours, 8/24/20) with two prolonged conversion pauses during sleeping. PAF event occurred in the setting of severe sinus infection which resulted in significant discomfort, inability to use CPAP and sleep deprevation for several weeks.  Patient was instructed to hold beta blocker therapy. He restarted low dose (Toprol XL 12.5mg) two months ago after he felt subjective symptoms of elevated BP.  NO recurrent AF or bradyarrhythmias noted on ILR.\par Now doing well and remains asymptomatic\par \par \par - Continue low dose Metoprolol 12.5mg\par - Continue aspirin 81mg as chads2 vasc 1. Discussed that if repeat intervention needed then may need anticoagulation in the future. Counseled to report any development of symptoms, and ILR to be monitored remotely closely. \par - If BP is uncontrolled, would consider antihypertensive medications without HR lowering properties\par - Urged compliance with CPAP and discussed goals of weight goals and BP control\par - EP follow up in 4-6 months.\par \par Maribel Robledo PAC\par

## 2020-11-18 ENCOUNTER — RESULT REVIEW (OUTPATIENT)
Age: 65
End: 2020-11-18

## 2020-11-18 ENCOUNTER — APPOINTMENT (OUTPATIENT)
Dept: HEMATOLOGY ONCOLOGY | Facility: CLINIC | Age: 65
End: 2020-11-18
Payer: MEDICAID

## 2020-11-18 VITALS
SYSTOLIC BLOOD PRESSURE: 168 MMHG | WEIGHT: 293.44 LBS | HEART RATE: 64 BPM | BODY MASS INDEX: 41.08 KG/M2 | HEIGHT: 71 IN | OXYGEN SATURATION: 97 % | DIASTOLIC BLOOD PRESSURE: 90 MMHG

## 2020-11-18 LAB
BASOPHILS # BLD AUTO: 0.1 K/UL — SIGNIFICANT CHANGE UP (ref 0–0.2)
BASOPHILS NFR BLD AUTO: 0.9 % — SIGNIFICANT CHANGE UP (ref 0–2)
EOSINOPHIL # BLD AUTO: 0.2 K/UL — SIGNIFICANT CHANGE UP (ref 0–0.5)
EOSINOPHIL NFR BLD AUTO: 3.1 % — SIGNIFICANT CHANGE UP (ref 0–6)
HCT VFR BLD CALC: 43.9 % — SIGNIFICANT CHANGE UP (ref 39–50)
HGB BLD-MCNC: 14.9 G/DL — SIGNIFICANT CHANGE UP (ref 13–17)
LYMPHOCYTES # BLD AUTO: 0.9 K/UL — LOW (ref 1–3.3)
LYMPHOCYTES # BLD AUTO: 15.8 % — SIGNIFICANT CHANGE UP (ref 13–44)
MCHC RBC-ENTMCNC: 31.6 PG — SIGNIFICANT CHANGE UP (ref 27–34)
MCHC RBC-ENTMCNC: 34.1 G/DL — SIGNIFICANT CHANGE UP (ref 32–36)
MCV RBC AUTO: 92.7 FL — SIGNIFICANT CHANGE UP (ref 80–100)
MONOCYTES # BLD AUTO: 0.5 K/UL — SIGNIFICANT CHANGE UP (ref 0–0.9)
MONOCYTES NFR BLD AUTO: 8.5 % — SIGNIFICANT CHANGE UP (ref 2–14)
NEUTROPHILS # BLD AUTO: 4.2 K/UL — SIGNIFICANT CHANGE UP (ref 1.8–7.4)
NEUTROPHILS NFR BLD AUTO: 71.7 % — SIGNIFICANT CHANGE UP (ref 43–77)
PLATELET # BLD AUTO: 159 K/UL — SIGNIFICANT CHANGE UP (ref 150–400)
RBC # BLD: 4.73 M/UL — SIGNIFICANT CHANGE UP (ref 4.2–5.8)
RBC # FLD: 12.2 % — SIGNIFICANT CHANGE UP (ref 10.3–14.5)
WBC # BLD: 5.9 K/UL — SIGNIFICANT CHANGE UP (ref 3.8–10.5)
WBC # FLD AUTO: 5.9 K/UL — SIGNIFICANT CHANGE UP (ref 3.8–10.5)

## 2020-11-18 PROCEDURE — 99214 OFFICE O/P EST MOD 30 MIN: CPT

## 2020-11-18 NOTE — PHYSICAL EXAM
[Fully active, able to carry on all pre-disease performance without restriction] : Status 0 - Fully active, able to carry on all pre-disease performance without restriction [Normal] : affect appropriate [de-identified] : hyperpigmentation of lips [de-identified] : vertical incision with staples in place

## 2020-11-18 NOTE — HISTORY OF PRESENT ILLNESS
[Disease: _____________________] : Disease: [unfilled] [T: ___] : T[unfilled] [N: ___] : N[unfilled] [M: ___] : M[unfilled] [AJCC Stage: ____] : AJCC Stage: [unfilled] [de-identified] : The patient was diagnosed with adenocarcinoma of the rectum in April 2017 at the age of 61.  He began experiencing hematochezia post narcotic-induced constipation after a fracture of his left lower leg.  He was referred to GI Dr. Armstrong who performed a colonoscopy on April 14, 2017.  A rectal mass was discovered and the biopsy of the rectal tumor at 15 cm showed in situ and invasive colonic adenocarcinoma.  On April 17, 2017 CT of the abdomen and  pelvis showed rectal wall thickening, and a lesion in the hepatic segment 8/7 measuring 1.4 cm suspicious for metastasis.  To better evaluate the hepatic findings he had an MRI of the abdomen on April 25, 2017 which showed T2 hyperintense lesion in segment 7/8, measuring 1.3 x 1.3 cm with peripheral discontinuous enhancement, probably consistent with an atypical hemangioma.  He was then referred to Dr. Hand on April 27, 2017 where he performed a flexible sigmoidoscopy which demonstrated a large cancer of the mid rectum approximately extending from 7-10 cm from the dentate line. On May 2, 2017 PET/CT showed no discrete FDG avidity associated with a segment 7/8 lesion.  FDG avid rectosigmoid colon mass with SUV of 11.1.  At the time of his consultation he had been evaluated by Dr. Soliz of radiation therapy.   [de-identified] : He had an MRI of the pelvis on 5/9/17, and he was scheduled for a prostate MRI today ordered by Dr. Kim.  \par PSA- Jan 2017 5.69; repeat 3/25/17 5.7 [de-identified] : The patient is s/p 6 cycles adjuvant xeloda (11/9/17 - 3/7/18) and CRT for rectal caner completed 6/19/17 - 7/27/17.  He completed RT for the prostate cancer on 1/8/17 - 2/5/17.  He is s/p reversal of colostomy on 4/16/18 with Dr Hand. Notes doing well. Denies N/V/D. Appetite is very good, gained significant weight since the summer.  He is doing excellent from a urinary standpoint. Has good urinary flow complete bladder emptying. No significant nocturia. No significant frequency urgency. No stress incontinence.\par No other new complaints today. Lost 50 lbs voluntarily through diet and exercise.

## 2020-11-18 NOTE — RESULTS/DATA
[FreeTextEntry1] : 05/28/20 CT:  No metastatic disease identified.  Unchanged small 3 mm pulmonary nodule.\par \par 8/8/19 CT C/A/P: No adenopathy in the thorax, abdomen or pelvis. 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    Interval reversal of the loop ileostomy.    Interval development of a small amount of pelvic ascites.\par \par 3/21/18 CT Chest:  2 mm nonspecific left upper lobe pulmonary nodule is  unchanged since August 16, 2017.\par \par 2/16/18 CT A/P: Unchanged 1.1 cm hypodensity in segment 7/8, previously characterized as an atypical hemangioma by MRI. No new lesion. No evidence of metastatic disease.\par \par Final Diagnosis\par 10/2/17 path:  \par 1     Abdominal pelvic cyst:- Simple  mesothelial  cyst.\par 2     Sigmoid colon and rectum:-  Segment of colon with mucosal ulceration extending the submucosal and muscularis propria, with focal serosal adhesions and serositis.  Negative for dysplasia/neoplasia- Histologically unremarkable surgical resection margins.\par 3     Colorectal /anal anastomosis distal donut:- Histologically unremarkable segment of colon.\par 4     Colorectal/anal anastomosis proximal donut Histologically unremarkable segment of colon.\par \par 8/23/17 MRI Pelvis: Since  5/9/2017,  the  primary  tumor  and  extramural  disease  shows  near  complete response. Posttreatment  stage:  ymrT1/2  (tumor  confined  to  rectal  wall),  ymrN0  Mesorectal  fascia:  Clear  (tumor  margin  >  2  mm).  Sphincter  involvement:  Absent.\par \par 8/22/17 PET/CT: Resolved  non-FDG  avid  subcentimeter  right  perirectal lymph  nodes.  Considerable  decrease  in  soft  tissue  thickening and  FDG  avidity  in  the  rectosigmoid  colon  (SUV  4.0 previous  SUV 11.1) suggestive  of  therapy  response.  Sigmoid  diverticulosis.  Decreased  FDG  avid  areas  of  subcutaneous  infiltration  in  the left  anterolateral  chest  wall  and  left  shoulder  region,  possibly inflammatory.  No  FDG  avid  distant  metastatic  disease.\par \par 8/16/17 CT C/A/P: There  is  a  stable  3  mm  nodule  in  the  right  lower lobe.  Again  noted  is  the  right  lobe  low-attenuation  lesion  measuring  1.4  cm previously  characterized  on  MRI  dated  4/25/2017  as  probable  atypical hemangioma.  There  is  a  stable  0.6  cm  left  adrenal  nodule,  adenoma  on  4/25/2017 MRI.  Mild  rectal  wall  thickening  again  noted.  There  is  no  bowel obstruction.  There  are  scattered  colonic  diverticula.  No  enlarged  mesenteric lymph  nodes. There  has  been  interval  development  of  a  small  amount  of  fluid in  the  pelvis. No  enlarged  retroperitoneal  or  pelvic  nodes.\par \par Prostate biopsy: 5/24/17:  Core Lab Biopsy\par Final Diagnosis\par 1. Prostate, MRI target lesion, biopsy\par -Adenocarcinoma of the prostate, Prognostic Grade Group 2 (Valmora score 3+4=7) involving 100%, 90% and 90% (10 mm, 9 mm and 6 mm in length) of 3 of 3 core(s). \par -Perineural invasion is identified.  Note: Valmora pattern 4 comprises approximately 10% of total tumor volume.\par 2. Prostate, right base, biopsy -Adenocarcinoma of the prostate, Prognostic Grade Group 1 (Valmora score 3+3=6) involving 5% (0.5 mm in length) of 1 of 2 core(s).\par -Perineural invasion is identified.\par 3. Prostate, right mid, biopsy -Adenocarcinoma of the prostate, Prognostic Grade Group1 (Valmora score 3+3=6) involving 80% and 50% (8 mm and 5 mm in length) of 2 of 2 core(s).  -Perineural invasion is identified.\par 4. Prostate, right apex, biopsy -Adenocarcinoma of the prostate, Prognostic Grade Group1 (Valmora score 3+3=6) involving 10% (1.5 mm in length) of 1 of 2 core(s).\par 5. Prostate, left base, biopsy -Benign prostate tissue.\par 6. Prostate, left mid, biopsy -Adenocarcinoma of the prostate, Prognostic Grade Group 2 (Valmora score 3+4=7) involving 25% (2 mm in length) of 1 of 2 core(s).\par -Perineural invasion is identified.  Note: Valmora pattern 4 comprises approximately 5% of total tumor volume.\par 7. Prostate, left apex, biopsy -Benign prostate tissue.\par       \par MRI prostate 5/10/17\par Prostate gland:\par Size:  3.8 x 3.1 x 4.3 cm.   Volume 26 cc.   Suspicious lesion in the left peripheral zone\par \par MRI pelvis/rectum: 5/10/17 A 3.6 cm lesion involving the anterior and right lateral walls of the  rectum \par \par May 2, 2017 PET/CT:   No discrete FDG avidity associated with a segment 7/8 lesion, better characterized on dedicated MRI as atypical hemangioma.  Liver background SUV mean as a reference for comparing studies is 3.1.  Tiny non-FDG avid right perirectal lymph nodes, for example measuring 0.4 cm, and 0.2 cm.  FDG avid rectosigmoid colon mass (SUV 11.1).\par \par April 27, 2017 Dr. Hand performed a flexible sigmoidoscopy which demonstrated a large cancer of the mid rectum approximately extending from 7-10 cm from the dentate line.\par \par April 25, 2017 MRI of abdomen: T2 hyperintense lesion in segment 7/8, measuring 1.3 x 1.3 cm with peripheral discontinuous enhancement, probably an atypical hemangioma.  Consider follow-up MRI in 3 months.\par \par April 17, 2017 CT abd/pelvis:  Rectal wall thickening, possibly known neoplasm.  Indeterminate lesion in hepatic segment 8/7, measuring 1.4 cm, suspicious for metastasis.\par \par April 14, 2017 Colonoscopy with biopsy of rectal tumor (Dr. Armstrong): Biopsy of rectal tumor at 15 cm shows in situ and invasive colonic adenocarcinoma.  The polyp sigmoid at 30 cm showed tubular adenoma.  \par \par March 30, 2017 Right flank; left flank; left shoulder Lipoma removal:  Fibrofatty tissue consistent with lipoma.  \par \par January 26, 2017 u/s of palpable areas of LE and abdomen:  In the right mid back region in the area of palpable concern, there is a subcutaneous isoechoic mass measuring 8.9 x 3.1 x 7.6 cm whose appearance is most compatible with a lipoma.  In the area of palpable concern in the left upper ventral abdominal wall, there is a subcutaneous echogenic mass measuring 10.6 x 6.5 x 8.3 cm whose appearance is most compatible with a lipoma.\par

## 2020-11-20 ENCOUNTER — APPOINTMENT (OUTPATIENT)
Dept: ELECTROPHYSIOLOGY | Facility: CLINIC | Age: 65
End: 2020-11-20
Payer: MEDICAID

## 2020-11-20 PROCEDURE — 93298 REM INTERROG DEV EVAL SCRMS: CPT

## 2020-11-20 PROCEDURE — G2066: CPT

## 2020-11-26 LAB — CEA SERPL-MCNC: <0.6 NG/ML

## 2020-12-03 NOTE — PROGRESS NOTE ADULT - ASSESSMENT
61yMale  POD3 LAR, lymph node dissection with ileostomy for malignant neoplasm of rectum  - pain control PRN  - afib better controlled, continue cardiac medications as per cardiology, keep on cardiac monitor and   - encourage deep breathing, IS  - alejandre for now  - encourage OOB/ambulation  - NPO/IVF until ileus resolves  - DVT ppx  will discuss with attending left back

## 2020-12-07 LAB
ALBUMIN SERPL ELPH-MCNC: 4.1 G/DL
ALP BLD-CCNC: 61 U/L
ALT SERPL-CCNC: 29 U/L
ANION GAP SERPL CALC-SCNC: 11 MMOL/L
AST SERPL-CCNC: 19 U/L
BILIRUB SERPL-MCNC: 0.3 MG/DL
BUN SERPL-MCNC: 17 MG/DL
CALCIUM SERPL-MCNC: 9.1 MG/DL
CHLORIDE SERPL-SCNC: 103 MMOL/L
CO2 SERPL-SCNC: 24 MMOL/L
CREAT SERPL-MCNC: 1.03 MG/DL
GLUCOSE SERPL-MCNC: 116 MG/DL
MAGNESIUM SERPL-MCNC: 2.2 MG/DL
POTASSIUM SERPL-SCNC: 4 MMOL/L
PROT SERPL-MCNC: 6.7 G/DL
SODIUM SERPL-SCNC: 138 MMOL/L

## 2020-12-23 ENCOUNTER — APPOINTMENT (OUTPATIENT)
Dept: ELECTROPHYSIOLOGY | Facility: CLINIC | Age: 65
End: 2020-12-23
Payer: MEDICAID

## 2020-12-23 PROBLEM — Z86.69 HISTORY OF ACUTE OTITIS MEDIA: Status: RESOLVED | Noted: 2020-08-24 | Resolved: 2020-12-23

## 2020-12-23 PROCEDURE — 93298 REM INTERROG DEV EVAL SCRMS: CPT

## 2020-12-23 PROCEDURE — G2066: CPT

## 2021-01-06 ENCOUNTER — APPOINTMENT (OUTPATIENT)
Dept: GASTROENTEROLOGY | Facility: CLINIC | Age: 66
End: 2021-01-06
Payer: MEDICAID

## 2021-01-06 VITALS
WEIGHT: 290 LBS | BODY MASS INDEX: 40.6 KG/M2 | TEMPERATURE: 97.7 F | DIASTOLIC BLOOD PRESSURE: 110 MMHG | HEART RATE: 68 BPM | SYSTOLIC BLOOD PRESSURE: 192 MMHG | HEIGHT: 71 IN

## 2021-01-06 VITALS — SYSTOLIC BLOOD PRESSURE: 160 MMHG | DIASTOLIC BLOOD PRESSURE: 100 MMHG

## 2021-01-06 PROCEDURE — 99072 ADDL SUPL MATRL&STAF TM PHE: CPT

## 2021-01-06 PROCEDURE — 99214 OFFICE O/P EST MOD 30 MIN: CPT

## 2021-01-06 NOTE — HISTORY OF PRESENT ILLNESS
[FreeTextEntry1] : 5-year-old white male with history of hypertensionh,yperlipidemia, diverticulosis, obesity and rectal cancer. Cancer was diagnosed in 4/2017 via a colonoscopy;  initial stage II. Initially treated with radiochemotherapy and subsequently came to resection via low anterior resection on 10/17. Ileostomy placed. Continued with  postop chemotherapy with Xeloda through 3/18. leostomy was reversed 4/18. Last colonoscopy was negative for recurrent disease 9/17/19. Now returns for surveillance colonoscopy.\par This history of PAF and had undergone ablation with Dr. Shelton  at [facility] in 3/19. Patient was temporarily placed on Xarelto but this medication has been withdrawn.\par currently feels good. Describes having regular formed bowel movement. No rectal bleeding or abdominal pain or distention or weight loss. No alteration in pattern of bowel movements. Last blood work from 2 months ago via oncology was normal. CEA was negative.\par Due for cardiology follow up with Dr.Masciello peterson.

## 2021-01-06 NOTE — ASSESSMENT
[FreeTextEntry1] : Personal history of rectal cancer stage II treated with radiochemotherapy thin resection. Eventual reversal the ileostomy. Last cleaning colonoscopy -9/19. Currently asymptomatic.\par Appropriate candidate for a surveillance colonoscopy. The procedure, its risks benefits and prepped, were explained to the patient, who understands and is agreeable to proceed. ASA #3. Old obesity. History of atrial fibrillation status post ablation and correction.\par No longer on anticoagulation. ASA #3. Cardiac clearance via Dr. Gonzales of [facility] has been requested. Trilyte  prep to be utilized. Repeat blood work not necessary. Arrangements made. Results to follow.

## 2021-01-23 ENCOUNTER — APPOINTMENT (OUTPATIENT)
Dept: DISASTER EMERGENCY | Facility: CLINIC | Age: 66
End: 2021-01-23

## 2021-01-26 LAB — SARS-COV-2 N GENE NPH QL NAA+PROBE: NOT DETECTED

## 2021-01-27 ENCOUNTER — APPOINTMENT (OUTPATIENT)
Dept: GASTROENTEROLOGY | Facility: GI CENTER | Age: 66
End: 2021-01-27
Payer: MEDICAID

## 2021-01-27 ENCOUNTER — OUTPATIENT (OUTPATIENT)
Dept: OUTPATIENT SERVICES | Facility: HOSPITAL | Age: 66
LOS: 1 days | End: 2021-01-27
Payer: MEDICAID

## 2021-01-27 ENCOUNTER — APPOINTMENT (OUTPATIENT)
Dept: ELECTROPHYSIOLOGY | Facility: CLINIC | Age: 66
End: 2021-01-27
Payer: MEDICAID

## 2021-01-27 DIAGNOSIS — Z86.018 PERSONAL HISTORY OF OTHER BENIGN NEOPLASM: Chronic | ICD-10-CM

## 2021-01-27 DIAGNOSIS — Z12.11 ENCOUNTER FOR SCREENING FOR MALIGNANT NEOPLASM OF COLON: ICD-10-CM

## 2021-01-27 DIAGNOSIS — Z85.048 PERSONAL HISTORY OF OTHER MALIGNANT NEOPLASM OF RECTUM, RECTOSIGMOID JUNCTION, AND ANUS: ICD-10-CM

## 2021-01-27 DIAGNOSIS — Z90.49 ACQUIRED ABSENCE OF OTHER SPECIFIED PARTS OF DIGESTIVE TRACT: Chronic | ICD-10-CM

## 2021-01-27 DIAGNOSIS — Z98.890 OTHER SPECIFIED POSTPROCEDURAL STATES: Chronic | ICD-10-CM

## 2021-01-27 DIAGNOSIS — Z96.7 PRESENCE OF OTHER BONE AND TENDON IMPLANTS: Chronic | ICD-10-CM

## 2021-01-27 DIAGNOSIS — K57.30 DIVERTICULOSIS OF LARGE INTESTINE W/OUT PERFORATION OR ABSCESS W/OUT BLEEDING: ICD-10-CM

## 2021-01-27 DIAGNOSIS — K64.8 OTHER HEMORRHOIDS: ICD-10-CM

## 2021-01-27 PROCEDURE — G2066: CPT

## 2021-01-27 PROCEDURE — 45378 DIAGNOSTIC COLONOSCOPY: CPT

## 2021-01-27 PROCEDURE — G0105: CPT

## 2021-01-27 PROCEDURE — 93298 REM INTERROG DEV EVAL SCRMS: CPT

## 2021-01-27 NOTE — HISTORY OF PRESENT ILLNESS
[FreeTextEntry1] : 64 yo WM c HTN, HLD, Obesity.  Rectal ca in 4.17.  treated with RT/ Chemo/ surgery.  Resolved.  Last screening colon 16 months ago.  Asymptomatic.  Recent BW is all normal CEA: normal.  Covid swab is neg.

## 2021-01-27 NOTE — ASSESSMENT
[FreeTextEntry1] : Hx of rectal cancer.  Intact rectal anastamosis.  No local recurrent disease.  \par Plan repeat colonoscopy in 1 yr.\par Internal hemorrhoids:  High fiber diet.  Topicals prn.  \par Diverticulosis coli:  High fiber diet.  \par   \par

## 2021-01-27 NOTE — PROCEDURE
[Hx of Colon Cancer] : history of colon cancer [Procedure Explained] : The procedure was explained [Allergies Reviewed] : allergies reviewed. [Risks] : Risks [Benefits] : benefits [Alternatives] : alternatives [Consent Obtained] : written consent was obtained prior to the procedure and is detailed in the patient's record [Patient] : the patient [Bowel Prep Kit] : the patient took the appropriate bowel preparation kit as directed [Approved Diet Followed] : the patient avoided solid foods and adhered to the approved diet list for 24 hours prior to the procedure [Automated Blood Pressure Cuff] : automated blood pressure cuff [Cardiac Monitor] : cardiac monitor [Pulse Oximeter] : pulse oximeter [Propofol ___ mg IV] : Propofol [unfilled] ~Umg intravenously [2] : 2 [Prep Qualtiy: ___] : Prep Quality:  [unfilled] [Withdrawal Time: ___] : Withdrawal Time:  [unfilled] [Left Lateral Decubitus] : The patient was positioned in the left lateral decubitus position [Abnormal Rectum] : a normal rectum [External Hemorrhoids] : no external hemorrhoids [Normal Prostate] : a normal prostate [Terminal Ileum via Ileocecal Valve] : and the terminal ileum was examined by entering the ileocecal valve [No Difficulty] : without difficulty [Insufflated] : insufflated [Multiple Passes Needed] : after multiple passes [Retroflex View] : a retroflex view of the rectum was performed [Diverticulosis] : diverticulosis [Normal] : Normal [Hemorrhoids] : hemorrhoids [Sent to Pathology] : was sent to pathology for analysis [Tolerated Well] : the patient tolerated the procedure well [Vital Signs Stable] : the vital signs were stable [No Complications] : There were no complications [de-identified] : Hx of rectal cancer:  3 yrs ago:  RT/ Chemo and Surgery.   [de-identified] : [id number] [de-identified] : TI / ICV were normal.   [de-identified] : A few scattered diverticulae were noted.   [de-identified] : Intact rectal anastamosis at 5 cm.  No recurrence of polyp or cancer.  No proctitis.  No retroflexion exam. Moderate internal hemorrhoids.    [de-identified] : Intact rectal anastamosis.  Diverticulosis coli;  Internal hemorrhoids.

## 2021-01-27 NOTE — PROCEDURE
[Hx of Colon Cancer] : history of colon cancer [Procedure Explained] : The procedure was explained [Allergies Reviewed] : allergies reviewed. [Risks] : Risks [Benefits] : benefits [Alternatives] : alternatives [Consent Obtained] : written consent was obtained prior to the procedure and is detailed in the patient's record [Patient] : the patient [Bowel Prep Kit] : the patient took the appropriate bowel preparation kit as directed [Approved Diet Followed] : the patient avoided solid foods and adhered to the approved diet list for 24 hours prior to the procedure [Automated Blood Pressure Cuff] : automated blood pressure cuff [Cardiac Monitor] : cardiac monitor [Pulse Oximeter] : pulse oximeter [Propofol ___ mg IV] : Propofol [unfilled] ~Umg intravenously [2] : 2 [Prep Qualtiy: ___] : Prep Quality:  [unfilled] [Withdrawal Time: ___] : Withdrawal Time:  [unfilled] [Left Lateral Decubitus] : The patient was positioned in the left lateral decubitus position [Abnormal Rectum] : a normal rectum [External Hemorrhoids] : no external hemorrhoids [Normal Prostate] : a normal prostate [Terminal Ileum via Ileocecal Valve] : and the terminal ileum was examined by entering the ileocecal valve [No Difficulty] : without difficulty [Insufflated] : insufflated [Multiple Passes Needed] : after multiple passes [Retroflex View] : a retroflex view of the rectum was performed [Diverticulosis] : diverticulosis [Normal] : Normal [Hemorrhoids] : hemorrhoids [Sent to Pathology] : was sent to pathology for analysis [Tolerated Well] : the patient tolerated the procedure well [Vital Signs Stable] : the vital signs were stable [No Complications] : There were no complications [de-identified] : Hx of rectal cancer:  3 yrs ago:  RT/ Chemo and Surgery.   [de-identified] : [id number] [de-identified] : TI / ICV were normal.   [de-identified] : A few scattered diverticulae were noted.   [de-identified] : Intact rectal anastamosis at 5 cm.  No recurrence of polyp or cancer.  No proctitis.  No retroflexion exam. Moderate internal hemorrhoids.    [de-identified] : Intact rectal anastamosis.  Diverticulosis coli;  Internal hemorrhoids.

## 2021-01-27 NOTE — PHYSICAL EXAM
[General Appearance - Alert] : alert [General Appearance - In No Acute Distress] : in no acute distress [Outer Ear] : the ears and nose were normal in appearance [Oropharynx] : the oropharynx was normal [Neck Appearance] : the appearance of the neck was normal [Neck Cervical Mass (___cm)] : no neck mass was observed [Jugular Venous Distention Increased] : there was no jugular-venous distention [Thyroid Diffuse Enlargement] : the thyroid was not enlarged [Thyroid Nodule] : there were no palpable thyroid nodules [Auscultation Breath Sounds / Voice Sounds] : lungs were clear to auscultation bilaterally [Heart Rate And Rhythm] : heart rate was normal and rhythm regular [Heart Sounds] : normal S1 and S2 [Heart Sounds Gallop] : no gallops [Murmurs] : no murmurs [Heart Sounds Pericardial Friction Rub] : no pericardial rub [Abdomen Soft] : soft [Bowel Sounds] : normal bowel sounds [Abdomen Tenderness] : non-tender [] : no hepato-splenomegaly [Abdomen Mass (___ Cm)] : no abdominal mass palpated [Normal Sphincter Tone] : normal sphincter tone [No Rectal Mass] : no rectal mass [Occult Blood Positive] : stool was negative for occult blood

## 2021-01-27 NOTE — REASON FOR VISIT
[Procedure: _________] : a [unfilled] procedure visit [Colonoscopy] : a colonoscopy [FreeTextEntry2] : + PH of rectal cancer.

## 2021-02-09 ENCOUNTER — APPOINTMENT (OUTPATIENT)
Dept: UROLOGY | Facility: CLINIC | Age: 66
End: 2021-02-09

## 2021-02-18 ENCOUNTER — APPOINTMENT (OUTPATIENT)
Dept: UROLOGY | Facility: CLINIC | Age: 66
End: 2021-02-18

## 2021-03-03 ENCOUNTER — NON-APPOINTMENT (OUTPATIENT)
Age: 66
End: 2021-03-03

## 2021-03-03 ENCOUNTER — APPOINTMENT (OUTPATIENT)
Dept: ELECTROPHYSIOLOGY | Facility: CLINIC | Age: 66
End: 2021-03-03
Payer: MEDICAID

## 2021-03-03 PROCEDURE — G2066: CPT

## 2021-03-03 PROCEDURE — 93298 REM INTERROG DEV EVAL SCRMS: CPT

## 2021-03-10 ENCOUNTER — APPOINTMENT (OUTPATIENT)
Dept: UROLOGY | Facility: CLINIC | Age: 66
End: 2021-03-10
Payer: MEDICAID

## 2021-03-10 VITALS
BODY MASS INDEX: 40.6 KG/M2 | HEART RATE: 69 BPM | SYSTOLIC BLOOD PRESSURE: 147 MMHG | HEIGHT: 71 IN | RESPIRATION RATE: 16 BRPM | WEIGHT: 290 LBS | DIASTOLIC BLOOD PRESSURE: 85 MMHG

## 2021-03-10 PROCEDURE — 99213 OFFICE O/P EST LOW 20 MIN: CPT

## 2021-03-10 PROCEDURE — 99072 ADDL SUPL MATRL&STAF TM PHE: CPT

## 2021-03-10 NOTE — ASSESSMENT
[FreeTextEntry1] : H/o prostate cancer: PSA today.\par Urinary function: stable, no medication needed at this time.\par ED: continue tadalafil prn.  Will arrange for consultation with male sexual health specialist for further evaluation and to discuss additional treatment options.\par \par All questions answered.\par \par Follow up in 6 months.\par

## 2021-03-10 NOTE — HISTORY OF PRESENT ILLNESS
[FreeTextEntry1] : Presents today for follow up\mariano Has previous diagnosis of both rectal cancer and prostate cancer.  He status post radiation therapy for his prostate cancer.  He was previous treated with androgen deprivation therapy while he was undergoing treatment for his rectal cancer.  He is completed now both treatments about 2 years ago.\par \par He is doing excellent from a urinary standpoint.  Has good urinary flow complete bladder emptying.  No significant nocturia.  No significant frequency urgency.  No stress incontinence.\par \par Reports that tadalafil is not as effective. Complaints of headache.

## 2021-03-11 LAB — PSA SERPL-MCNC: 0.26 NG/ML

## 2021-04-07 ENCOUNTER — APPOINTMENT (OUTPATIENT)
Dept: ELECTROPHYSIOLOGY | Facility: CLINIC | Age: 66
End: 2021-04-07
Payer: MEDICAID

## 2021-04-07 ENCOUNTER — NON-APPOINTMENT (OUTPATIENT)
Age: 66
End: 2021-04-07

## 2021-04-07 PROCEDURE — 93298 REM INTERROG DEV EVAL SCRMS: CPT

## 2021-04-07 PROCEDURE — G2066: CPT

## 2021-04-15 ENCOUNTER — APPOINTMENT (OUTPATIENT)
Dept: ELECTROPHYSIOLOGY | Facility: CLINIC | Age: 66
End: 2021-04-15
Payer: MEDICAID

## 2021-04-15 VITALS
WEIGHT: 297 LBS | TEMPERATURE: 97.2 F | HEART RATE: 66 BPM | OXYGEN SATURATION: 95 % | RESPIRATION RATE: 18 BRPM | SYSTOLIC BLOOD PRESSURE: 138 MMHG | HEIGHT: 71 IN | DIASTOLIC BLOOD PRESSURE: 80 MMHG | BODY MASS INDEX: 41.58 KG/M2

## 2021-04-15 PROCEDURE — 99072 ADDL SUPL MATRL&STAF TM PHE: CPT

## 2021-04-15 PROCEDURE — 93000 ELECTROCARDIOGRAM COMPLETE: CPT

## 2021-04-15 PROCEDURE — 99214 OFFICE O/P EST MOD 30 MIN: CPT

## 2021-04-15 RX ORDER — POLYETHYLENE GLYOCOL 3350, SODIUM CHLORIDE, SODIUM BICARBONATE AND POTASSIUM CHLORIDE 420; 11.2; 5.72; 1.48 G/4L; G/4L; G/4L; G/4L
420 POWDER, FOR SOLUTION NASOGASTRIC; ORAL
Qty: 1 | Refills: 0 | Status: DISCONTINUED | COMMUNITY
Start: 2021-01-06 | End: 2021-04-15

## 2021-04-15 RX ORDER — METOPROLOL TARTRATE 25 MG/1
25 TABLET, FILM COATED ORAL DAILY
Refills: 0 | Status: DISCONTINUED | COMMUNITY
Start: 2020-11-12 | End: 2021-04-15

## 2021-04-15 NOTE — PHYSICAL EXAM
[General Appearance - Well Developed] : well developed [] : no respiratory distress [Respiration, Rhythm And Depth] : normal respiratory rhythm and effort [Exaggerated Use Of Accessory Muscles For Inspiration] : no accessory muscle use [Heart Rate And Rhythm] : heart rate and rhythm were normal [Heart Sounds] : normal S1 and S2 [Murmurs] : no murmurs present [Abnormal Walk] : normal gait [Skin Color & Pigmentation] : normal skin color and pigmentation [Oriented To Time, Place, And Person] : oriented to person, place, and time

## 2021-04-15 NOTE — DISCUSSION/SUMMARY
[FreeTextEntry1] : 63 y/o M with pmhx obesity, HTN, prostate and colorectal cancer s/p partial colon resection, and symptomatic paroxysmal atrial fibrillation despite prior treatment with Amiodarone and high dose BB s/p RF ablation 3/29/19. Subsequently presented with early recurrent symptomatic AF s/p DCCV, ILR implant and Amiodarone reinitiation (6/4/2019). Amiodarone discontinued 7/25/19.\par \par Has mostly maintained SR for the past 2 years. Had one episode of PAF (6 hours, 8/24/20) with two prolonged conversion pauses during sleeping (asymptomatic). Occurred in the setting of severe sinus infection which resulted in significant discomfort, inability to use CPAP and sleep deprivation for several weeks. Patient was instructed to hold beta blocker therapy. He restarted low dose BB (Toprol 25mg) after he felt subjective symptoms of elevated BP. \par \par NO recurrent AF or bradyarrhythmias in the past 8 months.  Now doing well and remains asymptomatic\par \par - Continue low dose Toprol 25mg and aspirin 81mg ([id number], HTN). Discussed that if repeat intervention needed then may need anticoagulation in the future. \par - Counseled to report any development of symptoms, and ILR to be monitored remotely closely. \par - Recommend ongoing compliance with CPAP \par - Discussed goals of weight goals and ongoing BP control\par - EP follow up in 6 months unless sooner is needed\par \par Seen and discussed with Dr. Shelton\par Maribel Robledo PAC\par

## 2021-04-15 NOTE — REVIEW OF SYSTEMS
[Negative] : Gastrointestinal [Chills] : no chills [Fever] : no fever [Feeling Fatigued] : not feeling fatigued [Shortness Of Breath] : no shortness of breath [Dyspnea on exertion] : not dyspnea during exertion [Chest  Pressure] : no chest pressure [Chest Pain] : no chest pain [Lower Ext Edema] : no extremity edema [Palpitations] : no palpitations

## 2021-04-15 NOTE — HISTORY OF PRESENT ILLNESS
[FreeTextEntry1] : 65 y/o M with pmhx obesity, HTN, prostate and colorectal cancer s/p partial colon resection, and symptomatic paroxysmal atrial fibrillation despite prior treatment with Amiodarone and high dose BB s/p RF ablation 3/29/19. Subsequently presented with early recurrent symptomatic AF s/p DCCV, ILR implant and Amiodarone reinitiation (6/4/2019). Amiodarone discontinued 7/25/19.\par Had been doing well without recurrence of AF for over a year. On 8/24/2020, ILR showed one episode of PAF (6 hours) with two prolonged conversion pauses (one possible 20sec) during sleep. Occurred in the setting of severe sinus infection which resulted in significant discomfort, inability to use CPAP and sleep deprivation for several weeks.  After stopping BB, patient had symptoms of "pounding in neck" where he felt his BP was elevated.  He restarted Metoprolol 25mg daily. \par \par Remains asymptomatic.  NO recurrent AF or bradyarrhythmias on ILR in the past 8 months.  Reports recent weight gain in the setting of dietary noncompliance and inactivity during the pandemic.  \par

## 2021-04-21 ENCOUNTER — APPOINTMENT (OUTPATIENT)
Dept: UROLOGY | Facility: CLINIC | Age: 66
End: 2021-04-21
Payer: MEDICAID

## 2021-04-21 VITALS — TEMPERATURE: 97.9 F

## 2021-04-21 PROCEDURE — 99213 OFFICE O/P EST LOW 20 MIN: CPT

## 2021-04-21 PROCEDURE — 99072 ADDL SUPL MATRL&STAF TM PHE: CPT

## 2021-04-21 NOTE — HISTORY OF PRESENT ILLNESS
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

## 2021-04-21 NOTE — ASSESSMENT
[FreeTextEntry1] : Mr. Sorenson is a 65-year-old  with a history of rectal carcinoma and prostate carcinoma.  He has been treated with radiation therapy and chemotherapy as well as androgen ablation therapy.  He has had loss of erectile function which occurred after his surgery for his rectal carcinoma.  He states that he gets partial erections without distal rigidity.  They are not sufficient for intercourse.  He is accompanied by his wife.\par \par He is attempted phosphodiesterase inhibitors.  He has a partial response to these.  However he has incapacitating headaches which preclude further utilization of these therapies.\par \par DIscussed  treatment options including PD 5-I, Intracavernosal therapy, erection vacuum device.\par Instructional materials provided to patient.\par \par The technique for holding the penis for injection was demonstrated to the patient.  This assuage to his anxieties regarding proceeding with intracorporeal vasoactive agents.  The risks and complications associated with this including priapism are reviewed.  He wishes to proceed.  Arrangements will be made.  Should be noted that he is not willing to undergo any surgical intervention this time.  Certainly intracavernous injection therapy is the appropriate intervention.  We discussed incremental treatments as necessary.\par \par The ASHLEY SORENSON  expressed fully understanding of the information provided, the consequences and the management.\par

## 2021-04-21 NOTE — PHYSICAL EXAM
[Abdomen Soft] : soft [Abdomen Tenderness] : non-tender [] : no hepato-splenomegaly [Urethral Meatus] : meatus normal [Penis Abnormality] : normal circumcised penis [Epididymis] : the epididymides were normal [Testes Tenderness] : no tenderness of the testes [FreeTextEntry1] : 10.5 ; normal glans

## 2021-05-05 ENCOUNTER — APPOINTMENT (OUTPATIENT)
Dept: OTOLARYNGOLOGY | Facility: CLINIC | Age: 66
End: 2021-05-05
Payer: MEDICAID

## 2021-05-05 VITALS
SYSTOLIC BLOOD PRESSURE: 138 MMHG | WEIGHT: 297 LBS | HEART RATE: 66 BPM | DIASTOLIC BLOOD PRESSURE: 80 MMHG | BODY MASS INDEX: 41.58 KG/M2 | TEMPERATURE: 97.1 F | HEIGHT: 71 IN

## 2021-05-05 DIAGNOSIS — H93.8X1 OTHER SPECIFIED DISORDERS OF RIGHT EAR: ICD-10-CM

## 2021-05-05 DIAGNOSIS — H61.21 IMPACTED CERUMEN, RIGHT EAR: ICD-10-CM

## 2021-05-05 PROCEDURE — 99213 OFFICE O/P EST LOW 20 MIN: CPT | Mod: 25

## 2021-05-05 PROCEDURE — 99072 ADDL SUPL MATRL&STAF TM PHE: CPT

## 2021-05-05 PROCEDURE — 69210 REMOVE IMPACTED EAR WAX UNI: CPT

## 2021-05-05 NOTE — PHYSICAL EXAM
[Midline] : trachea located in midline position [Normal] : no rashes [FreeTextEntry1] : overweight  [de-identified] : sl swelling of left eac  [de-identified] : left wick out - sl debris suctioned and tm appears normal - ba instilled; also sl cerumen of right ear removed with suction

## 2021-05-05 NOTE — HISTORY OF PRESENT ILLNESS
[de-identified] : Here for recheck  - here to r/o cerumen .  Hearing seems stable.  Occ clogged right ear.

## 2021-05-05 NOTE — ASSESSMENT
[FreeTextEntry1] : Patient here for follow up of cerumen and occ clogged ear.  Exam reveals cerumen in right ear - removed.  After removal normal exam.  Patient feels hearing stable and no audio done.  Follow up in 6 mos and as necessary

## 2021-05-10 ENCOUNTER — APPOINTMENT (OUTPATIENT)
Dept: CT IMAGING | Facility: CLINIC | Age: 66
End: 2021-05-10
Payer: MEDICAID

## 2021-05-10 ENCOUNTER — OUTPATIENT (OUTPATIENT)
Dept: OUTPATIENT SERVICES | Facility: HOSPITAL | Age: 66
LOS: 1 days | End: 2021-05-10
Payer: MEDICAID

## 2021-05-10 DIAGNOSIS — Z86.018 PERSONAL HISTORY OF OTHER BENIGN NEOPLASM: Chronic | ICD-10-CM

## 2021-05-10 DIAGNOSIS — Z90.49 ACQUIRED ABSENCE OF OTHER SPECIFIED PARTS OF DIGESTIVE TRACT: Chronic | ICD-10-CM

## 2021-05-10 DIAGNOSIS — Z96.7 PRESENCE OF OTHER BONE AND TENDON IMPLANTS: Chronic | ICD-10-CM

## 2021-05-10 DIAGNOSIS — Z98.890 OTHER SPECIFIED POSTPROCEDURAL STATES: Chronic | ICD-10-CM

## 2021-05-10 DIAGNOSIS — Z00.8 ENCOUNTER FOR OTHER GENERAL EXAMINATION: ICD-10-CM

## 2021-05-10 PROCEDURE — 74177 CT ABD & PELVIS W/CONTRAST: CPT | Mod: 26

## 2021-05-10 PROCEDURE — 71260 CT THORAX DX C+: CPT

## 2021-05-10 PROCEDURE — 71260 CT THORAX DX C+: CPT | Mod: 26

## 2021-05-10 PROCEDURE — 74177 CT ABD & PELVIS W/CONTRAST: CPT

## 2021-05-12 ENCOUNTER — APPOINTMENT (OUTPATIENT)
Dept: ELECTROPHYSIOLOGY | Facility: CLINIC | Age: 66
End: 2021-05-12
Payer: MEDICAID

## 2021-05-12 PROCEDURE — G2066: CPT

## 2021-05-12 PROCEDURE — 93298 REM INTERROG DEV EVAL SCRMS: CPT

## 2021-05-13 ENCOUNTER — APPOINTMENT (OUTPATIENT)
Dept: UROLOGY | Facility: CLINIC | Age: 66
End: 2021-05-13
Payer: MEDICAID

## 2021-05-13 ENCOUNTER — NON-APPOINTMENT (OUTPATIENT)
Age: 66
End: 2021-05-13

## 2021-05-13 VITALS — DIASTOLIC BLOOD PRESSURE: 83 MMHG | SYSTOLIC BLOOD PRESSURE: 144 MMHG | HEART RATE: 60 BPM

## 2021-05-13 VITALS — HEART RATE: 63 BPM | SYSTOLIC BLOOD PRESSURE: 159 MMHG | RESPIRATION RATE: 20 BRPM | DIASTOLIC BLOOD PRESSURE: 92 MMHG

## 2021-05-13 VITALS — SYSTOLIC BLOOD PRESSURE: 145 MMHG | DIASTOLIC BLOOD PRESSURE: 89 MMHG | HEART RATE: 59 BPM

## 2021-05-13 VITALS — DIASTOLIC BLOOD PRESSURE: 85 MMHG | HEART RATE: 58 BPM | SYSTOLIC BLOOD PRESSURE: 158 MMHG

## 2021-05-13 VITALS — DIASTOLIC BLOOD PRESSURE: 91 MMHG | SYSTOLIC BLOOD PRESSURE: 152 MMHG | HEART RATE: 60 BPM | RESPIRATION RATE: 18 BRPM

## 2021-05-13 VITALS — SYSTOLIC BLOOD PRESSURE: 152 MMHG | DIASTOLIC BLOOD PRESSURE: 87 MMHG

## 2021-05-13 VITALS — RESPIRATION RATE: 20 BRPM | DIASTOLIC BLOOD PRESSURE: 96 MMHG | HEART RATE: 58 BPM | SYSTOLIC BLOOD PRESSURE: 148 MMHG

## 2021-05-13 VITALS — SYSTOLIC BLOOD PRESSURE: 151 MMHG | DIASTOLIC BLOOD PRESSURE: 95 MMHG | HEART RATE: 60 BPM

## 2021-05-13 VITALS — TEMPERATURE: 97.2 F

## 2021-05-13 VITALS — SYSTOLIC BLOOD PRESSURE: 158 MMHG | HEART RATE: 58 BPM | DIASTOLIC BLOOD PRESSURE: 95 MMHG

## 2021-05-13 VITALS — HEART RATE: 58 BPM | SYSTOLIC BLOOD PRESSURE: 158 MMHG | DIASTOLIC BLOOD PRESSURE: 102 MMHG

## 2021-05-13 VITALS — DIASTOLIC BLOOD PRESSURE: 92 MMHG | SYSTOLIC BLOOD PRESSURE: 165 MMHG | HEART RATE: 55 BPM

## 2021-05-13 DIAGNOSIS — N48.33 PRIAPISM, DRUG-INDUCED: ICD-10-CM

## 2021-05-13 PROCEDURE — 54220 IRRG CRPRA CAVRNOSA PRIAPISM: CPT

## 2021-05-13 PROCEDURE — A4218: CPT | Mod: NC

## 2021-05-13 PROCEDURE — 93980 PENILE VASCULAR STUDY: CPT

## 2021-05-13 PROCEDURE — 99072 ADDL SUPL MATRL&STAF TM PHE: CPT

## 2021-05-13 PROCEDURE — 99213 OFFICE O/P EST LOW 20 MIN: CPT | Mod: 25

## 2021-05-13 PROCEDURE — 54235 NJX CORPORA CAVERNOSA RX AGT: CPT

## 2021-05-13 RX ORDER — PHENYLEPHRINE HCL IN 0.9% NACL 0.5 MG/5ML
0.5-0.9 SYRINGE (ML) INTRAVENOUS
Refills: 0 | Status: COMPLETED | OUTPATIENT
Start: 2021-05-13

## 2021-05-13 RX ADMIN — Medication 0 MG/5ML-%: at 00:00

## 2021-05-13 NOTE — ASSESSMENT
[FreeTextEntry1] : Mr. Sorenson is a 65-year-old  with a history of rectal carcinoma and prostate carcinoma.  He has been treated with radiation therapy and chemotherapy as well as androgen ablation therapy.  He has had loss of erectile function which occurred after his surgery for his rectal carcinoma.  He states that he gets partial erections without distal rigidity.  They are not sufficient for intercourse.  He is accompanied by his wife.\par \par He is attempted phosphodiesterase inhibitors.  He has a partial response to these.  However he has incapacitating headaches which preclude further utilization of these therapies.\par \par DIscussed  treatment options including PD 5-I, Intracavernosal therapy, erection vacuum device.\par Instructional materials provided to patient.\par \par The technique for holding the penis for injection was demonstrated to the patient.  This assuage to his anxieties regarding proceeding with intracorporeal vasoactive agents.  The risks and complications associated with this including priapism are reviewed.  He wishes to proceed.  Arrangements will be made.  Should be noted that he is not willing to undergo any surgical intervention this time.  Certainly intracavernous injection therapy is the appropriate intervention.  We discussed incremental treatments as necessary.\par \par The ASHLEY SORENSON  expressed fully understanding of the information provided, the consequences and the management.\par \par \par 5.13.2021\par DUS demonstrated small vessels diameters\par Excellent erection \par Complete filling of corporal bodies\par RI 1\par 10 cm erect penis with 100% rigidity\par \par DIscussed  treatment options including PD 5-I, Intracavernosal therapy, erection vacuum device.\par Instructional materials provided to patient.\par Patient wishes to proceed with IC\par Warned re priapism risk and need for immediate intervention if erection lasts more than 2 hours.  Patient instructed to report to an emergency room and explicitly inform staff of his condition and need for treatment.\par Instructed re technique\par will start at 25 units trimix 30/2/20\par Discussed dose escalation by 2 units as needed with injection ( 2 injections at the same dose without escalation.\par Warned re priapism risk and need for immediate intervention if erection lasts more than 2 hours.  Patient instructed to report to an emergency room and explicitly inform staff of his condition and need for treatment.\par DIscussed alternating sides etc\par \par followup in 3 weeks\par  \par

## 2021-05-13 NOTE — HISTORY OF PRESENT ILLNESS
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

## 2021-05-17 ENCOUNTER — OUTPATIENT (OUTPATIENT)
Dept: OUTPATIENT SERVICES | Facility: HOSPITAL | Age: 66
LOS: 1 days | Discharge: HOME | End: 2021-05-17

## 2021-05-17 ENCOUNTER — APPOINTMENT (OUTPATIENT)
Dept: HEMATOLOGY ONCOLOGY | Facility: CLINIC | Age: 66
End: 2021-05-17

## 2021-05-17 DIAGNOSIS — Z86.018 PERSONAL HISTORY OF OTHER BENIGN NEOPLASM: Chronic | ICD-10-CM

## 2021-05-17 DIAGNOSIS — C20 MALIGNANT NEOPLASM OF RECTUM: ICD-10-CM

## 2021-05-17 DIAGNOSIS — Z98.890 OTHER SPECIFIED POSTPROCEDURAL STATES: Chronic | ICD-10-CM

## 2021-05-17 DIAGNOSIS — Z90.49 ACQUIRED ABSENCE OF OTHER SPECIFIED PARTS OF DIGESTIVE TRACT: Chronic | ICD-10-CM

## 2021-05-17 DIAGNOSIS — Z96.7 PRESENCE OF OTHER BONE AND TENDON IMPLANTS: Chronic | ICD-10-CM

## 2021-05-19 ENCOUNTER — APPOINTMENT (OUTPATIENT)
Dept: HEMATOLOGY ONCOLOGY | Facility: CLINIC | Age: 66
End: 2021-05-19
Payer: MEDICAID

## 2021-05-19 PROCEDURE — ZZZZZ: CPT

## 2021-05-19 NOTE — HISTORY OF PRESENT ILLNESS
[Disease: _____________________] : Disease: [unfilled] [T: ___] : T[unfilled] [N: ___] : N[unfilled] [M: ___] : M[unfilled] [AJCC Stage: ____] : AJCC Stage: [unfilled] [de-identified] : The patient was diagnosed with adenocarcinoma of the rectum in April 2017 at the age of 61.  He began experiencing hematochezia post narcotic-induced constipation after a fracture of his left lower leg.  He was referred to GI Dr. Armstrong who performed a colonoscopy on April 14, 2017.  A rectal mass was discovered and the biopsy of the rectal tumor at 15 cm showed in situ and invasive colonic adenocarcinoma.  On April 17, 2017 CT of the abdomen and  pelvis showed rectal wall thickening, and a lesion in the hepatic segment 8/7 measuring 1.4 cm suspicious for metastasis.  To better evaluate the hepatic findings he had an MRI of the abdomen on April 25, 2017 which showed T2 hyperintense lesion in segment 7/8, measuring 1.3 x 1.3 cm with peripheral discontinuous enhancement, probably consistent with an atypical hemangioma.  He was then referred to Dr. Hand on April 27, 2017 where he performed a flexible sigmoidoscopy which demonstrated a large cancer of the mid rectum approximately extending from 7-10 cm from the dentate line. On May 2, 2017 PET/CT showed no discrete FDG avidity associated with a segment 7/8 lesion.  FDG avid rectosigmoid colon mass with SUV of 11.1.  At the time of his consultation he had been evaluated by Dr. Soliz of radiation therapy.   [de-identified] : He had an MRI of the pelvis on 5/9/17, and he was scheduled for a prostate MRI today ordered by Dr. Kim.  \par PSA- Jan 2017 5.69; repeat 3/25/17 5.7 [de-identified] : The patient is s/p 6 cycles adjuvant xeloda (11/9/17 - 3/7/18) and CRT for rectal caner completed 6/19/17 - 7/27/17.  He completed RT for the prostate cancer on 1/8/17 - 2/5/17.  He is s/p reversal of colostomy on 4/16/18 with Dr Hnad. + Rectal discomfort with certain foods/spices. No diarrhea, melena or BRBPR.  + Stress incontinence with heavy lifting and erectial dysfunction both due to prostate cancer treatment. No N/V. Appetite is good. No fevers, cough or SOB.\par \par **Telehealth visit done instead of in person to minimize travel during the COVID pandemic.  Patient consented to a telephone visit.  Spent ~10 minutes on the phone and ~10 minutes preparing for the visit. \par

## 2021-05-19 NOTE — RESULTS/DATA
[FreeTextEntry1] : 5/10/21 CT A/P:  Stable appearance of the thorax, abdomen, and pelvis. No evidence of lymphadenopathy or osseous metastatic disease.\par \par 05/28/20 CT:  No metastatic disease identified.  Unchanged small 3 mm pulmonary nodule.\par \par 8/8/19 CT C/A/P: No adenopathy in the thorax, abdomen or pelvis. 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    Interval reversal of the loop ileostomy.    Interval development of a small amount of pelvic ascites.\par \par 3/21/18 CT Chest:  2 mm nonspecific left upper lobe pulmonary nodule is  unchanged since August 16, 2017.\par \par 2/16/18 CT A/P: Unchanged 1.1 cm hypodensity in segment 7/8, previously characterized as an atypical hemangioma by MRI. No new lesion. No evidence of metastatic disease.\par \par Final Diagnosis\par 10/2/17 path:  \par 1     Abdominal pelvic cyst:- Simple  mesothelial  cyst.\par 2     Sigmoid colon and rectum:-  Segment of colon with mucosal ulceration extending the submucosal and muscularis propria, with focal serosal adhesions and serositis.  Negative for dysplasia/neoplasia- Histologically unremarkable surgical resection margins.\par 3     Colorectal /anal anastomosis distal donut:- Histologically unremarkable segment of colon.\par 4     Colorectal/anal anastomosis proximal donut Histologically unremarkable segment of colon.\par \par 8/23/17 MRI Pelvis: Since  5/9/2017,  the  primary  tumor  and  extramural  disease  shows  near  complete response. Posttreatment  stage:  ymrT1/2  (tumor  confined  to  rectal  wall),  ymrN0  Mesorectal  fascia:  Clear  (tumor  margin  >  2  mm).  Sphincter  involvement:  Absent.\par \par 8/22/17 PET/CT: Resolved  non-FDG  avid  subcentimeter  right  perirectal lymph  nodes.  Considerable  decrease  in  soft  tissue  thickening and  FDG  avidity  in  the  rectosigmoid  colon  (SUV  4.0 previous  SUV 11.1) suggestive  of  therapy  response.  Sigmoid  diverticulosis.  Decreased  FDG  avid  areas  of  subcutaneous  infiltration  in  the left  anterolateral  chest  wall  and  left  shoulder  region,  possibly inflammatory.  No  FDG  avid  distant  metastatic  disease.\par \par 8/16/17 CT C/A/P: There  is  a  stable  3  mm  nodule  in  the  right  lower lobe.  Again  noted  is  the  right  lobe  low-attenuation  lesion  measuring  1.4  cm previously  characterized  on  MRI  dated  4/25/2017  as  probable  atypical hemangioma.  There  is  a  stable  0.6  cm  left  adrenal  nodule,  adenoma  on  4/25/2017 MRI.  Mild  rectal  wall  thickening  again  noted.  There  is  no  bowel obstruction.  There  are  scattered  colonic  diverticula.  No  enlarged  mesenteric lymph  nodes. There  has  been  interval  development  of  a  small  amount  of  fluid in  the  pelvis. No  enlarged  retroperitoneal  or  pelvic  nodes.\par \par Prostate biopsy: 5/24/17:  Core Lab Biopsy\par Final Diagnosis\par 1. Prostate, MRI target lesion, biopsy\par -Adenocarcinoma of the prostate, Prognostic Grade Group 2 ([place] score 3+4=7) involving 100%, 90% and 90% (10 mm, 9 mm and 6 mm in length) of 3 of 3 core(s). \par -Perineural invasion is identified.  Note: Boo pattern 4 comprises approximately 10% of total tumor volume.\par 2. Prostate, right base, biopsy -Adenocarcinoma of the prostate, Prognostic Grade Group 1 ([place] score 3+3=6) involving 5% (0.5 mm in length) of 1 of 2 core(s).\par -Perineural invasion is identified.\par 3. Prostate, right mid, biopsy -Adenocarcinoma of the prostate, Prognostic Grade Group1 ([place] score 3+3=6) involving 80% and 50% (8 mm and 5 mm in length) of 2 of 2 core(s).  -Perineural invasion is identified.\par 4. Prostate, right apex, biopsy -Adenocarcinoma of the prostate, Prognostic Grade Group1 ([place] score 3+3=6) involving 10% (1.5 mm in length) of 1 of 2 core(s).\par 5. Prostate, left base, biopsy -Benign prostate tissue.\par 6. Prostate, left mid, biopsy -Adenocarcinoma of the prostate, Prognostic Grade Group 2 ([place] score 3+4=7) involving 25% (2 mm in length) of 1 of 2 core(s).\par -Perineural invasion is identified.  Note: Boo pattern 4 comprises approximately 5% of total tumor volume.\par 7. Prostate, left apex, biopsy -Benign prostate tissue.\par       \par MRI prostate 5/10/17\par Prostate gland:\par Size:  3.8 x 3.1 x 4.3 cm.   Volume 26 cc.   Suspicious lesion in the left peripheral zone\par \par MRI pelvis/rectum: 5/10/17 A 3.6 cm lesion involving the anterior and right lateral walls of the  rectum \par \par May 2, 2017 PET/CT:   No discrete FDG avidity associated with a segment 7/8 lesion, better characterized on dedicated MRI as atypical hemangioma.  Liver background SUV mean as a reference for comparing studies is 3.1.  Tiny non-FDG avid right perirectal lymph nodes, for example measuring 0.4 cm, and 0.2 cm.  FDG avid rectosigmoid colon mass (SUV 11.1).\par \par April 27, 2017 Dr. Hand performed a flexible sigmoidoscopy which demonstrated a large cancer of the mid rectum approximately extending from 7-10 cm from the dentate line.\par \par April 25, 2017 MRI of abdomen: T2 hyperintense lesion in segment 7/8, measuring 1.3 x 1.3 cm with peripheral discontinuous enhancement, probably an atypical hemangioma.  Consider follow-up MRI in 3 months.\par \par April 17, 2017 CT abd/pelvis:  Rectal wall thickening, possibly known neoplasm.  Indeterminate lesion in hepatic segment 8/7, measuring 1.4 cm, suspicious for metastasis.\par \par April 14, 2017 Colonoscopy with biopsy of rectal tumor (Dr. Armstrong): Biopsy of rectal tumor at 15 cm shows in situ and invasive colonic adenocarcinoma.  The polyp sigmoid at 30 cm showed tubular adenoma.  \par \par March 30, 2017 Right flank; left flank; left shoulder Lipoma removal:  Fibrofatty tissue consistent with lipoma.  \par \par January 26, 2017 u/s of palpable areas of LE and abdomen:  In the right mid back region in the area of palpable concern, there is a subcutaneous isoechoic mass measuring 8.9 x 3.1 x 7.6 cm whose appearance is most compatible with a lipoma.  In the area of palpable concern in the left upper ventral abdominal wall, there is a subcutaneous echogenic mass measuring 10.6 x 6.5 x 8.3 cm whose appearance is most compatible with a lipoma.\par

## 2021-05-19 NOTE — PHYSICAL EXAM
[Fully active, able to carry on all pre-disease performance without restriction] : Status 0 - Fully active, able to carry on all pre-disease performance without restriction [Normal] : affect appropriate [de-identified] : hyperpigmentation of lips [de-identified] : vertical incision with staples in place

## 2021-06-16 ENCOUNTER — APPOINTMENT (OUTPATIENT)
Dept: ELECTROPHYSIOLOGY | Facility: CLINIC | Age: 66
End: 2021-06-16
Payer: MEDICAID

## 2021-06-16 ENCOUNTER — NON-APPOINTMENT (OUTPATIENT)
Age: 66
End: 2021-06-16

## 2021-06-16 PROCEDURE — 93298 REM INTERROG DEV EVAL SCRMS: CPT

## 2021-06-16 PROCEDURE — G2066: CPT

## 2021-07-21 ENCOUNTER — APPOINTMENT (OUTPATIENT)
Dept: ELECTROPHYSIOLOGY | Facility: CLINIC | Age: 66
End: 2021-07-21
Payer: MEDICAID

## 2021-07-21 ENCOUNTER — NON-APPOINTMENT (OUTPATIENT)
Age: 66
End: 2021-07-21

## 2021-07-21 PROCEDURE — 93298 REM INTERROG DEV EVAL SCRMS: CPT

## 2021-07-21 PROCEDURE — G2066: CPT

## 2021-07-22 ENCOUNTER — FORM ENCOUNTER (OUTPATIENT)
Age: 66
End: 2021-07-22

## 2021-07-29 NOTE — H&P PST ADULT - ENERGY EXPENDITURE (METS)
3
54 years presented with  uterovaginal prolapse, , unspecified , leiomyoma of uterus, unspecified .

## 2021-08-19 ENCOUNTER — APPOINTMENT (OUTPATIENT)
Dept: INTERNAL MEDICINE | Facility: CLINIC | Age: 66
End: 2021-08-19
Payer: MEDICAID

## 2021-08-19 VITALS
SYSTOLIC BLOOD PRESSURE: 161 MMHG | TEMPERATURE: 97.3 F | HEIGHT: 71 IN | BODY MASS INDEX: 38.81 KG/M2 | DIASTOLIC BLOOD PRESSURE: 82 MMHG | WEIGHT: 277.25 LBS | HEART RATE: 65 BPM

## 2021-08-19 DIAGNOSIS — Z85.038 PERSONAL HISTORY OF OTHER MALIGNANT NEOPLASM OF LARGE INTESTINE: ICD-10-CM

## 2021-08-19 DIAGNOSIS — Z85.048 PERSONAL HISTORY OF OTHER MALIGNANT NEOPLASM OF RECTUM, RECTOSIGMOID JUNCTION, AND ANUS: ICD-10-CM

## 2021-08-19 DIAGNOSIS — Z85.46 PERSONAL HISTORY OF MALIGNANT NEOPLASM OF PROSTATE: ICD-10-CM

## 2021-08-19 DIAGNOSIS — I48.0 PAROXYSMAL ATRIAL FIBRILLATION: ICD-10-CM

## 2021-08-19 PROCEDURE — 99396 PREV VISIT EST AGE 40-64: CPT | Mod: 25

## 2021-08-19 PROCEDURE — 36415 COLL VENOUS BLD VENIPUNCTURE: CPT

## 2021-08-19 NOTE — HISTORY OF PRESENT ILLNESS
[FreeTextEntry1] : physical exam.  [de-identified] : Mr. ASHLEY SORENSON is a 65 year male with a PMH of HTN, Prostate and rectal cancer comes to the office for physical exam. Patient denies fever, cough SOB. No other complaints at this time. \par

## 2021-08-25 ENCOUNTER — APPOINTMENT (OUTPATIENT)
Dept: ELECTROPHYSIOLOGY | Facility: CLINIC | Age: 66
End: 2021-08-25
Payer: MEDICAID

## 2021-08-25 ENCOUNTER — NON-APPOINTMENT (OUTPATIENT)
Age: 66
End: 2021-08-25

## 2021-08-25 PROCEDURE — G2066: CPT

## 2021-08-25 PROCEDURE — 93298 REM INTERROG DEV EVAL SCRMS: CPT

## 2021-08-26 LAB
25(OH)D3 SERPL-MCNC: 44.2 NG/ML
ALBUMIN SERPL ELPH-MCNC: 4.4 G/DL
ALP BLD-CCNC: 63 U/L
ALT SERPL-CCNC: 22 U/L
ANION GAP SERPL CALC-SCNC: 12 MMOL/L
AST SERPL-CCNC: 18 U/L
BASOPHILS # BLD AUTO: 0.03 K/UL
BASOPHILS NFR BLD AUTO: 0.5 %
BILIRUB SERPL-MCNC: 0.6 MG/DL
BUN SERPL-MCNC: 14 MG/DL
CALCIUM SERPL-MCNC: 9.3 MG/DL
CHLORIDE SERPL-SCNC: 106 MMOL/L
CHOLEST SERPL-MCNC: 135 MG/DL
CO2 SERPL-SCNC: 23 MMOL/L
CREAT SERPL-MCNC: 1.08 MG/DL
EOSINOPHIL # BLD AUTO: 0.12 K/UL
EOSINOPHIL NFR BLD AUTO: 1.8 %
ESTIMATED AVERAGE GLUCOSE: 114 MG/DL
GLUCOSE SERPL-MCNC: 79 MG/DL
HBA1C MFR BLD HPLC: 5.6 %
HCT VFR BLD CALC: 45.4 %
HDLC SERPL-MCNC: 41 MG/DL
HGB BLD-MCNC: 14.7 G/DL
IMM GRANULOCYTES NFR BLD AUTO: 0.3 %
LDLC SERPL CALC-MCNC: 69 MG/DL
LYMPHOCYTES # BLD AUTO: 1 K/UL
LYMPHOCYTES NFR BLD AUTO: 15.4 %
MAN DIFF?: NORMAL
MCHC RBC-ENTMCNC: 30.3 PG
MCHC RBC-ENTMCNC: 32.4 GM/DL
MCV RBC AUTO: 93.6 FL
MONOCYTES # BLD AUTO: 0.49 K/UL
MONOCYTES NFR BLD AUTO: 7.6 %
NEUTROPHILS # BLD AUTO: 4.83 K/UL
NEUTROPHILS NFR BLD AUTO: 74.4 %
NONHDLC SERPL-MCNC: 94 MG/DL
PLATELET # BLD AUTO: 190 K/UL
POTASSIUM SERPL-SCNC: 4.6 MMOL/L
PROT SERPL-MCNC: 6.8 G/DL
PSA SERPL-MCNC: 0.4 NG/ML
RBC # BLD: 4.85 M/UL
RBC # FLD: 13 %
SODIUM SERPL-SCNC: 141 MMOL/L
TRIGL SERPL-MCNC: 122 MG/DL
TSH SERPL-ACNC: 0.7 UIU/ML
WBC # FLD AUTO: 6.49 K/UL

## 2021-09-21 ENCOUNTER — APPOINTMENT (OUTPATIENT)
Dept: UROLOGY | Facility: CLINIC | Age: 66
End: 2021-09-21
Payer: MEDICAID

## 2021-09-21 PROCEDURE — 99213 OFFICE O/P EST LOW 20 MIN: CPT

## 2021-09-28 NOTE — ASSESSMENT
[FreeTextEntry1] : Labs reviewed, last PSA 8/19/2021 0.4 ng/mL.\par ED:  recommend trial of daily tadalafil 5 mg. Goals of medication reviewed.  Discussed the potential adverse effects of the medication.  Discussed the proper administration of the medication.\par Okay to add 20 mg dose prn.\par \par Follow up in 6 months.

## 2021-09-28 NOTE — HISTORY OF PRESENT ILLNESS
[FreeTextEntry1] : Here for follow-up visit.  \par \par Has previous diagnosis of both rectal cancer and prostate cancer.  He status post radiation therapy for his prostate cancer.  He was previous treated with androgen deprivation therapy while he was undergoing treatment for his rectal cancer.  He is completed now both treatments about 2 years ago.  Last ADT injection was June 2017.\par \par No urinary complaints.  No stress incontinence.\par Continues to have intermittent ED, currently using tadalafil.  Previously tried TriMix but unable to tolerate.\par Denies hematuria, dysuria.  No abdominal or flank pain.\par

## 2021-09-29 ENCOUNTER — NON-APPOINTMENT (OUTPATIENT)
Age: 66
End: 2021-09-29

## 2021-09-29 ENCOUNTER — APPOINTMENT (OUTPATIENT)
Dept: ELECTROPHYSIOLOGY | Facility: CLINIC | Age: 66
End: 2021-09-29
Payer: MEDICAID

## 2021-09-29 PROCEDURE — G2066: CPT

## 2021-09-29 PROCEDURE — 93298 REM INTERROG DEV EVAL SCRMS: CPT

## 2021-10-21 ENCOUNTER — APPOINTMENT (OUTPATIENT)
Dept: ELECTROPHYSIOLOGY | Facility: CLINIC | Age: 66
End: 2021-10-21
Payer: MEDICAID

## 2021-10-21 ENCOUNTER — NON-APPOINTMENT (OUTPATIENT)
Age: 66
End: 2021-10-21

## 2021-10-21 VITALS
DIASTOLIC BLOOD PRESSURE: 78 MMHG | SYSTOLIC BLOOD PRESSURE: 120 MMHG | HEART RATE: 60 BPM | HEIGHT: 71 IN | OXYGEN SATURATION: 97 % | BODY MASS INDEX: 39.2 KG/M2 | TEMPERATURE: 98.2 F | WEIGHT: 280 LBS

## 2021-10-21 PROCEDURE — 93000 ELECTROCARDIOGRAM COMPLETE: CPT

## 2021-10-21 PROCEDURE — 99214 OFFICE O/P EST MOD 30 MIN: CPT

## 2021-10-21 RX ORDER — TADALAFIL 20 MG/1
20 TABLET ORAL
Qty: 10 | Refills: 10 | Status: DISCONTINUED | COMMUNITY
Start: 2020-02-11 | End: 2021-10-21

## 2021-10-21 RX ORDER — TIMOLOL MALEATE 5 MG/ML
0.5 SOLUTION OPHTHALMIC
Refills: 0 | Status: DISCONTINUED | COMMUNITY
End: 2021-10-21

## 2021-10-21 NOTE — DISCUSSION/SUMMARY
[FreeTextEntry1] : 63 y/o M with pmhx obesity, HTN, prostate and colorectal cancer s/p partial colon resection, and symptomatic paroxysmal atrial fibrillation despite prior treatment with Amiodarone and high dose BB s/p RF ablation 3/29/19. Subsequently presented with early recurrent symptomatic AF s/p DCCV, ILR implant and Amiodarone reinitiation (6/4/2019). Amiodarone discontinued 7/25/19.\par \par Has mostly maintained SR for the past 2.5 years. Had one episode of PAF (6 hours, 8/24/20) with two prolonged conversion pauses during sleeping (asymptomatic). Occurred in the setting of severe sinus infection which resulted in significant discomfort, inability to use CPAP and sleep deprivation for several weeks. Patient was instructed to hold beta blocker therapy. He restarted low dose BB (Toprol 25mg) after he felt subjective symptoms of elevated BP. \par \par NO recurrent AF or bradyarrhythmias in over a year.  Now doing well and remains asymptomatic\par \par - Continue low dose Toprol 25mg and aspirin 81mg ([id number] 1, HTN). \par - Counseled to report any development of symptoms, and ILR to be monitored remotely closely. \par - Recommend ongoing compliance with CPAP \par - Discussed goals of weight goals and ongoing BP control\par - EP follow up in 6 months unless sooner is needed\par \par Maribel Robledo PAC\par

## 2021-10-21 NOTE — HISTORY OF PRESENT ILLNESS
[FreeTextEntry1] : 63 y/o M with pmhx obesity, HTN, prostate and colorectal cancer s/p partial colon resection, and symptomatic paroxysmal atrial fibrillation despite prior treatment with Amiodarone and high dose BB s/p RF ablation 3/29/19. Subsequently presented with early recurrent symptomatic AF s/p DCCV, ILR implant and Amiodarone reinitiation (6/4/2019). Amiodarone discontinued 7/25/19.\par Had been doing well without recurrence of AF for over a year. On 8/24/2020, ILR showed one episode of PAF (6 hours) with two prolonged conversion pauses (one possible 20sec) during sleep. Occurred in the setting of severe sinus infection which resulted in significant discomfort, inability to use CPAP and sleep deprivation for several weeks.  After stopping BB, patient had symptoms of "pounding in neck" where he felt his BP was elevated.  He restarted Metoprolol 25mg daily. \par \par Remains asymptomatic.  NO recurrent AF or bradyarrhythmias on ILR in the past 8 months.  Reports recent weight gain in the setting of dietary noncompliance and inactivity during the pandemic.  \par

## 2021-10-21 NOTE — REVIEW OF SYSTEMS
[Fever] : no fever [Chills] : no chills [Feeling Fatigued] : not feeling fatigued [SOB] : no shortness of breath [Dyspnea on exertion] : not dyspnea during exertion [Chest Discomfort] : no chest discomfort [Leg Claudication] : no intermittent leg claudication [Palpitations] : no palpitations [Orthopnea] : no orthopnea [Syncope] : no syncope [Dizziness] : no dizziness [Easy Bleeding] : no tendency for easy bleeding [Easy Bruising] : no tendency for easy bruising

## 2021-10-21 NOTE — REASON FOR VISIT
[Follow-Up - Clinic] : a clinic follow-up of [Atrial Fibrillation] : atrial fibrillation [FreeTextEntry3] : Dr. Robledo  [FreeTextEntry1] : Cardiology:  Dr. Robledo

## 2021-10-21 NOTE — PHYSICAL EXAM
[Well Developed] : well developed [No Acute Distress] : no acute distress [No Respiratory Distress] : no respiratory distress  [Normal Gait] : normal gait [No Edema] : no edema [Moves all extremities] : moves all extremities [Alert and Oriented] : alert and oriented

## 2021-10-25 ENCOUNTER — APPOINTMENT (OUTPATIENT)
Dept: OTOLARYNGOLOGY | Facility: CLINIC | Age: 66
End: 2021-10-25
Payer: MEDICAID

## 2021-10-25 VITALS
BODY MASS INDEX: 39.2 KG/M2 | HEIGHT: 71 IN | WEIGHT: 280 LBS | DIASTOLIC BLOOD PRESSURE: 78 MMHG | HEART RATE: 60 BPM | TEMPERATURE: 97.7 F | SYSTOLIC BLOOD PRESSURE: 120 MMHG

## 2021-10-25 DIAGNOSIS — H60.92 UNSPECIFIED OTITIS EXTERNA, LEFT EAR: ICD-10-CM

## 2021-10-25 DIAGNOSIS — H93.8X9 OTHER SPECIFIED DISORDERS OF EAR, UNSPECIFIED EAR: ICD-10-CM

## 2021-10-25 PROCEDURE — 99213 OFFICE O/P EST LOW 20 MIN: CPT

## 2021-10-25 NOTE — ASSESSMENT
[FreeTextEntry1] : Patient here for follow up of prior otitis externa and cerumen - normal exam today and no cerumen  noted.  Discussed audio however patient will defer.  Recommended conservative care and follow up in one year and as necessary .

## 2021-10-25 NOTE — REASON FOR VISIT
[Subsequent Evaluation] : a subsequent evaluation for [FreeTextEntry2] : f/u for ears - cerumen and prior otitis externa

## 2021-10-25 NOTE — HISTORY OF PRESENT ILLNESS
[de-identified] : Has had occ clogged ears - here for follow up of cerumen  and prior otitis externa.  No problems recently and has had only occ feeling of slight clogging in ears.

## 2021-11-03 ENCOUNTER — APPOINTMENT (OUTPATIENT)
Dept: ELECTROPHYSIOLOGY | Facility: CLINIC | Age: 66
End: 2021-11-03
Payer: MEDICAID

## 2021-11-03 ENCOUNTER — NON-APPOINTMENT (OUTPATIENT)
Age: 66
End: 2021-11-03

## 2021-11-03 PROCEDURE — 93298 REM INTERROG DEV EVAL SCRMS: CPT | Mod: NC

## 2021-11-03 PROCEDURE — G2066: CPT | Mod: NC

## 2021-12-08 ENCOUNTER — APPOINTMENT (OUTPATIENT)
Dept: ELECTROPHYSIOLOGY | Facility: CLINIC | Age: 66
End: 2021-12-08
Payer: MEDICAID

## 2021-12-08 ENCOUNTER — NON-APPOINTMENT (OUTPATIENT)
Age: 66
End: 2021-12-08

## 2021-12-08 PROCEDURE — 93298 REM INTERROG DEV EVAL SCRMS: CPT

## 2021-12-08 PROCEDURE — G2066: CPT

## 2021-12-16 ENCOUNTER — NON-APPOINTMENT (OUTPATIENT)
Age: 66
End: 2021-12-16

## 2022-01-10 ENCOUNTER — APPOINTMENT (OUTPATIENT)
Dept: ELECTROPHYSIOLOGY | Facility: CLINIC | Age: 67
End: 2022-01-10
Payer: MEDICAID

## 2022-01-10 ENCOUNTER — NON-APPOINTMENT (OUTPATIENT)
Age: 67
End: 2022-01-10

## 2022-01-10 PROCEDURE — G2066: CPT

## 2022-01-10 PROCEDURE — 93298 REM INTERROG DEV EVAL SCRMS: CPT

## 2022-02-14 ENCOUNTER — NON-APPOINTMENT (OUTPATIENT)
Age: 67
End: 2022-02-14

## 2022-02-14 ENCOUNTER — APPOINTMENT (OUTPATIENT)
Dept: ELECTROPHYSIOLOGY | Facility: CLINIC | Age: 67
End: 2022-02-14
Payer: MEDICAID

## 2022-02-14 PROCEDURE — 93298 REM INTERROG DEV EVAL SCRMS: CPT

## 2022-02-14 PROCEDURE — G2066: CPT

## 2022-03-21 ENCOUNTER — APPOINTMENT (OUTPATIENT)
Dept: ELECTROPHYSIOLOGY | Facility: CLINIC | Age: 67
End: 2022-03-21
Payer: MEDICAID

## 2022-03-21 ENCOUNTER — NON-APPOINTMENT (OUTPATIENT)
Age: 67
End: 2022-03-21

## 2022-03-21 PROCEDURE — 93298 REM INTERROG DEV EVAL SCRMS: CPT

## 2022-03-21 PROCEDURE — G2066: CPT

## 2022-04-25 ENCOUNTER — APPOINTMENT (OUTPATIENT)
Dept: ELECTROPHYSIOLOGY | Facility: CLINIC | Age: 67
End: 2022-04-25
Payer: MEDICAID

## 2022-04-25 ENCOUNTER — NON-APPOINTMENT (OUTPATIENT)
Age: 67
End: 2022-04-25

## 2022-04-25 PROCEDURE — G2066: CPT

## 2022-04-25 PROCEDURE — 93298 REM INTERROG DEV EVAL SCRMS: CPT

## 2022-04-28 ENCOUNTER — APPOINTMENT (OUTPATIENT)
Dept: UROLOGY | Facility: CLINIC | Age: 67
End: 2022-04-28

## 2022-05-18 NOTE — H&P PST ADULT - NS PRO FEM  PAP SMEARS 3YRS
Second unit of PRBCs initiated with CORIN Arriaga. VS stable. No distress noted or verbalized. WCTM.    not applicable (Male)

## 2022-06-01 ENCOUNTER — NON-APPOINTMENT (OUTPATIENT)
Age: 67
End: 2022-06-01

## 2022-06-01 ENCOUNTER — APPOINTMENT (OUTPATIENT)
Dept: ELECTROPHYSIOLOGY | Facility: CLINIC | Age: 67
End: 2022-06-01
Payer: MEDICAID

## 2022-06-01 PROCEDURE — 93298 REM INTERROG DEV EVAL SCRMS: CPT

## 2022-06-01 PROCEDURE — G2066: CPT

## 2022-06-03 RX ORDER — ASPIRIN 81 MG/1
81 TABLET, CHEWABLE ORAL
Qty: 90 | Refills: 0 | Status: DISCONTINUED | COMMUNITY
Start: 2020-08-27 | End: 2022-06-03

## 2022-06-06 ENCOUNTER — NON-APPOINTMENT (OUTPATIENT)
Age: 67
End: 2022-06-06

## 2022-06-23 ENCOUNTER — APPOINTMENT (OUTPATIENT)
Dept: ELECTROPHYSIOLOGY | Facility: CLINIC | Age: 67
End: 2022-06-23

## 2022-06-23 ENCOUNTER — APPOINTMENT (OUTPATIENT)
Dept: INTERNAL MEDICINE | Facility: CLINIC | Age: 67
End: 2022-06-23
Payer: MEDICAID

## 2022-06-23 VITALS
HEIGHT: 71 IN | BODY MASS INDEX: 37.17 KG/M2 | SYSTOLIC BLOOD PRESSURE: 151 MMHG | DIASTOLIC BLOOD PRESSURE: 85 MMHG | WEIGHT: 265.5 LBS | HEART RATE: 80 BPM

## 2022-06-23 VITALS
HEIGHT: 71 IN | WEIGHT: 267 LBS | DIASTOLIC BLOOD PRESSURE: 78 MMHG | HEART RATE: 65 BPM | SYSTOLIC BLOOD PRESSURE: 140 MMHG | OXYGEN SATURATION: 96 % | BODY MASS INDEX: 37.38 KG/M2 | TEMPERATURE: 98 F

## 2022-06-23 DIAGNOSIS — Z01.89 ENCOUNTER FOR OTHER SPECIFIED SPECIAL EXAMINATIONS: ICD-10-CM

## 2022-06-23 DIAGNOSIS — E04.1 NONTOXIC SINGLE THYROID NODULE: ICD-10-CM

## 2022-06-23 DIAGNOSIS — R73.03 PREDIABETES.: ICD-10-CM

## 2022-06-23 DIAGNOSIS — E55.9 VITAMIN D DEFICIENCY, UNSPECIFIED: ICD-10-CM

## 2022-06-23 DIAGNOSIS — M79.672 PAIN IN LEFT FOOT: ICD-10-CM

## 2022-06-23 PROCEDURE — 36415 COLL VENOUS BLD VENIPUNCTURE: CPT

## 2022-06-23 PROCEDURE — 93000 ELECTROCARDIOGRAM COMPLETE: CPT

## 2022-06-23 PROCEDURE — 99214 OFFICE O/P EST MOD 30 MIN: CPT

## 2022-06-23 PROCEDURE — 99214 OFFICE O/P EST MOD 30 MIN: CPT | Mod: 25

## 2022-06-23 RX ORDER — TRAVOPROST 0.04 MG/ML
0 SOLUTION/ DROPS OPHTHALMIC
Refills: 0 | Status: DISCONTINUED | COMMUNITY
End: 2022-06-23

## 2022-06-23 RX ORDER — RIVAROXABAN 20 MG/1
20 TABLET, FILM COATED ORAL
Qty: 90 | Refills: 1 | Status: DISCONTINUED | COMMUNITY
Start: 2018-04-19 | End: 2022-06-23

## 2022-06-23 RX ORDER — BRIMONIDINE TARTRATE 2 MG/MG
0.2 SOLUTION/ DROPS OPHTHALMIC
Refills: 0 | Status: DISCONTINUED | COMMUNITY
End: 2022-06-23

## 2022-06-23 RX ORDER — BRIMONIDINE TARTRATE, TIMOLOL MALEATE 2; 5 MG/ML; MG/ML
0.2-0.5 SOLUTION/ DROPS OPHTHALMIC
Refills: 0 | Status: DISCONTINUED | COMMUNITY
End: 2022-06-23

## 2022-06-23 NOTE — PLAN
[FreeTextEntry1] : PAFIB- patient will continue to follow with Cardiologist and Electrophysiologist \par \par \par left foot pain- patient referred to Podiatrist. \par \par Patient will continue to follow with Heme/Onc Dr. Mejia and Urologist Dr. Kim\par Patient will continue to follow with cardiologist. \par \par Counseling included abnormal lab results, differential diagnoses, treatment options, risks and benefits, lifestyle changes, current condition, medications, and dose adjustments. \par The patient was interactive, attentive, asked questions, and verbalized understanding \par

## 2022-06-23 NOTE — HEALTH RISK ASSESSMENT
[0] : 2) Feeling down, depressed, or hopeless: Not at all (0) [PHQ-2 Negative - No further assessment needed] : PHQ-2 Negative - No further assessment needed [[id number]] : 0

## 2022-06-23 NOTE — HISTORY OF PRESENT ILLNESS
[FreeTextEntry1] : follow up chronic medical conditions.  [de-identified] : Mr. ASHLEY SORENSON is a 66 year male with a PMH of HTN, Prostate and rectal cancer PAfib on Xarelto comes to the office for follow up of chronic medical conditions. Patient denies fever, cough SOB. No other complaints at this time.

## 2022-06-24 LAB
25(OH)D3 SERPL-MCNC: 50 NG/ML
ALBUMIN SERPL ELPH-MCNC: 4.2 G/DL
ALP BLD-CCNC: 72 U/L
ALT SERPL-CCNC: 17 U/L
ANION GAP SERPL CALC-SCNC: 11 MMOL/L
APTT BLD: 31.1 SEC
AST SERPL-CCNC: 17 U/L
BASOPHILS # BLD AUTO: 0.02 K/UL
BASOPHILS NFR BLD AUTO: 0.4 %
BILIRUB SERPL-MCNC: 0.4 MG/DL
BUN SERPL-MCNC: 13 MG/DL
CALCIUM SERPL-MCNC: 8.8 MG/DL
CHLORIDE SERPL-SCNC: 107 MMOL/L
CHOLEST SERPL-MCNC: 136 MG/DL
CO2 SERPL-SCNC: 20 MMOL/L
CREAT SERPL-MCNC: 0.89 MG/DL
EGFR: 96 ML/MIN/1.73M2
EOSINOPHIL # BLD AUTO: 0.11 K/UL
EOSINOPHIL NFR BLD AUTO: 2 %
ESTIMATED AVERAGE GLUCOSE: 117 MG/DL
GLUCOSE SERPL-MCNC: 105 MG/DL
HBA1C MFR BLD HPLC: 5.7 %
HCT VFR BLD CALC: 46.2 %
HDLC SERPL-MCNC: 39 MG/DL
HGB BLD-MCNC: 14.8 G/DL
IMM GRANULOCYTES NFR BLD AUTO: 0.2 %
INR PPP: 0.89 RATIO
LDLC SERPL CALC-MCNC: 74 MG/DL
LYMPHOCYTES # BLD AUTO: 0.75 K/UL
LYMPHOCYTES NFR BLD AUTO: 13.9 %
MAN DIFF?: NORMAL
MCHC RBC-ENTMCNC: 29.8 PG
MCHC RBC-ENTMCNC: 32 GM/DL
MCV RBC AUTO: 93 FL
MONOCYTES # BLD AUTO: 0.49 K/UL
MONOCYTES NFR BLD AUTO: 9.1 %
NEUTROPHILS # BLD AUTO: 4.01 K/UL
NEUTROPHILS NFR BLD AUTO: 74.4 %
NONHDLC SERPL-MCNC: 97 MG/DL
PLATELET # BLD AUTO: 156 K/UL
POTASSIUM SERPL-SCNC: 4.3 MMOL/L
PROT SERPL-MCNC: 6.5 G/DL
PSA SERPL-MCNC: 0.27 NG/ML
PT BLD: 10.4 SEC
RBC # BLD: 4.97 M/UL
RBC # FLD: 13.1 %
SODIUM SERPL-SCNC: 139 MMOL/L
TRIGL SERPL-MCNC: 114 MG/DL
TSH SERPL-ACNC: 0.89 UIU/ML
WBC # FLD AUTO: 5.39 K/UL

## 2022-06-30 NOTE — DISCUSSION/SUMMARY
[EKG obtained to assist in diagnosis and management of assessed problem(s)] : EKG obtained to assist in diagnosis and management of assessed problem(s) [FreeTextEntry1] : 63 y/o M with pmhx obesity, HTN, prostate and colorectal cancer s/p partial colon resection, and symptomatic paroxysmal atrial fibrillation despite prior treatment with Amiodarone and high dose BB s/p RF ablation 3/29/19. Subsequently presented with early recurrent symptomatic AF s/p DCCV, ILR implant and Amiodarone reinitiation (6/4/2019). Amiodarone discontinued 7/25/19.\par Had been doing well without recurrence of AF for over a year. On 8/24/2020, ILR showed one episode of PAF (6 hours) with two prolonged conversion pauses (one possible 20sec) during sleep. Occurred in the setting of severe sinus infection which resulted in significant discomfort, inability to use CPAP and sleep deprivation for several weeks.  After stopping BB, patient had symptoms of "pounding in neck" where he felt his BP was elevated.  He restarted Metoprolol 25mg daily. \par \par Had no recurrent AF for nearly 2 years and remained off a/c. Recently noted to have recurrent PAF with RVR - total duration 5 hours on 6/2 - 6/3.  Converted back to SR without subsequent recurrence. Was asymptomatic.\par [id number]–VASc 1. D/w Dr. Shelton. Was advised to restart Xarelto, out of precaution, but never did.\par \par Presents for EP follow, maintaining SR and remains asymptomatic. Recent PAF events occurred during highly stressful moment prior to daughters recent wedding. \par \par - Discussed role of a/c at length. Given NDQ0LY3–VASc 1 and only one recurrence of AF in nearly 2 years (< 6 hours duration) will aspirin 81mg and consider initiation of a/c if additional AF events noted. \par - Recommend ongoing compliance with CPAP \par - Discussed goals of weight goals and ongoing BP control\par - EP follow up in 6 months unless sooner is needed\par \par Seen and discussed with Dr. Shelton\par Maribel Robledo PAC\par

## 2022-06-30 NOTE — REASON FOR VISIT
[Follow-Up - Clinic] : a clinic follow-up of [Atrial Fibrillation] : atrial fibrillation [FreeTextEntry3] : Dr. Robledo

## 2022-06-30 NOTE — HISTORY OF PRESENT ILLNESS
[FreeTextEntry1] : 65 y/o M with pmhx obesity, HTN, prostate and colorectal cancer s/p partial colon resection, and symptomatic paroxysmal atrial fibrillation despite prior treatment with Amiodarone and high dose BB s/p RF ablation 3/29/19. Subsequently presented with early recurrent symptomatic AF s/p DCCV, ILR implant and Amiodarone reinitiation (6/4/2019). Amiodarone discontinued 7/25/19.\par Had been doing well without recurrence of AF for over a year. On 8/24/2020, ILR showed one episode of PAF (6 hours) with two prolonged conversion pauses (one possible 20sec) during sleep. Occurred in the setting of severe sinus infection which resulted in significant discomfort, inability to use CPAP and sleep deprivation for several weeks.  After stopping BB, patient had symptoms of "pounding in neck" where he felt his BP was elevated.  He restarted Metoprolol 25mg daily. \par \par Had no recurrent AF for nearly 2 years and remained off a/c. Recently noted to have recurrent PAF with RVR - total duration 5 hours on 6/2 - 6/3.  Converted back to SR without subsequent recurrence. Was asymptomatic.\par [id number]–VASc 1. D/w Dr. Shelton. Was advised to restart Xarelto, out of precaution, but never did.\par \par Presents for EP follow, maintaining SR and remains asymptomatic. Recent PAF events occurred during highly stressful moment prior to daughters recent wedding. \par

## 2022-07-05 ENCOUNTER — NON-APPOINTMENT (OUTPATIENT)
Age: 67
End: 2022-07-05

## 2022-07-26 ENCOUNTER — APPOINTMENT (OUTPATIENT)
Dept: MRI IMAGING | Facility: CLINIC | Age: 67
End: 2022-07-26

## 2022-07-28 ENCOUNTER — NON-APPOINTMENT (OUTPATIENT)
Age: 67
End: 2022-07-28

## 2022-07-28 ENCOUNTER — APPOINTMENT (OUTPATIENT)
Dept: ELECTROPHYSIOLOGY | Facility: CLINIC | Age: 67
End: 2022-07-28

## 2022-07-28 PROCEDURE — 93298 REM INTERROG DEV EVAL SCRMS: CPT

## 2022-07-28 PROCEDURE — G2066: CPT

## 2022-09-15 ENCOUNTER — APPOINTMENT (OUTPATIENT)
Dept: ELECTROPHYSIOLOGY | Facility: CLINIC | Age: 67
End: 2022-09-15

## 2022-09-15 ENCOUNTER — NON-APPOINTMENT (OUTPATIENT)
Age: 67
End: 2022-09-15

## 2022-09-15 VITALS
BODY MASS INDEX: 36.73 KG/M2 | TEMPERATURE: 98.2 F | HEIGHT: 71 IN | HEART RATE: 63 BPM | SYSTOLIC BLOOD PRESSURE: 132 MMHG | OXYGEN SATURATION: 95 % | WEIGHT: 262.4 LBS | DIASTOLIC BLOOD PRESSURE: 86 MMHG

## 2022-09-15 PROCEDURE — 99214 OFFICE O/P EST MOD 30 MIN: CPT | Mod: 25

## 2022-09-15 PROCEDURE — 93000 ELECTROCARDIOGRAM COMPLETE: CPT

## 2022-09-15 RX ORDER — PAPAVERINE HYDROCHLORIDE 30 MG/ML
30 INJECTION, SOLUTION INTRAVENOUS
Qty: 2 | Refills: 0 | Status: DISCONTINUED | COMMUNITY
Start: 2021-04-21 | End: 2022-09-15

## 2022-09-15 NOTE — PHYSICAL EXAM
[Well Developed] : well developed [Well Nourished] : well nourished [Normal Gait] : normal gait [No Edema] : no edema [Moves all extremities] : moves all extremities [Alert and Oriented] : alert and oriented

## 2022-09-15 NOTE — REVIEW OF SYSTEMS
[Headache] : no headache [Feeling Fatigued] : not feeling fatigued [SOB] : no shortness of breath [Dyspnea on exertion] : not dyspnea during exertion [Chest Discomfort] : no chest discomfort [Palpitations] : no palpitations [Syncope] : no syncope [Dizziness] : no dizziness

## 2022-09-15 NOTE — DISCUSSION/SUMMARY
[EKG obtained to assist in diagnosis and management of assessed problem(s)] : EKG obtained to assist in diagnosis and management of assessed problem(s) [FreeTextEntry1] : 65 y/o M with pmhx obesity, HTN, prostate and colorectal cancer s/p partial colon resection, and symptomatic paroxysmal atrial fibrillation despite prior treatment with Amiodarone and high dose BB s/p RF ablation 3/29/19. Subsequently presented with early recurrent symptomatic AF s/p DCCV, ILR implant and Amiodarone reinitiation (6/4/2019). Amiodarone discontinued 7/25/19.Had been doing well without recurrence of AF for over a year. On 8/24/2020, ILR showed one episode of PAF (6 hours) with two prolonged conversion pauses (one possible 20sec) during sleep. Occurred in the setting of severe sinus infection which resulted in significant discomfort, inability to use CPAP and sleep deprivation for several weeks. After stopping BB, patient had symptoms of "pounding in neck" where he felt his BP was elevated. He resumed Metoprolol 25mg daily. \par \par Had no recurrent AF for nearly 2 years and remained off a/c. Recently noted to have recurrent PAF with RVR - total duration 5 hours on 6/2 - 6/3. Converted back to SR without subsequent recurrence. Was asymptomatic.\par [id number]–VASc 1. AFter d/w Dr. Shelton was advised to restart Xarelto, out of precaution, but never did.\par \par DUring previous f/u 6/23/22 risks/benefits of AC discussed. Given CHADS2-VASc 1 and only one recurrence of AF in nearly 2 years aspirin therapy was recommended. Discussed considering initiation of AC if further AF events noted. \par \par Today, he reports he has been feeling well since last follow up. ILR has revealed no events since last follow up. Remains compliant with CPAP and is trying to lose weight. Maintained on aspirin 81mg daily and metoprolol 25mg daily. ILR nearly 3 years old but battery status still "good". \par \par Recommendation: \par \par Reviewed risks/benefits of AC. Given PUY0FF9–VASc 1 and only one recurrence of AF in nearly 2 years (< 6 hours duration) wilth no episodes since last follow up to continue aspirin 81mg and consider initiation of a/c if additional AF events noted. \par - Recommend ongoing compliance with CPAP \par - Discussed goals of weight goals and ongoing BP control\par - EP follow up in 6 months unless sooner is needed. Discussed that once ILR reaches RRT would recommend replacement to guide further AC management. He is agreeable.\par \par Christelle Yee ANP-C \par \par

## 2022-09-15 NOTE — HISTORY OF PRESENT ILLNESS
[FreeTextEntry1] : 65 y/o M with pmhx obesity, HTN, prostate and colorectal cancer s/p partial colon resection, and symptomatic paroxysmal atrial fibrillation despite prior treatment with Amiodarone and high dose BB s/p RF ablation 3/29/19. Subsequently presented with early recurrent symptomatic AF s/p DCCV, ILR implant and Amiodarone reinitiation (6/4/2019). Amiodarone discontinued 7/25/19.Had been doing well without recurrence of AF for over a year. On 8/24/2020, ILR showed one episode of PAF (6 hours) with two prolonged conversion pauses (one possible 20sec) during sleep. Occurred in the setting of severe sinus infection which resulted in significant discomfort, inability to use CPAP and sleep deprivation for several weeks. After stopping BB, patient had symptoms of "pounding in neck" where he felt his BP was elevated. He resumed Metoprolol 25mg daily. \par \par Had no recurrent AF for nearly 2 years and remained off a/c. Recently noted to have recurrent PAF with RVR - total duration 5 hours on 6/2 - 6/3. Converted back to SR without subsequent recurrence. Was asymptomatic.\par [id number]–VASc 1. AFter d/w Dr. Shelton was advised to restart Xarelto, out of precaution, but never did.\par \par DUring previous f/u 6/23/22 risks/benefits of AC discussed. Given CHADS2-VASc 1 and only one recurrence of AF in nearly 2 years aspirin therapy was recommended. Discussed considering initiation of AC if further AF events noted. \par \par Today, he reports he has been feeling well since last follow up. ILR has revealed no events since last follow up. Remains compliant with CPAP and is trying to lose weight. Maintained on aspirin 81mg daily and metoprolol 25mg daily. ILR nearly 3 years old but battery status still "good".

## 2022-10-17 ENCOUNTER — NON-APPOINTMENT (OUTPATIENT)
Age: 67
End: 2022-10-17

## 2022-10-17 ENCOUNTER — APPOINTMENT (OUTPATIENT)
Dept: ELECTROPHYSIOLOGY | Facility: CLINIC | Age: 67
End: 2022-10-17

## 2022-10-17 PROCEDURE — G2066: CPT

## 2022-10-17 PROCEDURE — 93298 REM INTERROG DEV EVAL SCRMS: CPT

## 2022-10-18 NOTE — H&P PST ADULT - NS MD HP INPLANTS MED DEV
[facility] Telephone Triage    10/18/2022    Main Concern:  Side effects from Tadalafil--- pt recently switched from sildenafil to tadalafil    Weight today: 144.8 lbs--weighed while on phone    Dry weight: ~150 lbs    ROS:  muscle aches, wrists hurt, ankles hurt, back aches---- started about 3 days after beginning tadalafil    Medications for Pulmonary Hypertension were reconciled and the patient is currently taking:    Drug   dose Normal dose range Started recently? AdCirca (tadalafil) 40 mg daily 20mg or 40mg po daily YES--Started 10/12---Last dose 10/15   Revatio (sildenafil) x 10-30mg po tid x   Letairis (ambrisentan) x 5mg -10mg po daily x   Opsumit (macitentan) x 10mg po daily x   Adempas (riociguat) x 1mg-3mg po tid x   Tyvaso (inhaled treprostinil) 64 mcg QID 18mg (1 puff)-9 puffs 4x/day No   Uptravi (selexipag) x 200mcg -1600mcg bid x         Lasix (furosemide) x  x   Demadex (torsemide) 10 mg daily--20 mg with weight gain  No--Took 20mg 10/17   Bumex (bumetadine) x  x   Aldactone (spironolactone) x 12.5mg-50mg daily x   Inspra (eplerenone) x 25mg-50mg daily x     Lab Results   Component Value Date/Time     06/24/2022 10:18 AM    K 3.8 06/24/2022 10:18 AM    CL 96 06/24/2022 10:18 AM    CO2 40 06/24/2022 10:18 AM    BUN 24 06/24/2022 10:18 AM    CREATININE 0.83 06/24/2022 10:18 AM    GLUCOSE 85 06/24/2022 10:18 AM    CALCIUM 10.1 06/24/2022 10:18 AM      Pt believes that her muscles are aching due to switching from sildenafil to tadalafil recently. Started tadalafil on Wednesday 10/12--- side effects began on 10/14, pt stopped taking abruptly 10/15. Muscle aches did not completely subside but do feel better. Felt swollen --- did not weigh herself--- but felt her fingers and feet were tight so she took 20mg torsemide on 10/17. Her weight is now 144.8 lbs this morning--- down from 156.12 lbs on 10/11. Does not weigh herself daily--RN stressed importance of this.     Should pt start taking her sildenafil or try to continue tadalafil? Other suggestions?     Muriel Diaz, RN None

## 2022-11-17 ENCOUNTER — RX RENEWAL (OUTPATIENT)
Age: 67
End: 2022-11-17

## 2022-11-21 ENCOUNTER — APPOINTMENT (OUTPATIENT)
Dept: ELECTROPHYSIOLOGY | Facility: CLINIC | Age: 67
End: 2022-11-21

## 2022-11-21 ENCOUNTER — NON-APPOINTMENT (OUTPATIENT)
Age: 67
End: 2022-11-21

## 2022-11-21 PROCEDURE — 93298 REM INTERROG DEV EVAL SCRMS: CPT

## 2022-11-21 PROCEDURE — G2066: CPT

## 2022-12-13 RX ORDER — METOPROLOL SUCCINATE 25 MG/1
25 TABLET, EXTENDED RELEASE ORAL DAILY
Qty: 90 | Refills: 3 | Status: ACTIVE | COMMUNITY
Start: 2021-04-15 | End: 1900-01-01

## 2022-12-14 ENCOUNTER — APPOINTMENT (OUTPATIENT)
Dept: INTERNAL MEDICINE | Facility: CLINIC | Age: 67
End: 2022-12-14

## 2022-12-14 VITALS
HEIGHT: 71 IN | RESPIRATION RATE: 16 BRPM | DIASTOLIC BLOOD PRESSURE: 85 MMHG | SYSTOLIC BLOOD PRESSURE: 157 MMHG | BODY MASS INDEX: 36.68 KG/M2 | WEIGHT: 262 LBS | HEART RATE: 63 BPM

## 2022-12-14 DIAGNOSIS — M20.42 OTHER HAMMER TOE(S) (ACQUIRED), LEFT FOOT: ICD-10-CM

## 2022-12-14 DIAGNOSIS — Z01.818 ENCOUNTER FOR OTHER PREPROCEDURAL EXAMINATION: ICD-10-CM

## 2022-12-14 PROCEDURE — 99214 OFFICE O/P EST MOD 30 MIN: CPT

## 2022-12-14 NOTE — HISTORY OF PRESENT ILLNESS
[Atrial Fibrillation] : atrial fibrillation [No Pertinent Pulmonary History] : no history of asthma, COPD, sleep apnea, or smoking [No Adverse Anesthesia Reaction] : no adverse anesthesia reaction in self or family member [(Patient denies any chest pain, claudication, dyspnea on exertion, orthopnea, palpitations or syncope)] : Patient denies any chest pain, claudication, dyspnea on exertion, orthopnea, palpitations or syncope [Poor (<4 METs)] : Poor (<4 METs) [Chronic Anticoagulation] : no chronic anticoagulation [Chronic Kidney Disease] : no chronic kidney disease [Diabetes] : no diabetes [FreeTextEntry1] : surgery of 2nd toe left foot.  [FreeTextEntry2] : 12/15/22 [FreeTextEntry3] : Dr. Roe [FreeTextEntry4] : Mr. ASHLEY SORENSON is a 67 year male with a PMH of obesity, HTN, prostate and colorectal cancer s/p partial colon resection, and symptomatic paroxysmal atrial fibrillation despite prior treatment with Amiodarone and high dose BB s/p RF ablation 3/29/19 comes to the office for preoperative evaluation.

## 2022-12-14 NOTE — PLAN
[FreeTextEntry1] : Pending cardiac clearance\par \par Patient's BP elevated at time of visit, Patient was started on Olmesartan 40 mg po QD and will re-test blood pressure prior to next clearance\par \par Prior to appointment and during encounter with patient extensive medical records were reviewed including but not limited to, Hospital records records, out patient records, laboratory data and microbiology data \par In addition extensive time was also spent in reviewing diagnostic studies.\par \par Total encounter total time 30 mins\par >50% of time spent counseling/coordinating care\par \par Counseling included abnormal lab results, differential diagnoses, treatment options, risks and benefits, lifestyle changes, current condition, medications, and dose adjustments. \par The patient was interactive, attentive, asked questions, and verbalized understanding

## 2022-12-14 NOTE — ASSESSMENT
[Patient NOT optimized for Surgery at this time] : Patient not optimized for surgery at this time [No Further Testing Recommended] : no further testing recommended [As per surgery] : as per surgery [FreeTextEntry4] : Pending cardiac clearance\par \par Patient's BP elevated at time of visit, Patient was started on Olmesartan 40 mg po QD and will re-test blood pressure prior to next clearance [FreeTextEntry6] : No NSAIDS or Aspirin 5 days prior to procedure

## 2022-12-15 RX ORDER — OLMESARTAN MEDOXOMIL 40 MG/1
40 TABLET, FILM COATED ORAL DAILY
Qty: 90 | Refills: 3 | Status: COMPLETED | COMMUNITY
Start: 2022-12-14 | End: 2022-12-15

## 2022-12-23 ENCOUNTER — NON-APPOINTMENT (OUTPATIENT)
Age: 67
End: 2022-12-23

## 2022-12-23 ENCOUNTER — APPOINTMENT (OUTPATIENT)
Dept: ELECTROPHYSIOLOGY | Facility: CLINIC | Age: 67
End: 2022-12-23
Payer: MEDICAID

## 2022-12-23 PROCEDURE — G2066: CPT

## 2022-12-23 PROCEDURE — 93298 REM INTERROG DEV EVAL SCRMS: CPT

## 2023-01-30 ENCOUNTER — APPOINTMENT (OUTPATIENT)
Dept: ELECTROPHYSIOLOGY | Facility: CLINIC | Age: 68
End: 2023-01-30
Payer: MEDICAID

## 2023-01-30 ENCOUNTER — NON-APPOINTMENT (OUTPATIENT)
Age: 68
End: 2023-01-30

## 2023-01-30 PROCEDURE — 93298 REM INTERROG DEV EVAL SCRMS: CPT

## 2023-01-30 PROCEDURE — G2066: CPT

## 2023-02-16 ENCOUNTER — NON-APPOINTMENT (OUTPATIENT)
Age: 68
End: 2023-02-16

## 2023-02-16 ENCOUNTER — APPOINTMENT (OUTPATIENT)
Dept: ELECTROPHYSIOLOGY | Facility: CLINIC | Age: 68
End: 2023-02-16
Payer: MEDICARE

## 2023-02-16 VITALS
WEIGHT: 282 LBS | DIASTOLIC BLOOD PRESSURE: 70 MMHG | TEMPERATURE: 98.9 F | SYSTOLIC BLOOD PRESSURE: 112 MMHG | OXYGEN SATURATION: 94 % | HEIGHT: 71 IN | BODY MASS INDEX: 39.48 KG/M2 | HEART RATE: 61 BPM

## 2023-02-16 DIAGNOSIS — R00.1 BRADYCARDIA, UNSPECIFIED: ICD-10-CM

## 2023-02-16 DIAGNOSIS — G47.33 OBSTRUCTIVE SLEEP APNEA (ADULT) (PEDIATRIC): ICD-10-CM

## 2023-02-16 PROCEDURE — 93000 ELECTROCARDIOGRAM COMPLETE: CPT

## 2023-02-16 PROCEDURE — 99214 OFFICE O/P EST MOD 30 MIN: CPT | Mod: 25

## 2023-02-16 NOTE — HISTORY OF PRESENT ILLNESS
[FreeTextEntry1] : 68 y/o M with pmhx obesity, HTN, prostate and colorectal cancer s/p partial colon resection, and symptomatic paroxysmal atrial fibrillation despite prior treatment with Amiodarone and high dose BB s/p RF ablation 3/29/19. Subsequently presented with early recurrent symptomatic AF s/p DCCV, ILR implant and Amiodarone reinitiation (6/4/2019). Amiodarone discontinued 7/25/19.Had been doing well without recurrence of AF for over a year. On 8/24/2020, ILR showed one episode of PAF (6 hours) with two prolonged conversion pauses (one possible 20sec) during sleep. Occurred in the setting of severe sinus infection which resulted in significant discomfort, inability to use CPAP and sleep deprivation for several weeks. After stopping BB, patient had symptoms of "pounding in neck" where he felt his BP was elevated. He resumed Metoprolol 25mg daily. \par \par Had no recurrent AF for nearly 2 years and remained off a/c. Recently noted to have recurrent PAF with RVR - total duration 5 hours on 6/2 - 6/3. Converted back to SR without subsequent recurrence. Was asymptomatic.\par [id number]–VASc 1. AFter d/w Dr. Shelton was advised to restart Xarelto, out of precaution, but never did.\par \par DUring previous f/u 6/23/22 risks/benefits of AC discussed. Given CHADS2-VASc 1 and only one recurrence of AF in nearly 2 years aspirin therapy was recommended. Discussed considering initiation of AC if further AF events noted. \par \par Today, he reports he has been feeling well since last follow up. ILR has revealed one episode c/w SR with salvos of AT since last follow up on 11/29/22 with no events since. Reports feeling well. Denies palpitations, dizziness, syncope. IR battery status still "good".

## 2023-02-16 NOTE — DISCUSSION/SUMMARY
[FreeTextEntry1] : 68 y/o M with pmhx obesity, HTN, prostate and colorectal cancer s/p partial colon resection, and symptomatic paroxysmal atrial fibrillation despite prior treatment with Amiodarone and high dose BB s/p RF ablation 3/29/19. Subsequently presented with early recurrent symptomatic AF s/p DCCV, ILR implant and Amiodarone reinitiation (6/4/2019). Amiodarone discontinued 7/25/19.Had been doing well without recurrence of AF for over a year. On 8/24/2020, ILR showed one episode of PAF (6 hours) with two prolonged conversion pauses (one possible 20sec) during sleep. Occurred in the setting of severe sinus infection which resulted in significant discomfort, inability to use CPAP and sleep deprivation for several weeks. After stopping BB, patient had symptoms of "pounding in neck" where he felt his BP was elevated. He resumed Metoprolol 25mg daily. \par \par Had no recurrent AF for nearly 2 years and remained off a/c. Recently noted to have recurrent PAF with RVR - total duration 5 hours on 6/2 - 6/3. Converted back to SR without subsequent recurrence. Was asymptomatic.\par [id number]–VASc 1. AFter d/w Dr. Shelton was advised to restart Xarelto, out of precaution, but never did.\par \par DUring previous f/u 6/23/22 risks/benefits of AC discussed. Given CHADS2-VASc 1 and only one recurrence of AF in nearly 2 years aspirin therapy was recommended. Discussed considering initiation of AC if further AF events noted. \par \par Today, he reports he has been feeling well since last follow up. ILR has revealed one episode c/w SR with salvos of AT since last follow up on 11/29/22 with no events since. Reports feeling well. Denies palpitations, dizziness, syncope. IR battery status still "good". \par \par Recommendation: \par \par -Reviewed risks/benefits of AC. Given [id number]–VASc 2 and only one true recurrence of AF in nearly 2 years (< 6 hours duration) recommend ongoing monitoring for arrhythmia surveillance. Would recommend initiation of a/c if additional AF events noted. \par -ILR likely approaching RRT, would recommend replacement as he is off AC to guide AF/ AC management in future.\par - Recommend ongoing compliance with CPAP \par - EP follow up in 6 months unless sooner is needed.\par \par Christelle CHUNG-C \par \par  [EKG obtained to assist in diagnosis and management of assessed problem(s)] : EKG obtained to assist in diagnosis and management of assessed problem(s)

## 2023-03-23 ENCOUNTER — APPOINTMENT (OUTPATIENT)
Dept: ELECTROPHYSIOLOGY | Facility: CLINIC | Age: 68
End: 2023-03-23
Payer: MEDICAID

## 2023-03-23 ENCOUNTER — NON-APPOINTMENT (OUTPATIENT)
Age: 68
End: 2023-03-23

## 2023-03-23 PROCEDURE — 93298 REM INTERROG DEV EVAL SCRMS: CPT

## 2023-03-23 PROCEDURE — G2066: CPT

## 2023-04-27 ENCOUNTER — NON-APPOINTMENT (OUTPATIENT)
Age: 68
End: 2023-04-27

## 2023-04-27 ENCOUNTER — APPOINTMENT (OUTPATIENT)
Dept: ELECTROPHYSIOLOGY | Facility: CLINIC | Age: 68
End: 2023-04-27
Payer: MEDICAID

## 2023-04-27 PROCEDURE — G2066: CPT

## 2023-04-27 PROCEDURE — 93298 REM INTERROG DEV EVAL SCRMS: CPT

## 2023-06-01 ENCOUNTER — NON-APPOINTMENT (OUTPATIENT)
Age: 68
End: 2023-06-01

## 2023-06-01 ENCOUNTER — APPOINTMENT (OUTPATIENT)
Dept: ELECTROPHYSIOLOGY | Facility: CLINIC | Age: 68
End: 2023-06-01
Payer: MEDICARE

## 2023-06-01 PROCEDURE — 93298 REM INTERROG DEV EVAL SCRMS: CPT

## 2023-06-01 PROCEDURE — G2066: CPT

## 2023-06-12 NOTE — ASU PREOP CHECKLIST - BSA (M2)
· Sodium 134  Mild  Could be secondary to HCTZ  Hold HCTZ due to decreased oral intake  Received normal saline 500 mL in ED  Repeat lab in the morning  · Magnesium 1 5  Received mag 2 g IV in ED    Repeat lab in the morning 2.43

## 2023-07-03 ENCOUNTER — APPOINTMENT (OUTPATIENT)
Dept: ELECTROPHYSIOLOGY | Facility: CLINIC | Age: 68
End: 2023-07-03

## 2023-08-02 ENCOUNTER — RX RENEWAL (OUTPATIENT)
Age: 68
End: 2023-08-02

## 2023-09-14 ENCOUNTER — APPOINTMENT (OUTPATIENT)
Dept: ELECTROPHYSIOLOGY | Facility: CLINIC | Age: 68
End: 2023-09-14
Payer: MEDICARE

## 2023-09-14 VITALS
BODY MASS INDEX: 38.22 KG/M2 | DIASTOLIC BLOOD PRESSURE: 80 MMHG | SYSTOLIC BLOOD PRESSURE: 138 MMHG | HEART RATE: 66 BPM | WEIGHT: 273 LBS | OXYGEN SATURATION: 97 % | HEIGHT: 71 IN

## 2023-09-14 DIAGNOSIS — Z95.818 PRESENCE OF OTHER CARDIAC IMPLANTS AND GRAFTS: ICD-10-CM

## 2023-09-14 PROCEDURE — 93000 ELECTROCARDIOGRAM COMPLETE: CPT

## 2023-09-14 PROCEDURE — 99214 OFFICE O/P EST MOD 30 MIN: CPT | Mod: 25

## 2023-10-18 ENCOUNTER — APPOINTMENT (OUTPATIENT)
Dept: INTERNAL MEDICINE | Facility: CLINIC | Age: 68
End: 2023-10-18
Payer: MEDICARE

## 2023-10-18 VITALS
WEIGHT: 273 LBS | HEART RATE: 65 BPM | BODY MASS INDEX: 38.22 KG/M2 | DIASTOLIC BLOOD PRESSURE: 94 MMHG | HEIGHT: 71 IN | SYSTOLIC BLOOD PRESSURE: 142 MMHG

## 2023-10-18 DIAGNOSIS — E83.42 HYPOMAGNESEMIA: ICD-10-CM

## 2023-10-18 DIAGNOSIS — Z00.00 ENCOUNTER FOR GENERAL ADULT MEDICAL EXAMINATION W/OUT ABNORMAL FINDINGS: ICD-10-CM

## 2023-10-18 PROCEDURE — 36415 COLL VENOUS BLD VENIPUNCTURE: CPT

## 2023-10-18 PROCEDURE — G0438: CPT

## 2023-10-19 LAB
ALBUMIN SERPL ELPH-MCNC: 4.3 G/DL
ALP BLD-CCNC: 68 U/L
ALT SERPL-CCNC: 24 U/L
ANION GAP SERPL CALC-SCNC: 11 MMOL/L
AST SERPL-CCNC: 18 U/L
BASOPHILS # BLD AUTO: 0.04 K/UL
BASOPHILS NFR BLD AUTO: 0.7 %
BILIRUB SERPL-MCNC: 0.6 MG/DL
BUN SERPL-MCNC: 16 MG/DL
CALCIUM SERPL-MCNC: 9.4 MG/DL
CHLORIDE SERPL-SCNC: 105 MMOL/L
CHOLEST SERPL-MCNC: 163 MG/DL
CO2 SERPL-SCNC: 25 MMOL/L
CREAT SERPL-MCNC: 1.05 MG/DL
EGFR: 79 ML/MIN/1.73M2
EOSINOPHIL # BLD AUTO: 0.15 K/UL
EOSINOPHIL NFR BLD AUTO: 2.6 %
ESTIMATED AVERAGE GLUCOSE: 114 MG/DL
FOLATE SERPL-MCNC: >20 NG/ML
GLUCOSE SERPL-MCNC: 102 MG/DL
HBA1C MFR BLD HPLC: 5.6 %
HCT VFR BLD CALC: 47 %
HDLC SERPL-MCNC: 37 MG/DL
HGB BLD-MCNC: 15.3 G/DL
IMM GRANULOCYTES NFR BLD AUTO: 0.3 %
IRON SATN MFR SERPL: 29 %
IRON SERPL-MCNC: 90 UG/DL
LDLC SERPL CALC-MCNC: 100 MG/DL
LYMPHOCYTES # BLD AUTO: 1.1 K/UL
LYMPHOCYTES NFR BLD AUTO: 18.7 %
MAGNESIUM SERPL-MCNC: 2.2 MG/DL
MAN DIFF?: NORMAL
MCHC RBC-ENTMCNC: 30.7 PG
MCHC RBC-ENTMCNC: 32.6 GM/DL
MCV RBC AUTO: 94.2 FL
MONOCYTES # BLD AUTO: 0.41 K/UL
MONOCYTES NFR BLD AUTO: 7 %
NEUTROPHILS # BLD AUTO: 4.16 K/UL
NEUTROPHILS NFR BLD AUTO: 70.7 %
NONHDLC SERPL-MCNC: 125 MG/DL
PLATELET # BLD AUTO: 177 K/UL
POTASSIUM SERPL-SCNC: 4.9 MMOL/L
PROT SERPL-MCNC: 7.1 G/DL
PSA SERPL-MCNC: 0.33 NG/ML
RBC # BLD: 4.99 M/UL
RBC # FLD: 13.1 %
SODIUM SERPL-SCNC: 140 MMOL/L
TIBC SERPL-MCNC: 313 UG/DL
TRIGL SERPL-MCNC: 145 MG/DL
TSH SERPL-ACNC: 0.55 UIU/ML
UIBC SERPL-MCNC: 222 UG/DL
VIT B12 SERPL-MCNC: 728 PG/ML
WBC # FLD AUTO: 5.88 K/UL

## 2023-11-21 ENCOUNTER — OUTPATIENT (OUTPATIENT)
Dept: OUTPATIENT SERVICES | Facility: HOSPITAL | Age: 68
LOS: 1 days | End: 2023-11-21
Payer: COMMERCIAL

## 2023-11-21 ENCOUNTER — TRANSCRIPTION ENCOUNTER (OUTPATIENT)
Age: 68
End: 2023-11-21

## 2023-11-21 VITALS
DIASTOLIC BLOOD PRESSURE: 71 MMHG | HEART RATE: 674 BPM | SYSTOLIC BLOOD PRESSURE: 175 MMHG | TEMPERATURE: 98 F | OXYGEN SATURATION: 94 % | RESPIRATION RATE: 16 BRPM

## 2023-11-21 VITALS
HEIGHT: 71 IN | WEIGHT: 274.92 LBS | DIASTOLIC BLOOD PRESSURE: 88 MMHG | OXYGEN SATURATION: 98 % | SYSTOLIC BLOOD PRESSURE: 183 MMHG | RESPIRATION RATE: 18 BRPM | HEART RATE: 65 BPM

## 2023-11-21 DIAGNOSIS — Z86.018 PERSONAL HISTORY OF OTHER BENIGN NEOPLASM: Chronic | ICD-10-CM

## 2023-11-21 DIAGNOSIS — Z90.49 ACQUIRED ABSENCE OF OTHER SPECIFIED PARTS OF DIGESTIVE TRACT: Chronic | ICD-10-CM

## 2023-11-21 DIAGNOSIS — Z96.7 PRESENCE OF OTHER BONE AND TENDON IMPLANTS: Chronic | ICD-10-CM

## 2023-11-21 DIAGNOSIS — I48.0 PAROXYSMAL ATRIAL FIBRILLATION: ICD-10-CM

## 2023-11-21 DIAGNOSIS — Z98.890 OTHER SPECIFIED POSTPROCEDURAL STATES: Chronic | ICD-10-CM

## 2023-11-21 PROCEDURE — 33286 RMVL SUBQ CAR RHYTHM MNTR: CPT | Mod: 59

## 2023-11-21 PROCEDURE — 33285 INSJ SUBQ CAR RHYTHM MNTR: CPT

## 2023-11-21 PROCEDURE — C1764: CPT

## 2023-11-21 RX ORDER — METOPROLOL TARTRATE 50 MG
25 TABLET ORAL ONCE
Refills: 0 | Status: COMPLETED | OUTPATIENT
Start: 2023-11-21 | End: 2023-11-21

## 2023-11-21 RX ORDER — CEPHALEXIN 500 MG
500 CAPSULE ORAL ONCE
Refills: 0 | Status: COMPLETED | OUTPATIENT
Start: 2023-11-21 | End: 2023-11-21

## 2023-11-21 RX ADMIN — Medication 500 MILLIGRAM(S): at 07:30

## 2023-11-21 RX ADMIN — Medication 25 MILLIGRAM(S): at 07:31

## 2023-11-21 NOTE — H&P PST ADULT - ASSESSMENT
Pt is a 68 y/o male, pmhx significant for obesity, HTN, prostate and colorectal cancer s/p partial colon resection, and symptomatic paroxysmal atrial fibrillation s/p RF ablation (3/29/19),  with early recurrent symptomatic AF s/p DCCV, ILR implant and Amiodarone reinitiation (6/4/2019). Amiodarone discontinued 7/25/19.Had been doing well without recurrence of AF for over a year. On 8/24/2020, ILR showed one episode of PAF (6 hours) with two prolonged conversion pauses (one possible 20sec) during sleep. Pt also noted to have recurrent PAF with RVR - total duration 5 hours on 6/2 - 6/3/22. Converted back to SR without subsequent recurrence. No further episodes documented. Pts device has now reached EOS and he presents electively today for device exchange. Currently denies chest pain, SOB, palpitations dizziness, recent syncopal events.     Plan:  1) Loop recorder exchange  - Keflex 500mg prior to procedure  - Consent with attending   - Site prep per protocol

## 2023-11-21 NOTE — ASU DISCHARGE PLAN (ADULT/PEDIATRIC) - NS MD DC FALL RISK RISK
For information on Fall & Injury Prevention, visit: [email or web address] OR  [email or web address] OR  https://www.cdc.gov/steadi/patient.html

## 2023-11-21 NOTE — DISCHARGE NOTE NURSING/CASE MANAGEMENT/SOCIAL WORK - PATIENT PORTAL LINK FT
You can access the FollowMyHealth Patient Portal offered by [facility] by registering at the following website: [email or web address]. By joining [facility] [employer]’s FollowMyHealth portal, you will also be able to view your health information using other applications (apps) compatible with our system.

## 2023-11-21 NOTE — ASU DISCHARGE PLAN (ADULT/PEDIATRIC) - CARE PROVIDER_API CALL
Gera Shelton  Cardiac Electrophysiology  [address], [address]  [place] [address]  Phone: [phone]  Fax: [phone]  Follow Up Time:     Anjel Robledo  Cardiology  [address] [place] [address].  [place] [address]  Phone: [phone]  Fax: [phone]  Follow Up Time:

## 2023-11-21 NOTE — H&P PST ADULT - NSICDXFAMILYHX_GEN_ALL_CORE_FT
FAMILY HISTORY:  Family history of CABG  Family history of cervical cancer  Family history of coronary artery disease  Family history of hypertension    Sibling  Still living? Yes, Estimated age: Age Unknown  Family history of prostate cancer, Age at diagnosis: 51-60

## 2023-11-21 NOTE — ASU DISCHARGE PLAN (ADULT/PEDIATRIC) - COMMENTS
Follow up with Dr. Shelton in 1 - 2 weeks. Our office will contact you in 3-5 days to schedule this appointment. Please call [phone] with questions or concerns.

## 2023-11-21 NOTE — H&P PST ADULT - HISTORY OF PRESENT ILLNESS
Pt is a 66 y/o male, pmhx significant for obesity, HTN, prostate and colorectal cancer s/p partial colon resection, and symptomatic paroxysmal atrial fibrillation s/p RF ablation (3/29/19),  with early recurrent symptomatic AF s/p DCCV, ILR implant and Amiodarone reinitiation (6/4/2019). Amiodarone discontinued 7/25/19.Had been doing well without recurrence of AF for over a year. On 8/24/2020, ILR showed one episode of PAF (6 hours) with two prolonged conversion pauses (one possible 20sec) during sleep. Pt also noted to have recurrent PAF with RVR - total duration 5 hours on 6/2 - 6/3/22. Converted back to SR without subsequent recurrence. No further episodes documented. Pts device has now reached EOS and he presents electively today for device exchange. Pt is a 68 y/o male, pmhx significant for obesity, HTN, prostate and colorectal cancer s/p partial colon resection, and symptomatic paroxysmal atrial fibrillation s/p RF ablation (3/29/19),  with early recurrent symptomatic AF s/p DCCV, ILR implant and Amiodarone reinitiation (6/4/2019). Amiodarone discontinued 7/25/19.Had been doing well without recurrence of AF for over a year. On 8/24/2020, ILR showed one episode of PAF (6 hours) with two prolonged conversion pauses (one possible 20sec) during sleep. Pt also noted to have recurrent PAF with RVR - total duration 5 hours on 6/2 - 6/3/22. Converted back to SR without subsequent recurrence. No further episodes documented. Pts device has now reached EOS and he presents electively today for device exchange. Currently denies chest pain, SOB, palpitations dizziness, recent syncopal events.

## 2023-12-18 ENCOUNTER — RX RENEWAL (OUTPATIENT)
Age: 68
End: 2023-12-18

## 2023-12-26 ENCOUNTER — NON-APPOINTMENT (OUTPATIENT)
Age: 68
End: 2023-12-26

## 2023-12-26 ENCOUNTER — APPOINTMENT (OUTPATIENT)
Dept: ELECTROPHYSIOLOGY | Facility: CLINIC | Age: 68
End: 2023-12-26
Payer: MEDICARE

## 2023-12-26 PROCEDURE — 93298 REM INTERROG DEV EVAL SCRMS: CPT

## 2023-12-26 PROCEDURE — G2066: CPT

## 2024-01-16 ENCOUNTER — RX RENEWAL (OUTPATIENT)
Age: 69
End: 2024-01-16

## 2024-01-22 ENCOUNTER — APPOINTMENT (OUTPATIENT)
Dept: ELECTROPHYSIOLOGY | Facility: CLINIC | Age: 69
End: 2024-01-22
Payer: MEDICARE

## 2024-01-22 ENCOUNTER — NON-APPOINTMENT (OUTPATIENT)
Age: 69
End: 2024-01-22

## 2024-01-22 VITALS
SYSTOLIC BLOOD PRESSURE: 147 MMHG | DIASTOLIC BLOOD PRESSURE: 82 MMHG | WEIGHT: 279 LBS | OXYGEN SATURATION: 97 % | TEMPERATURE: 97.7 F | HEART RATE: 65 BPM | HEIGHT: 71 IN | BODY MASS INDEX: 39.06 KG/M2

## 2024-01-22 DIAGNOSIS — Z86.79 OTHER SPECIFIED POSTPROCEDURAL STATES: ICD-10-CM

## 2024-01-22 DIAGNOSIS — I48.0 PAROXYSMAL ATRIAL FIBRILLATION: ICD-10-CM

## 2024-01-22 DIAGNOSIS — Z98.890 OTHER SPECIFIED POSTPROCEDURAL STATES: ICD-10-CM

## 2024-01-22 PROCEDURE — 99213 OFFICE O/P EST LOW 20 MIN: CPT | Mod: 25

## 2024-01-22 PROCEDURE — 93000 ELECTROCARDIOGRAM COMPLETE: CPT

## 2024-01-22 RX ORDER — ATORVASTATIN CALCIUM 10 MG/1
10 TABLET, FILM COATED ORAL
Qty: 30 | Refills: 0 | Status: ACTIVE | COMMUNITY

## 2024-01-22 NOTE — REVIEW OF SYSTEMS
[SOB] : no shortness of breath [Dyspnea on exertion] : not dyspnea during exertion [Chest Discomfort] : no chest discomfort [Lower Ext Edema] : no extremity edema [Palpitations] : no palpitations [Syncope] : no syncope [Dizziness] : no dizziness [Easy Bleeding] : no tendency for easy bleeding

## 2024-01-22 NOTE — HISTORY OF PRESENT ILLNESS
[FreeTextEntry1] : 69 y/o M with pmhx obesity, HTN, prostate and colorectal cancer s/p partial colon resection, and symptomatic paroxysmal atrial fibrillation despite prior treatment with Amiodarone and high dose BB s/p RF ablation 3/29/19. Subsequently presented with early recurrent symptomatic AF s/p DCCV, ILR implant and Amiodarone reinitiation (6/4/2019). Amiodarone discontinued 7/25/19.Had been doing well without recurrence of AF for over a year. On 8/24/2020, ILR showed one episode of PAF (6 hours) with two prolonged conversion pauses (one possible 20sec) during sleep. Occurred in the setting of severe sinus infection which resulted in significant discomfort, inability to use CPAP and sleep deprivation for several weeks. After stopping BB, patient had symptoms of "pounding in neck" where he felt his BP was elevated. He resumed Metoprolol 25mg daily.  Had no recurrent AF for nearly 2 years and remained off a/c. Recently noted to have recurrent PAF with RVR - total duration 5 hours on 6/2 - 6/3/22. Converted back to SR without subsequent recurrence. Was asymptomatic.  [id number] 1. AFter d/w Dr. Shelton was advised to restart Xarelto, out of precaution, but never did.  DUring previous f/u 6/23/22 risks/benefits of AC discussed. Given CHADS2-VASc 1 and only one recurrence of AF in nearly 2 years aspirin therapy was recommended. Discussed considering initiation of AC if further AF events noted as he was approaching 67 years of age which would make his [id number] score 2.   During previous f/u 2/2023 ILR revealed one episode of SR with salvos of AT.  Previously Stopped aspirin due to hemorrhoidal bleeding, now reports being back on it  Mr. Morrison reports he has been feeling very well since last follow up.  Denies palpitations, dizziness, near syncope, syncope.  He now reports for follow-up S/P MDT ILR explant of EOS device and Re-implantation of MDT ILR on 11/21/23 with Dr Shelton for continued arrhythmia/PAF monitoring.

## 2024-01-22 NOTE — CARDIOLOGY SUMMARY
[de-identified] : sinus bradycardia [de-identified] : ILR remote 12/26/23 no AT/AF episodes for review. burden 0.0%  [de-identified] : ILR explant of EOS device, reimplantation ILR on 11/21/23

## 2024-01-22 NOTE — PHYSICAL EXAM
[No Respiratory Distress] : no respiratory distress  [No Acute Distress] : no acute distress [Normal Gait] : normal gait [No Edema] : no edema [Moves all extremities] : moves all extremities [Alert and Oriented] : alert and oriented [Rhythm Regular] : regular [Normal S1] : normal S1 [Normal S2] : normal S2 [No Murmur] : no murmurs heard [de-identified] : ILR site intact

## 2024-01-22 NOTE — DISCUSSION/SUMMARY
[FreeTextEntry1] : 69 y/o M with pmhx obesity, HTN, prostate and colorectal cancer s/p partial colon resection, and symptomatic paroxysmal atrial fibrillation despite prior treatment with Amiodarone and high dose BB s/p RF ablation 3/29/19. Subsequently presented with early recurrent symptomatic AF s/p DCCV, ILR implant and Amiodarone reinitiation (6/4/2019). Amiodarone discontinued 7/25/19.Had been doing well without recurrence of AF for over a year. On 8/24/2020, ILR showed one episode of PAF (6 hours) with two prolonged conversion pauses (one possible 20sec) during sleep. Occurred in the setting of severe sinus infection which resulted in significant discomfort, inability to use CPAP and sleep deprivation for several weeks. After stopping BB, patient had symptoms of "pounding in neck" where he felt his BP was elevated. He resumed Metoprolol 25mg daily. Had no recurrent AF for nearly 2 years and remained off a/c. Recently noted to have recurrent PAF with RVR - total duration 5 hours on 6/2 - 6/3/22. Converted back to SR without subsequent recurrence. Was asymptomatic.  [id number] 1. AFter d/w Dr. Shelton was advised to restart Xarelto, out of precaution, but never did.  DUring previous f/u 6/23/22 risks/benefits of AC discussed. Given CHADS2-VASc 1 and only one recurrence of AF in nearly 2 years aspirin therapy was recommended. Discussed considering initiation of AC if further AF events noted as he was approaching 67 years of age which would make his [id number] score 2.  During previous f/u 2/2023 ILR revealed one episode of SR with salvos of AT.  Previously Stopped aspirin due to hemorrhoidal bleeding, now reports being back on it  He arrives today now S/P Explant of EOS ILR and Re Implantation on 11/23/24 of new MDT ILR. He arrives in no acute distress.  He does not reports symptoms of unstable arrhythmia and/or PAF recurrence. ECG illustrates sinus bradycardia.  His blood pressure is a bit more on the higher side of normal.   Given his PXO7ZI1-LPOo 2 (HTN/Age) and only one true recurrence of AF in nearly 2 years (< 6 hours duration) we will continue ongoing monitoring for arrhythmia surveillance via ILR.  No recurrent AT/AF noted on today's transmission. ILR insertions site is healing.  Home monitoring system reinforced.  We have again reviewed the recommendation to initiate oral a/c if additional AF events were to noted versus LAAO due to c/o hemorrhoidal bleeding.   I have encouraged follow-up with pulmonology regarding history of known sleep apnea and more strict B/P control to help modify the risks for AF recurrence. He will follow-up with general cardiology as scheduled  If all is well, he will return for in office ILR interrogation in 6 months.  [EKG obtained to assist in diagnosis and management of assessed problem(s)] : EKG obtained to assist in diagnosis and management of assessed problem(s)

## 2024-01-22 NOTE — REASON FOR VISIT
[Arrhythmia/ECG Abnorrmalities] : arrhythmia/ECG abnormalities [Other: ____] : [unfilled] [FreeTextEntry3] :

## 2024-02-22 ENCOUNTER — NON-APPOINTMENT (OUTPATIENT)
Age: 69
End: 2024-02-22

## 2024-02-23 ENCOUNTER — APPOINTMENT (OUTPATIENT)
Dept: ELECTROPHYSIOLOGY | Facility: CLINIC | Age: 69
End: 2024-02-23
Payer: MEDICARE

## 2024-02-23 PROCEDURE — 93298 REM INTERROG DEV EVAL SCRMS: CPT

## 2024-03-07 ENCOUNTER — APPOINTMENT (OUTPATIENT)
Dept: UROLOGY | Facility: CLINIC | Age: 69
End: 2024-03-07
Payer: MEDICARE

## 2024-03-07 VITALS
BODY MASS INDEX: 38.64 KG/M2 | RESPIRATION RATE: 16 BRPM | WEIGHT: 276 LBS | HEIGHT: 71 IN | DIASTOLIC BLOOD PRESSURE: 94 MMHG | SYSTOLIC BLOOD PRESSURE: 179 MMHG | HEART RATE: 61 BPM

## 2024-03-07 DIAGNOSIS — Z85.46 PERSONAL HISTORY OF MALIGNANT NEOPLASM OF PROSTATE: ICD-10-CM

## 2024-03-07 DIAGNOSIS — N52.35 ERECTILE DYSFUNCTION FOLLOWING RADIATION THERAPY: ICD-10-CM

## 2024-03-07 PROCEDURE — G2211 COMPLEX E/M VISIT ADD ON: CPT

## 2024-03-07 PROCEDURE — 99214 OFFICE O/P EST MOD 30 MIN: CPT

## 2024-03-20 ENCOUNTER — NON-APPOINTMENT (OUTPATIENT)
Age: 69
End: 2024-03-20

## 2024-03-20 RX ORDER — TADALAFIL 20 MG/1
20 TABLET ORAL
Qty: 30 | Refills: 6 | Status: ACTIVE | COMMUNITY
Start: 2024-03-07 | End: 1900-01-01

## 2024-03-28 ENCOUNTER — NON-APPOINTMENT (OUTPATIENT)
Age: 69
End: 2024-03-28

## 2024-03-29 ENCOUNTER — APPOINTMENT (OUTPATIENT)
Dept: ELECTROPHYSIOLOGY | Facility: CLINIC | Age: 69
End: 2024-03-29
Payer: MEDICARE

## 2024-03-29 PROCEDURE — 93298 REM INTERROG DEV EVAL SCRMS: CPT

## 2024-03-31 PROBLEM — Z85.46 HISTORY OF MALIGNANT NEOPLASM OF PROSTATE: Status: RESOLVED | Noted: 2017-06-20 | Resolved: 2024-03-31

## 2024-03-31 PROBLEM — N52.35 ERECTILE DYSFUNCTION FOLLOWING RADIATION THERAPY: Status: ACTIVE | Noted: 2018-06-27

## 2024-03-31 NOTE — HISTORY OF PRESENT ILLNESS
[FreeTextEntry1] : Here for follow-up visit. H/o rectal cancer and prostate cancer. S/P radiation therapy and ADT.  Completed now both treatments about 2 years ago. Last ADT injection was June 2017. Last PSA 10/2023 - 0.33ng/mL  No urinary complaints. No stress incontinence. Denies hematuria, dysuria. No abdominal or flank pain.  Reports no erections s/p radiation. Was on tadalafil 5mg without improvement. Tried Sildenafil but discontinued due to insurance denial.   Brother had prostate cancer treated with prostatectomy. Father diagnosed with prostate cancer, 80, treated with radiation.

## 2024-03-31 NOTE — ASSESSMENT
[FreeTextEntry1] : - Refilled Tadalafil 5mg. Recommend additional 20 mg PRN. Goals of medication reviewed.  Discussed the potential adverse effects of the medication.  Discussed the proper administration of the medication. - PSA today. Will call with results. - Follow up in one year. - Call the office in 4-5 weeks if tadalafil does not improve ED.

## 2024-04-01 ENCOUNTER — NON-APPOINTMENT (OUTPATIENT)
Age: 69
End: 2024-04-01

## 2024-04-18 ENCOUNTER — APPOINTMENT (OUTPATIENT)
Dept: INTERNAL MEDICINE | Facility: CLINIC | Age: 69
End: 2024-04-18
Payer: MEDICARE

## 2024-04-18 VITALS
DIASTOLIC BLOOD PRESSURE: 99 MMHG | BODY MASS INDEX: 38.64 KG/M2 | HEART RATE: 87 BPM | WEIGHT: 276 LBS | SYSTOLIC BLOOD PRESSURE: 151 MMHG | HEIGHT: 71 IN

## 2024-04-18 DIAGNOSIS — E66.9 OBESITY, UNSPECIFIED: ICD-10-CM

## 2024-04-18 DIAGNOSIS — R53.83 OTHER FATIGUE: ICD-10-CM

## 2024-04-18 DIAGNOSIS — I48.20 CHRONIC ATRIAL FIBRILLATION, UNSP: ICD-10-CM

## 2024-04-18 DIAGNOSIS — E78.5 HYPERLIPIDEMIA, UNSPECIFIED: ICD-10-CM

## 2024-04-18 DIAGNOSIS — I10 ESSENTIAL (PRIMARY) HYPERTENSION: ICD-10-CM

## 2024-04-18 DIAGNOSIS — R79.9 ABNORMAL FINDING OF BLOOD CHEMISTRY, UNSPECIFIED: ICD-10-CM

## 2024-04-18 DIAGNOSIS — N40.0 BENIGN PROSTATIC HYPERPLASIA WITHOUT LOWER URINARY TRACT SYMPMS: ICD-10-CM

## 2024-04-18 PROCEDURE — 36415 COLL VENOUS BLD VENIPUNCTURE: CPT

## 2024-04-18 RX ORDER — TADALAFIL 5 MG/1
5 TABLET ORAL
Qty: 90 | Refills: 3 | Status: COMPLETED | COMMUNITY
Start: 2021-09-21 | End: 2024-04-18

## 2024-04-18 RX ORDER — LOSARTAN POTASSIUM 50 MG/1
50 TABLET, FILM COATED ORAL
Qty: 90 | Refills: 3 | Status: ACTIVE | COMMUNITY
Start: 1900-01-01 | End: 1900-01-01

## 2024-04-18 NOTE — PLAN
[FreeTextEntry1] : Afib s/p ablation- patient will continue medication as prescribed by his Cardiologist  HTN- BP well controlled with Losartan 50 mg PO QD and Metoprolol Succinate ER 25 mg PO QD.  Patient will continue to follow with GI doctor for history of colon cancer  history of prostate cancer/ ED -Patient will continue to follow with Urologist  Counseling included abnormal lab results, differential diagnoses, treatment options, risks and benefits, lifestyle changes, current condition, medications, and dose adjustments.  The patient was interactive, attentive, asked questions, and verbalized understanding

## 2024-04-18 NOTE — HEALTH RISK ASSESSMENT
[Good] : ~his/her~  mood as  good [Yes] : Yes [Monthly or less (1 pt)] : Monthly or less (1 point) [1 or 2 (0 pts)] : 1 or 2 (0 points) [Never (0 pts)] : Never (0 points) [No] : In the past 12 months have you used drugs other than those required for medical reasons? No [0] : 2) Feeling down, depressed, or hopeless: Not at all (0) [PHQ-2 Negative - No further assessment needed] : PHQ-2 Negative - No further assessment needed [Former] : Former [5-9] : 5-9 [> 15 Years] : > 15 Years [HIV test declined] : HIV test declined [Hepatitis C test declined] : Hepatitis C test declined [Audit-CScore] : 1 [[id number]] : 0

## 2024-04-18 NOTE — HISTORY OF PRESENT ILLNESS
[FreeTextEntry1] : follow up chronic medical conditions [de-identified] : Mr. ASHLEY SORENSON 69 y/o M with pmhx obesity, HTN, prostate and colorectal cancer s/p partial colon resection, and symptomatic paroxysmal atrial fibrillation  s/p RF ablation 3/29/19 comes to the office for physical exam. Patient denies fever, cough SOB. No other complaints at this time.

## 2024-04-19 LAB
25(OH)D3 SERPL-MCNC: 35.4 NG/ML
ALBUMIN SERPL ELPH-MCNC: 4.3 G/DL
ALP BLD-CCNC: 63 U/L
ALT SERPL-CCNC: 25 U/L
ANION GAP SERPL CALC-SCNC: 12 MMOL/L
AST SERPL-CCNC: 22 U/L
BASOPHILS # BLD AUTO: 0.04 K/UL
BASOPHILS NFR BLD AUTO: 0.7 %
BILIRUB SERPL-MCNC: 0.6 MG/DL
BUN SERPL-MCNC: 16 MG/DL
CALCIUM SERPL-MCNC: 9.2 MG/DL
CHLORIDE SERPL-SCNC: 104 MMOL/L
CHOLEST SERPL-MCNC: 155 MG/DL
CO2 SERPL-SCNC: 25 MMOL/L
CREAT SERPL-MCNC: 1.07 MG/DL
EGFR: 77 ML/MIN/1.73M2
EOSINOPHIL # BLD AUTO: 0.15 K/UL
EOSINOPHIL NFR BLD AUTO: 2.6 %
ESTIMATED AVERAGE GLUCOSE: 114 MG/DL
GLUCOSE SERPL-MCNC: 99 MG/DL
HBA1C MFR BLD HPLC: 5.6 %
HCT VFR BLD CALC: 43.8 %
HDLC SERPL-MCNC: 38 MG/DL
HGB BLD-MCNC: 14.5 G/DL
IMM GRANULOCYTES NFR BLD AUTO: 0.4 %
LDLC SERPL CALC-MCNC: 84 MG/DL
LYMPHOCYTES # BLD AUTO: 0.94 K/UL
LYMPHOCYTES NFR BLD AUTO: 16.5 %
MAGNESIUM SERPL-MCNC: 2.2 MG/DL
MAN DIFF?: NORMAL
MCHC RBC-ENTMCNC: 30.3 PG
MCHC RBC-ENTMCNC: 33.1 GM/DL
MCV RBC AUTO: 91.6 FL
MONOCYTES # BLD AUTO: 0.48 K/UL
MONOCYTES NFR BLD AUTO: 8.5 %
NEUTROPHILS # BLD AUTO: 4.05 K/UL
NEUTROPHILS NFR BLD AUTO: 71.3 %
NONHDLC SERPL-MCNC: 117 MG/DL
PLATELET # BLD AUTO: 168 K/UL
POTASSIUM SERPL-SCNC: 4.6 MMOL/L
PROT SERPL-MCNC: 6.6 G/DL
PSA SERPL-MCNC: 0.5 NG/ML
RBC # BLD: 4.78 M/UL
RBC # FLD: 13.1 %
SODIUM SERPL-SCNC: 141 MMOL/L
TESTOST SERPL-MCNC: 339 NG/DL
TRIGL SERPL-MCNC: 193 MG/DL
TSH SERPL-ACNC: 0.69 UIU/ML
WBC # FLD AUTO: 5.68 K/UL

## 2024-05-02 ENCOUNTER — NON-APPOINTMENT (OUTPATIENT)
Age: 69
End: 2024-05-02

## 2024-05-03 ENCOUNTER — APPOINTMENT (OUTPATIENT)
Dept: ELECTROPHYSIOLOGY | Facility: CLINIC | Age: 69
End: 2024-05-03
Payer: MEDICARE

## 2024-05-03 PROCEDURE — 93298 REM INTERROG DEV EVAL SCRMS: CPT

## 2024-05-06 ENCOUNTER — RX RENEWAL (OUTPATIENT)
Age: 69
End: 2024-05-06

## 2024-05-06 RX ORDER — ATORVASTATIN CALCIUM 20 MG/1
20 TABLET, FILM COATED ORAL
Qty: 90 | Refills: 1 | Status: ACTIVE | COMMUNITY
Start: 2020-08-25 | End: 1900-01-01

## 2024-06-05 ENCOUNTER — EMERGENCY (EMERGENCY)
Facility: HOSPITAL | Age: 69
LOS: 1 days | Discharge: HOME | End: 2024-06-05
Attending: EMERGENCY MEDICINE
Payer: MEDICARE

## 2024-06-05 VITALS
TEMPERATURE: 98 F | HEART RATE: 60 BPM | OXYGEN SATURATION: 98 % | DIASTOLIC BLOOD PRESSURE: 83 MMHG | SYSTOLIC BLOOD PRESSURE: 153 MMHG | RESPIRATION RATE: 18 BRPM

## 2024-06-05 VITALS
WEIGHT: 279.33 LBS | HEART RATE: 62 BPM | HEIGHT: 71 IN | SYSTOLIC BLOOD PRESSURE: 198 MMHG | RESPIRATION RATE: 18 BRPM | DIASTOLIC BLOOD PRESSURE: 106 MMHG | TEMPERATURE: 98 F | OXYGEN SATURATION: 96 %

## 2024-06-05 DIAGNOSIS — Z86.018 PERSONAL HISTORY OF OTHER BENIGN NEOPLASM: Chronic | ICD-10-CM

## 2024-06-05 DIAGNOSIS — Z96.7 PRESENCE OF OTHER BONE AND TENDON IMPLANTS: Chronic | ICD-10-CM

## 2024-06-05 DIAGNOSIS — Z90.49 ACQUIRED ABSENCE OF OTHER SPECIFIED PARTS OF DIGESTIVE TRACT: Chronic | ICD-10-CM

## 2024-06-05 DIAGNOSIS — Z98.890 OTHER SPECIFIED POSTPROCEDURAL STATES: Chronic | ICD-10-CM

## 2024-06-05 LAB
ALBUMIN SERPL ELPH-MCNC: 3.7 G/DL — SIGNIFICANT CHANGE UP (ref 3.3–5.2)
ALP SERPL-CCNC: 65 U/L — SIGNIFICANT CHANGE UP (ref 40–120)
ALT FLD-CCNC: 28 U/L — SIGNIFICANT CHANGE UP
ANION GAP SERPL CALC-SCNC: 10 MMOL/L — SIGNIFICANT CHANGE UP (ref 5–17)
AST SERPL-CCNC: 25 U/L — SIGNIFICANT CHANGE UP
BASOPHILS # BLD AUTO: 0.04 K/UL — SIGNIFICANT CHANGE UP (ref 0–0.2)
BASOPHILS NFR BLD AUTO: 0.5 % — SIGNIFICANT CHANGE UP (ref 0–2)
BILIRUB SERPL-MCNC: 0.4 MG/DL — SIGNIFICANT CHANGE UP (ref 0.4–2)
BUN SERPL-MCNC: 14.9 MG/DL — SIGNIFICANT CHANGE UP (ref 8–20)
CALCIUM SERPL-MCNC: 9 MG/DL — SIGNIFICANT CHANGE UP (ref 8.4–10.5)
CHLORIDE SERPL-SCNC: 102 MMOL/L — SIGNIFICANT CHANGE UP (ref 96–108)
CO2 SERPL-SCNC: 24 MMOL/L — SIGNIFICANT CHANGE UP (ref 22–29)
CREAT SERPL-MCNC: 0.9 MG/DL — SIGNIFICANT CHANGE UP (ref 0.5–1.3)
EGFR: 94 ML/MIN/1.73M2 — SIGNIFICANT CHANGE UP
EOSINOPHIL # BLD AUTO: 0.17 K/UL — SIGNIFICANT CHANGE UP (ref 0–0.5)
EOSINOPHIL NFR BLD AUTO: 2 % — SIGNIFICANT CHANGE UP (ref 0–6)
GLUCOSE SERPL-MCNC: 98 MG/DL — SIGNIFICANT CHANGE UP (ref 70–99)
HCT VFR BLD CALC: 46 % — SIGNIFICANT CHANGE UP (ref 39–50)
HGB BLD-MCNC: 15.8 G/DL — SIGNIFICANT CHANGE UP (ref 13–17)
IMM GRANULOCYTES NFR BLD AUTO: 0.4 % — SIGNIFICANT CHANGE UP (ref 0–0.9)
LYMPHOCYTES # BLD AUTO: 0.87 K/UL — LOW (ref 1–3.3)
LYMPHOCYTES # BLD AUTO: 10.4 % — LOW (ref 13–44)
MCHC RBC-ENTMCNC: 31 PG — SIGNIFICANT CHANGE UP (ref 27–34)
MCHC RBC-ENTMCNC: 34.3 GM/DL — SIGNIFICANT CHANGE UP (ref 32–36)
MCV RBC AUTO: 90.2 FL — SIGNIFICANT CHANGE UP (ref 80–100)
MONOCYTES # BLD AUTO: 0.55 K/UL — SIGNIFICANT CHANGE UP (ref 0–0.9)
MONOCYTES NFR BLD AUTO: 6.6 % — SIGNIFICANT CHANGE UP (ref 2–14)
NEUTROPHILS # BLD AUTO: 6.72 K/UL — SIGNIFICANT CHANGE UP (ref 1.8–7.4)
NEUTROPHILS NFR BLD AUTO: 80.1 % — HIGH (ref 43–77)
PLATELET # BLD AUTO: 183 K/UL — SIGNIFICANT CHANGE UP (ref 150–400)
POTASSIUM SERPL-MCNC: 4.6 MMOL/L — SIGNIFICANT CHANGE UP (ref 3.5–5.3)
POTASSIUM SERPL-SCNC: 4.6 MMOL/L — SIGNIFICANT CHANGE UP (ref 3.5–5.3)
PROT SERPL-MCNC: 6.6 G/DL — SIGNIFICANT CHANGE UP (ref 6.6–8.7)
RBC # BLD: 5.1 M/UL — SIGNIFICANT CHANGE UP (ref 4.2–5.8)
RBC # FLD: 12.4 % — SIGNIFICANT CHANGE UP (ref 10.3–14.5)
SODIUM SERPL-SCNC: 136 MMOL/L — SIGNIFICANT CHANGE UP (ref 135–145)
WBC # BLD: 8.38 K/UL — SIGNIFICANT CHANGE UP (ref 3.8–10.5)
WBC # FLD AUTO: 8.38 K/UL — SIGNIFICANT CHANGE UP (ref 3.8–10.5)

## 2024-06-05 PROCEDURE — 99284 EMERGENCY DEPT VISIT MOD MDM: CPT | Mod: 25

## 2024-06-05 PROCEDURE — 36415 COLL VENOUS BLD VENIPUNCTURE: CPT

## 2024-06-05 PROCEDURE — 85025 COMPLETE CBC W/AUTO DIFF WBC: CPT

## 2024-06-05 PROCEDURE — 96374 THER/PROPH/DIAG INJ IV PUSH: CPT

## 2024-06-05 PROCEDURE — 99285 EMERGENCY DEPT VISIT HI MDM: CPT

## 2024-06-05 PROCEDURE — 96375 TX/PRO/DX INJ NEW DRUG ADDON: CPT

## 2024-06-05 PROCEDURE — 70450 CT HEAD/BRAIN W/O DYE: CPT | Mod: MC

## 2024-06-05 PROCEDURE — 80053 COMPREHEN METABOLIC PANEL: CPT

## 2024-06-05 PROCEDURE — 70450 CT HEAD/BRAIN W/O DYE: CPT | Mod: 26,MC

## 2024-06-05 RX ORDER — MECLIZINE HCL 12.5 MG
1 TABLET ORAL
Qty: 40 | Refills: 0
Start: 2024-06-05 | End: 2024-06-14

## 2024-06-05 RX ORDER — LABETALOL HCL 100 MG
10 TABLET ORAL ONCE
Refills: 0 | Status: COMPLETED | OUTPATIENT
Start: 2024-06-05 | End: 2024-06-05

## 2024-06-05 RX ORDER — LOSARTAN POTASSIUM 100 MG/1
1 TABLET, FILM COATED ORAL
Qty: 60 | Refills: 0
Start: 2024-06-05 | End: 2024-07-04

## 2024-06-05 RX ORDER — LOSARTAN POTASSIUM 100 MG/1
50 TABLET, FILM COATED ORAL ONCE
Refills: 0 | Status: COMPLETED | OUTPATIENT
Start: 2024-06-05 | End: 2024-06-05

## 2024-06-05 RX ORDER — ACETAMINOPHEN 500 MG
1000 TABLET ORAL ONCE
Refills: 0 | Status: COMPLETED | OUTPATIENT
Start: 2024-06-05 | End: 2024-06-05

## 2024-06-05 RX ORDER — DIAZEPAM 5 MG
10 TABLET ORAL ONCE
Refills: 0 | Status: DISCONTINUED | OUTPATIENT
Start: 2024-06-05 | End: 2024-06-05

## 2024-06-05 RX ORDER — MECLIZINE HCL 12.5 MG
50 TABLET ORAL ONCE
Refills: 0 | Status: COMPLETED | OUTPATIENT
Start: 2024-06-05 | End: 2024-06-05

## 2024-06-05 RX ADMIN — Medication 50 MILLIGRAM(S): at 13:30

## 2024-06-05 RX ADMIN — Medication 10 MILLIGRAM(S): at 13:30

## 2024-06-05 RX ADMIN — Medication 10 MILLIGRAM(S): at 17:55

## 2024-06-05 RX ADMIN — Medication 400 MILLIGRAM(S): at 13:30

## 2024-06-05 RX ADMIN — LOSARTAN POTASSIUM 50 MILLIGRAM(S): 100 TABLET, FILM COATED ORAL at 19:08

## 2024-06-05 NOTE — ED PROVIDER NOTE - CARE PLAN
1 Principal Discharge DX:	Vertigo   Principal Discharge DX:	Vertigo  Secondary Diagnosis:	Hypertension

## 2024-06-05 NOTE — ED ADULT NURSE REASSESSMENT NOTE - NS ED NURSE REASSESS COMMENT FT1
Patient a&ox3, no acute distress, resp nonlabored, resting comfortably in bed with family.  VSS. Denies headache, chest pain, palpitations, SOB, abd pain, n/v/d, urinary symptoms, fevers, chills, weakness at this time. Remains on continuos cardiac monitoring HR 63 &  wdl on ra. Patient awaiting ct head . Safety maintained.

## 2024-06-05 NOTE — ED ADULT NURSE NOTE - NSFALLRISKINTERV_ED_ALL_ED

## 2024-06-05 NOTE — ED ADULT NURSE NOTE - OBJECTIVE STATEMENT
alert and oriented x4. pt presenting to the ED complaining of dizziness on and off since Saturday. PMH HTN, bladder and prostate CA. Pt states he had a similar feeling in the past after an ear infection and it was vertigo. Pt reporting the dizziness to be worse upon movement. Denies headache, blurry vision, weakness, numbness/tingling, n/v/d, fever, chills, chest pain, shortness of breath at this time. gross neuro intact, RR even/unlabored. Placed on continuos cardiac monitoring nsr wdl on ra.

## 2024-06-05 NOTE — ED PROVIDER NOTE - OBJECTIVE STATEMENT
68-year-old male past medical history of hypertension prostate CA comes to the ED with 4-day history of feeling dizzy. 68-year-old male past medical history of hypertension prostate CA comes to the ED with 4-day history of feeling dizzy.Patient states that similar symptoms when he had right ear infection which was treated with antibiotics and resolved.  Patient not take any medications for his symptoms.  Patient denies any fever chills nausea or vomiting.  Patient noted with elevated blood pressure in the ED.

## 2024-06-05 NOTE — ED PROVIDER NOTE - CARE PROVIDER_API CALL
Oliver Carvajal  Head/Neck Surgery  [address], [address]  [place] [address]  Phone: [phone]  Fax: [phone]  Follow Up Time:     Lul Hilario  Neurology  [address], [address]  [place] [address]  Phone: [phone]  Fax: [phone]  Follow Up Time:

## 2024-06-05 NOTE — ED ADULT TRIAGE NOTE - CHIEF COMPLAINT QUOTE
pt c/o dizziness and nausea that started Saturday at 7am, denies tingling/numbness, weakness, visual changes

## 2024-06-05 NOTE — ED PROVIDER NOTE - NSFOLLOWUPINSTRUCTIONS_ED_ALL_ED_FT
increase losartan 50mg by mouth 2 times a day   continue metoprolol 25mg by mouth once a day   meclizine 25mg by mouth every 6 hours  follow up with primary care doctor in 3 - 5 days with referral to ear,nose and throat, neurology

## 2024-06-05 NOTE — ED PROVIDER NOTE - CLINICAL SUMMARY MEDICAL DECISION MAKING FREE TEXT BOX
h/o htn, prostate ca with dizziness; eval stroke;  bpv; labs, meds ivf and reassess h/o htn, prostate ca with dizziness; eval stroke;  bpv; labs, meds, ct of head, ivf and reassess

## 2024-06-05 NOTE — ED PROVIDER NOTE - CARE PROVIDERS DIRECT ADDRESSES
[email or web address]

## 2024-06-05 NOTE — ED PROVIDER NOTE - PATIENT PORTAL LINK FT
You can access the FollowMyHealth Patient Portal offered by [facility] by registering at the following website: [email or web address]. By joining [facility]’s FollowMyHealth portal, you will also be able to view your health information using other applications (apps) compatible with our system.

## 2024-06-06 ENCOUNTER — NON-APPOINTMENT (OUTPATIENT)
Age: 69
End: 2024-06-06

## 2024-06-07 ENCOUNTER — APPOINTMENT (OUTPATIENT)
Dept: ELECTROPHYSIOLOGY | Facility: CLINIC | Age: 69
End: 2024-06-07
Payer: MEDICARE

## 2024-06-07 PROCEDURE — 93298 REM INTERROG DEV EVAL SCRMS: CPT

## 2024-06-10 ENCOUNTER — APPOINTMENT (OUTPATIENT)
Dept: OTOLARYNGOLOGY | Facility: CLINIC | Age: 69
End: 2024-06-10
Payer: MEDICARE

## 2024-06-10 VITALS
DIASTOLIC BLOOD PRESSURE: 82 MMHG | WEIGHT: 272 LBS | BODY MASS INDEX: 38.08 KG/M2 | HEIGHT: 71 IN | SYSTOLIC BLOOD PRESSURE: 132 MMHG | HEART RATE: 73 BPM

## 2024-06-10 DIAGNOSIS — H90.3 SENSORINEURAL HEARING LOSS, BILATERAL: ICD-10-CM

## 2024-06-10 DIAGNOSIS — H81.20 VESTIBULAR NEURONITIS, UNSPECIFIED EAR: ICD-10-CM

## 2024-06-10 DIAGNOSIS — R42 DIZZINESS AND GIDDINESS: ICD-10-CM

## 2024-06-10 DIAGNOSIS — H90.A22 SENSORINEURAL HEARING LOSS, UNILATERAL, LEFT EAR, WITH RESTRICTED HEARING ON THE CONTRALATERAL SIDE: ICD-10-CM

## 2024-06-10 PROCEDURE — 92567 TYMPANOMETRY: CPT

## 2024-06-10 PROCEDURE — 99204 OFFICE O/P NEW MOD 45 MIN: CPT

## 2024-06-10 PROCEDURE — 92557 COMPREHENSIVE HEARING TEST: CPT

## 2024-06-10 RX ORDER — HYDROCHLOROTHIAZIDE 12.5 MG/1
12.5 CAPSULE ORAL
Refills: 0 | Status: ACTIVE | COMMUNITY

## 2024-06-10 RX ORDER — MECLIZINE HYDROCHLORIDE 25 MG/1
25 TABLET ORAL
Refills: 0 | Status: ACTIVE | COMMUNITY

## 2024-06-10 NOTE — HISTORY OF PRESENT ILLNESS
[de-identified] : 10 days ago - upon waking ? had nystagmus and felt dizzy Went to ED next day - did have elevated bp.  Was  treated with meclizine -  did also change bp meds -  bp meds were increased.  Has also some neck discomfort.  Feels off balance - occ spinning for just a few seconds with moving head.  ? problem turning in bed.   Hx of migraines as child.

## 2024-06-10 NOTE — DATA REVIEWED
[de-identified] :  Type A tymps, AU. Testing via headphones: AD- WNL w/ a mild SNHL component at 4kHz. AS- WNL up to 1.5kHz, sloping to a mild SNHL. Recs: 1) F/u w/ MD 2) ABR & VNG as per MD 3) Re-eval post med intervention

## 2024-06-10 NOTE — ASSESSMENT
[FreeTextEntry1] : Patient with sudden onset of problem with balance -  currently being treated for  elevated bp - today bp controlled.   Exam today unremarkable but patient does have sl asymetric snhl worse in left ear.   In view of hx and  aasymmetry recommended MRI and also recommended vng - if all normal likely vestibular neuronitis or mirgraine variant - however problem may be exacerbated by bp.  Follow up 2mos - if not better would recommend neuro eval.

## 2024-06-10 NOTE — REVIEW OF SYSTEMS
[Dizziness] : dizziness [Vertigo] : vertigo [Negative] : Heme/Lymph [de-identified] : get off balance/ nausea

## 2024-06-13 RX ORDER — ALPRAZOLAM 0.5 MG/1
0.5 TABLET ORAL
Qty: 1 | Refills: 0 | Status: ACTIVE | COMMUNITY
Start: 2017-04-27 | End: 1900-01-01

## 2024-06-14 ENCOUNTER — RESULT REVIEW (OUTPATIENT)
Age: 69
End: 2024-06-14

## 2024-06-14 ENCOUNTER — APPOINTMENT (OUTPATIENT)
Dept: MRI IMAGING | Facility: CLINIC | Age: 69
End: 2024-06-14

## 2024-06-14 ENCOUNTER — APPOINTMENT (OUTPATIENT)
Dept: OTOLARYNGOLOGY | Facility: CLINIC | Age: 69
End: 2024-06-14
Payer: MEDICARE

## 2024-06-14 ENCOUNTER — OUTPATIENT (OUTPATIENT)
Dept: OUTPATIENT SERVICES | Facility: HOSPITAL | Age: 69
LOS: 1 days | End: 2024-06-14
Payer: MEDICARE

## 2024-06-14 ENCOUNTER — APPOINTMENT (OUTPATIENT)
Dept: ULTRASOUND IMAGING | Facility: CLINIC | Age: 69
End: 2024-06-14

## 2024-06-14 DIAGNOSIS — Z90.49 ACQUIRED ABSENCE OF OTHER SPECIFIED PARTS OF DIGESTIVE TRACT: Chronic | ICD-10-CM

## 2024-06-14 DIAGNOSIS — Z96.7 PRESENCE OF OTHER BONE AND TENDON IMPLANTS: Chronic | ICD-10-CM

## 2024-06-14 DIAGNOSIS — Z98.890 OTHER SPECIFIED POSTPROCEDURAL STATES: Chronic | ICD-10-CM

## 2024-06-14 DIAGNOSIS — Z86.018 PERSONAL HISTORY OF OTHER BENIGN NEOPLASM: Chronic | ICD-10-CM

## 2024-06-14 DIAGNOSIS — Z00.8 ENCOUNTER FOR OTHER GENERAL EXAMINATION: ICD-10-CM

## 2024-06-14 PROCEDURE — 93880 EXTRACRANIAL BILAT STUDY: CPT | Mod: 26

## 2024-06-14 PROCEDURE — 70553 MRI BRAIN STEM W/O & W/DYE: CPT | Mod: 26

## 2024-06-17 ENCOUNTER — NON-APPOINTMENT (OUTPATIENT)
Age: 69
End: 2024-06-17

## 2024-06-17 PROCEDURE — 92537 CALORIC VSTBLR TEST W/REC: CPT

## 2024-06-17 PROCEDURE — 92547 SUPPLEMENTAL ELECTRICAL TEST: CPT

## 2024-06-17 PROCEDURE — 92540 BASIC VESTIBULAR EVALUATION: CPT

## 2024-06-24 ENCOUNTER — APPOINTMENT (OUTPATIENT)
Dept: NEUROLOGY | Facility: CLINIC | Age: 69
End: 2024-06-24
Payer: MEDICARE

## 2024-06-24 VITALS
OXYGEN SATURATION: 98 % | WEIGHT: 268 LBS | SYSTOLIC BLOOD PRESSURE: 118 MMHG | DIASTOLIC BLOOD PRESSURE: 70 MMHG | HEIGHT: 71 IN | HEART RATE: 62 BPM | BODY MASS INDEX: 37.52 KG/M2

## 2024-06-24 DIAGNOSIS — R42 DIZZINESS AND GIDDINESS: ICD-10-CM

## 2024-06-24 DIAGNOSIS — Z86.59 PERSONAL HISTORY OF OTHER MENTAL AND BEHAVIORAL DISORDERS: ICD-10-CM

## 2024-06-24 PROCEDURE — G2211 COMPLEX E/M VISIT ADD ON: CPT

## 2024-06-24 PROCEDURE — 99205 OFFICE O/P NEW HI 60 MIN: CPT

## 2024-06-24 RX ORDER — NIFEDIPINE 30 MG/1
30 TABLET, EXTENDED RELEASE ORAL
Refills: 0 | Status: ACTIVE | COMMUNITY

## 2024-06-24 RX ORDER — ALPRAZOLAM 0.5 MG/1
0.5 TABLET ORAL
Qty: 2 | Refills: 0 | Status: ACTIVE | COMMUNITY
Start: 2024-06-24 | End: 1900-01-01

## 2024-06-25 ENCOUNTER — APPOINTMENT (OUTPATIENT)
Dept: MRI IMAGING | Facility: CLINIC | Age: 69
End: 2024-06-25

## 2024-06-25 ENCOUNTER — APPOINTMENT (OUTPATIENT)
Dept: ULTRASOUND IMAGING | Facility: CLINIC | Age: 69
End: 2024-06-25

## 2024-07-03 ENCOUNTER — APPOINTMENT (OUTPATIENT)
Dept: MRI IMAGING | Facility: CLINIC | Age: 69
End: 2024-07-03
Payer: MEDICARE

## 2024-07-03 ENCOUNTER — OUTPATIENT (OUTPATIENT)
Dept: OUTPATIENT SERVICES | Facility: HOSPITAL | Age: 69
LOS: 1 days | End: 2024-07-03

## 2024-07-03 DIAGNOSIS — Z98.890 OTHER SPECIFIED POSTPROCEDURAL STATES: Chronic | ICD-10-CM

## 2024-07-03 DIAGNOSIS — Z96.7 PRESENCE OF OTHER BONE AND TENDON IMPLANTS: Chronic | ICD-10-CM

## 2024-07-03 DIAGNOSIS — R42 DIZZINESS AND GIDDINESS: ICD-10-CM

## 2024-07-03 DIAGNOSIS — Z86.018 PERSONAL HISTORY OF OTHER BENIGN NEOPLASM: Chronic | ICD-10-CM

## 2024-07-03 DIAGNOSIS — Z90.49 ACQUIRED ABSENCE OF OTHER SPECIFIED PARTS OF DIGESTIVE TRACT: Chronic | ICD-10-CM

## 2024-07-03 PROCEDURE — 70544 MR ANGIOGRAPHY HEAD W/O DYE: CPT | Mod: 26

## 2024-07-08 ENCOUNTER — NON-APPOINTMENT (OUTPATIENT)
Age: 69
End: 2024-07-08

## 2024-07-09 RX ORDER — TIZANIDINE 2 MG/1
2 TABLET ORAL
Qty: 45 | Refills: 0 | Status: ACTIVE | COMMUNITY
Start: 2024-07-09 | End: 1900-01-01

## 2024-07-11 ENCOUNTER — NON-APPOINTMENT (OUTPATIENT)
Age: 69
End: 2024-07-11

## 2024-07-12 ENCOUNTER — APPOINTMENT (OUTPATIENT)
Dept: ELECTROPHYSIOLOGY | Facility: CLINIC | Age: 69
End: 2024-07-12
Payer: MEDICARE

## 2024-07-12 PROCEDURE — 93298 REM INTERROG DEV EVAL SCRMS: CPT

## 2024-07-15 ENCOUNTER — OUTPATIENT (OUTPATIENT)
Dept: OUTPATIENT SERVICES | Facility: HOSPITAL | Age: 69
LOS: 1 days | End: 2024-07-15
Payer: MEDICARE

## 2024-07-15 ENCOUNTER — APPOINTMENT (OUTPATIENT)
Dept: CT IMAGING | Facility: CLINIC | Age: 69
End: 2024-07-15
Payer: MEDICARE

## 2024-07-15 DIAGNOSIS — M54.2 CERVICALGIA: ICD-10-CM

## 2024-07-15 DIAGNOSIS — Z86.018 PERSONAL HISTORY OF OTHER BENIGN NEOPLASM: Chronic | ICD-10-CM

## 2024-07-15 DIAGNOSIS — Z96.7 PRESENCE OF OTHER BONE AND TENDON IMPLANTS: Chronic | ICD-10-CM

## 2024-07-15 DIAGNOSIS — Z90.49 ACQUIRED ABSENCE OF OTHER SPECIFIED PARTS OF DIGESTIVE TRACT: Chronic | ICD-10-CM

## 2024-07-15 PROCEDURE — 72125 CT NECK SPINE W/O DYE: CPT | Mod: 26

## 2024-07-15 PROCEDURE — 72125 CT NECK SPINE W/O DYE: CPT

## 2024-07-22 ENCOUNTER — APPOINTMENT (OUTPATIENT)
Dept: NEUROLOGY | Facility: CLINIC | Age: 69
End: 2024-07-22
Payer: MEDICARE

## 2024-07-22 VITALS
SYSTOLIC BLOOD PRESSURE: 118 MMHG | DIASTOLIC BLOOD PRESSURE: 75 MMHG | OXYGEN SATURATION: 95 % | WEIGHT: 267 LBS | HEART RATE: 72 BPM | BODY MASS INDEX: 37.38 KG/M2 | HEIGHT: 71 IN

## 2024-07-22 DIAGNOSIS — R42 DIZZINESS AND GIDDINESS: ICD-10-CM

## 2024-07-22 DIAGNOSIS — M54.2 CERVICALGIA: ICD-10-CM

## 2024-07-22 DIAGNOSIS — G56.23 LESION OF ULNAR NERVE, BILATERAL UPPER LIMBS: ICD-10-CM

## 2024-07-22 PROCEDURE — G2211 COMPLEX E/M VISIT ADD ON: CPT

## 2024-07-22 PROCEDURE — 99215 OFFICE O/P EST HI 40 MIN: CPT

## 2024-07-24 PROBLEM — G56.23 CUBITAL TUNNEL SYNDROME, BILATERAL: Status: ACTIVE | Noted: 2024-07-24

## 2024-07-24 NOTE — DISCUSSION/SUMMARY
[FreeTextEntry1] : Mr. Morrison is a 68-year-old male with history of HTN, HLD, ALFONSO, migraine headaches, rectal cancer s/p surgery/chemotherapy, prostate cancer s/p radiation/chemotherapy, PAF s/p ILR who presents today for follow up evaluation of dizziness and new onset bilateral numbness/tingling of his fingers. Likely cervicogenic dizziness. MRA brain unremarkable without evidence of stenosis, occlusion of the vertebrobasilar system. CT c-spine revealed moderate multilevel cervical spondylosis with multilevel degenerative disc disease. Will obtain MRI c-spine wo contrast for further evaluation. Plan to continue PT for neck pain and vestibular exercises. He may continue OTC analgesics as needed for pain relief. Given the distribution of his numbness/tingling, it appears he has cubital tunnel syndrome. I recommend he trial a nighttime elbow brace to see if this provides relief of his symptoms. May consider EMG if no improvement seen or symptoms become more frequent/severe. Will follow up in 3 months or sooner if needed. All of the patient's questions and concerns were addressed.

## 2024-07-24 NOTE — HISTORY OF PRESENT ILLNESS
[FreeTextEntry1] : Mr. Morrison is a 68-year-old male with history of HTN, HLD, ALFONSO, migraine headaches, rectal cancer s/p surgery/chemotherapy, prostate cancer s/p radiation/chemotherapy, PAF s/p ILR who presents today for follow up evaluation of dizziness. MRA brain wo contrast unremarkable without evidence of stenosis/occlusion of the vertebrobasilar system. CT c-spine revealed moderate multilevel cervical spondylosis with multilevel degenerative disc disease. He reports good and bad days in regard to his dizziness. He reports limited ROM of his neck (up/down, side to side) and continued neck pain but has noticed some improvement with tizanidine and PT. At PT, he reports that they have been doing vestibular exercises as well. Overall, his dizziness is less severe than it was at onset, but he continues to notice "rocking" sensation after moving his head. When he sits still, he feels okay. When getting up and walking, he still feels "off" and unsteady on his feet. He notices this especially when turning around quickly. No falls. While he has history of migraine headaches, he reports none since his 20s.    He now reports waking up with numbness/tingling starting just above his wrist, radiating along the side of his hand to his pinky, ring, and part of his middle finger of both hands. This will last a few minutes in the morning and then go away. This has happened about 3-4 mornings since his last visit. No numbness/tingling originating from his neck down his arm. No other new concerns.

## 2024-07-24 NOTE — PHYSICAL EXAM
[FreeTextEntry1] : GENERAL PHYSICAL EXAM: GEN: no distress, normal affect  NEUROLOGICAL EXAM: Mental Status Orientation: alert and oriented to person, place, time, and situation Language: clear and fluent, intact comprehension and repetition. No dysarthria.   Cranial Nerves II: visual fields full to confrontation III, IV, VI: PERRL, EOMI V, VII: facial sensation and movement intact and symmetric VIII: hearing intact IX, X: uvula midline, soft palate elevates normally XI: BL shoulder shrug intact XII: tongue midline   Motor Shoulder abduction: 5 (R), 5 (L) UE flexion: 5 (R), 5 (L) UE extension: 5 (R), 5 (L) Hand : 5 (R), 5 (L) Hip flexion: 5 (R), 5 (L) Knee flexion: 5 (R), 5 (L) Knee extension: 5 (R), 5 (L) Dorsiflexion: 5 (R), 5 (L) Plantar flexion: 5 (R), 5 (L)   Tone and bulk are normal in upper and lower limbs No pronator drift   Sensation Intact to light touch in all 4 EXTs   Coordination Normal FTN bilaterally Able to perform rapid, alternating movements   Gait Wide based gait, some unsteadiness seen when changing directions quickly Negative Romberg but with significant sway  Elbow Flexion Test positive bilaterally

## 2024-07-25 ENCOUNTER — APPOINTMENT (OUTPATIENT)
Dept: ELECTROPHYSIOLOGY | Facility: CLINIC | Age: 69
End: 2024-07-25
Payer: MEDICARE

## 2024-07-25 ENCOUNTER — NON-APPOINTMENT (OUTPATIENT)
Age: 69
End: 2024-07-25

## 2024-07-25 VITALS
SYSTOLIC BLOOD PRESSURE: 104 MMHG | BODY MASS INDEX: 38.92 KG/M2 | WEIGHT: 278 LBS | OXYGEN SATURATION: 98 % | HEART RATE: 65 BPM | DIASTOLIC BLOOD PRESSURE: 70 MMHG | HEIGHT: 71 IN

## 2024-07-25 DIAGNOSIS — Z98.890 OTHER SPECIFIED POSTPROCEDURAL STATES: ICD-10-CM

## 2024-07-25 DIAGNOSIS — Z86.79 OTHER SPECIFIED POSTPROCEDURAL STATES: ICD-10-CM

## 2024-07-25 DIAGNOSIS — Z95.818 PRESENCE OF OTHER CARDIAC IMPLANTS AND GRAFTS: ICD-10-CM

## 2024-07-25 DIAGNOSIS — I48.0 PAROXYSMAL ATRIAL FIBRILLATION: ICD-10-CM

## 2024-07-25 PROCEDURE — 99214 OFFICE O/P EST MOD 30 MIN: CPT

## 2024-07-25 RX ORDER — LOSARTAN POTASSIUM 100 MG/1
100 TABLET, FILM COATED ORAL DAILY
Refills: 0 | Status: ACTIVE | COMMUNITY

## 2024-07-25 NOTE — HISTORY OF PRESENT ILLNESS
[FreeTextEntry1] : 69 y/o M with pmhx obesity, HTN, prostate and colorectal cancer s/p partial colon resection, and symptomatic paroxysmal atrial fibrillation despite prior treatment with Amiodarone and high dose BB s/p RF ablation 3/29/19. Subsequently presented with early recurrent symptomatic AF s/p DCCV, ILR implant and Amiodarone reinitiation (6/4/2019). Amiodarone discontinued 7/25/19.Had been doing well without recurrence of AF for over a year. On 8/24/2020, ILR showed one episode of PAF (6 hours) with two prolonged conversion pauses (one possible 20sec) during sleep. Occurred in the setting of severe sinus infection which resulted in significant discomfort, inability to use CPAP and sleep deprivation for several weeks. After stopping BB, patient had symptoms of "pounding in neck" where he felt his BP was elevated. He resumed Metoprolol 25mg daily.  Had no recurrent AF for nearly 2 years and remained off a/c. Recently noted to have recurrent PAF with RVR - total duration 5 hours on 6/2 - 6/3/22. Converted back to SR without subsequent recurrence. Was asymptomatic. At the time pt was [id number] 1. After d/w Dr. Shelton was advised to restart Xarelto, out of precaution, but never did.  During previous f/u 6/23/22 risks/benefits of AC discussed. Given CHADS2-VASc 1 and only one recurrence of AF in nearly 2 years aspirin therapy was recommended. Discussed considering initiation of AC if further AF events noted as he was approaching 67 years of age which would make his [id number] score 2.  During previous f/u 2/2023 ILR revealed one episode of SR with salvos of AT. Previously Stopped aspirin due to hemorrhoidal bleeding, then resumed.   He now reports for follow-up S/P MDT ILR explant of EOS device and Re-implantation of MDT ILR on 11/21/23 with Dr Shelton for continued arrhythmia/PAF monitoring.  Today, ILR has revealed no events since last follow up. He denies symptoms of arrhythmia recurrence but has been suffering from vertigo. ECG reveals SR with first degree HB.

## 2024-07-25 NOTE — ADDENDUM
[FreeTextEntry1] : I was present for the above evaluation and agree with above.  Pt doing very well following ablation, with no recent recurrent AF episodes. Will remain off anticoagulation for now with ILR monitoring. IF recurrent AF noted, will need to reconsider anticoagulation.

## 2024-07-25 NOTE — DISCUSSION/SUMMARY
[FreeTextEntry1] : 69 y/o M with pmhx obesity, HTN, prostate and colorectal cancer s/p partial colon resection, and symptomatic paroxysmal atrial fibrillation despite prior treatment with Amiodarone and high dose BB s/p RF ablation 3/29/19. Subsequently presented with early recurrent symptomatic AF s/p DCCV, ILR implant and Amiodarone reinitiation (6/4/2019). Amiodarone discontinued 7/25/19.Had been doing well without recurrence of AF for over a year. On 8/24/2020, ILR showed one episode of PAF (6 hours) with two prolonged conversion pauses (one possible 20sec) during sleep. Occurred in the setting of severe sinus infection which resulted in significant discomfort, inability to use CPAP and sleep deprivation for several weeks. After stopping BB, patient had symptoms of "pounding in neck" where he felt his BP was elevated. He resumed Metoprolol 25mg daily.  Had no recurrent AF for nearly 2 years and remained off a/c. Recently noted to have recurrent PAF with RVR - total duration 5 hours on 6/2 - 6/3/22. Converted back to SR without subsequent recurrence. Was asymptomatic. At the time pt was [id number] 1. After d/w Dr. Shelton was advised to restart Xarelto, out of precaution, but never did.  During previous f/u 6/23/22 risks/benefits of AC discussed. Given CHADS2-VASc 1 and only one recurrence of AF in nearly 2 years aspirin therapy was recommended. Discussed considering initiation of AC if further AF events noted as he was approaching 67 years of age which would make his [id number] score 2.  During previous f/u 2/2023 ILR revealed one episode of SR with salvos of AT. Previously Stopped aspirin due to hemorrhoidal bleeding, then resumed.   He now reports for follow-up S/P MDT ILR explant of EOS device and Re-implantation of MDT ILR on 11/21/23 with Dr Shelton for continued arrhythmia/PAF monitoring.  Today, ILR has revealed no events since last follow up. He denies symptoms of arrhythmia recurrence but has been suffering from vertigo. ECG reveals SR with first degree HB.   Recommendation:   -Mr. Morrison has done well following ablation for symptomatic PAF in 2019. Given his BRO5CQ5-GXEe 2 (HTN/Age) and only one true recurrence of AF in nearly 4 years (< 6 hours duration) he has remained off AC. We will continue ongoing monitoring for arrhythmia surveillance via ILR. Continue toprol 25mg daily.  -EP follow up annually or sooner PRN.  Christelle JACKSONC

## 2024-07-25 NOTE — HISTORY OF PRESENT ILLNESS
[FreeTextEntry1] : 69 y/o M with pmhx obesity, HTN, prostate and colorectal cancer s/p partial colon resection, and symptomatic paroxysmal atrial fibrillation despite prior treatment with Amiodarone and high dose BB s/p RF ablation 3/29/19. Subsequently presented with early recurrent symptomatic AF s/p DCCV, ILR implant and Amiodarone reinitiation (6/4/2019). Amiodarone discontinued 7/25/19.Had been doing well without recurrence of AF for over a year. On 8/24/2020, ILR showed one episode of PAF (6 hours) with two prolonged conversion pauses (one possible 20sec) during sleep. Occurred in the setting of severe sinus infection which resulted in significant discomfort, inability to use CPAP and sleep deprivation for several weeks. After stopping BB, patient had symptoms of "pounding in neck" where he felt his BP was elevated. He resumed Metoprolol 25mg daily.  Had no recurrent AF for nearly 2 years and remained off a/c. Recently noted to have recurrent PAF with RVR - total duration 5 hours on 6/2 - 6/3/22. Converted back to SR without subsequent recurrence. Was asymptomatic. At the time pt was [id number] 1. After d/w Dr. Shelton was advised to restart Xarelto, out of precaution, but never did.  During previous f/u 6/23/22 risks/benefits of AC discussed. Given CHADS2-VASc 1 and only one recurrence of AF in nearly 2 years aspirin therapy was recommended. Discussed considering initiation of AC if further AF events noted as he was approaching 67 years of age which would make his [id number] score 2.  During previous f/u 2/2023 ILR revealed one episode of SR with salvos of AT. Previously Stopped aspirin due to hemorrhoidal bleeding, then resumed.   He now reports for follow-up S/P MDT ILR explant of EOS device and Re-implantation of MDT ILR on 11/21/23 with Dr Shelton for continued arrhythmia/PAF monitoring.  Today, ILR has revealed no events since last follow up. He denies symptoms of arrhythmia recurrence but has been suffering from vertigo. ECG reveals SR with first degree HB.

## 2024-07-25 NOTE — DISCUSSION/SUMMARY
[FreeTextEntry1] : 67 y/o M with pmhx obesity, HTN, prostate and colorectal cancer s/p partial colon resection, and symptomatic paroxysmal atrial fibrillation despite prior treatment with Amiodarone and high dose BB s/p RF ablation 3/29/19. Subsequently presented with early recurrent symptomatic AF s/p DCCV, ILR implant and Amiodarone reinitiation (6/4/2019). Amiodarone discontinued 7/25/19.Had been doing well without recurrence of AF for over a year. On 8/24/2020, ILR showed one episode of PAF (6 hours) with two prolonged conversion pauses (one possible 20sec) during sleep. Occurred in the setting of severe sinus infection which resulted in significant discomfort, inability to use CPAP and sleep deprivation for several weeks. After stopping BB, patient had symptoms of "pounding in neck" where he felt his BP was elevated. He resumed Metoprolol 25mg daily.  Had no recurrent AF for nearly 2 years and remained off a/c. Recently noted to have recurrent PAF with RVR - total duration 5 hours on 6/2 - 6/3/22. Converted back to SR without subsequent recurrence. Was asymptomatic. At the time pt was [id number] 1. After d/w Dr. Shelton was advised to restart Xarelto, out of precaution, but never did.  During previous f/u 6/23/22 risks/benefits of AC discussed. Given CHADS2-VASc 1 and only one recurrence of AF in nearly 2 years aspirin therapy was recommended. Discussed considering initiation of AC if further AF events noted as he was approaching 67 years of age which would make his [id number] score 2.  During previous f/u 2/2023 ILR revealed one episode of SR with salvos of AT. Previously Stopped aspirin due to hemorrhoidal bleeding, then resumed.   He now reports for follow-up S/P MDT ILR explant of EOS device and Re-implantation of MDT ILR on 11/21/23 with Dr Shelton for continued arrhythmia/PAF monitoring.  Today, ILR has revealed no events since last follow up. He denies symptoms of arrhythmia recurrence but has been suffering from vertigo. ECG reveals SR with first degree HB.   Recommendation:   -Mr. Morrison has done well following ablation for symptomatic PAF in 2019. Given his [id number] 2 (HTN/Age) and only one true recurrence of AF in nearly 4 years (< 6 hours duration) he has remained off AC. We will continue ongoing monitoring for arrhythmia surveillance via ILR. Continue toprol 25mg daily.  -EP follow up annually or sooner PRN.  Christelle JACKSONC

## 2024-07-25 NOTE — REVIEW OF SYSTEMS
[Dizziness] : dizziness [Feeling Fatigued] : not feeling fatigued [SOB] : no shortness of breath [Dyspnea on exertion] : not dyspnea during exertion [Chest Discomfort] : no chest discomfort [Palpitations] : no palpitations [Syncope] : no syncope

## 2024-07-29 RX ORDER — ALPRAZOLAM 0.5 MG/1
0.5 TABLET ORAL
Qty: 2 | Refills: 0 | Status: ACTIVE | COMMUNITY
Start: 2024-07-29 | End: 1900-01-01

## 2024-08-07 ENCOUNTER — OUTPATIENT (OUTPATIENT)
Dept: OUTPATIENT SERVICES | Facility: HOSPITAL | Age: 69
LOS: 1 days | End: 2024-08-07

## 2024-08-07 ENCOUNTER — APPOINTMENT (OUTPATIENT)
Dept: MRI IMAGING | Facility: CLINIC | Age: 69
End: 2024-08-07

## 2024-08-07 DIAGNOSIS — Z96.7 PRESENCE OF OTHER BONE AND TENDON IMPLANTS: Chronic | ICD-10-CM

## 2024-08-07 DIAGNOSIS — Z86.018 PERSONAL HISTORY OF OTHER BENIGN NEOPLASM: Chronic | ICD-10-CM

## 2024-08-07 DIAGNOSIS — Z90.49 ACQUIRED ABSENCE OF OTHER SPECIFIED PARTS OF DIGESTIVE TRACT: Chronic | ICD-10-CM

## 2024-08-07 DIAGNOSIS — M54.2 CERVICALGIA: ICD-10-CM

## 2024-08-07 PROCEDURE — 72141 MRI NECK SPINE W/O DYE: CPT | Mod: 26

## 2024-08-25 ENCOUNTER — NON-APPOINTMENT (OUTPATIENT)
Age: 69
End: 2024-08-25

## 2024-08-26 ENCOUNTER — APPOINTMENT (OUTPATIENT)
Dept: ELECTROPHYSIOLOGY | Facility: CLINIC | Age: 69
End: 2024-08-26
Payer: MEDICARE

## 2024-08-26 PROCEDURE — 93298 REM INTERROG DEV EVAL SCRMS: CPT

## 2024-09-09 ENCOUNTER — APPOINTMENT (OUTPATIENT)
Dept: OTOLARYNGOLOGY | Facility: CLINIC | Age: 69
End: 2024-09-09

## 2024-09-23 ENCOUNTER — NON-APPOINTMENT (OUTPATIENT)
Age: 69
End: 2024-09-23

## 2024-09-29 ENCOUNTER — NON-APPOINTMENT (OUTPATIENT)
Age: 69
End: 2024-09-29

## 2024-09-30 ENCOUNTER — APPOINTMENT (OUTPATIENT)
Dept: ELECTROPHYSIOLOGY | Facility: CLINIC | Age: 69
End: 2024-09-30
Payer: MEDICARE

## 2024-09-30 PROCEDURE — 93298 REM INTERROG DEV EVAL SCRMS: CPT

## 2024-10-06 NOTE — DISCHARGE NOTE NURSING/CASE MANAGEMENT/SOCIAL WORK - NSDCDPATPORTLINK_GEN_ALL_CORE
37
You can access the [facility] Patient Portal, offered by [facility], by registering with the following website: [email or web address]

## 2024-10-21 ENCOUNTER — APPOINTMENT (OUTPATIENT)
Dept: INTERNAL MEDICINE | Facility: CLINIC | Age: 69
End: 2024-10-21
Payer: MEDICARE

## 2024-10-21 VITALS
HEIGHT: 71 IN | WEIGHT: 285 LBS | SYSTOLIC BLOOD PRESSURE: 130 MMHG | DIASTOLIC BLOOD PRESSURE: 70 MMHG | BODY MASS INDEX: 39.9 KG/M2

## 2024-10-21 DIAGNOSIS — R79.9 ABNORMAL FINDING OF BLOOD CHEMISTRY, UNSPECIFIED: ICD-10-CM

## 2024-10-21 DIAGNOSIS — E78.5 HYPERLIPIDEMIA, UNSPECIFIED: ICD-10-CM

## 2024-10-21 DIAGNOSIS — Z23 ENCOUNTER FOR IMMUNIZATION: ICD-10-CM

## 2024-10-21 DIAGNOSIS — I10 ESSENTIAL (PRIMARY) HYPERTENSION: ICD-10-CM

## 2024-10-21 DIAGNOSIS — I48.20 CHRONIC ATRIAL FIBRILLATION, UNSP: ICD-10-CM

## 2024-10-21 DIAGNOSIS — N40.0 BENIGN PROSTATIC HYPERPLASIA WITHOUT LOWER URINARY TRACT SYMPMS: ICD-10-CM

## 2024-10-21 DIAGNOSIS — Z85.038 PERSONAL HISTORY OF OTHER MALIGNANT NEOPLASM OF LARGE INTESTINE: ICD-10-CM

## 2024-10-21 DIAGNOSIS — E66.9 OBESITY, UNSPECIFIED: ICD-10-CM

## 2024-10-21 DIAGNOSIS — R53.83 OTHER FATIGUE: ICD-10-CM

## 2024-10-21 DIAGNOSIS — F41.9 ANXIETY DISORDER, UNSPECIFIED: ICD-10-CM

## 2024-10-21 DIAGNOSIS — Z00.00 ENCOUNTER FOR GENERAL ADULT MEDICAL EXAMINATION W/OUT ABNORMAL FINDINGS: ICD-10-CM

## 2024-10-21 PROCEDURE — 90662 IIV NO PRSV INCREASED AG IM: CPT

## 2024-10-21 PROCEDURE — 36415 COLL VENOUS BLD VENIPUNCTURE: CPT

## 2024-10-21 PROCEDURE — G0008: CPT

## 2024-10-21 PROCEDURE — G0439: CPT

## 2024-10-22 LAB
ALBUMIN SERPL ELPH-MCNC: 4.2 G/DL
ALP BLD-CCNC: 69 U/L
ALT SERPL-CCNC: 29 U/L
ANION GAP SERPL CALC-SCNC: 14 MMOL/L
AST SERPL-CCNC: 22 U/L
BASOPHILS # BLD AUTO: 0.03 K/UL
BASOPHILS NFR BLD AUTO: 0.5 %
BILIRUB SERPL-MCNC: 0.4 MG/DL
BUN SERPL-MCNC: 18 MG/DL
CALCIUM SERPL-MCNC: 9.7 MG/DL
CHLORIDE SERPL-SCNC: 106 MMOL/L
CHOLEST SERPL-MCNC: 142 MG/DL
CO2 SERPL-SCNC: 22 MMOL/L
CREAT SERPL-MCNC: 1.01 MG/DL
EGFR: 82 ML/MIN/1.73M2
EOSINOPHIL # BLD AUTO: 0.14 K/UL
EOSINOPHIL NFR BLD AUTO: 2.4 %
ESTIMATED AVERAGE GLUCOSE: 117 MG/DL
GLUCOSE SERPL-MCNC: 100 MG/DL
HBA1C MFR BLD HPLC: 5.7 %
HCT VFR BLD CALC: 46.2 %
HDLC SERPL-MCNC: 36 MG/DL
HGB BLD-MCNC: 14.8 G/DL
IMM GRANULOCYTES NFR BLD AUTO: 0.2 %
LDLC SERPL CALC-MCNC: 71 MG/DL
LYMPHOCYTES # BLD AUTO: 1 K/UL
LYMPHOCYTES NFR BLD AUTO: 16.9 %
MAN DIFF?: NORMAL
MCHC RBC-ENTMCNC: 30.6 PG
MCHC RBC-ENTMCNC: 32 GM/DL
MCV RBC AUTO: 95.5 FL
MONOCYTES # BLD AUTO: 0.52 K/UL
MONOCYTES NFR BLD AUTO: 8.8 %
NEUTROPHILS # BLD AUTO: 4.2 K/UL
NEUTROPHILS NFR BLD AUTO: 71.2 %
NONHDLC SERPL-MCNC: 106 MG/DL
PLATELET # BLD AUTO: 189 K/UL
POTASSIUM SERPL-SCNC: 4.4 MMOL/L
PROT SERPL-MCNC: 6.9 G/DL
PSA SERPL-MCNC: 0.29 NG/ML
RBC # BLD: 4.84 M/UL
RBC # FLD: 13.2 %
SODIUM SERPL-SCNC: 142 MMOL/L
TRIGL SERPL-MCNC: 208 MG/DL
TSH SERPL-ACNC: 0.47 UIU/ML
WBC # FLD AUTO: 5.9 K/UL

## 2024-10-23 ENCOUNTER — APPOINTMENT (OUTPATIENT)
Dept: NEUROLOGY | Facility: CLINIC | Age: 69
End: 2024-10-23

## 2024-11-04 ENCOUNTER — APPOINTMENT (OUTPATIENT)
Dept: ELECTROPHYSIOLOGY | Facility: CLINIC | Age: 69
End: 2024-11-04
Payer: MEDICARE

## 2024-11-04 ENCOUNTER — NON-APPOINTMENT (OUTPATIENT)
Age: 69
End: 2024-11-04

## 2024-11-04 PROCEDURE — 93298 REM INTERROG DEV EVAL SCRMS: CPT

## 2024-12-02 ENCOUNTER — RX RENEWAL (OUTPATIENT)
Age: 69
End: 2024-12-02

## 2024-12-09 ENCOUNTER — NON-APPOINTMENT (OUTPATIENT)
Age: 69
End: 2024-12-09

## 2024-12-09 ENCOUNTER — APPOINTMENT (OUTPATIENT)
Dept: ELECTROPHYSIOLOGY | Facility: CLINIC | Age: 69
End: 2024-12-09
Payer: MEDICARE

## 2024-12-09 PROCEDURE — 93298 REM INTERROG DEV EVAL SCRMS: CPT

## 2025-01-13 ENCOUNTER — APPOINTMENT (OUTPATIENT)
Dept: ELECTROPHYSIOLOGY | Facility: CLINIC | Age: 70
End: 2025-01-13
Payer: MEDICARE

## 2025-01-13 ENCOUNTER — NON-APPOINTMENT (OUTPATIENT)
Age: 70
End: 2025-01-13

## 2025-01-13 PROCEDURE — 93298 REM INTERROG DEV EVAL SCRMS: CPT

## 2025-01-23 ENCOUNTER — RX RENEWAL (OUTPATIENT)
Age: 70
End: 2025-01-23

## 2025-02-06 ENCOUNTER — RX RENEWAL (OUTPATIENT)
Age: 70
End: 2025-02-06

## 2025-02-18 ENCOUNTER — NON-APPOINTMENT (OUTPATIENT)
Age: 70
End: 2025-02-18

## 2025-02-18 ENCOUNTER — APPOINTMENT (OUTPATIENT)
Dept: ELECTROPHYSIOLOGY | Facility: CLINIC | Age: 70
End: 2025-02-18
Payer: MEDICARE

## 2025-02-18 PROCEDURE — 93298 REM INTERROG DEV EVAL SCRMS: CPT

## 2025-03-20 ENCOUNTER — APPOINTMENT (OUTPATIENT)
Dept: UROLOGY | Facility: CLINIC | Age: 70
End: 2025-03-20

## 2025-03-20 VITALS
SYSTOLIC BLOOD PRESSURE: 148 MMHG | RESPIRATION RATE: 16 BRPM | OXYGEN SATURATION: 97 % | DIASTOLIC BLOOD PRESSURE: 88 MMHG | HEIGHT: 71 IN | HEART RATE: 63 BPM | TEMPERATURE: 97.9 F | WEIGHT: 285 LBS | BODY MASS INDEX: 39.9 KG/M2

## 2025-03-20 DIAGNOSIS — Z85.46 PERSONAL HISTORY OF MALIGNANT NEOPLASM OF PROSTATE: ICD-10-CM

## 2025-03-20 DIAGNOSIS — N52.35 ERECTILE DYSFUNCTION FOLLOWING RADIATION THERAPY: ICD-10-CM

## 2025-03-20 LAB
PSA SERPL-MCNC: 0.37 NG/ML
TESTOST SERPL-MCNC: 414 NG/DL

## 2025-03-20 PROCEDURE — G2211 COMPLEX E/M VISIT ADD ON: CPT

## 2025-03-20 PROCEDURE — 99212 OFFICE O/P EST SF 10 MIN: CPT

## 2025-03-25 ENCOUNTER — APPOINTMENT (OUTPATIENT)
Dept: ELECTROPHYSIOLOGY | Facility: CLINIC | Age: 70
End: 2025-03-25
Payer: MEDICARE

## 2025-03-25 ENCOUNTER — APPOINTMENT (OUTPATIENT)
Dept: ELECTROPHYSIOLOGY | Facility: CLINIC | Age: 70
End: 2025-03-25

## 2025-03-25 ENCOUNTER — NON-APPOINTMENT (OUTPATIENT)
Age: 70
End: 2025-03-25

## 2025-03-25 PROCEDURE — 93298 REM INTERROG DEV EVAL SCRMS: CPT

## 2025-04-21 ENCOUNTER — RX RENEWAL (OUTPATIENT)
Age: 70
End: 2025-04-21

## 2025-04-29 ENCOUNTER — APPOINTMENT (OUTPATIENT)
Dept: ELECTROPHYSIOLOGY | Facility: CLINIC | Age: 70
End: 2025-04-29
Payer: MEDICARE

## 2025-04-29 ENCOUNTER — NON-APPOINTMENT (OUTPATIENT)
Age: 70
End: 2025-04-29

## 2025-04-29 PROCEDURE — 93298 REM INTERROG DEV EVAL SCRMS: CPT

## 2025-06-03 ENCOUNTER — APPOINTMENT (OUTPATIENT)
Dept: ELECTROPHYSIOLOGY | Facility: CLINIC | Age: 70
End: 2025-06-03
Payer: MEDICARE

## 2025-06-03 ENCOUNTER — NON-APPOINTMENT (OUTPATIENT)
Age: 70
End: 2025-06-03

## 2025-06-03 PROCEDURE — 93298 REM INTERROG DEV EVAL SCRMS: CPT

## 2025-06-10 ENCOUNTER — RX RENEWAL (OUTPATIENT)
Age: 70
End: 2025-06-10

## 2025-06-30 NOTE — H&P PST ADULT - NSICDXFAMILYHX_GEN_ALL_CORE_FT
Addended by: RICHARD NEFF on: 6/30/2025 02:23 PM     Modules accepted: Orders    
FAMILY HISTORY:  Family history of CABG  Family history of cervical cancer  Family history of coronary artery disease  Family history of hypertension    Sibling  Still living? Yes, Estimated age: Age Unknown  Family history of prostate cancer, Age at diagnosis: 51-60

## 2025-07-08 ENCOUNTER — NON-APPOINTMENT (OUTPATIENT)
Age: 70
End: 2025-07-08

## 2025-07-08 ENCOUNTER — APPOINTMENT (OUTPATIENT)
Dept: ELECTROPHYSIOLOGY | Facility: CLINIC | Age: 70
End: 2025-07-08
Payer: MEDICARE

## 2025-07-08 PROCEDURE — 93298 REM INTERROG DEV EVAL SCRMS: CPT

## 2025-08-12 ENCOUNTER — NON-APPOINTMENT (OUTPATIENT)
Age: 70
End: 2025-08-12

## 2025-08-12 ENCOUNTER — APPOINTMENT (OUTPATIENT)
Dept: ELECTROPHYSIOLOGY | Facility: CLINIC | Age: 70
End: 2025-08-12
Payer: MEDICARE

## 2025-08-12 PROCEDURE — 93298 REM INTERROG DEV EVAL SCRMS: CPT

## 2025-09-16 ENCOUNTER — NON-APPOINTMENT (OUTPATIENT)
Age: 70
End: 2025-09-16

## 2025-09-16 ENCOUNTER — APPOINTMENT (OUTPATIENT)
Dept: ELECTROPHYSIOLOGY | Facility: CLINIC | Age: 70
End: 2025-09-16
Payer: MEDICARE

## 2025-09-16 PROCEDURE — 93298 REM INTERROG DEV EVAL SCRMS: CPT
